# Patient Record
Sex: FEMALE | Race: WHITE | NOT HISPANIC OR LATINO | Employment: OTHER | URBAN - METROPOLITAN AREA
[De-identification: names, ages, dates, MRNs, and addresses within clinical notes are randomized per-mention and may not be internally consistent; named-entity substitution may affect disease eponyms.]

---

## 2018-07-19 ENCOUNTER — OFFICE VISIT (OUTPATIENT)
Dept: URGENT CARE | Facility: CLINIC | Age: 83
End: 2018-07-19
Payer: COMMERCIAL

## 2018-07-19 VITALS
HEART RATE: 78 BPM | DIASTOLIC BLOOD PRESSURE: 66 MMHG | HEIGHT: 63 IN | SYSTOLIC BLOOD PRESSURE: 126 MMHG | TEMPERATURE: 99 F | BODY MASS INDEX: 29.59 KG/M2 | WEIGHT: 167 LBS | OXYGEN SATURATION: 95 % | RESPIRATION RATE: 16 BRPM

## 2018-07-19 DIAGNOSIS — R42 DIZZINESS AND GIDDINESS: Primary | ICD-10-CM

## 2018-07-19 DIAGNOSIS — R53.1 WEAKNESS: ICD-10-CM

## 2018-07-19 PROCEDURE — 99203 OFFICE O/P NEW LOW 30 MIN: CPT | Performed by: PHYSICIAN ASSISTANT

## 2018-07-19 PROCEDURE — G0382 LEV 3 HOSP TYPE B ED VISIT: HCPCS | Performed by: PHYSICIAN ASSISTANT

## 2018-07-19 PROCEDURE — S9088 SERVICES PROVIDED IN URGENT: HCPCS | Performed by: PHYSICIAN ASSISTANT

## 2018-07-19 RX ORDER — ATENOLOL 50 MG/1
50 TABLET ORAL DAILY
COMMUNITY
End: 2018-11-21 | Stop reason: SDUPTHER

## 2018-07-19 RX ORDER — DOXYCYCLINE HYCLATE 50 MG/1
324 CAPSULE, GELATIN COATED ORAL
COMMUNITY

## 2018-07-19 RX ORDER — ATORVASTATIN CALCIUM 10 MG/1
10 TABLET, FILM COATED ORAL DAILY
COMMUNITY
End: 2018-11-21 | Stop reason: SDUPTHER

## 2018-07-19 NOTE — PROGRESS NOTES
[address] [employer] [facility]        NAME: Michelle Bearden is a 80 y o  female  : 1932    MRN: [id number]  DATE: 2018  TIME: 12:37 PM    Assessment and Plan   Dizziness and giddiness [R42]  1  Dizziness and giddiness     2  Weakness           Patient Instructions     Today you were evaluated for dizziness and feeling weak  I feel that you need more of a work-up than what can be provided here  Therefore I recommend that you go to the ER for further evaluation  Patient declines ambulance and prefers to go via private vehicle with her son  Patient signed AMA form  Chief Complaint     Chief Complaint   Patient presents with    Rapid Heart Rate     x this a m  History of Present Illness       Patient is an 14-year-old female the medical history of hypertension and hyperlipidemia who presents today for evaluation of dizziness and feeling generally weak  Patient states that around 11 a m  she started to feel dizzy and then sat down at the table and ate something  Patient states that after that, she started to feel her heart race  Patient states that she went back into bed and lay down and the sensation lasted for about an hour  She states that she also felt a "fullness" sensation near her neck which has now subsided  Patient states that she is feeling slightly better however she still feels generally weak and slightly dizzy  Patient denies having any chest pain or shortness of breath during this event  Review of Systems   Review of Systems   Constitutional: Negative for chills and fever  Respiratory: Negative for shortness of breath  Cardiovascular: Positive for palpitations  Negative for chest pain  Gastrointestinal: Negative for abdominal pain, nausea and vomiting  Musculoskeletal: Negative for back pain  Neurological: Positive for dizziness and weakness  Negative for headaches           Current Medications       Current Outpatient Prescriptions:     atenolol (TENORMIN) 50 mg tablet, Take 50 mg by mouth daily, Disp: , Rfl:     atorvastatin (LIPITOR) 10 mg tablet, Take 10 mg by mouth daily, Disp: , Rfl:     bisacodyl (FLEET) 10 MG/30ML ENEM, Insert 10 mg into the rectum once, Disp: , Rfl:     calcium carbonate-vitamin D (OSCAL-D) 500 mg-200 units per tablet, Take 1 tablet by mouth 2 (two) times a day with meals, Disp: , Rfl:     ferrous gluconate (FERGON) 324 mg tablet, Take 324 mg by mouth daily with breakfast, Disp: , Rfl:     Current Allergies     Allergies as of 07/19/2018    (No Known Allergies)            The following portions of the patient's history were reviewed and updated as appropriate: allergies, current medications, past family history, past medical history, past social history, past surgical history and problem list      Past Medical History:   Diagnosis Date    Hyperlipidemia     Hypertension        Past Surgical History:   Procedure Laterality Date    APPENDECTOMY      CARPAL TUNNEL RELEASE Bilateral     CHOLECYSTECTOMY      COLON SURGERY      HYSTERECTOMY         Family History   Problem Relation Age of Onset    Heart disease Mother     Cancer Father          Medications have been verified  Objective   /66 (BP Location: Right arm, Patient Position: Sitting, Cuff Size: Standard)   Pulse 78   Temp 99 °F (37 2 °C) (Tympanic)   Resp 16   Ht 5' 3" (1 6 m)   Wt 75 8 kg (167 lb)   SpO2 95%   BMI 29 58 kg/m²        Physical Exam     Physical Exam   Constitutional: She is oriented to person, place, and time  She appears well-developed and well-nourished  No distress  HENT:   Mouth/Throat: Oropharynx is clear and moist    Eyes: Conjunctivae and EOM are normal  Pupils are equal, round, and reactive to light  Neck: Normal range of motion  Neck supple  Cardiovascular: Normal rate, regular rhythm and normal heart sounds  Pulmonary/Chest: Effort normal and breath sounds normal  She has no wheezes  She has no rales     Neurological: She is alert and oriented to person, place, and time  She has normal strength  No cranial nerve deficit or sensory deficit  Coordination and gait normal    Skin: Skin is warm and dry  Psychiatric: She has a normal mood and affect  Nursing note and vitals reviewed

## 2018-07-19 NOTE — PATIENT INSTRUCTIONS
Today you were evaluated for dizziness and feeling weak  I feel that you need more of a work-up than what can be provided here  Therefore I recommend that you go to the ER for further evaluation  Patient declines ambulance and prefers to go via private vehicle with her son

## 2018-07-19 NOTE — PROGRESS NOTES
Pt  States that she could not get her balance this a m , ate something then laied down  Pt  States that her heart started to race and pound  Pt  States that she was afraid to get up for fear of falling

## 2018-07-20 ENCOUNTER — HOSPITAL ENCOUNTER (INPATIENT)
Facility: HOSPITAL | Age: 83
LOS: 3 days | Discharge: HOME | DRG: 309 | End: 2018-07-24
Attending: EMERGENCY MEDICINE | Admitting: INTERNAL MEDICINE
Payer: COMMERCIAL

## 2018-07-20 ENCOUNTER — APPOINTMENT (EMERGENCY)
Dept: RADIOLOGY | Facility: HOSPITAL | Age: 83
DRG: 309 | End: 2018-07-20
Payer: COMMERCIAL

## 2018-07-20 DIAGNOSIS — I48.0 PAROXYSMAL ATRIAL FIBRILLATION (HCC): ICD-10-CM

## 2018-07-20 DIAGNOSIS — R77.8 ELEVATED TROPONIN: ICD-10-CM

## 2018-07-20 DIAGNOSIS — I21.4 NSTEMI (NON-ST ELEVATED MYOCARDIAL INFARCTION) (HCC): Primary | ICD-10-CM

## 2018-07-20 LAB
ALBUMIN SERPL BCP-MCNC: 3.5 G/DL (ref 3.5–5)
ALP SERPL-CCNC: 95 U/L (ref 46–116)
ALT SERPL W P-5'-P-CCNC: 40 U/L (ref 12–78)
ANION GAP SERPL CALCULATED.3IONS-SCNC: 8 MMOL/L (ref 4–13)
APTT PPP: 28 SECONDS (ref 24–36)
AST SERPL W P-5'-P-CCNC: 38 U/L (ref 5–45)
BASOPHILS # BLD AUTO: 0.07 THOUSANDS/ΜL (ref 0–0.1)
BASOPHILS NFR BLD AUTO: 1 % (ref 0–1)
BILIRUB SERPL-MCNC: 0.3 MG/DL (ref 0.2–1)
BUN SERPL-MCNC: 21 MG/DL (ref 5–25)
CALCIUM SERPL-MCNC: 9.6 MG/DL (ref 8.3–10.1)
CHLORIDE SERPL-SCNC: 101 MMOL/L (ref 100–108)
CO2 SERPL-SCNC: 32 MMOL/L (ref 21–32)
CREAT SERPL-MCNC: 1.81 MG/DL (ref 0.6–1.3)
EOSINOPHIL # BLD AUTO: 0.2 THOUSAND/ΜL (ref 0–0.61)
EOSINOPHIL NFR BLD AUTO: 2 % (ref 0–6)
ERYTHROCYTE [DISTWIDTH] IN BLOOD BY AUTOMATED COUNT: 12.7 % (ref 11.6–15.1)
GFR SERPL CREATININE-BSD FRML MDRD: 25 ML/MIN/1.73SQ M
GLUCOSE SERPL-MCNC: 173 MG/DL (ref 65–140)
GLUCOSE SERPL-MCNC: 203 MG/DL (ref 65–140)
HCT VFR BLD AUTO: 48.6 % (ref 34.8–46.1)
HGB BLD-MCNC: 15.5 G/DL (ref 11.5–15.4)
IMM GRANULOCYTES # BLD AUTO: 0.06 THOUSAND/UL (ref 0–0.2)
IMM GRANULOCYTES NFR BLD AUTO: 1 % (ref 0–2)
INR PPP: 1 (ref 0.86–1.17)
LYMPHOCYTES # BLD AUTO: 2.47 THOUSANDS/ΜL (ref 0.6–4.47)
LYMPHOCYTES NFR BLD AUTO: 24 % (ref 14–44)
MAGNESIUM SERPL-MCNC: 2 MG/DL (ref 1.6–2.6)
MCH RBC QN AUTO: 30.6 PG (ref 26.8–34.3)
MCHC RBC AUTO-ENTMCNC: 31.9 G/DL (ref 31.4–37.4)
MCV RBC AUTO: 96 FL (ref 82–98)
MONOCYTES # BLD AUTO: 0.69 THOUSAND/ΜL (ref 0.17–1.22)
MONOCYTES NFR BLD AUTO: 7 % (ref 4–12)
NEUTROPHILS # BLD AUTO: 6.87 THOUSANDS/ΜL (ref 1.85–7.62)
NEUTS SEG NFR BLD AUTO: 65 % (ref 43–75)
NRBC BLD AUTO-RTO: 0 /100 WBCS
PLATELET # BLD AUTO: 272 THOUSANDS/UL (ref 149–390)
PMV BLD AUTO: 10.1 FL (ref 8.9–12.7)
POTASSIUM SERPL-SCNC: 3.8 MMOL/L (ref 3.5–5.3)
PROT SERPL-MCNC: 8.2 G/DL (ref 6.4–8.2)
PROTHROMBIN TIME: 13.1 SECONDS (ref 11.8–14.2)
RBC # BLD AUTO: 5.07 MILLION/UL (ref 3.81–5.12)
SODIUM SERPL-SCNC: 141 MMOL/L (ref 136–145)
TROPONIN I SERPL-MCNC: 0.11 NG/ML
TSH SERPL DL<=0.05 MIU/L-ACNC: 3.87 UIU/ML (ref 0.36–3.74)
WBC # BLD AUTO: 10.36 THOUSAND/UL (ref 4.31–10.16)

## 2018-07-20 PROCEDURE — 93005 ELECTROCARDIOGRAM TRACING: CPT

## 2018-07-20 PROCEDURE — 84484 ASSAY OF TROPONIN QUANT: CPT | Performed by: EMERGENCY MEDICINE

## 2018-07-20 PROCEDURE — 36415 COLL VENOUS BLD VENIPUNCTURE: CPT | Performed by: EMERGENCY MEDICINE

## 2018-07-20 PROCEDURE — 83735 ASSAY OF MAGNESIUM: CPT | Performed by: EMERGENCY MEDICINE

## 2018-07-20 PROCEDURE — 85610 PROTHROMBIN TIME: CPT | Performed by: EMERGENCY MEDICINE

## 2018-07-20 PROCEDURE — 96365 THER/PROPH/DIAG IV INF INIT: CPT

## 2018-07-20 PROCEDURE — 84443 ASSAY THYROID STIM HORMONE: CPT | Performed by: EMERGENCY MEDICINE

## 2018-07-20 PROCEDURE — 71045 X-RAY EXAM CHEST 1 VIEW: CPT

## 2018-07-20 PROCEDURE — 80053 COMPREHEN METABOLIC PANEL: CPT | Performed by: EMERGENCY MEDICINE

## 2018-07-20 PROCEDURE — 82948 REAGENT STRIP/BLOOD GLUCOSE: CPT

## 2018-07-20 PROCEDURE — 85730 THROMBOPLASTIN TIME PARTIAL: CPT | Performed by: EMERGENCY MEDICINE

## 2018-07-20 PROCEDURE — 85025 COMPLETE CBC W/AUTO DIFF WBC: CPT | Performed by: EMERGENCY MEDICINE

## 2018-07-20 PROCEDURE — 84439 ASSAY OF FREE THYROXINE: CPT | Performed by: EMERGENCY MEDICINE

## 2018-07-20 PROCEDURE — 96375 TX/PRO/DX INJ NEW DRUG ADDON: CPT

## 2018-07-20 RX ORDER — HEPARIN SODIUM 1000 [USP'U]/ML
2000 INJECTION, SOLUTION INTRAVENOUS; SUBCUTANEOUS AS NEEDED
Status: DISCONTINUED | OUTPATIENT
Start: 2018-07-20 | End: 2018-07-24

## 2018-07-20 RX ORDER — HEPARIN SODIUM 1000 [USP'U]/ML
4000 INJECTION, SOLUTION INTRAVENOUS; SUBCUTANEOUS AS NEEDED
Status: DISCONTINUED | OUTPATIENT
Start: 2018-07-20 | End: 2018-07-24

## 2018-07-20 RX ORDER — HEPARIN SODIUM 10000 [USP'U]/100ML
3-20 INJECTION, SOLUTION INTRAVENOUS
Status: DISCONTINUED | OUTPATIENT
Start: 2018-07-20 | End: 2018-07-24

## 2018-07-20 RX ORDER — DILTIAZEM HYDROCHLORIDE 5 MG/ML
5 INJECTION INTRAVENOUS ONCE
Status: COMPLETED | OUTPATIENT
Start: 2018-07-20 | End: 2018-07-20

## 2018-07-20 RX ORDER — HEPARIN SODIUM 1000 [USP'U]/ML
4000 INJECTION, SOLUTION INTRAVENOUS; SUBCUTANEOUS ONCE
Status: COMPLETED | OUTPATIENT
Start: 2018-07-20 | End: 2018-07-20

## 2018-07-20 RX ADMIN — HEPARIN SODIUM 4000 UNITS: 1000 INJECTION, SOLUTION INTRAVENOUS; SUBCUTANEOUS at 23:49

## 2018-07-20 RX ADMIN — HEPARIN SODIUM AND DEXTROSE 12 UNITS/KG/HR: 10000; 5 INJECTION INTRAVENOUS at 23:50

## 2018-07-20 RX ADMIN — DILTIAZEM HYDROCHLORIDE 5 MG: 5 INJECTION INTRAVENOUS at 23:20

## 2018-07-21 PROBLEM — I21.4 NSTEMI (NON-ST ELEVATED MYOCARDIAL INFARCTION) (HCC): Status: ACTIVE | Noted: 2018-07-21

## 2018-07-21 PROBLEM — I48.91 ATRIAL FIBRILLATION (HCC): Status: ACTIVE | Noted: 2018-07-21

## 2018-07-21 PROBLEM — I10 ESSENTIAL HYPERTENSION: Chronic | Status: ACTIVE | Noted: 2018-07-21

## 2018-07-21 LAB
ANION GAP SERPL CALCULATED.3IONS-SCNC: 8 MMOL/L (ref 4–13)
APTT PPP: 63 SECONDS (ref 24–36)
APTT PPP: 77 SECONDS (ref 24–36)
BACTERIA UR QL AUTO: ABNORMAL /HPF
BASOPHILS # BLD AUTO: 0.05 THOUSANDS/ΜL (ref 0–0.1)
BASOPHILS NFR BLD AUTO: 1 % (ref 0–1)
BILIRUB UR QL STRIP: NEGATIVE
BUN SERPL-MCNC: 19 MG/DL (ref 5–25)
CALCIUM SERPL-MCNC: 9 MG/DL (ref 8.3–10.1)
CHLORIDE SERPL-SCNC: 106 MMOL/L (ref 100–108)
CHOLEST SERPL-MCNC: 172 MG/DL (ref 50–200)
CLARITY UR: CLEAR
CO2 SERPL-SCNC: 28 MMOL/L (ref 21–32)
COLOR UR: YELLOW
CREAT SERPL-MCNC: 1.4 MG/DL (ref 0.6–1.3)
EOSINOPHIL # BLD AUTO: 0.14 THOUSAND/ΜL (ref 0–0.61)
EOSINOPHIL NFR BLD AUTO: 2 % (ref 0–6)
ERYTHROCYTE [DISTWIDTH] IN BLOOD BY AUTOMATED COUNT: 12.6 % (ref 11.6–15.1)
GFR SERPL CREATININE-BSD FRML MDRD: 34 ML/MIN/1.73SQ M
GLUCOSE SERPL-MCNC: 140 MG/DL (ref 65–140)
GLUCOSE UR STRIP-MCNC: NEGATIVE MG/DL
HCT VFR BLD AUTO: 41.7 % (ref 34.8–46.1)
HDLC SERPL-MCNC: 48 MG/DL (ref 40–60)
HGB BLD-MCNC: 13.4 G/DL (ref 11.5–15.4)
HGB UR QL STRIP.AUTO: ABNORMAL
IMM GRANULOCYTES # BLD AUTO: 0.04 THOUSAND/UL (ref 0–0.2)
IMM GRANULOCYTES NFR BLD AUTO: 1 % (ref 0–2)
KETONES UR STRIP-MCNC: NEGATIVE MG/DL
LDLC SERPL CALC-MCNC: 99 MG/DL (ref 0–100)
LEUKOCYTE ESTERASE UR QL STRIP: ABNORMAL
LYMPHOCYTES # BLD AUTO: 1.81 THOUSANDS/ΜL (ref 0.6–4.47)
LYMPHOCYTES NFR BLD AUTO: 21 % (ref 14–44)
MAGNESIUM SERPL-MCNC: 2.1 MG/DL (ref 1.6–2.6)
MCH RBC QN AUTO: 30.5 PG (ref 26.8–34.3)
MCHC RBC AUTO-ENTMCNC: 32.1 G/DL (ref 31.4–37.4)
MCV RBC AUTO: 95 FL (ref 82–98)
MONOCYTES # BLD AUTO: 0.56 THOUSAND/ΜL (ref 0.17–1.22)
MONOCYTES NFR BLD AUTO: 7 % (ref 4–12)
NEUTROPHILS # BLD AUTO: 5.93 THOUSANDS/ΜL (ref 1.85–7.62)
NEUTS SEG NFR BLD AUTO: 68 % (ref 43–75)
NITRITE UR QL STRIP: NEGATIVE
NON-SQ EPI CELLS URNS QL MICRO: ABNORMAL /HPF
NRBC BLD AUTO-RTO: 0 /100 WBCS
PH UR STRIP.AUTO: 7 [PH] (ref 4.5–8)
PLATELET # BLD AUTO: 201 THOUSANDS/UL (ref 149–390)
PMV BLD AUTO: 10 FL (ref 8.9–12.7)
POTASSIUM SERPL-SCNC: 3.8 MMOL/L (ref 3.5–5.3)
PROT UR STRIP-MCNC: NEGATIVE MG/DL
RBC # BLD AUTO: 4.4 MILLION/UL (ref 3.81–5.12)
RBC #/AREA URNS AUTO: ABNORMAL /HPF
SODIUM SERPL-SCNC: 142 MMOL/L (ref 136–145)
SP GR UR STRIP.AUTO: 1.01 (ref 1–1.03)
T4 FREE SERPL-MCNC: 1 NG/DL (ref 0.76–1.46)
TRIGL SERPL-MCNC: 126 MG/DL
TROPONIN I SERPL-MCNC: 0.1 NG/ML
TROPONIN I SERPL-MCNC: 0.11 NG/ML
TROPONIN I SERPL-MCNC: 0.11 NG/ML
UROBILINOGEN UR QL STRIP.AUTO: 0.2 E.U./DL
WBC # BLD AUTO: 8.53 THOUSAND/UL (ref 4.31–10.16)
WBC #/AREA URNS AUTO: ABNORMAL /HPF

## 2018-07-21 PROCEDURE — 84484 ASSAY OF TROPONIN QUANT: CPT | Performed by: PHYSICIAN ASSISTANT

## 2018-07-21 PROCEDURE — 93005 ELECTROCARDIOGRAM TRACING: CPT

## 2018-07-21 PROCEDURE — 85025 COMPLETE CBC W/AUTO DIFF WBC: CPT | Performed by: PHYSICIAN ASSISTANT

## 2018-07-21 PROCEDURE — 99285 EMERGENCY DEPT VISIT HI MDM: CPT

## 2018-07-21 PROCEDURE — 81001 URINALYSIS AUTO W/SCOPE: CPT | Performed by: EMERGENCY MEDICINE

## 2018-07-21 PROCEDURE — 85730 THROMBOPLASTIN TIME PARTIAL: CPT | Performed by: INTERNAL MEDICINE

## 2018-07-21 PROCEDURE — 99223 1ST HOSP IP/OBS HIGH 75: CPT | Performed by: INTERNAL MEDICINE

## 2018-07-21 PROCEDURE — 80061 LIPID PANEL: CPT | Performed by: PHYSICIAN ASSISTANT

## 2018-07-21 PROCEDURE — 99222 1ST HOSP IP/OBS MODERATE 55: CPT | Performed by: INTERNAL MEDICINE

## 2018-07-21 PROCEDURE — 83735 ASSAY OF MAGNESIUM: CPT | Performed by: PHYSICIAN ASSISTANT

## 2018-07-21 PROCEDURE — 84439 ASSAY OF FREE THYROXINE: CPT | Performed by: INTERNAL MEDICINE

## 2018-07-21 PROCEDURE — 96366 THER/PROPH/DIAG IV INF ADDON: CPT

## 2018-07-21 PROCEDURE — 80048 BASIC METABOLIC PNL TOTAL CA: CPT | Performed by: PHYSICIAN ASSISTANT

## 2018-07-21 RX ORDER — ATENOLOL 50 MG/1
50 TABLET ORAL DAILY
Status: DISCONTINUED | OUTPATIENT
Start: 2018-07-21 | End: 2018-07-24 | Stop reason: HOSPADM

## 2018-07-21 RX ORDER — ASPIRIN 81 MG/1
81 TABLET, CHEWABLE ORAL DAILY
Status: DISCONTINUED | OUTPATIENT
Start: 2018-07-22 | End: 2018-07-24

## 2018-07-21 RX ORDER — ACETAMINOPHEN 325 MG/1
650 TABLET ORAL EVERY 6 HOURS PRN
Status: DISCONTINUED | OUTPATIENT
Start: 2018-07-21 | End: 2018-07-24 | Stop reason: HOSPADM

## 2018-07-21 RX ORDER — ATORVASTATIN CALCIUM 10 MG/1
10 TABLET, FILM COATED ORAL DAILY
Status: DISCONTINUED | OUTPATIENT
Start: 2018-07-21 | End: 2018-07-24 | Stop reason: HOSPADM

## 2018-07-21 RX ORDER — ONDANSETRON 2 MG/ML
4 INJECTION INTRAMUSCULAR; INTRAVENOUS EVERY 6 HOURS PRN
Status: DISCONTINUED | OUTPATIENT
Start: 2018-07-21 | End: 2018-07-24 | Stop reason: HOSPADM

## 2018-07-21 RX ADMIN — ATORVASTATIN CALCIUM 10 MG: 10 TABLET, FILM COATED ORAL at 10:52

## 2018-07-21 RX ADMIN — ONDANSETRON 4 MG: 2 INJECTION INTRAMUSCULAR; INTRAVENOUS at 03:36

## 2018-07-21 RX ADMIN — ATENOLOL 50 MG: 50 TABLET ORAL at 10:52

## 2018-07-21 RX ADMIN — ONDANSETRON 4 MG: 2 INJECTION INTRAMUSCULAR; INTRAVENOUS at 16:08

## 2018-07-21 NOTE — CONSULTS
Consultation - Cardiology  Nakia Ely 80 y o  female MRN: [id number]  Unit/Bed#: -01 Encounter: [id number]    Consults    Physician Requesting Consult: Luiz Penn MD  Reason for Consult / Principal Problem: nstemi    Chief Complaint   Patient presents with    Dizziness     Pt co feeling light headed and dizzy since this evening  Pt also states "I feel cramping from my chest up I don't know I really just don't feel right or good "       HPI: Cardiologist Dr Kareem Prater is a 80y o  year old female who has a history of htn  Pt came to the ed with complaints of palpitations  She states that she had 2 episodes of palpitations  She states that she was seen in urgent care for 1 episodes and was advised to come to ed but did not  She then had another episode a few days later and this prompted her ed visit  She denies any chest pain, shortness of breath  In the ed she was noted to be in new afib and given iv cardizem  She converted to sr       REVIEW OF SYSTEMS:  Constitutional:  Denies fever or chills   Eyes:  Denies change in visual acuity   HENT:  Denies nasal congestion or sore throat   Respiratory:  Denies cough or shortness of breath   Cardiovascular: +palpitations Denies chest pain or edema   GI:  Denies abdominal pain, nausea, vomiting, bloody stools or diarrhea   :  Denies dysuria, frequency, difficulty in micturition and nocturia  Musculoskeletal:  Denies back pain or joint pain   Neurologic:  Denies headache, focal weakness or sensory changes   Endocrine:  Denies polyuria or polydipsia   Lymphatic:  Denies swollen glands   Psychiatric:  Denies depression or anxiety     Historical Information   Past Medical History:   Diagnosis Date    Hyperlipidemia     Hypertension      Past Surgical History:   Procedure Laterality Date    APPENDECTOMY      CARPAL TUNNEL RELEASE Bilateral     CHOLECYSTECTOMY      COLON SURGERY      COLOSTOMY      HYSTERECTOMY       History   Alcohol Use No     History   Drug Use No     History   Smoking Status    Never Smoker   Smokeless Tobacco    Never Used     Family History:   Family History   Problem Relation Age of Onset    Heart disease Mother     Cancer Father        MEDS & ALLERGIES:  all current active meds have been reviewed and current meds:   Current Facility-Administered Medications   Medication Dose Route Frequency    acetaminophen (TYLENOL) tablet 650 mg  650 mg Oral Q6H PRN    [START ON 7/22/2018] aspirin chewable tablet 81 mg  81 mg Oral Daily    atenolol (TENORMIN) tablet 50 mg  50 mg Oral Daily    atorvastatin (LIPITOR) tablet 10 mg  10 mg Oral Daily    diltiazem (CARDIZEM) 125 mg in sodium chloride 0 9 % 125 mL infusion  1-15 mg/hr Intravenous Once    heparin (porcine) 25,000 units in 250 mL infusion (premix)  3-20 Units/kg/hr (Order-Specific) Intravenous Titrated    heparin (porcine) injection 2,000 Units  2,000 Units Intravenous PRN    heparin (porcine) injection 4,000 Units  4,000 Units Intravenous PRN    ondansetron (ZOFRAN) injection 4 mg  4 mg Intravenous Q6H PRN    phenylephrine (KELLEY-SYNEPHRINE) 100 mcg in sodium chloride 0 9% FOR PRIAPISM  100 mcg Intracavernosal Q3 min PRN       heparin (porcine) 3-20 Units/kg/hr (Order-Specific) Last Rate: 12 Units/kg/hr (07/20/18 2350)     No Known Allergies    OBJECTIVE:  Vitals:   Vitals:    07/21/18 1100   BP: (!) 178/88   Pulse: 75   Resp: 18   Temp: 98 °F (36 7 °C)   SpO2: 96%     Body mass index is 29 41 kg/m²      Systolic (85FKX), DMI:554 , Min:120 , NFS:161     Diastolic (72PAV), RJY:15, Min:64, Max:112      Intake/Output Summary (Last 24 hours) at 07/21/18 1236  Last data filed at 07/21/18 0900   Gross per 24 hour   Intake              360 ml   Output              700 ml   Net             -340 ml     Weight (last 2 days)     Date/Time   Weight    07/20/18 2306  75 3 (166 01)            Invasive Devices     Peripheral Intravenous Line            Peripheral IV 07/20/18 Left Antecubital less than 1 day    Peripheral IV 07/21/18 Right Antecubital less than 1 day                PHYSICAL EXAMS:  General:  Patient is not in acute distress, laying in the bed comfortably, awake, alert responding to commands  Head: Normocephalic, Atraumatic  HEENT:  Both pupils normal-size atraumatic, normocephalic, nonicteric  Neck:  JVP not raised  Trachea central  Respiratory:  Bronchovascular breathing all over the chest without any accompaniment  Cardiovascular:  S1-S2 normal RRR without any murmur  or rub  GI:  Abdomen soft nontender   Liver and spleen normal size  Musculoskeletal:  No edema   Integument:  No skin rashes or ulceration  Lymphatic:  No cervical or inguinal lymphadenopathy  Neurologic:  Patient is awake alert, responding to command, well-oriented to time and place and person    LABORATORY RESULTS:    Results from last 7 days  Lab Units 07/21/18  0916 07/21/18  0624 07/21/18  0345   TROPONIN I ng/mL 0 11* 0 11* 0 10*     CBC with diff:   Results from last 7 days  Lab Units 07/21/18  0356 07/20/18  2323   WBC Thousand/uL 8 53 10 36*   HEMOGLOBIN g/dL 13 4 15 5*   HEMATOCRIT % 41 7 48 6*   MCV fL 95 96   PLATELETS Thousands/uL 201 272   MCH pg 30 5 30 6   MCHC g/dL 32 1 31 9   RDW % 12 6 12 7   MPV fL 10 0 10 1   NRBC AUTO /100 WBCs 0 0       CMP:  Results from last 7 days  Lab Units 07/21/18  0356 07/20/18  2324   SODIUM mmol/L 142 141   POTASSIUM mmol/L 3 8 3 8   CHLORIDE mmol/L 106 101   CO2 mmol/L 28 32   ANION GAP mmol/L 8 8   BUN mg/dL 19 21   CREATININE mg/dL 1 40* 1 81*   GLUCOSE RANDOM mg/dL 140 203*   CALCIUM mg/dL 9 0 9 6   AST U/L  --  38   ALT U/L  --  40   ALK PHOS U/L  --  95   TOTAL PROTEIN g/dL  --  8 2   BILIRUBIN TOTAL mg/dL  --  0 30   EGFR ml/min/1 73sq m 34 25       BMP:  Results from last 7 days  Lab Units 07/21/18  0356 07/20/18  2324   SODIUM mmol/L 142 141   POTASSIUM mmol/L 3 8 3 8   CHLORIDE mmol/L 106 101   CO2 mmol/L 28 32   BUN mg/dL 19 21   CREATININE mg/dL 1 40* 1 81*   GLUCOSE RANDOM mg/dL 140 203*   CALCIUM mg/dL 9 0 9 6              Results from last 7 days  Lab Units 07/21/18  0356 07/20/18  2324   MAGNESIUM mg/dL 2 1 2 0           Results from last 7 days  Lab Units 07/20/18  2324   TSH 3RD GENERATON uIU/mL 3 865*       Results from last 7 days  Lab Units 07/20/18  2323   INR  1 00       Lipid Profile:   Lab Results   Component Value Date    CHOL 172 07/21/2018     Lab Results   Component Value Date    HDL 48 07/21/2018     Lab Results   Component Value Date    LDLCALC 99 07/21/2018     Lab Results   Component Value Date    TRIG 126 07/21/2018       Cardiac testing:   No results found for this or any previous visit  No results found for this or any previous visit  No procedure found  No results found for this or any previous visit  Imaging: I have personally reviewed pertinent reports  EKG reviewed personally:   Unavailable to me at this time    Telemetry reviewed personally:   sr    Assessment/Plan:  1-new onset atrial fibrillation, converted back to sinus rhythm  Continue telemetry  Continue with aspirin, atenolol, Lipitor  Check echocardiogram to assess LV function  TSH 3 86    2-NSTEMI likely type 2 secondary to new onset of atrial fibrillation, troponin 0 10/0 11/0  11  Continue to monitor trend  Consider stress test if echocardiogram is abnormal, or if patient has symptoms  3-hypertension,  Continue all medications      Code Status: Level 3 - DNAR and DNI    Thank you for allowing us to participate in this patient's care  This pt will follow up with Dr Neil Lord once discharged  Counseling / Coordination of Care  Total floor / unit time spent today 35 minutes  Greater than 50% of total time was spent with the patient and / or family counseling and / or coordination of care  A description of the counseling / coordination of care: Review of history, current assessment, development of a plan      Darci Mary PA-C  4/10/1580,21:04 PM    Portions of the record may have been created with voice recognition software   Occasional wrong word or "sound a like" substitutions may have occurred due to the inherent limitations of voice recognition software   Read the chart carefully and recognize, using context, where substitutions have occurred

## 2018-07-21 NOTE — H&P
H&P- Poonam Alvarez 2/25/1932, 80 y o  female MRN: [id number]    Unit/Bed#: -01 Encounter: [id number]    Primary Care Provider: No primary care provider on file  Date and time admitted to hospital: 7/20/2018 11:01 PM        * NSTEMI (non-ST elevated myocardial infarction) [facility])   Assessment & Plan    Admit telemetry  Trend troponin  Cardiology consult  Echo pending  Begin heparin drip        Atrial fibrillation ([address] )   Assessment & Plan    Continue heparin drip  Cardiology consult pending  Monitor on telemetry        Essential hypertension   Assessment & Plan    Continue atenolol            VTE Prophylaxis: Heparin Drip  / sequential compression device   Code Status:  DNR/DNI  POLST: There is no POLST form on file for this patient (pre-hospital)  Discussion with family:  There is no family present for discussion    Anticipated Length of Stay:  Patient will be admitted on an Inpatient basis with an anticipated length of stay of  more than 2 midnights  Justification for Hospital Stay:  Cardiology evaluation    Total Time for Visit, including Counseling / Coordination of Care: 30 minutes  Greater than 50% of this total time spent on direct patient counseling and coordination of care  Chief Complaint:   Palpitations    History of Present Illness:    Poonam Alvarez is a 80 y o  female who presents with complaints of palpitations  Patient states that 1 day ago she felt palpitations with neck pain  Patient states that she was seen at urgent care who recommended she go to the emergency department for further evaluation  Patient states her symptoms resolved so she went home without being seen  Patient states the last evening her palpitations type pain return  Patient denies any shortness of breath, nausea, vomiting  Patient denies previous history of atrial fibrillation  Patient denies previous cardiac event  The patient presents to the ED she was found to be in atrial fibrillation    Patient was given 5 mg of IV diltiazem  Patient converted to normal sinus rhythm  Review of Systems:    Review of Systems   Cardiovascular: Positive for palpitations  All other systems reviewed and are negative  Past Medical and Surgical History:     Past Medical History:   Diagnosis Date    Hyperlipidemia     Hypertension        Past Surgical History:   Procedure Laterality Date    APPENDECTOMY      CARPAL TUNNEL RELEASE Bilateral     CHOLECYSTECTOMY      COLON SURGERY      COLOSTOMY      HYSTERECTOMY         Meds/Allergies:    Prior to Admission medications    Medication Sig Start Date End Date Taking? Authorizing Provider   atenolol (TENORMIN) 50 mg tablet Take 50 mg by mouth daily   Yes Historical Provider, MD   atorvastatin (LIPITOR) 10 mg tablet Take 10 mg by mouth daily   Yes Historical Provider, MD   bisacodyl (FLEET) 10 MG/30ML ENEM Insert 10 mg into the rectum once   Yes Historical Provider, MD   calcium carbonate-vitamin D (OSCAL-D) 500 mg-200 units per tablet Take 1 tablet by mouth 2 (two) times a day with meals   Yes Historical Provider, MD   ferrous gluconate (FERGON) 324 mg tablet Take 324 mg by mouth daily with breakfast   Yes Historical Provider, MD     I have reviewed home medications with patient personally  Allergies: No Known Allergies    Social History:     Marital Status:     Occupation:  Retired  Patient Pre-hospital Living Situation:  Lives at home  Patient Pre-hospital Level of Mobility:  Ambulatory  Patient Pre-hospital Diet Restrictions:  None  Substance Use History:   History   Alcohol Use No     History   Smoking Status    Never Smoker   Smokeless Tobacco    Never Used     History   Drug Use No       Family History:    Family History   Problem Relation Age of Onset    Heart disease Mother     Cancer Father        Physical Exam:     Vitals:   Blood Pressure: 163/80 (07/21/18 0300)  Pulse: 63 (07/21/18 0300)  Temperature: 98 3 °F (36 8 °C) (07/21/18 0300)  Temp Source: Oral (07/21/18 0300)  Respirations: 18 (07/21/18 0300)  Height: 5' 3" (160 cm) (07/20/18 2306)  Weight - Scale: 75 3 kg (166 lb 0 1 oz) (07/20/18 2306)  SpO2: 95 % (07/21/18 0300)    Physical Exam   Constitutional: She is oriented to person, place, and time  She appears well-developed and well-nourished  HENT:   Head: Normocephalic and atraumatic  Right Ear: External ear normal    Left Ear: External ear normal    Nose: Nose normal    Mouth/Throat: Oropharynx is clear and moist    Eyes: Pupils are equal, round, and reactive to light  Neck: Normal range of motion  Cardiovascular: Normal rate, regular rhythm and normal heart sounds  Pulmonary/Chest: Effort normal and breath sounds normal    Abdominal: Soft  Musculoskeletal: Normal range of motion  Neurological: She is alert and oriented to person, place, and time  Skin: Skin is warm and dry  Psychiatric: She has a normal mood and affect  Her behavior is normal  Judgment and thought content normal    Vitals reviewed  Additional Data:     Lab Results: I have personally reviewed pertinent reports  Results from last 7 days  Lab Units 07/21/18  0356   WBC Thousand/uL 8 53   HEMOGLOBIN g/dL 13 4   HEMATOCRIT % 41 7   PLATELETS Thousands/uL 201   NEUTROS PCT % 68   LYMPHS PCT % 21   MONOS PCT % 7   EOS PCT % 2       Results from last 7 days  Lab Units 07/21/18  0356 07/20/18  2324   SODIUM mmol/L 142 141   POTASSIUM mmol/L 3 8 3 8   CHLORIDE mmol/L 106 101   CO2 mmol/L 28 32   BUN mg/dL 19 21   CREATININE mg/dL 1 40* 1 81*   CALCIUM mg/dL 9 0 9 6   TOTAL PROTEIN g/dL  --  8 2   BILIRUBIN TOTAL mg/dL  --  0 30   ALK PHOS U/L  --  95   ALT U/L  --  40   AST U/L  --  38   GLUCOSE RANDOM mg/dL 140 203*       Results from last 7 days  Lab Units 07/20/18  2323   INR  1 00       Results from last 7 days  Lab Units 07/20/18  2306   POC GLUCOSE mg/dl 173*           Imaging: I have personally reviewed pertinent reports        XR chest 1 view portable (Results Pending)       EKG, Pathology, and Other Studies Reviewed on Admission:   · EKG:  Atrial fibrillation    Allscripts / Epic Records Reviewed: Yes     ** Please Note: This note has been constructed using a voice recognition system   **

## 2018-07-21 NOTE — CASE MANAGEMENT
Initial Clinical Review    Admission: Date/Time/Statement: 7/21/18 @ 0138     Orders Placed This Encounter   Procedures    Inpatient Admission     Standing Status:   Standing     Number of Occurrences:   1     Order Specific Question:   Admitting Physician     Answer:   Naveen Beard [[id number]]     Order Specific Question:   Level of Care     Answer:   Med Surg [16]     Order Specific Question:   Estimated length of stay     Answer:   More than 2 Midnights     Order Specific Question:   Certification     Answer:   I certify that inpatient services are medically necessary for this patient for a duration of greater than two midnights  See H&P and MD Progress Notes for additional information about the patient's course of treatment  ED: Date/Time/Mode of Arrival:   ED Arrival Information     Expected Arrival Acuity Means of Arrival Escorted By Service Admission Type    - 7/20/2018 22:55 Emergent Walk-In Self General Medicine Emergency    Arrival Complaint    DIZZINESS          Chief Complaint:   Chief Complaint   Patient presents with    Dizziness     Pt co feeling light headed and dizzy since this evening  Pt also states "I feel cramping from my chest up I don't know I really just don't feel right or good "       History of Illness:  80 y o  female who presents with complaints of palpitations  Patient states that 1 day ago she felt palpitations with neck pain  Patient states that she was seen at urgent care who recommended she go to the emergency department for further evaluation  Patient states her symptoms resolved so she went home without being seen  Patient states the last evening her palpitations type pain return  The patient presents to the ED she was found to be in atrial fibrillation  Patient was given 5 mg of IV diltiazem    Patient converted to normal sinus rhythm        ED Vital Signs:   ED Triage Vitals   Temperature Pulse Respirations Blood Pressure SpO2   07/20/18 2306 07/20/18 2306 07/20/18 2306 07/20/18 2315 07/20/18 2315   98 °F (36 7 °C) 77 20 (!) 138/112 97 %      Temp Source Heart Rate Source Patient Position - Orthostatic VS BP Location FiO2 (%)   07/20/18 2306 07/20/18 2306 07/20/18 2306 07/20/18 2306 --   Oral Monitor Lying Right arm       Pain Score       07/20/18 2306       No Pain        Wt Readings from Last 1 Encounters:   07/20/18 75 3 kg (166 lb 0 1 oz)       Vital Signs (abnormal): B/P = 178/88    Abnormal Labs:   Creatinine 0 60 - 1 30 mg/dL 1 81        07/20/18 2324    Troponin I <=0 04 ng/mL 0 11       10 36       RBC 3 81 - 5 12 Million/uL 5 07    Hemoglobin 11 5 - 15 4 g/dL 15 5     Hematocrit 34 8 - 46 1 % 48 6       Diagnostic Test Results: PCXR - Linear scarring or atelectasis in the left mid and lower lung zones  ED Treatment:   Medication Administration from 07/20/2018 2255 to 07/21/2018 0247       Date/Time Order Dose Route Action     07/20/2018 2320 diltiazem (CARDIZEM) injection 5 mg 5 mg Intravenous Given     07/20/2018 2349 heparin (porcine) injection 4,000 Units 4,000 Units Intravenous Given     07/20/2018 2350 heparin (porcine) 25,000 units in 250 mL infusion (premix) 12 Units/kg/hr Intravenous New Bag          Past Medical/Surgical History:    Active Ambulatory Problems     Diagnosis Date Noted    No Active Ambulatory Problems     Resolved Ambulatory Problems     Diagnosis Date Noted    No Resolved Ambulatory Problems     Past Medical History:   Diagnosis Date    Hyperlipidemia     Hypertension        Admitting Diagnosis: Dizziness [R42]  NSTEMI (non-ST elevated myocardial infarction) ([facility] ) [I21 4]    Age/Sex: 80 y o  female    Assessment/Plan:   * NSTEMI (non-ST elevated myocardial infarction) ([facility] )   Assessment & Plan     Admit telemetry  Trend troponin  Cardiology consult  Echo pending  Begin heparin drip          Atrial fibrillation (HCC)   Assessment & Plan     Continue heparin drip  Cardiology consult pending  Monitor on telemetry          Essential hypertension   Assessment & Plan     Continue atenolol             VTE Prophylaxis: Heparin Drip  / sequential compression device   Code Status:  DNR/DNI     Anticipated Length of Stay:  Patient will be admitted on an Inpatient basis with an anticipated length of stay of  more than 2 midnights  Justification for Hospital Stay:  Cardiology evaluation       Admission Orders:  Tele monitoring  Echo  Cardiology cons    Scheduled Meds:   Current Facility-Administered Medications:  [START ON 7/22/2018] aspirin 81 mg Oral Daily   atenolol 50 mg Oral Daily   atorvastatin 10 mg Oral Daily   diltiazem 1-15 mg/hr Intravenous Once     Continuous Infusions:   heparin (porcine) 3-20 Units/kg/hr (Order-Specific) Last Rate: 12 Units/kg/hr (07/20/18 9730)     PRN Meds:     acetaminophen    Ondansetron Iv x1    phenylephrine    -----------------------------------------------------------------------------------------------------------------------------------------------    7/21 Cardiology cons:  Assessment/Plan:  1-new onset atrial fibrillation, converted back to sinus rhythm  Continue telemetry  Continue with aspirin, atenolol, Lipitor  Check echocardiogram to assess LV function  TSH 3 86     2-NSTEMI likely type 2 secondary to new onset of atrial fibrillation, troponin 0 10/0 11/0  11  Continue to monitor trend    Consider stress test if echocardiogram is abnormal, or if patient has symptoms      3-hypertension,  Continue all medications        Code Status: Level 3 - DNAR and DNI

## 2018-07-22 ENCOUNTER — APPOINTMENT (INPATIENT)
Dept: NON INVASIVE DIAGNOSTICS | Facility: HOSPITAL | Age: 83
DRG: 309 | End: 2018-07-22
Payer: COMMERCIAL

## 2018-07-22 LAB
ANION GAP SERPL CALCULATED.3IONS-SCNC: 4 MMOL/L (ref 4–13)
APTT PPP: 61 SECONDS (ref 24–36)
ATRIAL RATE: 61 BPM
ATRIAL RATE: 63 BPM
BUN SERPL-MCNC: 18 MG/DL (ref 5–25)
CALCIUM SERPL-MCNC: 8.9 MG/DL (ref 8.3–10.1)
CHLORIDE SERPL-SCNC: 107 MMOL/L (ref 100–108)
CO2 SERPL-SCNC: 28 MMOL/L (ref 21–32)
CREAT SERPL-MCNC: 1.19 MG/DL (ref 0.6–1.3)
ERYTHROCYTE [DISTWIDTH] IN BLOOD BY AUTOMATED COUNT: 12.7 % (ref 11.6–15.1)
GFR SERPL CREATININE-BSD FRML MDRD: 41 ML/MIN/1.73SQ M
GLUCOSE SERPL-MCNC: 148 MG/DL (ref 65–140)
HCT VFR BLD AUTO: 41.7 % (ref 34.8–46.1)
HGB BLD-MCNC: 13.2 G/DL (ref 11.5–15.4)
MCH RBC QN AUTO: 30.3 PG (ref 26.8–34.3)
MCHC RBC AUTO-ENTMCNC: 31.7 G/DL (ref 31.4–37.4)
MCV RBC AUTO: 96 FL (ref 82–98)
P AXIS: 54 DEGREES
P AXIS: 55 DEGREES
PLATELET # BLD AUTO: 180 THOUSANDS/UL (ref 149–390)
PMV BLD AUTO: 10 FL (ref 8.9–12.7)
POTASSIUM SERPL-SCNC: 4.1 MMOL/L (ref 3.5–5.3)
PR INTERVAL: 158 MS
PR INTERVAL: 162 MS
QRS AXIS: -10 DEGREES
QRS AXIS: 1 DEGREES
QRSD INTERVAL: 74 MS
QRSD INTERVAL: 84 MS
QT INTERVAL: 460 MS
QT INTERVAL: 486 MS
QTC INTERVAL: 470 MS
QTC INTERVAL: 489 MS
RBC # BLD AUTO: 4.36 MILLION/UL (ref 3.81–5.12)
SODIUM SERPL-SCNC: 139 MMOL/L (ref 136–145)
T WAVE AXIS: 78 DEGREES
T WAVE AXIS: 86 DEGREES
T4 FREE SERPL-MCNC: 1.2 NG/DL (ref 0.76–1.46)
VENTRICULAR RATE: 61 BPM
VENTRICULAR RATE: 63 BPM
WBC # BLD AUTO: 6.74 THOUSAND/UL (ref 4.31–10.16)

## 2018-07-22 PROCEDURE — 93306 TTE W/DOPPLER COMPLETE: CPT

## 2018-07-22 PROCEDURE — 85027 COMPLETE CBC AUTOMATED: CPT | Performed by: INTERNAL MEDICINE

## 2018-07-22 PROCEDURE — 85730 THROMBOPLASTIN TIME PARTIAL: CPT | Performed by: INTERNAL MEDICINE

## 2018-07-22 PROCEDURE — 99232 SBSQ HOSP IP/OBS MODERATE 35: CPT | Performed by: INTERNAL MEDICINE

## 2018-07-22 PROCEDURE — 80048 BASIC METABOLIC PNL TOTAL CA: CPT | Performed by: INTERNAL MEDICINE

## 2018-07-22 PROCEDURE — 93010 ELECTROCARDIOGRAM REPORT: CPT | Performed by: INTERNAL MEDICINE

## 2018-07-22 PROCEDURE — 93306 TTE W/DOPPLER COMPLETE: CPT | Performed by: INTERNAL MEDICINE

## 2018-07-22 RX ORDER — MESALAMINE 400 MG/1
800 CAPSULE, DELAYED RELEASE ORAL 3 TIMES DAILY
Status: DISCONTINUED | OUTPATIENT
Start: 2018-07-22 | End: 2018-07-24 | Stop reason: HOSPADM

## 2018-07-22 RX ORDER — MESALAMINE 800 MG/1
800 TABLET, DELAYED RELEASE ORAL 3 TIMES DAILY
COMMUNITY
End: 2019-04-09 | Stop reason: SDUPTHER

## 2018-07-22 RX ORDER — MESALAMINE 400 MG/1
2400 CAPSULE, DELAYED RELEASE ORAL 3 TIMES DAILY
Status: DISCONTINUED | OUTPATIENT
Start: 2018-07-22 | End: 2018-07-22

## 2018-07-22 RX ADMIN — MESALAMINE 800 MG: 400 CAPSULE, DELAYED RELEASE ORAL at 20:56

## 2018-07-22 RX ADMIN — HEPARIN SODIUM AND DEXTROSE 12 UNITS/KG/HR: 10000; 5 INJECTION INTRAVENOUS at 02:20

## 2018-07-22 RX ADMIN — ASPIRIN 81 MG 81 MG: 81 TABLET ORAL at 08:35

## 2018-07-22 RX ADMIN — ATENOLOL 50 MG: 50 TABLET ORAL at 08:35

## 2018-07-22 RX ADMIN — MESALAMINE 800 MG: 400 CAPSULE, DELAYED RELEASE ORAL at 12:49

## 2018-07-22 RX ADMIN — MESALAMINE 800 MG: 400 CAPSULE, DELAYED RELEASE ORAL at 17:11

## 2018-07-22 RX ADMIN — ACETAMINOPHEN 650 MG: 325 TABLET, FILM COATED ORAL at 02:44

## 2018-07-22 RX ADMIN — ATORVASTATIN CALCIUM 10 MG: 10 TABLET, FILM COATED ORAL at 08:35

## 2018-07-22 NOTE — PROGRESS NOTES
General Cardiology   Progress Note   Poonam Alvarez 80 y o  female MRN: [id number]  Unit/Bed#: - Encounter: [id number]      SUBJECTIVE:   No significant events overnight  im feeling ok  Denies cp    REVIEW OF SYSTEMS:  Constitutional:  Denies fever or chills   Eyes:  Denies change in visual acuity   HENT:  Denies nasal congestion or sore throat   Respiratory:  Denies cough or shortness of breath   Cardiovascular:  Denies chest pain or edema   GI:  Denies abdominal pain, nausea, vomiting, bloody stools or diarrhea   :  Denies dysuria, frequency, difficulty in micturition and nocturia  Musculoskeletal:  Denies back pain or joint pain   Neurologic:  Denies headache, focal weakness or sensory changes   Endocrine:  Denies polyuria or polydipsia   Lymphatic:  Denies swollen glands   Psychiatric:  Denies depression or anxiety     OBJECTIVE:   Vitals:  Vitals:    18 0700   BP: (!) 171/81   Pulse: 68   Resp: 18   Temp: 98 6 °F (37 °C)   SpO2: 96%     Body mass index is 29 41 kg/m²  Systolic (84PXX), OO , Min:125 , AVT:497     Diastolic (06KWF), ICY:00, Min:63, Max:93      Intake/Output Summary (Last 24 hours) at 18 1058  Last data filed at 18 0900   Gross per 24 hour   Intake              840 ml   Output             2425 ml   Net            -1585 ml     Weight (last 2 days)     Date/Time   Weight    18 2306  75 3 (166 01)                  PHYSICAL EXAMS:  General:  Patient is not in acute distress, laying in the bed comfortably, awake, alert responding to commands  Head: Normocephalic, Atraumatic  HEENT:  Both pupils normal-size atraumatic, normocephalic, nonicteric  Neck:  JVP not raised  Trachea central  Respiratory:  Bronchovascular breathing all over the chest without any accompaniment  Cardiovascular:  S1 S2 normal RRR  GI:  Abdomen soft nontender   Liver and spleen normal size  Lymphatic:  No cervical or inguinal lymphadenopathy  Neurologic:  Patient is awake alert, responding to command, well-oriented to time and place and person moving     LABORATORY RESULTS:    Results from last 7 days  Lab Units 07/21/18  0916 07/21/18  0624 07/21/18  0345   TROPONIN I ng/mL 0 11* 0 11* 0 10*       CBC with diff:   Results from last 7 days  Lab Units 07/22/18  0552 07/21/18  0356 07/20/18  2323   WBC Thousand/uL 6 74 8 53 10 36*   HEMOGLOBIN g/dL 13 2 13 4 15 5*   HEMATOCRIT % 41 7 41 7 48 6*   MCV fL 96 95 96   PLATELETS Thousands/uL 180 201 272   MCH pg 30 3 30 5 30 6   MCHC g/dL 31 7 32 1 31 9   RDW % 12 7 12 6 12 7   MPV fL 10 0 10 0 10 1   NRBC AUTO /100 WBCs  --  0 0       CMP:  Results from last 7 days  Lab Units 07/22/18  0552 07/21/18  0356 07/20/18  2324   SODIUM mmol/L 139 142 141   POTASSIUM mmol/L 4 1 3 8 3 8   CHLORIDE mmol/L 107 106 101   CO2 mmol/L 28 28 32   ANION GAP mmol/L 4 8 8   BUN mg/dL 18 19 21   CREATININE mg/dL 1 19 1 40* 1 81*   GLUCOSE RANDOM mg/dL 148* 140 203*   CALCIUM mg/dL 8 9 9 0 9 6   AST U/L  --   --  38   ALT U/L  --   --  40   ALK PHOS U/L  --   --  95   TOTAL PROTEIN g/dL  --   --  8 2   BILIRUBIN TOTAL mg/dL  --   --  0 30   EGFR ml/min/1 73sq m 41 34 25       BMP:  Results from last 7 days  Lab Units 07/22/18  0552 07/21/18  0356 07/20/18  2324   SODIUM mmol/L 139 142 141   POTASSIUM mmol/L 4 1 3 8 3 8   CHLORIDE mmol/L 107 106 101   CO2 mmol/L 28 28 32   BUN mg/dL 18 19 21   CREATININE mg/dL 1 19 1 40* 1 81*   GLUCOSE RANDOM mg/dL 148* 140 203*   CALCIUM mg/dL 8 9 9 0 9 6              Results from last 7 days  Lab Units 07/21/18  0356 07/20/18  2324   MAGNESIUM mg/dL 2 1 2 0           Results from last 7 days  Lab Units 07/21/18  0356 07/20/18  2324   TSH 3RD GENERATON uIU/mL  --  3 865*   FREE T4 ng/dL 1 20 1 00       Results from last 7 days  Lab Units 07/20/18  2323   INR  1 00       Lipid Profile:   Lab Results   Component Value Date    CHOL 172 07/21/2018     Lab Results   Component Value Date    HDL 48 07/21/2018     Lab Results   Component Value Date    LDLCALC 99 07/21/2018     Lab Results   Component Value Date    TRIG 126 07/21/2018       Cardiac testing:  No results found for this or any previous visit  No results found for this or any previous visit  No results found for this or any previous visit  No procedure found  No results found for this or any previous visit  Meds/Allergies   all current active meds have been reviewed  Prescriptions Prior to Admission   Medication    atenolol (TENORMIN) 50 mg tablet    atorvastatin (LIPITOR) 10 mg tablet    bisacodyl (FLEET) 10 MG/30ML ENEM    calcium carbonate-vitamin D (OSCAL-D) 500 mg-200 units per tablet    ferrous gluconate (FERGON) 324 mg tablet    mesalamine (ASACOL) 800 MG EC tablet       heparin (porcine) 3-20 Units/kg/hr (Order-Specific) Last Rate: 12 Units/kg/hr (07/22/18 0220)       ASSESSMENT & PLAN   1-new onset af back to SR, continue tele, continue asa, Lipitor, atenolol  Echo pending  tsh 3 86  Oral AC on hold until after stress test    2-NSTEMI likely type 2 secondary to new af, trop  Peak 0 11, stress test ordered to be done on Monday, further recommendations will be based upon stress test results    3-htn, continue all meds            Nette Mcclelland PA-C  7/22/2018,10:58 AM    Portions of the record may have been created with voice recognition software   Occasional wrong word or "sound a like" substitutions may have occurred due to the inherent limitations of voice recognition software   Read the chart carefully and recognize, using context, where substitutions have occurred

## 2018-07-22 NOTE — PROGRESS NOTES
[facility] Internal Medicine Progress Note  Patient: Jordan Ponce 80 y o  female   MRN: [id number]  PCP: No primary care provider on file  Unit/Bed#: MS uMrphy Encounter: [id number]  Date Of Visit: 07/22/18    Assessment:    Principal Problem:    NSTEMI (non-ST elevated myocardial infarction) ([facility] )  Active Problems:    Atrial fibrillation ([facility] )    Essential hypertension      Plan:    # New onset PAF - s/p spontaneous conversion  - Remains in SR  - rate controlled, on atenolol  - A/c with heparin; to be switched to eliquis pending echo and NM stress test  - Echo pending  - D/w cardio plan on proceeding with NM stress test    # NSTEMI likely type II secondary to demand ischemic  - s/p serial trop remains flat  - PT is cp free  - NM stress test pending     # Subclinical hypothyroidism - repeat TFT in 6-8 weeks    # Hx of castillo's procedure; She follow with Dr Robb Shaffer had diversion colitis with resulting rectal bleeding s/p colonoscopy w/ polypectomy in 7/17  She is on short-chain fatty acid enema which she states has been causing rectal bleeding thus she has discontinued  She is also to be on proctofoam which she states also at times causes anal irritation and intermittent rectal bleeding  Given that she will be on chronic a/c I have advised that she holds further use of protofoam for now  I will try to call Dr Robb Shaffer on Monday to discuss with him as well  - cont mesalamine     VTE Pharmacologic Prophylaxis:   Pharmacologic: Heparin Drip  Mechanical VTE Prophylaxis in Place: Yes    Patient Centered Rounds: I have performed bedside rounds with nursing staff today  Discussions with Specialists or Other Care Team Provider: Cardiology    Education and Discussions with Family / Patient: patient    Time Spent for Care: 30 minutes  More than 50% of total time spent on counseling and coordination of care as described above      Current Length of Stay: 1 day(s)    Current Patient Status: Inpatient   Certification Statement: The patient will continue to require additional inpatient hospital stay due to Pending NM stress test    Discharge Plan / Estimated Discharge Date:     Code Status: Level 3 - DNAR and DNI      Subjective:   Sitting in chair, remains in SR, rate controlled  Denies cp or palpitations    Objective:     Vitals:   Temp (24hrs), Av 2 °F (36 8 °C), Min:97 9 °F (36 6 °C), Max:98 6 °F (37 °C)    HR:  [60-75] 68  Resp:  [18] 18  BP: (125-178)/(63-93) 171/81  SpO2:  [94 %-96 %] 96 %  Body mass index is 29 41 kg/m²  Input and Output Summary (last 24 hours): Intake/Output Summary (Last 24 hours) at 18 1026  Last data filed at 18 0900   Gross per 24 hour   Intake              480 ml   Output             2425 ml   Net            -1945 ml       Physical Exam:     Physical Exam   Constitutional: She is oriented to person, place, and time  She appears well-developed  Neck: Neck supple  Cardiovascular: Normal rate, regular rhythm, normal heart sounds and intact distal pulses  Exam reveals no gallop and no friction rub  No murmur heard  Pulmonary/Chest: Effort normal and breath sounds normal  No respiratory distress  She has no wheezes  She has no rales  She exhibits no tenderness  Abdominal: Soft  Bowel sounds are normal  She exhibits no distension and no mass  There is no tenderness  There is no rebound and no guarding    + colostomy, brown stool   Musculoskeletal: Normal range of motion  She exhibits no edema, tenderness or deformity  Neurological: She is alert and oriented to person, place, and time  She has normal reflexes  She displays normal reflexes  No cranial nerve deficit  She exhibits normal muscle tone   Coordination normal          Additional Data:     Labs:      Results from last 7 days  Lab Units 18  0552 18  0356   WBC Thousand/uL 6 74 8 53   HEMOGLOBIN g/dL 13 2 13 4   HEMATOCRIT % 41 7 41 7   PLATELETS Thousands/uL 180 201   NEUTROS PCT %  --  68   LYMPHS PCT %  --  21   MONOS PCT %  --  7   EOS PCT %  --  2       Results from last 7 days  Lab Units 07/22/18  0552  07/20/18  2324   SODIUM mmol/L 139  < > 141   POTASSIUM mmol/L 4 1  < > 3 8   CHLORIDE mmol/L 107  < > 101   CO2 mmol/L 28  < > 32   BUN mg/dL 18  < > 21   CREATININE mg/dL 1 19  < > 1 81*   CALCIUM mg/dL 8 9  < > 9 6   TOTAL PROTEIN g/dL  --   --  8 2   BILIRUBIN TOTAL mg/dL  --   --  0 30   ALK PHOS U/L  --   --  95   ALT U/L  --   --  40   AST U/L  --   --  38   GLUCOSE RANDOM mg/dL 148*  < > 203*   < > = values in this interval not displayed  Results from last 7 days  Lab Units 07/20/18  2323   INR  1 00       * I Have Reviewed All Lab Data Listed Above  * Additional Pertinent Lab Tests Reviewed: All Labs Within Last 24 Hours Reviewed    Imaging:    Imaging Reports Reviewed Today Include:   Imaging Personally Reviewed by Myself Includes:      Recent Cultures (last 7 days):           Last 24 Hours Medication List:     Current Facility-Administered Medications:  acetaminophen 650 mg Oral Q6H PRN Alcario Ramus, PA-C    aspirin 81 mg Oral Daily Alcario Ramus, PA-C    atenolol 50 mg Oral Daily Alcario Ramus, PA-C    atorvastatin 10 mg Oral Daily Alcario Ramus, PA-C    diltiazem 1-15 mg/hr Intravenous Once Ardia Moros, DO Last Rate: Stopped (07/21/18 0034)   heparin (porcine) 3-20 Units/kg/hr (Order-Specific) Intravenous Titrated Ardia Moros, DO Last Rate: 12 Units/kg/hr (07/22/18 0220)   heparin (porcine) 2,000 Units Intravenous PRN Ardia Moros, DO    heparin (porcine) 4,000 Units Intravenous PRN Ardia Moros, DO    ondansetron 4 mg Intravenous Q6H PRN Alcario Ramus, PA-C    phenylephrine 100 mcg Intracavernosal Q3 min PRN Ardia Moros, DO         Today, Patient Was Seen By: Kenyetta Gould DO    ** Please Note: This note has been constructed using a voice recognition system   **

## 2018-07-22 NOTE — PLAN OF CARE
CARDIOVASCULAR - ADULT     Maintains optimal cardiac output and hemodynamic stability Progressing     Absence of cardiac dysrhythmias or at baseline rhythm Progressing        GASTROINTESTINAL - ADULT     Minimal or absence of nausea and/or vomiting Progressing     Maintains or returns to baseline bowel function Progressing     Maintains adequate nutritional intake Progressing     Establish and maintain optimal ostomy function Progressing        GENITOURINARY - ADULT     Maintains or returns to baseline urinary function Progressing     Absence of urinary retention Progressing        METABOLIC, FLUID AND ELECTROLYTES - ADULT     Electrolytes maintained within normal limits Progressing     Fluid balance maintained Progressing        MUSCULOSKELETAL - ADULT     Maintain or return mobility to safest level of function Progressing     Maintain proper alignment of affected body part Progressing        Potential for Falls     Patient will remain free of falls Progressing        Prexisting or High Potential for Compromised Skin Integrity     Skin integrity is maintained or improved Progressing        RESPIRATORY - ADULT     Achieves optimal ventilation and oxygenation Progressing        SKIN/TISSUE INTEGRITY - ADULT     Skin integrity remains intact Progressing     Incision(s), wounds(s) or drain site(s) healing without S/S of infection Progressing     Oral mucous membranes remain intact Progressing

## 2018-07-22 NOTE — ED PROVIDER NOTES
History  Chief Complaint   Patient presents with    Dizziness     Pt co feeling light headed and dizzy since this evening  Pt also states "I feel cramping from my chest up I don't know I really just don't feel right or good  "     51-year-old female patient presents emergency department for evaluation of generalized fatigue  The patient states chest discomfort and feeling generally unwell  She cannot be more descriptive than that  Currently patient is lying in bed, no apparent distress, she is mildly hypertensive  Patient is otherwise relatively well-appearing  The patient has a benign physical exam   The patient be evaluated with a differential diagnosis to include but not be limited to STEMI, nSTEMI, dehydration  History provided by:  Patient   used: No    Dizziness   Quality:  Unable to specify  Severity:  Unable to specify  Timing:  Unable to specify  Context: not when bending over, not with ear pain, not with eye movement, not with inactivity, not with loss of consciousness and not when standing up    Relieved by:  Nothing  Worsened by:  Nothing  Ineffective treatments:  None tried  Associated symptoms: no chest pain, no headaches, no hearing loss and no shortness of breath        Prior to Admission Medications   Prescriptions Last Dose Informant Patient Reported?  Taking?   atenolol (TENORMIN) 50 mg tablet  Self Yes Yes   Sig: Take 50 mg by mouth daily   atorvastatin (LIPITOR) 10 mg tablet  Self Yes Yes   Sig: Take 10 mg by mouth daily   bisacodyl (FLEET) 10 MG/30ML ENEM  Self Yes Yes   Sig: Insert 10 mg into the rectum once   calcium carbonate-vitamin D (OSCAL-D) 500 mg-200 units per tablet  Self Yes Yes   Sig: Take 1 tablet by mouth 2 (two) times a day with meals   ferrous gluconate (FERGON) 324 mg tablet  Self Yes Yes   Sig: Take 324 mg by mouth daily with breakfast      Facility-Administered Medications: None       Past Medical History:   Diagnosis Date    Hyperlipidemia  Hypertension        Past Surgical History:   Procedure Laterality Date    APPENDECTOMY      CARPAL TUNNEL RELEASE Bilateral     CHOLECYSTECTOMY      COLON SURGERY      COLOSTOMY      HYSTERECTOMY         Family History   Problem Relation Age of Onset    Heart disease Mother     Cancer Father      I have reviewed and agree with the history as documented  Social History   Substance Use Topics    Smoking status: Never Smoker    Smokeless tobacco: Never Used    Alcohol use No        Review of Systems   HENT: Negative for hearing loss  Respiratory: Negative for shortness of breath  Cardiovascular: Negative for chest pain  Neurological: Positive for dizziness  Negative for headaches  All other systems reviewed and are negative  Physical Exam  Physical Exam   Constitutional: She is oriented to person, place, and time  She appears well-developed and well-nourished  HENT:   Head: Normocephalic and atraumatic  Right Ear: External ear normal    Left Ear: External ear normal    Eyes: Conjunctivae and EOM are normal    Neck: No JVD present  No tracheal deviation present  No thyromegaly present  Cardiovascular: Normal rate  Pulmonary/Chest: Effort normal and breath sounds normal  No stridor  Abdominal: Soft  She exhibits no distension and no mass  There is no tenderness  There is no guarding  No hernia  Musculoskeletal: Normal range of motion  She exhibits no edema, tenderness or deformity  Lymphadenopathy:     She has no cervical adenopathy  Neurological: She is alert and oriented to person, place, and time  Skin: Skin is warm  No rash noted  No erythema  No pallor  Psychiatric: She has a normal mood and affect  Her behavior is normal    Nursing note and vitals reviewed        Vital Signs  ED Triage Vitals   Temperature Pulse Respirations Blood Pressure SpO2   07/20/18 2306 07/20/18 2306 07/20/18 2306 07/20/18 2315 07/20/18 2315   98 °F (36 7 °C) 77 20 (!) 138/112 97 % Temp Source Heart Rate Source Patient Position - Orthostatic VS BP Location FiO2 (%)   07/20/18 2306 07/20/18 2306 07/20/18 2306 07/20/18 2306 --   Oral Monitor Lying Right arm       Pain Score       07/20/18 2306       No Pain           Vitals:    07/21/18 1052 07/21/18 1100 07/21/18 1500 07/21/18 1900   BP: 134/64 (!) 178/88 130/63 136/93   Pulse: 60 75 71 66   Patient Position - Orthostatic VS:  Lying Lying Lying       Visual Acuity      ED Medications  Medications   phenylephrine (KELLEY-SYNEPHRINE) 100 mcg in sodium chloride 0 9% FOR PRIAPISM (not administered)   diltiazem (CARDIZEM) 125 mg in sodium chloride 0 9 % 125 mL infusion (0 mg/hr Intravenous Hold 7/21/18 0034)   heparin (porcine) 25,000 units in 250 mL infusion (premix) (12 Units/kg/hr × 70 kg (Order-Specific) Intravenous Handoff 7/21/18 0728)   heparin (porcine) injection 4,000 Units (not administered)   heparin (porcine) injection 2,000 Units (not administered)   atenolol (TENORMIN) tablet 50 mg (50 mg Oral Given 7/21/18 1052)   atorvastatin (LIPITOR) tablet 10 mg (10 mg Oral Given 7/21/18 1052)   ondansetron (ZOFRAN) injection 4 mg (4 mg Intravenous Given 7/21/18 1608)   aspirin chewable tablet 81 mg (not administered)   acetaminophen (TYLENOL) tablet 650 mg (not administered)   diltiazem (CARDIZEM) injection 5 mg (5 mg Intravenous Given 7/20/18 2320)   heparin (porcine) injection 4,000 Units (4,000 Units Intravenous Given 7/20/18 2349)       Diagnostic Studies  Results Reviewed     Procedure Component Value Units Date/Time    T4, free [[id number]]  (Normal) Collected:  07/20/18 2324    Lab Status:  Final result Specimen:  Blood from Arm, Left Updated:  07/21/18 1251     Free T4 1 00 ng/dL     Urine Microscopic [[id number]]  (Abnormal) Collected:  07/21/18 0042    Lab Status:  Final result Specimen:  Urine from Urine, Clean Catch Updated:  07/21/18 0054     RBC, UA None Seen /hpf      WBC, UA 1-2 (A) /hpf      Epithelial Cells Occasional /hpf Bacteria, UA Occasional /hpf     UA w Reflex to Microscopic w Reflex to Culture [[id number]]  (Abnormal) Collected:  07/21/18 0042    Lab Status:  Final result Specimen:  Urine from Urine, Clean Catch Updated:  07/21/18 0049     Color, UA Yellow     Clarity, UA Clear     Specific Gravity, UA 1 010     pH, UA 7 0     Leukocytes, UA Trace (A)     Nitrite, UA Negative     Protein, UA Negative mg/dl      Glucose, UA Negative mg/dl      Ketones, UA Negative mg/dl      Urobilinogen, UA 0 2 E U /dl      Bilirubin, UA Negative     Blood, UA Trace-Intact (A)    TSH, 3rd generation with Free T4 reflex [[id number]]  (Abnormal) Collected:  07/20/18 2324    Lab Status:  Final result Specimen:  Blood from Arm, Left Updated:  07/20/18 2353     TSH 3RD GENERATON 3 865 (H) uIU/mL     Narrative:         Patients undergoing fluorescein dye angiography may retain small amounts of fluorescein in the body for 48-72 hours post procedure  Samples containing fluorescein can produce falsely depressed TSH values  If the patient had this procedure,a specimen should be resubmitted post fluorescein clearance            The recommended reference ranges for TSH during pregnancy are as follows:  First trimester 0 1 to 2 5 uIU/mL  Second trimester  0 2 to 3 0 uIU/mL  Third trimester 0 3 to 3 0 uIU/m      Magnesium [[id number]]  (Normal) Collected:  07/20/18 2324    Lab Status:  Final result Specimen:  Blood from Arm, Left Updated:  07/20/18 2353     Magnesium 2 0 mg/dL     Troponin I [[id number]]  (Abnormal) Collected:  07/20/18 2324    Lab Status:  Final result Specimen:  Blood from Arm, Left Updated:  07/20/18 2350     Troponin I 0 11 (H) ng/mL     Comprehensive metabolic panel [[id number]]  (Abnormal) Collected:  07/20/18 2324    Lab Status:  Final result Specimen:  Blood from Arm, Left Updated:  07/20/18 2345     Sodium 141 mmol/L      Potassium 3 8 mmol/L      Chloride 101 mmol/L      CO2 32 mmol/L      Anion Gap 8 mmol/L      BUN 21 mg/dL      Creatinine 1 81 (H) mg/dL      Glucose 203 (H) mg/dL      Calcium 9 6 mg/dL      AST 38 U/L      ALT 40 U/L      Alkaline Phosphatase 95 U/L      Total Protein 8 2 g/dL      Albumin 3 5 g/dL      Total Bilirubin 0 30 mg/dL      eGFR 25 ml/min/1 73sq m     Narrative:         National Kidney Disease Education Program recommendations are as follows:  GFR calculation is accurate only with a steady state creatinine  Chronic Kidney disease less than 60 ml/min/1 73 sq  meters  Kidney failure less than 15 ml/min/1 73 sq  meters      Protime-INR [[id number]]  (Normal) Collected:  07/20/18 2323    Lab Status:  Final result Specimen:  Blood from Arm, Left Updated:  07/20/18 2340     Protime 13 1 seconds      INR 1 00    APTT six (6) hours after Heparin bolus/drip initiation or dosing change [[id number]]  (Normal) Collected:  07/20/18 2323    Lab Status:  Final result Specimen:  Blood from Arm, Left Updated:  07/20/18 2340     PTT 28 seconds     CBC and differential [[id number]]  (Abnormal) Collected:  07/20/18 2323    Lab Status:  Final result Specimen:  Blood from Arm, Left Updated:  07/20/18 2329     WBC 10 36 (H) Thousand/uL      RBC 5 07 Million/uL      Hemoglobin 15 5 (H) g/dL      Hematocrit 48 6 (H) %      MCV 96 fL      MCH 30 6 pg      MCHC 31 9 g/dL      RDW 12 7 %      MPV 10 1 fL      Platelets 141 Thousands/uL      nRBC 0 /100 WBCs      Neutrophils Relative 65 %      Immat GRANS % 1 %      Lymphocytes Relative 24 %      Monocytes Relative 7 %      Eosinophils Relative 2 %      Basophils Relative 1 %      Neutrophils Absolute 6 87 Thousands/µL      Immature Grans Absolute 0 06 Thousand/uL      Lymphocytes Absolute 2 47 Thousands/µL      Monocytes Absolute 0 69 Thousand/µL      Eosinophils Absolute 0 20 Thousand/µL      Basophils Absolute 0 07 Thousands/µL     Fingerstick Glucose (POCT) [[id number]]  (Abnormal) Collected:  07/20/18 2306    Lab Status:  Final result Updated:  07/20/18 2318     POC Glucose 173 (H) mg/dl XR chest 1 view portable   Final Result by Godfrey Cid MD (07/21 3155)      Linear scarring or atelectasis in the left mid and lower lung zones  Workstation performed: [id number]                    Procedures  Procedures       Phone Contacts  ED Phone Contact    ED Course                               MDM  Number of Diagnoses or Management Options  Elevated troponin: new and requires workup     Amount and/or Complexity of Data Reviewed  Decide to obtain previous medical records or to obtain history from someone other than the patient: yes  Review and summarize past medical records: yes    Patient Progress  Patient progress: stable    CritCare Time    Disposition  Final diagnoses:   NSTEMI (non-ST elevated myocardial infarction) ([facility] [address] )     Time reflects when diagnosis was documented in both MDM as applicable and the Disposition within this note     Time User Action Codes Description Comment    7/21/2018  1:38 AM Amor Peralta Add [I21 4] NSTEMI (non-ST elevated myocardial infarction) [facility])       ED Disposition     None      Follow-up Information    None         Current Discharge Medication List      CONTINUE these medications which have NOT CHANGED    Details   atenolol (TENORMIN) 50 mg tablet Take 50 mg by mouth daily      atorvastatin (LIPITOR) 10 mg tablet Take 10 mg by mouth daily      bisacodyl (FLEET) 10 MG/30ML ENEM Insert 10 mg into the rectum once      calcium carbonate-vitamin D (OSCAL-D) 500 mg-200 units per tablet Take 1 tablet by mouth 2 (two) times a day with meals      ferrous gluconate (FERGON) 324 mg tablet Take 324 mg by mouth daily with breakfast           No discharge procedures on file      ED Provider  Electronically Signed by           Lincoln Parnell DO  07/21/18 9292

## 2018-07-23 ENCOUNTER — APPOINTMENT (INPATIENT)
Dept: NUCLEAR MEDICINE | Facility: HOSPITAL | Age: 83
DRG: 309 | End: 2018-07-23
Payer: COMMERCIAL

## 2018-07-23 ENCOUNTER — APPOINTMENT (INPATIENT)
Dept: NON INVASIVE DIAGNOSTICS | Facility: HOSPITAL | Age: 83
DRG: 309 | End: 2018-07-23
Payer: COMMERCIAL

## 2018-07-23 LAB
APTT PPP: 57 SECONDS (ref 24–36)
APTT PPP: 65 SECONDS (ref 24–36)
APTT PPP: 69 SECONDS (ref 24–36)
ARRHY DURING EX: NORMAL
ATRIAL RATE: 80 BPM
ATRIAL RATE: 86 BPM
CHEST PAIN STATEMENT: NORMAL
MAX DIASTOLIC BP: 76 MMHG
MAX HEART RATE: 88 BPM
MAX PREDICTED HEART RATE: 134 BPM
MAX. SYSTOLIC BP: 167 MMHG
P AXIS: 42 DEGREES
P AXIS: 55 DEGREES
PR INTERVAL: 170 MS
PR INTERVAL: 174 MS
PROTOCOL NAME: NORMAL
QRS AXIS: -20 DEGREES
QRS AXIS: -34 DEGREES
QRSD INTERVAL: 76 MS
QRSD INTERVAL: 76 MS
QT INTERVAL: 380 MS
QT INTERVAL: 432 MS
QTC INTERVAL: 454 MS
QTC INTERVAL: 498 MS
REASON FOR TERMINATION: NORMAL
T WAVE AXIS: 71 DEGREES
T WAVE AXIS: 72 DEGREES
TARGET HR FORMULA: NORMAL
TEST INDICATION: NORMAL
TIME IN EXERCISE PHASE: NORMAL
VENTRICULAR RATE: 80 BPM
VENTRICULAR RATE: 86 BPM

## 2018-07-23 PROCEDURE — 93010 ELECTROCARDIOGRAM REPORT: CPT | Performed by: PHYSICIAN ASSISTANT

## 2018-07-23 PROCEDURE — 85730 THROMBOPLASTIN TIME PARTIAL: CPT | Performed by: INTERNAL MEDICINE

## 2018-07-23 PROCEDURE — 78452 HT MUSCLE IMAGE SPECT MULT: CPT

## 2018-07-23 PROCEDURE — 99232 SBSQ HOSP IP/OBS MODERATE 35: CPT | Performed by: INTERNAL MEDICINE

## 2018-07-23 PROCEDURE — A9502 TC99M TETROFOSMIN: HCPCS

## 2018-07-23 PROCEDURE — 93017 CV STRESS TEST TRACING ONLY: CPT

## 2018-07-23 PROCEDURE — 99232 SBSQ HOSP IP/OBS MODERATE 35: CPT | Performed by: PHYSICIAN ASSISTANT

## 2018-07-23 RX ADMIN — HEPARIN SODIUM 2000 UNITS: 1000 INJECTION, SOLUTION INTRAVENOUS; SUBCUTANEOUS at 05:57

## 2018-07-23 RX ADMIN — ATENOLOL 50 MG: 50 TABLET ORAL at 08:58

## 2018-07-23 RX ADMIN — HEPARIN SODIUM AND DEXTROSE 14 UNITS/KG/HR: 10000; 5 INJECTION INTRAVENOUS at 11:58

## 2018-07-23 RX ADMIN — ATORVASTATIN CALCIUM 10 MG: 10 TABLET, FILM COATED ORAL at 08:58

## 2018-07-23 RX ADMIN — MESALAMINE 800 MG: 400 CAPSULE, DELAYED RELEASE ORAL at 08:58

## 2018-07-23 RX ADMIN — MESALAMINE 800 MG: 400 CAPSULE, DELAYED RELEASE ORAL at 16:32

## 2018-07-23 RX ADMIN — REGADENOSON 0.4 MG: 0.08 INJECTION, SOLUTION INTRAVENOUS at 10:29

## 2018-07-23 RX ADMIN — MESALAMINE 800 MG: 400 CAPSULE, DELAYED RELEASE ORAL at 20:14

## 2018-07-23 RX ADMIN — ASPIRIN 81 MG 81 MG: 81 TABLET ORAL at 08:58

## 2018-07-23 NOTE — SOCIAL WORK
CM met with pt at bedside  Pt is alert and oriented  She lives alone in an apt in a [facility] in [place], [place]  It is a community that has a lab, doctor, store, post office, transportation, activities, and a nurse on call  She has no steps to navigate  She uses no DME's  Pt is independent with ADL's  She has never been in rehab or used [facility] services  Denies substance abuse, tobacco abuse, or mental health issues  Dr Renetta Esteves is her PCP  She has an Advanced Directive and her POA is her son Rissa Holbrook  She is retired, but still drives  Her pharmacy is [facility] in [place]  CM discussed d/c needs and pt is in discussion with her son at the present time to move locally near her son in her own home  She has not made the final decision yet and will be returning to her apt on d/c   CM contacted [facility] for the cost of Eliquis 5mg #60 1 BID and is it a [facility] covered med with a co-pay cost of $47 00  This cost is ok with pt  CM will continue to follow through hospitalization  CM reviewed discharge planning process including the following: identifying help at home, patient preference for discharge planning needs, pharmacy preference, and availability of treatment team to discuss questions or concerns patient and/or family may have regarding understanding medications and recognizing signs and symptoms once discharged  CM also encouraged patient to follow up with all recommended appointments after discharge  Patient advised of importance for patient and family to participate in managing patients medical well being  CM name and role reviewed and Discharge Checklist provided

## 2018-07-23 NOTE — PROGRESS NOTES
[facility] Progress Note  Patient: Tyrone Malone 80 y o  female   MRN: [id number]  PCP: No primary care provider on file  Unit/Bed#: [facility] Encounter: [id number]  Date Of Visit: 07/23/18    Assessment:    Principal Problem:    NSTEMI (non-ST elevated myocardial infarction) ([facility] [address] )  Active Problems:    Atrial fibrillation ([facility] [address] )    Essential hypertension      Plan:    # New onset PAF - s/p spontaneous conversion  - Remains in SR  - rate controlled, on atenolol  - A/c with heparin; to be switched to eliquis if nuclear stress test is normal  - echocardiogram will be done    # NSTEMI likely type II secondary to demand ischemic  - s/p serial trop remains flat  - PT is cp free  - status post nuclear stress test    # Subclinical hypothyroidism - repeat TFT in 6-8 weeks    # Hx of castillo's procedure; She follows with Dr Keeley Mendoza had diversion colitis with resulting rectal bleeding s/p colonoscopy w/ polypectomy in 7/17  She is on short-chain fatty acid enema which she states has been causing rectal bleeding thus she has discontinued  She is also to be on proctofoam which she states also at times causes anal irritation and intermittent rectal bleeding  Given that she will be on chronic a/c I have advised that she holds further use of protofoam for now  I will try to call Dr Keeley Mendoza on Monday to discuss with him as well  - cont mesalamine     VTE Pharmacologic Prophylaxis:   Pharmacologic: Heparin Drip  Mechanical VTE Prophylaxis in Place: Yes    Patient Centered Rounds: I have performed bedside rounds with nursing staff today  Discussions with Specialists or Other Care Team Provider:     Education and Discussions with Family / Patient: patient    Time Spent for Care: 30 minutes  More than 50% of total time spent on counseling and coordination of care as described above      Current Length of Stay: 2 day(s)    Current Patient Status: Inpatient   Certification Statement: The patient will continue to require additional inpatient hospital stay due to Pending NM stress test    Discharge Plan / Estimated Discharge Date:  DC planning tomorrow    Code Status: Level 3 - DNAR and DNI      Subjective:   Remains in sinus rhythm  No acute events overnight  Objective:     Vitals:   Temp (24hrs), Av 5 °F (36 9 °C), Min:98 3 °F (36 8 °C), Max:98 9 °F (37 2 °C)    HR:  [60-68] 63  Resp:  [18] 18  BP: (135-162)/(64-75) 143/67  SpO2:  [94 %-98 %] 94 %  Body mass index is 29 41 kg/m²  Input and Output Summary (last 24 hours): Intake/Output Summary (Last 24 hours) at 18 1648  Last data filed at 18 1601   Gross per 24 hour   Intake             1080 ml   Output             3250 ml   Net            -2170 ml       Physical Exam:     Physical Exam   Constitutional: She is oriented to person, place, and time  She appears well-developed  Neck: Neck supple  Cardiovascular: Normal rate, regular rhythm, normal heart sounds and intact distal pulses  Exam reveals no gallop and no friction rub  No murmur heard  Pulmonary/Chest: Effort normal and breath sounds normal  No respiratory distress  She has no wheezes  She has no rales  She exhibits no tenderness  Abdominal: Soft  Bowel sounds are normal  She exhibits no distension and no mass  There is no tenderness  There is no rebound and no guarding    + colostomy, brown stool   Musculoskeletal: Normal range of motion  She exhibits no edema, tenderness or deformity  Neurological: She is alert and oriented to person, place, and time  She has normal reflexes  No cranial nerve deficit  She exhibits normal muscle tone   Coordination normal          Additional Data:     Labs:      Results from last 7 days  Lab Units 18  0552 18  0356   WBC Thousand/uL 6 74 8 53   HEMOGLOBIN g/dL 13 2 13 4   HEMATOCRIT % 41 7 41 7   PLATELETS Thousands/uL 180 201   NEUTROS PCT %  --  68   LYMPHS PCT %  --  21   MONOS PCT %  --  7   EOS PCT %  --  2       Results from last 7 days  Lab Units 07/22/18  0552  07/20/18  2324   SODIUM mmol/L 139  < > 141   POTASSIUM mmol/L 4 1  < > 3 8   CHLORIDE mmol/L 107  < > 101   CO2 mmol/L 28  < > 32   BUN mg/dL 18  < > 21   CREATININE mg/dL 1 19  < > 1 81*   CALCIUM mg/dL 8 9  < > 9 6   TOTAL PROTEIN g/dL  --   --  8 2   BILIRUBIN TOTAL mg/dL  --   --  0 30   ALK PHOS U/L  --   --  95   ALT U/L  --   --  40   AST U/L  --   --  38   GLUCOSE RANDOM mg/dL 148*  < > 203*   < > = values in this interval not displayed  Results from last 7 days  Lab Units 07/20/18  2323   INR  1 00       * I Have Reviewed All Lab Data Listed Above  * Additional Pertinent Lab Tests Reviewed: All Labs Within Last 24 Hours Reviewed    Imaging:    Imaging Reports Reviewed Today Include:   Imaging Personally Reviewed by Myself Includes:      Recent Cultures (last 7 days):           Last 24 Hours Medication List:     Current Facility-Administered Medications:  acetaminophen 650 mg Oral Q6H PRN Eve Don PA-C    aspirin 81 mg Oral Daily Eve Don PA-C    atenolol 50 mg Oral Daily Eve Don PA-C    atorvastatin 10 mg Oral Daily Eve Don PA-C    diltiazem 1-15 mg/hr Intravenous Once Jose Meeter, DO Last Rate: Stopped (07/21/18 0034)   heparin (porcine) 3-20 Units/kg/hr (Order-Specific) Intravenous Titrated Jose Meeter, DO Last Rate: 14 Units/kg/hr (07/23/18 1158)   heparin (porcine) 2,000 Units Intravenous PRN Jose Meeter, DO    heparin (porcine) 4,000 Units Intravenous PRN Jose Meeter, DO    mesalamine 800 mg Oral TID Mirna Wills DO    ondansetron 4 mg Intravenous Q6H PRN Eve Don PA-C    phenylephrine 100 mcg Intracavernosal Q3 min PRN Jose Meeter, DO         Today, Patient Was Seen By: Mirna Wills DO    ** Please Note: This note has been constructed using a voice recognition system   **

## 2018-07-23 NOTE — PLAN OF CARE
Problem: DISCHARGE PLANNING - CARE MANAGEMENT  Goal: Discharge to post-acute care or home with appropriate resources  INTERVENTIONS:  - Conduct assessment to determine patient/family and health care team treatment goals, and need for post-acute services based on payer coverage, community resources, and patient preferences, and barriers to discharge  - Address psychosocial, clinical, and financial barriers to discharge as identified in assessment in conjunction with the patient/family and health care team  - Arrange appropriate level of post-acute services according to patients   needs and preference and payer coverage in collaboration with the physician and health care team  - Communicate with and update the patient/family, physician, and health care team regarding progress on the discharge plan  - Arrange appropriate transportation to post-acute venues  Outcome: Progressing  CM met with pt at bedside  Pt is alert and oriented  She lives alone in an apt in a nursing home [facility] in [place], [place]  It is a community that has a lab, doctor, store, post office, transportation, activities, and a nurse on call  She has no steps to navigate  She uses no DME's  Pt is independent with ADL's  She has never been in rehab or used [facility] services  Denies substance abuse, tobacco abuse, or mental health issues  Dr Nancy Jean is her PCP  She has an Advanced Directive and her POA is her son Martinez Or  She is retired, but still drives  Her pharmacy is [facility] in [place]  CM discussed d/c needs and pt is in discussion with her son at the present time to move locally near her son in her own home  She has not made the final decision yet and will be returning to her apt on d/c   CM contacted [facility] for the cost of Eliquis 5mg #60 1 BID and is it a [employer] covered med with a co-pay cost of $47 00  This cost is ok with pt  CM will continue to follow through hospitalization    CM reviewed discharge planning process including the following: identifying help at home, patient preference for discharge planning needs, pharmacy preference, and availability of treatment team to discuss questions or concerns patient and/or family may have regarding understanding medications and recognizing signs and symptoms once discharged  CM also encouraged patient to follow up with all recommended appointments after discharge  Patient advised of importance for patient and family to participate in managing patients medical well being  CM name and role reviewed and Discharge Checklist provided

## 2018-07-23 NOTE — PROGRESS NOTES
Progress Note - Cardiology     Meryle Sins 80 y o  female MRN: [id number]  Unit/Bed#: -01 Encounter: [id number]      Subjective:   No significant events overnight  Patient denies chest pain, SOB, palpitations, lightheadedness  Patient had colostomy and uses rectal enemas as prescribed by her gastroenterologist   She does admit to occasional rectal bleeding after doing an enema  And will at times pass clots  She denies bloody, dark tarry stool        Objective:   Vitals:  Vitals:    07/23/18 1100   BP: 136/65   Pulse: 62   Resp: 18   Temp: 98 9 °F (37 2 °C)   SpO2: 94%       Body mass index is 29 41 kg/m²  Systolic (44OFU), EJT:727 , Min:135 , GBK:551     Diastolic (56CQV), KSH:51, Min:64, Max:75      Intake/Output Summary (Last 24 hours) at 07/23/18 1258  Last data filed at 07/23/18 0900   Gross per 24 hour   Intake           1018 8 ml   Output             2650 ml   Net          -1631 2 ml     Weight (last 2 days)     None          PHYSICAL EXAM:  General: Patient is not in acute distress  Head: Normocephalic  Atraumatic  HEENT: Both pupils normal sized, round and reactive to light  Nonicteric  Neck: JVP not raised  Trachea central    Respiratory: Bilateral bronchovascular breath sounds with no crackles or rhonchi  Cardiovascular: RRR  S1 and S2 normal  No murmur, rub or gallop  GI: Abdomen soft, nontender  No guarding or rigidity  Neurologic: Patient is awake, alert, responding to command   Moving all extremities  Integumentary:  No skin rash  Extremities: No clubbing, cyanosis or edema    LABORATORY RESULTS:    Results from last 7 days  Lab Units 07/21/18  0916 07/21/18  0624 07/21/18  0345   TROPONIN I ng/mL 0 11* 0 11* 0 10*     CBC with diff:   Results from last 7 days  Lab Units 07/22/18  0552 07/21/18  0356 07/20/18  2323   WBC Thousand/uL 6 74 8 53 10 36*   HEMOGLOBIN g/dL 13 2 13 4 15 5*   HEMATOCRIT % 41 7 41 7 48 6*   MCV fL 96 95 96   PLATELETS Thousands/uL 180 201 272   MCH pg 30 3 30 5 30 6   MCHC g/dL 31 7 32 1 31 9   RDW % 12 7 12 6 12 7   MPV fL 10 0 10 0 10 1   NRBC AUTO /100 WBCs  --  0 0       CMP:  Results from last 7 days  Lab Units 07/22/18  0552 07/21/18  0356 07/20/18  2324   SODIUM mmol/L 139 142 141   POTASSIUM mmol/L 4 1 3 8 3 8   CHLORIDE mmol/L 107 106 101   CO2 mmol/L 28 28 32   ANION GAP mmol/L 4 8 8   BUN mg/dL 18 19 21   CREATININE mg/dL 1 19 1 40* 1 81*   GLUCOSE RANDOM mg/dL 148* 140 203*   CALCIUM mg/dL 8 9 9 0 9 6   AST U/L  --   --  38   ALT U/L  --   --  40   ALK PHOS U/L  --   --  95   TOTAL PROTEIN g/dL  --   --  8 2   BILIRUBIN TOTAL mg/dL  --   --  0 30   EGFR ml/min/1 73sq m 41 34 25       BMP:  Results from last 7 days  Lab Units 07/22/18  0552 07/21/18  0356 07/20/18  2324   SODIUM mmol/L 139 142 141   POTASSIUM mmol/L 4 1 3 8 3 8   CHLORIDE mmol/L 107 106 101   CO2 mmol/L 28 28 32   BUN mg/dL 18 19 21   CREATININE mg/dL 1 19 1 40* 1 81*   GLUCOSE RANDOM mg/dL 148* 140 203*   CALCIUM mg/dL 8 9 9 0 9 6                Results from last 7 days  Lab Units 07/21/18  0356 07/20/18  2324   MAGNESIUM mg/dL 2 1 2 0               Results from last 7 days  Lab Units 07/21/18  0356 07/20/18  2324   TSH 3RD GENERATON uIU/mL  --  3 865*   FREE T4 ng/dL 1 20 1 00         Results from last 7 days  Lab Units 07/20/18  2323   INR  1 00       Lipid Profile:   Lab Results   Component Value Date    CHOL 172 07/21/2018     Lab Results   Component Value Date    HDL 48 07/21/2018     Lab Results   Component Value Date    LDLCALC 99 07/21/2018     Lab Results   Component Value Date    TRIG 126 07/21/2018       Cardiac testing:   Results for orders placed during the hospital encounter of 07/20/18   Echo complete with contrast if indicated    Narrative [address] [place], [place] [address]  [phone]    Transthoracic Echocardiogram  2D, M-mode, Doppler, and Color Doppler    Study date:  22-Jul-2018    Patient: Germán Wright  MR number: [id number]  Account number: [de-identified]  : 1932  Age: 80 years  Gender: Female  Status: Inpatient  Location: Bedside  Height: 63 in  Weight: 166 lb  BP: 171/ 81 mmHg    Indications: Evaluate suspected myocardial infarction  Diagnoses: 410 90 - AMI NOS, UNSPECIFIED    Sonographer:  Dulce Rees RDCS  Interpreting Physician:  Armida Barney MD  Referring Physician:  Pako Best PA-C  Group:  [facility]    SUMMARY    LEFT VENTRICLE:  Ejection fraction was estimated to be 65 %  There were no regional wall motion abnormalities  There was moderate concentric hypertrophy  Doppler parameters were consistent with abnormal left ventricular relaxation (grade 1 diastolic dysfunction)  MITRAL VALVE:  There was mild to moderate annular calcification  There was trace regurgitation  AORTIC VALVE:  There was trace regurgitation  TRICUSPID VALVE:  There was trace regurgitation  HISTORY: PRIOR HISTORY: Palpitations, NSTEMI, Atrial fibrillation  Risk factors: hypertension and hypercholesterolemia  PROCEDURE: The procedure was performed at the bedside  This was a routine study  The transthoracic approach was used  The study included complete 2D imaging, M-mode, complete spectral Doppler, and color Doppler  The heart rate was 62 bpm,  at the start of the study  Images were obtained from the parasternal, apical, subcostal, and suprasternal notch acoustic windows  Image quality was adequate  LEFT VENTRICLE: Size was normal  Ejection fraction was estimated to be 65 %  There were no regional wall motion abnormalities  There was moderate concentric hypertrophy  DOPPLER: Doppler parameters were consistent with abnormal left  ventricular relaxation (grade 1 diastolic dysfunction)      RIGHT VENTRICLE: The size was normal  Systolic function was normal  Wall thickness was normal     LEFT ATRIUM: Size was normal     RIGHT ATRIUM: Size was normal     MITRAL VALVE: There was mild to moderate annular calcification  Valve structure was normal  There was normal leaflet separation  DOPPLER: The transmitral velocity was within the normal range  There was no evidence for stenosis  There was  trace regurgitation  AORTIC VALVE: The valve was not well visualized  DOPPLER: There was trace regurgitation  TRICUSPID VALVE: The valve structure was normal  There was normal leaflet separation  DOPPLER: The transtricuspid velocity was within the normal range  There was no evidence for stenosis  There was trace regurgitation  PULMONIC VALVE: Leaflets exhibited normal thickness, no calcification, and normal cuspal separation  DOPPLER: The transpulmonic velocity was within the normal range  There was no regurgitation  PERICARDIUM: There was no pericardial effusion  The pericardium was normal in appearance  AORTA: The root exhibited normal size  SYSTEM MEASUREMENT TABLES    2D  %FS: 35 4 %  Ao Diam: 2 9 cm  EDV(Teich): 53 7 ml  EF(Teich): 65 8 %  ESV(Teich): 18 4 ml  IVSd: 1 3 cm  LA Area: 17 6 cm2  LA Diam: 2 1 cm  LVEDV MOD A4C: 67 9 ml  LVEF MOD A4C: 77 2 %  LVESV MOD A4C: 15 5 ml  LVIDd: 3 6 cm  LVIDs: 2 3 cm  LVLd A4C: 6 5 cm  LVLs A4C: 5 3 cm  LVOT Diam: 2 cm  LVPWd: 1 3 cm  RA Area: 13 4 cm2  RVIDd: 3 6 cm  SV MOD A4C: 52 4 ml  SV(Teich): 35 3 ml    CW  AV Env  Ti: 295 ms  AV VTI: 41 1 cm  AV Vmax: 2 m/s  AV Vmean: 1 4 m/s  AV maxPG: 15 3 mmHg  AV meanP 7 mmHg  TR Vmax: 2 4 m/s  TR maxP 2 mmHg    MM  TAPSE: 2 6 cm    PW  SINDY (VTI): 2 6 cm2  SINDY Vmax: 2 3 cm2  E': 0 m/s  E/E': 18 8  LVOT Env  Ti: 331 1 ms  LVOT VTI: 34 6 cm  LVOT Vmax: 1 4 m/s  LVOT Vmean: 1 m/s  LVOT maxP 3 mmHg  LVOT meanP mmHg  LVSV Dopp: 106 5 ml  MV A Wilson: 1 3 m/s  MV Dec [place]: 2 4 m/s2  MV DecT: 301 2 ms  MV E Wilson: 0 7 m/s  MV E/A Ratio: 0 6  MV PHT: 87 3 ms  MVA By PHT: 2 5 cm2    [employer] Accredited Echocardiography Laboratory    Prepared and electronically signed by    [employer] MD Gwen  Signed 22-Jul-2018 17:12:32           Meds/Allergies   all current active meds have been reviewed and current meds:   Current Facility-Administered Medications   Medication Dose Route Frequency    acetaminophen (TYLENOL) tablet 650 mg  650 mg Oral Q6H PRN    aspirin chewable tablet 81 mg  81 mg Oral Daily    atenolol (TENORMIN) tablet 50 mg  50 mg Oral Daily    atorvastatin (LIPITOR) tablet 10 mg  10 mg Oral Daily    diltiazem (CARDIZEM) 125 mg in sodium chloride 0 9 % 125 mL infusion  1-15 mg/hr Intravenous Once    heparin (porcine) 25,000 units in 250 mL infusion (premix)  3-20 Units/kg/hr (Order-Specific) Intravenous Titrated    heparin (porcine) injection 2,000 Units  2,000 Units Intravenous PRN    heparin (porcine) injection 4,000 Units  4,000 Units Intravenous PRN    mesalamine (DELZICOL) delayed release capsule 800 mg  800 mg Oral TID    ondansetron (ZOFRAN) injection 4 mg  4 mg Intravenous Q6H PRN    phenylephrine (KELLEY-SYNEPHRINE) 100 mcg in sodium chloride 0 9% FOR PRIAPISM  100 mcg Intracavernosal Q3 min PRN     Prescriptions Prior to Admission   Medication    atenolol (TENORMIN) 50 mg tablet    atorvastatin (LIPITOR) 10 mg tablet    bisacodyl (FLEET) 10 MG/30ML ENEM    calcium carbonate-vitamin D (OSCAL-D) 500 mg-200 units per tablet    ferrous gluconate (FERGON) 324 mg tablet    hydrocortisone-pramoxine (PROCTOFOAM-HC) rectal foam    mesalamine (ASACOL) 800 MG EC tablet         heparin (porcine) 3-20 Units/kg/hr (Order-Specific) Last Rate: 14 Units/kg/hr (07/23/18 1158)         Assessment and Plan:    New onset atrial fibrillation  - now converted back to normal sinus rhythm, continue atenolol  -CHADS VASC 4 (age-2, HTN-1, female-1)  Risks and benefits of anticoagulation to reduce thromboembolic risk discussed    Patient currently on heparin drip, will plan to transition to oral anticoagulation following result stress test   Options for anticoagulation including Coumadin versus NOAC explained  Will speak to case management to determine if NOAC may be affordable for this patient   - patient does report some issues with rectal bleeding in the past, patient advised that anticoagulation may exacerbate this, she was advised to follow up with gastroenterologist upon discharge  NSTEMI   - likely type 2 in the setting of RVR, echocardiogram was without regional wall motion abnormality, EF 65%  await stress test      hypertension  -  Stable, continue present regimen    ** Please Note: Dragon 360 Dictation voice to text software may have been used in the creation of this document   **

## 2018-07-24 VITALS
BODY MASS INDEX: 29.41 KG/M2 | DIASTOLIC BLOOD PRESSURE: 71 MMHG | HEART RATE: 61 BPM | OXYGEN SATURATION: 95 % | SYSTOLIC BLOOD PRESSURE: 166 MMHG | HEIGHT: 63 IN | TEMPERATURE: 98.2 F | RESPIRATION RATE: 17 BRPM | WEIGHT: 166.01 LBS

## 2018-07-24 PROBLEM — I48.0 PAF (PAROXYSMAL ATRIAL FIBRILLATION) (HCC): Status: ACTIVE | Noted: 2018-07-24

## 2018-07-24 PROBLEM — I21.4 NSTEMI (NON-ST ELEVATED MYOCARDIAL INFARCTION) (HCC): Status: RESOLVED | Noted: 2018-07-21 | Resolved: 2018-07-24

## 2018-07-24 LAB
APTT PPP: 83 SECONDS (ref 24–36)
ATRIAL RATE: 147 BPM
P AXIS: 232 DEGREES
QRS AXIS: 61 DEGREES
QRSD INTERVAL: 228 MS
QT INTERVAL: 264 MS
QTC INTERVAL: 468 MS
T WAVE AXIS: 0 DEGREES
VENTRICULAR RATE: 189 BPM

## 2018-07-24 PROCEDURE — 93010 ELECTROCARDIOGRAM REPORT: CPT | Performed by: PHYSICIAN ASSISTANT

## 2018-07-24 PROCEDURE — 99232 SBSQ HOSP IP/OBS MODERATE 35: CPT | Performed by: PHYSICIAN ASSISTANT

## 2018-07-24 PROCEDURE — 85730 THROMBOPLASTIN TIME PARTIAL: CPT | Performed by: INTERNAL MEDICINE

## 2018-07-24 PROCEDURE — 99239 HOSP IP/OBS DSCHRG MGMT >30: CPT | Performed by: INTERNAL MEDICINE

## 2018-07-24 RX ADMIN — ATORVASTATIN CALCIUM 10 MG: 10 TABLET, FILM COATED ORAL at 08:53

## 2018-07-24 RX ADMIN — APIXABAN 5 MG: 5 TABLET, FILM COATED ORAL at 09:41

## 2018-07-24 RX ADMIN — MESALAMINE 800 MG: 400 CAPSULE, DELAYED RELEASE ORAL at 08:53

## 2018-07-24 RX ADMIN — ASPIRIN 81 MG 81 MG: 81 TABLET ORAL at 08:53

## 2018-07-24 RX ADMIN — ATENOLOL 50 MG: 50 TABLET ORAL at 08:53

## 2018-07-24 NOTE — SOCIAL WORK
Pt to be discharged home w/no needs  IMM reviewed and signed  Copy to pt and copy to MR for scanning  Marcell Perez will transport home

## 2018-07-24 NOTE — DISCHARGE INSTRUCTIONS
A-fib (Atrial Fibrillation)   WHAT YOU NEED TO KNOW:   A-fib may come and go, or it may be a long-term condition  A-fib can cause blood clots, stroke, or heart failure  These conditions may become life-threatening  It is important to treat and manage a-fib to help prevent a blood clot, stroke, or heart failure  DISCHARGE INSTRUCTIONS:   Call 911 for any of the following:   · You have any of the following signs of a heart attack:      ¨ Squeezing, pressure, or pain in your chest that lasts longer than 5 minutes or returns    ¨ Discomfort or pain in your back, neck, jaw, stomach, or arm     ¨ Trouble breathing    ¨ Nausea or vomiting    ¨ Lightheadedness or a sudden cold sweat, especially with chest pain or trouble breathing    · You have any of the following signs of a stroke:      ¨ Numbness or drooping on one side of your face     ¨ Weakness in an arm or leg    ¨ Confusion or difficulty speaking    ¨ Dizziness, a severe headache, or vision loss  Seek care immediately if:  You have any of the following signs of a blood clot:  · You feel lightheaded, are short of breath, and have chest pain  · You cough up blood  · You have swelling, redness, pain, or warmth in your arm or leg  Contact your cardiologist or healthcare provider if:   · Your heart rate is higher than your healthcare provider said it should be  · You have new or worsening swelling in your legs, feet, ankles, or abdomen  · You are short of breath, even at rest      · You have questions or concerns about your condition or care  Medicines: You may need any of the following:  · Heart medicines  help control your heart rate and rhythm  You may need more than one medicine to treat your symptoms  · Blood thinners    help prevent blood clots  Examples of blood thinners include heparin and warfarin  Clots can cause strokes, heart attacks, and death   The following are general safety guidelines to follow while you are taking a blood thinner:    ¨ Watch for bleeding and bruising while you take blood thinners  Watch for bleeding from your gums or nose  Watch for blood in your urine and bowel movements  Use a soft washcloth on your skin, and a soft toothbrush to brush your teeth  This can keep your skin and gums from bleeding  If you shave, use an electric shaver  Do not play contact sports  ¨ Tell your dentist and other healthcare providers that you take anticoagulants  Wear a bracelet or necklace that says you take this medicine  ¨ Do not start or stop any medicines unless your healthcare provider tells you to  Many medicines cannot be used with blood thinners  ¨ Tell your healthcare provider right away if you forget to take the medicine, or if you take too much  ¨ Warfarin  is a blood thinner that you may need to take  The following are things you should be aware of if you take warfarin  § Foods and medicines can affect the amount of warfarin in your blood  Do not make major changes to your diet while you take warfarin  Warfarin works best when you eat about the same amount of vitamin K every day  Vitamin K is found in green leafy vegetables and certain other foods  Ask for more information about what to eat when you are taking warfarin  § You will need to see your healthcare provider for follow-up visits when you are on warfarin  You will need regular blood tests  These tests are used to decide how much medicine you need  · Antiplatelets , such as aspirin, help prevent blood clots  Take your antiplatelet medicine exactly as directed  These medicines make it more likely for you to bleed or bruise  If you are told to take aspirin, do not take acetaminophen or ibuprofen instead  · Take your medicine as directed  Contact your healthcare provider if you think your medicine is not helping or if you have side effects  Tell him or her if you are allergic to any medicine   Keep a list of the medicines, vitamins, and herbs you take  Include the amounts, and when and why you take them  Bring the list or the pill bottles to follow-up visits  Carry your medicine list with you in case of an emergency  Follow up with your cardiologist as directed: You will need regular blood tests and monitoring  Write down your questions so you remember to ask them during your visits  Manage A-fib:   · Know your target heart rate  Learn how to take your pulse and monitor your heart rate  · Manage other health conditions  This includes high blood pressure, sleep apnea, thyroid disease, diabetes, and other heart conditions  Take medicine as directed and follow your treatment plan  · Limit or do not drink alcohol  Alcohol can make a-fib hard to manage  Ask your healthcare provider if it is safe for you to drink alcohol  A drink of alcohol is 12 ounces of beer, 5 ounces of wine, or 1½ ounces of liquor  · Do not smoke  Nicotine and other chemicals in cigarettes and cigars can cause heart and lung damage  Ask your healthcare provider for information if you currently smoke and need help to quit  E-cigarettes or smokeless tobacco still contain nicotine  Talk to your healthcare provider before you use these products  · Eat heart-healthy foods  Heart healthy foods will help keep your cholesterol low  These include fruits, vegetables, whole-grain breads, low-fat dairy products, beans, lean meats, and fish  Replace butter and margarine with heart-healthy oils such as olive oil and canola oil  · Maintain a healthy weight  Ask your healthcare provider how much you should weigh  Ask him to help you create a weight loss plan if you are overweight  · Exercise for 30 minutes  most days of the week  Ask your healthcare provider about the best exercise plan for you  © 2017 [employer] Information is for End User's use only and may not be sold, redistributed or otherwise used for commercial purposes   All illustrations and images included in CareNotes® are the copyrighted property of [employer]  or [employer] Figuero[employer]  The above information is an  only  It is not intended as medical advice for individual conditions or treatments  Talk to your doctor, nurse or pharmacist before following any medical regimen to see if it is safe and effective for you  Apixaban (By mouth)   Apixaban (a-PIX-a-ban)  Treats and prevents blood clots  This medicine is a blood thinner  Brand Name(s): Eliquis   There may be other brand names for this medicine  When This Medicine Should Not Be Used: This medicine is not right for everyone  Do not use it if you had an allergic reaction to apixaban or you have active bleeding  How to Use This Medicine:   Tablet  · Your doctor will tell you how much medicine to use  Do not use more than directed  · If you are not able to swallow the tablets whole, they may be crushed and mixed in water, 5% dextrose in water (D5W), apple juice, or applesauce  The crushed tablets may be mixed with 60 mL of water or D5W dose and given through a nasogastric tube (NGT)  · This medicine should come with a Medication Guide  Ask your pharmacist for a copy if you do not have one  · Missed dose: Take a dose as soon as you remember  If it is almost time for your next dose, wait until then and take a regular dose  Do not take extra medicine to make up for a missed dose  · Store the medicine in a closed container at room temperature, away from heat, moisture, and direct light  Drugs and Foods to Avoid:   Ask your doctor or pharmacist before using any other medicine, including over-the-counter medicines, vitamins, and herbal products  · Some medicines can affect how apixaban works   Tell your doctor if you are using any of the following:   ¨ Carbamazepine, clarithromycin, itraconazole, ketoconazole, phenytoin, rifampin, ritonavir, Stefan's wort  ¨ Blood thinner (including clopidogrel, heparin, prasugrel, warfarin)  ¨ Medicine to treat depression  ¨ NSAID pain or arthritis medicine (including aspirin, celecoxib, diclofenac, ibuprofen, naproxen)  Warnings While Using This Medicine:   · Tell your doctor if you are pregnant or breastfeeding, or if you have kidney disease, liver disease, bleeding problems, or an artificial heart valve  · Do not stop using this medicine suddenly without asking your doctor  You might have a higher risk of stroke for a short time after you stop using this medicine  · This medicine increases your risk for bleeding that can become serious if not controlled  You may also bruise easily, and it may take longer than usual for bleeding to stop  · This medicine may increase your risk for blood clots in your spine or back if you undergo an epidural or spinal puncture  This could lead to paralysis  Tell your doctor if you ever had spine problems or back surgery  · Tell any doctor or dentist who treats you that you are using this medicine  With your doctor's supervision, you may need to stop using this medicine several days before you have surgery or medical tests  · Your doctor will do lab tests at regular visits to check on the effects of this medicine  Keep all appointments  · Keep all medicine out of the reach of children  Never share your medicine with anyone    Possible Side Effects While Using This Medicine:   Call your doctor right away if you notice any of these side effects:  · Allergic reaction: Itching or hives, swelling in your face or hands, swelling or tingling in your mouth or throat, chest tightness, trouble breathing  · Change in how much or how often you urinate, red or pink urine  · Chest pain, trouble breathing  · Coughing up blood, vomiting blood or material that looks like coffee grounds  · Numbness, tingling, or muscle weakness in your legs or feet  · Red or black, tarry stools  · Unusual bleeding, bruising, or weakness  If you notice other side effects that you think are caused by this medicine, tell your doctor  Call your doctor for medical advice about side effects  You may report side effects to FDA at 9-283-FDA-0777  © 2017 [employer] Information is for End User's use only and may not be sold, redistributed or otherwise used for commercial purposes  The above information is an  only  It is not intended as medical advice for individual conditions or treatments  Talk to your doctor, nurse or pharmacist before following any medical regimen to see if it is safe and effective for you

## 2018-07-24 NOTE — DISCHARGE SUMMARY
Discharge Summary - [facility]    Patient Information: Carmelo Hall 80 y o  female MRN: [id number]  Unit/Bed#: -01 Encounter: [id number]    Discharging Physician / Practitioner: Rosa Pineda DO  PCP: No primary care provider on file  Admission Date: 7/20/2018  Discharge Date: 07/24/18    Reason for Admission: Palpitations    Discharge Diagnoses:     Principal Problem:    PAF (paroxysmal atrial fibrillation) ([address] )  Active Problems:    Essential hypertension  Resolved Problems:    NSTEMI (non-ST elevated myocardial infarction) ([facility])    HPI: Carmelo Hall is a 80 y o  female who presents with complaints of palpitations  Patient states that 1 day ago she felt palpitations with neck pain  Patient states that she was seen at urgent care who recommended she go to the emergency department for further evaluation  Patient states her symptoms resolved so she went home without being seen  Patient states the last evening her palpitations type pain return  Patient denies any shortness of breath, nausea, vomiting  Patient denies previous history of atrial fibrillation  Patient denies previous cardiac event  The patient presents to the ED she was found to be in atrial fibrillation  Patient was given 5 mg of IV diltiazem  Patient converted to normal sinus rhythm      Consultations During Hospital Stay:  · Cardiology    Procedures Performed:     · Nuclear stress test  · Echocardiogram    Significant Findings:     · Refer to hospital course    Incidental Findings:   ·     Test Results Pending at Discharge (will require follow up):   ·      Outpatient Tests Requested:  ·     Complications:  None    Hospital Course:      # New onset PAF - s/p spontaneous conversion  - Remains in SR  - rate controlled, on atenolol  - s/p echo   - A/c eliquis, CM verified coverage     # NSTEMI likely type II secondary to demand ischemic  - s/p serial trop remains flat  - PT is cp free  - status post nuclear stress test; nml     # Subclinical hypothyroidism - repeat TFT in 6-8 weeks; communicated to patient     # Hx of castillo's procedure; She follows with Dr Naveed Ponce had diversion colitis with resulting rectal bleeding s/p colonoscopy w/ polypectomy in 7/17  She is on short-chain fatty acid enema which she states has been causing rectal bleeding thus she has discontinued  She is also to be on proctofoam which she states also at times causes anal irritation and intermittent rectal bleeding  Given that she will be on chronic a/c I called Dr Naveed Ponce and discussed with him, he recommended should not be an issue and that pt may continue on protofoam and will arrange for patient to see him sooner  - cont mesalamine   - Obviously educated patient at length of potential side effects of chronic a/c and advised if were to occur to immediately present to her pcp or ER  Disp: D/c to home  Plan of care discussed with patient at length, also called her son and discussed plan of care with him as well  Condition at Discharge: stable     Discharge Day Visit / Exam:     Subjective:  Remains in sinus rhythm, heart rate controlled  Remains without any cardiac symptoms including chest pain palpitations  No acute events overnight  Vitals: Blood Pressure: 166/71 (07/24/18 1100)  Pulse: 61 (07/24/18 1100)  Temperature: 98 2 °F (36 8 °C) (07/24/18 1100)  Temp Source: Oral (07/24/18 1100)  Respirations: 17 (07/24/18 1100)  Height: 5' 3" (160 cm) (07/20/18 2306)  Weight - Scale: 75 3 kg (166 lb 0 1 oz) (07/20/18 2306)  SpO2: 95 % (07/24/18 1100)  Exam:   Physical Exam   Constitutional: She is oriented to person, place, and time  She appears well-developed and well-nourished  Neck: Neck supple  Cardiovascular: Normal rate, regular rhythm, normal heart sounds and intact distal pulses  Exam reveals no gallop and no friction rub  No murmur heard  Pulmonary/Chest: Effort normal and breath sounds normal  No respiratory distress   She has no wheezes  She has no rales  She exhibits no tenderness  Abdominal: Soft  Bowel sounds are normal  She exhibits no distension and no mass  There is no tenderness  There is no rebound and no guarding  Positive colostomy, brown stool   Musculoskeletal: Normal range of motion  She exhibits no edema, tenderness or deformity  Neurological: She is alert and oriented to person, place, and time  She has normal reflexes  She displays normal reflexes  No cranial nerve deficit  She exhibits normal muscle tone  Coordination normal        Discharge instructions/Information to patient and family:   See after visit summary for information provided to patient and family  Provisions for Follow-Up Care:  See after visit summary for information related to follow-up care and any pertinent home health orders  Disposition:     Home    For Discharges to [facility] SNF:   · Not Applicable to this Patient - Not Applicable to this Patient    Planned Readmission: No     Discharge Statement:  I spent > 30 minutes discharging the patient  This time was spent on the day of discharge  I had direct contact with the patient on the day of discharge  Greater than 50% of the total time was spent examining patient, answering all patient questions, arranging and discussing plan of care with patient as well as directly providing post-discharge instructions  Additional time then spent on discharge activities  Discharge Medications:  See after visit summary for reconciled discharge medications provided to patient and family  ** Please Note: Dragon 360 Dictation voice to text software may have been used in the creation of this document   **

## 2018-07-24 NOTE — PROGRESS NOTES
Progress Note - Cardiology     Charlane Riding 80 y o  female MRN: [id number]  Unit/Bed#: [facility]-01 Encounter: [id number]      Subjective:   No significant events overnight  Patient denies chest pain, SOB, palpitations, lightheadedness  Objective:   Vitals:  Vitals:    07/24/18 1100   BP: 166/71   Pulse: 61   Resp: 17   Temp: 98 2 °F (36 8 °C)   SpO2: 95%       Body mass index is 29 41 kg/m²  Systolic (97EGW), RGB:878 , Min:140 , NZY:878     Diastolic (65QIC), HFL:10, Min:65, Max:76      Intake/Output Summary (Last 24 hours) at 07/24/18 1410  Last data filed at 07/24/18 0700   Gross per 24 hour   Intake              360 ml   Output             1775 ml   Net            -1415 ml     Weight (last 2 days)     None          PHYSICAL EXAM:  General: Patient is not in acute distress  Laying comfortably in the bed  Head: Normocephalic  Atraumatic  HEENT: Both pupils normal sized, round and reactive to light  Nonicteric  Neck: JVP not raised  Trachea central  Respiratory: Bilateral bronchovascular breath sounds with no crackles or rhonchi  Cardiovascular: RRR  S1 and S2 normal  No murmur, rub or gallop  GI: Abdomen soft, nontender  No guarding or rigidity  Neurologic: Patient is awake, alert, responding to command   Moving all extremities  Integumentary:  No skin rash  Extremities: No clubbing, cyanosis or edema    LABORATORY RESULTS:    Results from last 7 days  Lab Units 07/21/18  0916 07/21/18  0624 07/21/18  0345   TROPONIN I ng/mL 0 11* 0 11* 0 10*     CBC with diff:   Results from last 7 days  Lab Units 07/22/18  0552 07/21/18  0356 07/20/18  2323   WBC Thousand/uL 6 74 8 53 10 36*   HEMOGLOBIN g/dL 13 2 13 4 15 5*   HEMATOCRIT % 41 7 41 7 48 6*   MCV fL 96 95 96   PLATELETS Thousands/uL 180 201 272   MCH pg 30 3 30 5 30 6   MCHC g/dL 31 7 32 1 31 9   RDW % 12 7 12 6 12 7   MPV fL 10 0 10 0 10 1   NRBC AUTO /100 WBCs  --  0 0       CMP:  Results from last 7 days  Lab Units 07/22/18  0552 07/21/18  0356 18  2324   SODIUM mmol/L 139 142 141   POTASSIUM mmol/L 4 1 3 8 3 8   CHLORIDE mmol/L 107 106 101   CO2 mmol/L 28 28 32   ANION GAP mmol/L 4 8 8   BUN mg/dL 18 19 21   CREATININE mg/dL 1 19 1 40* 1 81*   GLUCOSE RANDOM mg/dL 148* 140 203*   CALCIUM mg/dL 8 9 9 0 9 6   AST U/L  --   --  38   ALT U/L  --   --  40   ALK PHOS U/L  --   --  95   TOTAL PROTEIN g/dL  --   --  8 2   BILIRUBIN TOTAL mg/dL  --   --  0 30   EGFR ml/min/1 73sq m 41 34 25       BMP:  Results from last 7 days  Lab Units 18  0552 18  0356 18  2324   SODIUM mmol/L 139 142 141   POTASSIUM mmol/L 4 1 3 8 3 8   CHLORIDE mmol/L 107 106 101   CO2 mmol/L 28 28 32   BUN mg/dL 18 19 21   CREATININE mg/dL 1 19 1 40* 1 81*   GLUCOSE RANDOM mg/dL 148* 140 203*   CALCIUM mg/dL 8 9 9 0 9 6                Results from last 7 days  Lab Units 18  0356 18  2324   MAGNESIUM mg/dL 2 1 2 0               Results from last 7 days  Lab Units 18  0356 18  2324   TSH 3RD GENERATON uIU/mL  --  3 865*   FREE T4 ng/dL 1 20 1 00         Results from last 7 days  Lab Units 18  2323   INR  1 00       Lipid Profile:   Lab Results   Component Value Date    CHOL 172 2018     Lab Results   Component Value Date    HDL 48 2018     Lab Results   Component Value Date    LDLCALC 99 2018     Lab Results   Component Value Date    TRIG 126 2018       Cardiac testing:   Results for orders placed during the hospital encounter of 18   Echo complete with contrast if indicated    [place], [place] [id number] [phone]    Transthoracic Echocardiogram  2D, M-mode, Doppler, and Color Doppler    Study date:  2018    Patient: Grace Pang  MR number: [id number]  Account number: [de-identified]  : [id number]  Age: 80 years  Gender: Female  Status: Inpatient  Location: Bedside  Height: 63 in  Weight: 166 lb  BP: 171/ 81 mmHg    Indications: Evaluate suspected myocardial infarction  Diagnoses: 410 90 - AMI NOS, UNSPECIFIED    Sonographer:  Dulce Donald RDCS  Interpreting Physician:  Lexie Mcintyre MD  Referring Physician:  Pastor Fowler PA-C  Group:  [facility]    SUMMARY    LEFT VENTRICLE:  Ejection fraction was estimated to be 65 %  There were no regional wall motion abnormalities  There was moderate concentric hypertrophy  Doppler parameters were consistent with abnormal left ventricular relaxation (grade 1 diastolic dysfunction)  MITRAL VALVE:  There was mild to moderate annular calcification  There was trace regurgitation  AORTIC VALVE:  There was trace regurgitation  TRICUSPID VALVE:  There was trace regurgitation  HISTORY: PRIOR HISTORY: Palpitations, NSTEMI, Atrial fibrillation  Risk factors: hypertension and hypercholesterolemia  PROCEDURE: The procedure was performed at the bedside  This was a routine study  The transthoracic approach was used  The study included complete 2D imaging, M-mode, complete spectral Doppler, and color Doppler  The heart rate was 62 bpm,  at the start of the study  Images were obtained from the parasternal, apical, subcostal, and suprasternal notch acoustic windows  Image quality was adequate  LEFT VENTRICLE: Size was normal  Ejection fraction was estimated to be 65 %  There were no regional wall motion abnormalities  There was moderate concentric hypertrophy  DOPPLER: Doppler parameters were consistent with abnormal left  ventricular relaxation (grade 1 diastolic dysfunction)  RIGHT VENTRICLE: The size was normal  Systolic function was normal  Wall thickness was normal     LEFT ATRIUM: Size was normal     RIGHT ATRIUM: Size was normal     MITRAL VALVE: There was mild to moderate annular calcification  Valve structure was normal  There was normal leaflet separation  DOPPLER: The transmitral velocity was within the normal range   There was no evidence for stenosis  There was  trace regurgitation  AORTIC VALVE: The valve was not well visualized  DOPPLER: There was trace regurgitation  TRICUSPID VALVE: The valve structure was normal  There was normal leaflet separation  DOPPLER: The transtricuspid velocity was within the normal range  There was no evidence for stenosis  There was trace regurgitation  PULMONIC VALVE: Leaflets exhibited normal thickness, no calcification, and normal cuspal separation  DOPPLER: The transpulmonic velocity was within the normal range  There was no regurgitation  PERICARDIUM: There was no pericardial effusion  The pericardium was normal in appearance  AORTA: The root exhibited normal size  SYSTEM MEASUREMENT TABLES    2D  %FS: 35 4 %  Ao Diam: 2 9 cm  EDV(Teich): 53 7 ml  EF(Teich): 65 8 %  ESV(Teich): 18 4 ml  IVSd: 1 3 cm  LA Area: 17 6 cm2  LA Diam: 2 1 cm  LVEDV MOD A4C: 67 9 ml  LVEF MOD A4C: 77 2 %  LVESV MOD A4C: 15 5 ml  LVIDd: 3 6 cm  LVIDs: 2 3 cm  LVLd A4C: 6 5 cm  LVLs A4C: 5 3 cm  LVOT Diam: 2 cm  LVPWd: 1 3 cm  RA Area: 13 4 cm2  RVIDd: 3 6 cm  SV MOD A4C: 52 4 ml  SV(Teich): 35 3 ml    CW  AV Env  Ti: 295 ms  AV VTI: 41 1 cm  AV Vmax: 2 m/s  AV Vmean: 1 4 m/s  AV maxPG: 15 3 mmHg  AV meanP 7 mmHg  TR Vmax: 2 4 m/s  TR maxP 2 mmHg    MM  TAPSE: 2 6 cm    PW  SINDY (VTI): 2 6 cm2  SINDY Vmax: 2 3 cm2  E': 0 m/s  E/E': 18 8  LVOT Env  Ti: 331 1 ms  LVOT VTI: 34 6 cm  LVOT Vmax: 1 4 m/s  LVOT Vmean: 1 m/s  LVOT maxP 3 mmHg  LVOT meanP mmHg  LVSV Dopp: 106 5 ml  MV A Wilson: 1 3 m/s  MV Dec Carson: 2 4 m/s2  MV DecT: 301 2 ms  MV E Wilson: 0 7 m/s  MV E/A Ratio: 0 6  MV PHT: 87 3 ms  MVA By PHT: 2 5 cm2    [employer] Accredited Echocardiography Laboratory    Prepared and electronically signed by    Jose Maloney MD  Signed 2018 17:12:32             Meds/Allergies   all current active meds have been reviewed  Prescriptions Prior to Admission   Medication    atenolol (TENORMIN) 50 mg tablet    atorvastatin (LIPITOR) 10 mg tablet    bisacodyl (FLEET) 10 MG/30ML ENEM    calcium carbonate-vitamin D (OSCAL-D) 500 mg-200 units per tablet    ferrous gluconate (FERGON) 324 mg tablet    hydrocortisone-pramoxine (PROCTOFOAM-HC) rectal foam    mesalamine (ASACOL) 800 MG EC tablet              Assessment and Plan:    New onset atrial fibrillation  - now converted back to normal sinus rhythm, continue atenolol  -CHADS VASC 4 (age-2, HTN-1, female-1)  Risks and benefits of anticoagulation to reduce thromboembolic risk discussed  Will transition from heparin drip to Eliquis 5 mg twice daily  Patient had discussed cost of medications with case management, patient reports that Eliquis will be affordable her  - patient does report some issues with rectal bleeding in cony past, patient advised that anticoagulation may exacerbate this, she was advised to follow up with gastroenterologist upon discharge and to call the cardiology office if she has any issues while taking the Eliquis      NSTEMI   - likely type 2 in the setting of RVR, echocardiogram was without regional wall motion abnormality, EF 65%  Stress test negative for evidence of ischemia      hypertension  -  Stable, continue present regimen    ** Please Note: Dragon 360 Dictation voice to text software may have been used in the creation of this document   **

## 2018-07-24 NOTE — PLAN OF CARE
Problem: DISCHARGE PLANNING - CARE MANAGEMENT  Goal: Discharge to post-acute care or home with appropriate resources  INTERVENTIONS:  - Conduct assessment to determine patient/family and health care team treatment goals, and need for post-acute services based on payer coverage, community resources, and patient preferences, and barriers to discharge  - Address psychosocial, clinical, and financial barriers to discharge as identified in assessment in conjunction with the patient/family and health care team  - Arrange appropriate level of post-acute services according to patient's   needs and preference and payer coverage in collaboration with the physician and health care team  - Communicate with and update the patient/family, physician, and health care team regarding progress on the discharge plan  - Arrange appropriate transportation to post-acute venues   Outcome: Completed Date Met: 07/24/18  Pt to be discharged home w/no needs  IMM reviewed and signed  Copy to pt and copy to MR for scanning  Marcell Dennis will transport home

## 2018-08-14 ENCOUNTER — OFFICE VISIT (OUTPATIENT)
Dept: CARDIOLOGY CLINIC | Facility: CLINIC | Age: 83
End: 2018-08-14
Payer: COMMERCIAL

## 2018-08-14 VITALS
SYSTOLIC BLOOD PRESSURE: 136 MMHG | DIASTOLIC BLOOD PRESSURE: 82 MMHG | HEART RATE: 80 BPM | BODY MASS INDEX: 29.06 KG/M2 | OXYGEN SATURATION: 96 % | HEIGHT: 63 IN | WEIGHT: 164 LBS

## 2018-08-14 DIAGNOSIS — I51.89 DIASTOLIC DYSFUNCTION: ICD-10-CM

## 2018-08-14 DIAGNOSIS — I05.9 MITRAL ANNULAR CALCIFICATION: ICD-10-CM

## 2018-08-14 DIAGNOSIS — Z79.01 ANTICOAGULANT LONG-TERM USE: ICD-10-CM

## 2018-08-14 DIAGNOSIS — I10 ESSENTIAL HYPERTENSION: ICD-10-CM

## 2018-08-14 DIAGNOSIS — E78.2 MIXED HYPERLIPIDEMIA: ICD-10-CM

## 2018-08-14 DIAGNOSIS — R00.2 INTERMITTENT PALPITATIONS: ICD-10-CM

## 2018-08-14 DIAGNOSIS — I48.0 PAROXYSMAL ATRIAL FIBRILLATION (HCC): Primary | ICD-10-CM

## 2018-08-14 PROBLEM — I34.81 MITRAL ANNULAR CALCIFICATION: Status: ACTIVE | Noted: 2018-08-14

## 2018-08-14 PROCEDURE — 99214 OFFICE O/P EST MOD 30 MIN: CPT | Performed by: INTERNAL MEDICINE

## 2018-08-14 RX ORDER — POLYETHYLENE GLYCOL 3350 17 G/17G
17 POWDER, FOR SOLUTION ORAL DAILY
COMMUNITY

## 2018-08-14 RX ORDER — DOCUSATE SODIUM 100 MG/1
100 CAPSULE, LIQUID FILLED ORAL 2 TIMES DAILY
COMMUNITY

## 2018-08-14 NOTE — PROGRESS NOTES
CARDIOLOGY OFFICE VISIT  [facility]  [place] [address], [place], [address] [place] [address], [facility]  Tel: [phone]      NAME: Shannon Higgins  AGE: 80 y o  SEX: female  : 1932   MRN: [id number]      Chief Complaint:  Chief Complaint   Patient presents with   [place] [address] follow-up - NSTEMI         History of Present Illness:   Patient comes for follow up after recent hospitalization at [facility] for NSTEMI type 2 and new onset atrial fibrillation  States she is doing well from cardiac stand point and denies chest pain / pressure, SOB, lightheadedness, syncope, swelling feet, orthopnea, PND, claudication  PAF - has rare episodes of short lived palpitations  On beta-blocker for rate control and apixaban for anticoagulation  Denies bleeding from any site  CHADS2-VASc = 4 (age x 2, female, HTN)    HTN, DD -  Has been hypertensive for many years  Taking medications regularly  Denies lightheadedness, headache, medication side effects  HLP -  Has had hyperlipidemia for many years  Taking statin regularly along with diet control  Denies myalgia  PCP closely monitoring the blood work  Colostomy+ since     Past Medical History:  Past Medical History:   Diagnosis Date    Hyperlipidemia     Hypertension          Past Surgical History:  Past Surgical History:   Procedure Laterality Date    APPENDECTOMY      CARPAL TUNNEL RELEASE Bilateral     CHOLECYSTECTOMY      COLON SURGERY      COLOSTOMY      HYSTERECTOMY           Family History:  Family History   Problem Relation Age of Onset    Heart disease Mother     Cancer Father          Social History:  Social History     Social History    Marital status:      Spouse name: N/A    Number of children: N/A    Years of education: N/A     Social History Main Topics    Smoking status: Never Smoker    Smokeless tobacco: Never Used    Alcohol use No    Drug use:  No  Sexual activity: Not on file     Other Topics Concern    Not on file     Social History Narrative    No narrative on file         Active Problems:  Patient Active Problem List   Diagnosis    Essential hypertension    Paroxysmal atrial fibrillation (HCC)    Anticoagulant long-term use    Mixed hyperlipidemia    Diastolic dysfunction    Mitral annular calcification    Intermittent palpitations         The following portions of the patient's history were reviewed and updated as appropriate: past medical history, past surgical history, past family history,  past social history, current medications, allergies and problem list       Review of Systems:  Constitutional: Denies fever, chills, fatigue  Eyes: Denies eye redness, eye discharge, double vision  ENT: Denies hearing loss, tinnitus, sneezing, nasal discharge, sore throat   Respiratory: Denies cough, expectoration, hemoptysis, shortness of breath  Cardiovascular: Denies chest pain, orthopnea, PND, lower extremity swelling  Gastrointestinal: Denies abdominal pain, nausea, vomiting, hematemesis, diarrhea, bloody stools  Genito-Urinary: Denies dysuria, incontinence  Musculoskeletal: Denies back pain, joint pain, muscle pain  Neurologic: Denies confusion, lightheadedness, syncope, headache, focal weakness, sensory changes, seizures  Endocrine: Denies polyuria, polydipsia, temperature intolerance  Allergy and Immunology: Denies hives, insect bite sensitivity  Hematological and Lymphatic: Denies bleeding problems, swollen glands   Psychological: Denies depression, suicidal ideation, anxiety, panic  Dermatological: Denies pruritus, rash, skin lesion changes      Vitals:  Vitals:    08/14/18 1323   BP: 136/82   Pulse: 80   SpO2: 96%       Body mass index is 29 05 kg/m²  Weight (last 2 days)     Date/Time   Weight    08/14/18 1323  74 4 (164)                Physical Examination:  General: Patient is not in acute distress   Awake, alert, oriented in time, place and person  Responding to commands  Head: Normocephalic  Atraumatic  Eyes: Both pupils normal sized, round and reactive to light  Nonicteric  ENT: Normal external ear canals  Nares normal, no drainage  Lips and oral mucosa normal  Neck: Supple  JVP not raised  Trachea central  No thyromegaly  Lungs: Bilateral bronchovascular breath sounds with no crackles or rhonchi  Chest wall: No tenderness  Cardiovascular: RRR  S1 and S2 normal  No murmur, rub or gallop  Gastrointestinal: Abdomen soft, nontender  Colostomy+  Neurologic: Patient is awake, alert, oriented in time, place and person  Responding to command  Moving all extremities  Integumentary:  No skin rash  Lymphatic: No cervical lymphadenopathy  Back: Symmetric   No CVA tenderness  Extremities: No clubbing, cyanosis or edema      Laboratory Results:  CBC with diff:   Lab Results   Component Value Date    WBC 6 74 07/22/2018    RBC 4 36 07/22/2018    HGB 13 2 07/22/2018    HCT 41 7 07/22/2018    MCV 96 07/22/2018    MCH 30 3 07/22/2018    RDW 12 7 07/22/2018     07/22/2018       CMP:  Lab Results   Component Value Date    CREATININE 1 19 07/22/2018    BUN 18 07/22/2018     07/22/2018    K 4 1 07/22/2018     07/22/2018    CO2 28 07/22/2018    GLUCOSE 148 (H) 07/22/2018    PROT 8 2 07/20/2018    ALKPHOS 95 07/20/2018    ALT 40 07/20/2018    AST 38 07/20/2018       Lab Results   Component Value Date    MG 2 1 07/21/2018       Lab Results   Component Value Date    TROPONINI 0 11 (H) 07/21/2018    TROPONINI 0 11 (H) 07/21/2018    TROPONINI 0 10 (H) 07/21/2018       Lipid Profile:   Lab Results   Component Value Date    CHOL 172 07/21/2018     Lab Results   Component Value Date    HDL 48 07/21/2018     Lab Results   Component Value Date    LDLCALC 99 07/21/2018     Lab Results   Component Value Date    TRIG 126 07/21/2018       Cardiac testing:   Results for orders placed during the hospital encounter of 07/20/18   Echo complete with contrast if indicated    Narrative [address] [place], [place] [id number]  [phone]    Transthoracic Echocardiogram  2D, M-mode, Doppler, and Color Doppler    Study date:  2018    Patient: Leighton Villanueva  MR number: [id number]  Account number: [de-identified]  : 1932  Age: 80 years  Gender: Female  Status: Inpatient  Location: Bedside  Height: 63 in  Weight: 166 lb  BP: 171/ 81 mmHg    Indications: Evaluate suspected myocardial infarction  Diagnoses: 410 90 - AMI NOS, UNSPECIFIED    Sonographer:  Dulce Griffin RDCS  Interpreting Physician:  Ana Pham MD  Referring Physician:  Agata Miller PA-C  Group:  [facility]    SUMMARY    LEFT VENTRICLE:  Ejection fraction was estimated to be 65 %  There were no regional wall motion abnormalities  There was moderate concentric hypertrophy  Doppler parameters were consistent with abnormal left ventricular relaxation (grade 1 diastolic dysfunction)  MITRAL VALVE:  There was mild to moderate annular calcification  There was trace regurgitation  AORTIC VALVE:  There was trace regurgitation  TRICUSPID VALVE:  There was trace regurgitation  HISTORY: PRIOR HISTORY: Palpitations, NSTEMI, Atrial fibrillation  Risk factors: hypertension and hypercholesterolemia  PROCEDURE: The procedure was performed at the bedside  This was a routine study  The transthoracic approach was used  The study included complete 2D imaging, M-mode, complete spectral Doppler, and color Doppler  The heart rate was 62 bpm,  at the start of the study  Images were obtained from the parasternal, apical, subcostal, and suprasternal notch acoustic windows  Image quality was adequate  LEFT VENTRICLE: Size was normal  Ejection fraction was estimated to be 65 %  There were no regional wall motion abnormalities  There was moderate concentric hypertrophy   DOPPLER: Doppler parameters were consistent with abnormal left  ventricular relaxation (grade 1 diastolic dysfunction)  RIGHT VENTRICLE: The size was normal  Systolic function was normal  Wall thickness was normal     LEFT ATRIUM: Size was normal     RIGHT ATRIUM: Size was normal     MITRAL VALVE: There was mild to moderate annular calcification  Valve structure was normal  There was normal leaflet separation  DOPPLER: The transmitral velocity was within the normal range  There was no evidence for stenosis  There was  trace regurgitation  AORTIC VALVE: The valve was not well visualized  DOPPLER: There was trace regurgitation  TRICUSPID VALVE: The valve structure was normal  There was normal leaflet separation  DOPPLER: The transtricuspid velocity was within the normal range  There was no evidence for stenosis  There was trace regurgitation  PULMONIC VALVE: Leaflets exhibited normal thickness, no calcification, and normal cuspal separation  DOPPLER: The transpulmonic velocity was within the normal range  There was no regurgitation  PERICARDIUM: There was no pericardial effusion  The pericardium was normal in appearance  AORTA: The root exhibited normal size  SYSTEM MEASUREMENT TABLES    2D  %FS: 35 4 %  Ao Diam: 2 9 cm  EDV(Teich): 53 7 ml  EF(Teich): 65 8 %  ESV(Teich): 18 4 ml  IVSd: 1 3 cm  LA Area: 17 6 cm2  LA Diam: 2 1 cm  LVEDV MOD A4C: 67 9 ml  LVEF MOD A4C: 77 2 %  LVESV MOD A4C: 15 5 ml  LVIDd: 3 6 cm  LVIDs: 2 3 cm  LVLd A4C: 6 5 cm  LVLs A4C: 5 3 cm  LVOT Diam: 2 cm  LVPWd: 1 3 cm  RA Area: 13 4 cm2  RVIDd: 3 6 cm  SV MOD A4C: 52 4 ml  SV(Teich): 35 3 ml    CW  AV Env  Ti: 295 ms  AV VTI: 41 1 cm  AV Vmax: 2 m/s  AV Vmean: 1 4 m/s  AV maxPG: 15 3 mmHg  AV meanP 7 mmHg  TR Vmax: 2 4 m/s  TR maxP 2 mmHg    MM  TAPSE: 2 6 cm    PW  SINDY (VTI): 2 6 cm2  SINDY Vmax: 2 3 cm2  E': 0 m/s  E/E': 18 8  LVOT Env  Ti: 331 1 ms  LVOT VTI: 34 6 cm  LVOT Vmax: 1 4 m/s  LVOT Vmean: 1 m/s  LVOT maxP 3 mmHg  LVOT meanP mmHg  LVSV Dopp: 106 5 ml  MV A Wilson: 1 3 m/s  MV Dec [place]: 2 4 m/s2  MV DecT: 301 2 ms  MV E Wilson: 0 7 m/s  MV E/A Ratio: 0 6  MV PHT: 87 3 ms  MVA By PHT: 2 5 cm2    [address] Accredited Ech[facility]    Prepared and electronically signed by    Ramez Ragsdale MD  Signed 22-Jul-2018 17:12:32         Medications:    Current Outpatient Prescriptions:     apixaban (ELIQUIS) 5 mg, Take 1 tablet (5 mg total) by mouth 2 (two) times a day for 30 days, Disp: 60 tablet, Rfl: 1    atenolol (TENORMIN) 50 mg tablet, Take 50 mg by mouth daily, Disp: , Rfl:     atorvastatin (LIPITOR) 10 mg tablet, Take 10 mg by mouth daily, Disp: , Rfl:     calcium carbonate-vitamin D (OSCAL-D) 500 mg-200 units per tablet, Take 1 tablet by mouth 2 (two) times a day with meals, Disp: , Rfl:     docusate sodium (COLACE) 100 mg capsule, Take 100 mg by mouth 2 (two) times a day, Disp: , Rfl:     ferrous gluconate (FERGON) 324 mg tablet, Take 324 mg by mouth daily with breakfast, Disp: , Rfl:     hydrocortisone-pramoxine (PROCTOFOAM-HC) rectal foam, Insert 1 applicator into the rectum daily, Disp: , Rfl:     mesalamine (ASACOL) 800 MG EC tablet, Take 800 mg by mouth 3 (three) times a day  , Disp: , Rfl:     polyethylene glycol (MIRALAX) 17 g packet, Take 17 g by mouth daily, Disp: , Rfl:     bisacodyl (FLEET) 10 MG/30ML ENEM, Insert 10 mg into the rectum once, Disp: , Rfl:       Allergies:  No Known Allergies      Assessment and Plan:  1  Paroxysmal atrial fibrillation (HCC)  Continue beta-blocker for rate control and Eliquis for anticoagulation    2  Anticoagulant long-term use  Continue Eliquis  Denies bleeding from any site    3  Essential hypertension  BP well controlled  Continue current medications    4  Mixed hyperlipidemia  Continue statin and diet control  Her PCP closely monitor the blood    5  Diastolic dysfunction  Tight BP control    6  Mitral annular calcification  Follow-up with serial echocardiogram    7  Intermittent palpitations  Likely from atrial fibrillation  Recommend aggressive risk factor modification and therapeutic lifestyle changes  Low-salt, low-calorie, low-fat, low-cholesterol diet with regular exercise and to optimize weight  I will defer the ordering and monitoring of necessity lab studies to you, but I am available and happy to review and manage any of the data at your request in the future  Discussed concepts of atherosclerosis, including signs and symptoms of cardiac disease  Previous studies were reviewed  Safety measures were reviewed  Questions were entertained and answered  Patient was advised to report any problems requiring medical attention  Follow-up with PCP and appropriate specialist and lab work as discussed  Return for follow up visit as scheduled or earlier, if needed  Thank you for allowing me to participate in the care and evaluation of your patient  Should you have any questions, please feel free to contact me        Rakel Chen MD  9/27/4149,8:73 PM

## 2018-11-21 ENCOUNTER — OFFICE VISIT (OUTPATIENT)
Dept: FAMILY MEDICINE CLINIC | Facility: CLINIC | Age: 83
End: 2018-11-21
Payer: COMMERCIAL

## 2018-11-21 VITALS
OXYGEN SATURATION: 97 % | RESPIRATION RATE: 18 BRPM | DIASTOLIC BLOOD PRESSURE: 100 MMHG | HEART RATE: 68 BPM | TEMPERATURE: 98.9 F | HEIGHT: 63 IN | SYSTOLIC BLOOD PRESSURE: 160 MMHG | WEIGHT: 168 LBS | BODY MASS INDEX: 29.77 KG/M2

## 2018-11-21 DIAGNOSIS — N18.30 TYPE 2 DIABETES MELLITUS WITH STAGE 3 CHRONIC KIDNEY DISEASE, WITHOUT LONG-TERM CURRENT USE OF INSULIN (HCC): ICD-10-CM

## 2018-11-21 DIAGNOSIS — N18.30 CHRONIC KIDNEY DISEASE, STAGE III (MODERATE) (HCC): ICD-10-CM

## 2018-11-21 DIAGNOSIS — Z76.89 ENCOUNTER TO ESTABLISH CARE: Primary | ICD-10-CM

## 2018-11-21 DIAGNOSIS — E03.8 SUBCLINICAL HYPOTHYROIDISM: ICD-10-CM

## 2018-11-21 DIAGNOSIS — E11.22 TYPE 2 DIABETES MELLITUS WITH STAGE 3 CHRONIC KIDNEY DISEASE, WITHOUT LONG-TERM CURRENT USE OF INSULIN (HCC): ICD-10-CM

## 2018-11-21 DIAGNOSIS — Z93.3 COLOSTOMY IN PLACE (HCC): ICD-10-CM

## 2018-11-21 DIAGNOSIS — E78.2 MIXED HYPERLIPIDEMIA: ICD-10-CM

## 2018-11-21 DIAGNOSIS — I10 BENIGN ESSENTIAL HYPERTENSION: Chronic | ICD-10-CM

## 2018-11-21 DIAGNOSIS — I48.0 PAROXYSMAL ATRIAL FIBRILLATION (HCC): ICD-10-CM

## 2018-11-21 PROBLEM — K21.9 GASTROESOPHAGEAL REFLUX DISEASE WITHOUT ESOPHAGITIS: Status: RESOLVED | Noted: 2018-11-21 | Resolved: 2018-11-21

## 2018-11-21 PROBLEM — K21.9 GASTROESOPHAGEAL REFLUX DISEASE WITHOUT ESOPHAGITIS: Status: ACTIVE | Noted: 2018-11-21

## 2018-11-21 PROBLEM — I48.91 ATRIAL FIBRILLATION (HCC): Status: ACTIVE | Noted: 2018-07-24

## 2018-11-21 PROCEDURE — 1036F TOBACCO NON-USER: CPT | Performed by: FAMILY MEDICINE

## 2018-11-21 PROCEDURE — 1160F RVW MEDS BY RX/DR IN RCRD: CPT | Performed by: FAMILY MEDICINE

## 2018-11-21 PROCEDURE — 99204 OFFICE O/P NEW MOD 45 MIN: CPT | Performed by: FAMILY MEDICINE

## 2018-11-21 PROCEDURE — 3725F SCREEN DEPRESSION PERFORMED: CPT | Performed by: FAMILY MEDICINE

## 2018-11-21 RX ORDER — ATENOLOL 50 MG/1
50 TABLET ORAL DAILY
Qty: 90 TABLET | Refills: 3 | Status: SHIPPED | OUTPATIENT
Start: 2018-11-21 | End: 2019-07-24 | Stop reason: SDUPTHER

## 2018-11-21 RX ORDER — ATORVASTATIN CALCIUM 10 MG/1
10 TABLET, FILM COATED ORAL DAILY
Qty: 90 TABLET | Refills: 3 | Status: SHIPPED | OUTPATIENT
Start: 2018-11-21 | End: 2019-03-06 | Stop reason: SDUPTHER

## 2018-11-21 RX ORDER — LOSARTAN POTASSIUM 25 MG/1
25 TABLET ORAL DAILY
Qty: 30 TABLET | Refills: 3 | Status: SHIPPED | OUTPATIENT
Start: 2018-11-21 | End: 2018-11-23 | Stop reason: SDUPTHER

## 2018-11-21 NOTE — PROGRESS NOTES
Assessment/Plan:     Diagnoses and all orders for this visit:    Encounter to establish care    Benign essential hypertension  -     atenolol (TENORMIN) 50 mg tablet; Take 1 tablet (50 mg total) by mouth daily  -     losartan (COZAAR) 25 mg tablet; Take 1 tablet (25 mg total) by mouth daily    Type 2 diabetes mellitus with stage 3 chronic kidney disease, without long-term current use of insulin (HCC)  -     Microalbumin / creatinine urine ratio; Future  -     Hemoglobin A1C; Future  -     Ambulatory referral to Ophthalmology; Future  -     losartan (COZAAR) 25 mg tablet; Take 1 tablet (25 mg total) by mouth daily    Chronic kidney disease, stage III (moderate) (HCC)  -     CBC and differential; Future  -     Comprehensive metabolic panel; Future  -     losartan (COZAAR) 25 mg tablet; Take 1 tablet (25 mg total) by mouth daily    Colostomy in place [facility])    Paroxysmal atrial fibrillation (HCC)  -     apixaban (ELIQUIS) 5 mg; Take 1 tablet (5 mg total) by mouth 2 (two) times a day for 30 days  -     CBC and differential; Future    Mixed hyperlipidemia  -     atorvastatin (LIPITOR) 10 mg tablet; Take 1 tablet (10 mg total) by mouth daily  -     Lipid Panel with Direct LDL reflex; Future    Subclinical hypothyroidism  -     TSH, 3rd generation with Free T4 reflex; Future        Add Losartan for BP, renal protection  Recheck BP here in 2 wks  Labs in 3 months  Subjective:      Patient ID: Mima Lombardi is a 80 y o  female  59-year-old female here to establish care  She moved here from the [facility]  H/O colostomy since 2010- patient states she had a "rectal stricture" - chart lists h/o diverticular disease  She follows up with Dr Quin Muir from [facility] every 6 months  She is on mesalamine and proctofoam      H/O DM - last A1c in care everywhere was 7 4 in August 2018  Not on any medications  H/O HTN - BP is elevated  She does not check it at home  She is only on atenolol    Is not on an Ace inhibitor or Arb     History of CKD-her last creatinine was in July  GFR was 41  H/O A fib - on Eliquis, sees Dr Daniella Hdz  She is also on Atenolol  H/O subclinical hypothyroid - due for TSH  The following portions of the patient's history were reviewed and updated as appropriate:   She  has a past medical history of Chronic kidney disease; Diabetes mellitus ([facility] ); Hyperlipidemia; and Hypertension  She   Patient Active Problem List    Diagnosis Date Noted    Type 2 diabetes mellitus with stage 3 chronic kidney disease, without long-term current use of insulin ([facility] ) 11/21/2018    Anticoagulant long-term use 08/14/2018    Mixed hyperlipidemia 36/00/8445    Diastolic dysfunction 28/73/8356    Mitral annular calcification 08/14/2018    Intermittent palpitations 08/14/2018    Subclinical hypothyroidism 07/30/2018    Atrial fibrillation ([facility] ) 07/24/2018    Benign essential hypertension 07/21/2018    Osteopenia of multiple sites 02/22/2016    Colostomy in place [facility]) 01/27/2016    Chronic kidney disease, stage III (moderate) ([facility] ) 06/24/2015    Bilateral hearing loss 02/13/2015     She  has a past surgical history that includes Colon surgery; Appendectomy; Hysterectomy; Carpal tunnel release (Bilateral); Cholecystectomy; Colostomy; and Cataract extraction  Her family history includes Cancer in her father; Heart disease in her mother  She  reports that she has never smoked  She has never used smokeless tobacco  She reports that she does not drink alcohol or use drugs    Current Outpatient Prescriptions   Medication Sig Dispense Refill    atenolol (TENORMIN) 50 mg tablet Take 1 tablet (50 mg total) by mouth daily 90 tablet 3    atorvastatin (LIPITOR) 10 mg tablet Take 1 tablet (10 mg total) by mouth daily 90 tablet 3    calcium carbonate-vitamin D (OSCAL-D) 500 mg-200 units per tablet Take 1 tablet by mouth 2 (two) times a day with meals      docusate sodium (COLACE) 100 mg capsule Take 100 mg by mouth 2 (two) times a day      ferrous gluconate (FERGON) 324 mg tablet Take 324 mg by mouth daily with breakfast      hydrocortisone-pramoxine (PROCTOFOAM-HC) rectal foam Insert 1 applicator into the rectum daily      mesalamine (ASACOL) 800 MG EC tablet Take 800 mg by mouth 3 (three) times a day        polyethylene glycol (MIRALAX) 17 g packet Take 17 g by mouth daily      apixaban (ELIQUIS) 5 mg Take 1 tablet (5 mg total) by mouth 2 (two) times a day for 30 days 180 tablet 0    losartan (COZAAR) 25 mg tablet Take 1 tablet (25 mg total) by mouth daily 30 tablet 3     No current facility-administered medications for this visit  Current Outpatient Prescriptions on File Prior to Visit   Medication Sig    calcium carbonate-vitamin D (OSCAL-D) 500 mg-200 units per tablet Take 1 tablet by mouth 2 (two) times a day with meals    docusate sodium (COLACE) 100 mg capsule Take 100 mg by mouth 2 (two) times a day    ferrous gluconate (FERGON) 324 mg tablet Take 324 mg by mouth daily with breakfast    hydrocortisone-pramoxine (PROCTOFOAM-HC) rectal foam Insert 1 applicator into the rectum daily    mesalamine (ASACOL) 800 MG EC tablet Take 800 mg by mouth 3 (three) times a day      polyethylene glycol (MIRALAX) 17 g packet Take 17 g by mouth daily    [DISCONTINUED] atenolol (TENORMIN) 50 mg tablet Take 50 mg by mouth daily    [DISCONTINUED] atorvastatin (LIPITOR) 10 mg tablet Take 10 mg by mouth daily    [DISCONTINUED] bisacodyl (FLEET) 10 MG/30ML ENEM Insert 10 mg into the rectum once    [DISCONTINUED] apixaban (ELIQUIS) 5 mg Take 1 tablet (5 mg total) by mouth 2 (two) times a day for 30 days     No current facility-administered medications on file prior to visit  She is allergic to amoxicillin-pot clavulanate       Review of Systems   Constitutional: Negative for activity change, appetite change, chills, fatigue, fever and unexpected weight change     HENT: Negative for congestion, ear discharge, ear pain, postnasal drip, sinus pressure and sore throat  Eyes: Negative for discharge and visual disturbance  Respiratory: Negative for cough, shortness of breath and wheezing  Cardiovascular: Negative for chest pain, palpitations and leg swelling  Gastrointestinal: Negative for abdominal pain, constipation, diarrhea, nausea and vomiting  Endocrine: Negative for cold intolerance, heat intolerance, polydipsia and polyuria  Genitourinary: Negative for difficulty urinating and frequency  Musculoskeletal: Negative for arthralgias, back pain, joint swelling and myalgias  Skin: Negative for rash  Neurological: Negative for dizziness, weakness, light-headedness, numbness and headaches  Hematological: Negative for adenopathy  Psychiatric/Behavioral: Negative for behavioral problems, confusion, dysphoric mood, sleep disturbance and suicidal ideas  The patient is not nervous/anxious  Objective:      /100   Pulse 68   Temp 98 9 °F (37 2 °C) (Tympanic)   Resp 18   Ht 5' 3" (1 6 m)   Wt 76 2 kg (168 lb)   SpO2 97%   BMI 29 76 kg/m²          Physical Exam   Constitutional: She is oriented to person, place, and time  She appears well-developed and well-nourished  No distress  HENT:   Head: Normocephalic and atraumatic  Right Ear: Hearing, tympanic membrane, external ear and ear canal normal    Left Ear: Hearing, tympanic membrane, external ear and ear canal normal    Nose: Nose normal    Mouth/Throat: Oropharynx is clear and moist and mucous membranes are normal  No oropharyngeal exudate  Eyes: Pupils are equal, round, and reactive to light  Conjunctivae and EOM are normal    Neck: Neck supple  No thyromegaly present  Cardiovascular: Normal rate, regular rhythm and normal heart sounds  Exam reveals no gallop and no friction rub  Pulses are no weak pulses  No murmur heard  Pulses:       Dorsalis pedis pulses are 2+ on the right side, and 2+ on the left side          Posterior tibial pulses are 2+ on the right side, and 2+ on the left side  Pulmonary/Chest: Effort normal and breath sounds normal  No respiratory distress  She has no wheezes  She has no rales  She exhibits no tenderness  Abdominal: Soft  Bowel sounds are normal  She exhibits no distension and no mass  There is no tenderness  There is no rebound and no guarding  Musculoskeletal: Normal range of motion  She exhibits no edema  Feet:   Right Foot:   Skin Integrity: Negative for ulcer, skin breakdown, erythema, warmth, callus or dry skin  Left Foot:   Skin Integrity: Negative for ulcer, skin breakdown, erythema, warmth, callus or dry skin  Lymphadenopathy:     She has no cervical adenopathy  Neurological: She is alert and oriented to person, place, and time  Skin: Skin is warm and dry  No rash noted  She is not diaphoretic  Psychiatric: She has a normal mood and affect  Her behavior is normal  Judgment and thought content normal    Nursing note and vitals reviewed  Patient's shoes and socks removed  Right Foot/Ankle   Right Foot Inspection  Skin Exam: skin normal and skin intact no dry skin, no warmth, no callus, no erythema, no maceration, no abnormal color, no pre-ulcer, no ulcer and no callus                          Toe Exam: no swelling, no tenderness, erythema and  no right toe deformity  Sensory   Vibration: intact    Monofilament testing: intact  Vascular  Capillary refills: < 3 seconds  The right DP pulse is 2+  The right PT pulse is 2+  Left Foot/Ankle  Left Foot Inspection  Skin Exam: skin normal and skin intactno dry skin, no warmth, no erythema, no maceration, normal color, no pre-ulcer, no ulcer and no callus                         Toe Exam: no swelling, no tenderness, no erythema and no left toe deformity                   Sensory   Vibration: intact    Monofilament: intact  Vascular  Capillary refills: < 3 seconds  The left DP pulse is 2+  The left PT pulse is 2+     Assign Risk Category:  No deformity present; No loss of protective sensation;  No weak pulses       Risk: 0

## 2018-11-23 DIAGNOSIS — N18.30 TYPE 2 DIABETES MELLITUS WITH STAGE 3 CHRONIC KIDNEY DISEASE, WITHOUT LONG-TERM CURRENT USE OF INSULIN (HCC): ICD-10-CM

## 2018-11-23 DIAGNOSIS — N18.30 CHRONIC KIDNEY DISEASE, STAGE III (MODERATE) (HCC): ICD-10-CM

## 2018-11-23 DIAGNOSIS — I10 BENIGN ESSENTIAL HYPERTENSION: Chronic | ICD-10-CM

## 2018-11-23 DIAGNOSIS — E11.22 TYPE 2 DIABETES MELLITUS WITH STAGE 3 CHRONIC KIDNEY DISEASE, WITHOUT LONG-TERM CURRENT USE OF INSULIN (HCC): ICD-10-CM

## 2018-11-23 RX ORDER — LOSARTAN POTASSIUM 25 MG/1
25 TABLET ORAL DAILY
Qty: 90 TABLET | Refills: 1 | Status: SHIPPED | OUTPATIENT
Start: 2018-11-23 | End: 2019-06-05 | Stop reason: SDUPTHER

## 2018-12-05 ENCOUNTER — OFFICE VISIT (OUTPATIENT)
Dept: FAMILY MEDICINE CLINIC | Facility: CLINIC | Age: 83
End: 2018-12-05
Payer: COMMERCIAL

## 2018-12-05 VITALS
HEIGHT: 63 IN | SYSTOLIC BLOOD PRESSURE: 142 MMHG | OXYGEN SATURATION: 98 % | HEART RATE: 70 BPM | BODY MASS INDEX: 29.23 KG/M2 | WEIGHT: 165 LBS | DIASTOLIC BLOOD PRESSURE: 82 MMHG | TEMPERATURE: 97.2 F

## 2018-12-05 DIAGNOSIS — I10 BENIGN ESSENTIAL HYPERTENSION: Primary | Chronic | ICD-10-CM

## 2018-12-05 PROCEDURE — 1160F RVW MEDS BY RX/DR IN RCRD: CPT | Performed by: FAMILY MEDICINE

## 2018-12-05 PROCEDURE — 4040F PNEUMOC VAC/ADMIN/RCVD: CPT | Performed by: FAMILY MEDICINE

## 2018-12-05 PROCEDURE — 99213 OFFICE O/P EST LOW 20 MIN: CPT | Performed by: FAMILY MEDICINE

## 2018-12-05 NOTE — PROGRESS NOTES
Assessment/Plan:       Diagnoses and all orders for this visit:    Benign essential hypertension        Stay on current BP medications  BP improved  Repeat labs in 3 months, f/u then  Subjective:      Patient ID: Courtney Davison is a 80 y o  female  She is here to repeat her BP  She was started on Losartan  Her BP is better  Has not been checking at home  No cp, sob  The following portions of the patient's history were reviewed and updated as appropriate:   She  has a past medical history of Chronic kidney disease; Diabetes mellitus ([facility] [address] ); Hyperlipidemia; and Hypertension  She   Patient Active Problem List    Diagnosis Date Noted    Type 2 diabetes mellitus with stage 3 chronic kidney disease, without long-term current use of insulin ([facility] ) 11/21/2018    Anticoagulant long-term use 08/14/2018    Mixed hyperlipidemia 74/34/9976    Diastolic dysfunction 33/35/6433    Mitral annular calcification 08/14/2018    Intermittent palpitations 08/14/2018    Subclinical hypothyroidism 07/30/2018    Atrial fibrillation ([facility] [address] ) 07/24/2018    Benign essential hypertension 07/21/2018    Osteopenia of multiple sites 02/22/2016    Colostomy in place [facility]) 01/27/2016    Chronic kidney disease, stage III (moderate) ([facility] [address] ) 06/24/2015    Bilateral hearing loss 02/13/2015     She  has a past surgical history that includes Colon surgery; Appendectomy; Hysterectomy; Carpal tunnel release (Bilateral); Cholecystectomy; Colostomy; and Cataract extraction  Her family history includes Cancer in her father; Heart disease in her mother  She  reports that she has never smoked  She has never used smokeless tobacco  She reports that she does not drink alcohol or use drugs    Current Outpatient Prescriptions   Medication Sig Dispense Refill    apixaban (ELIQUIS) 5 mg Take 1 tablet (5 mg total) by mouth 2 (two) times a day for 30 days 180 tablet 0    atenolol (TENORMIN) 50 mg tablet Take 1 tablet (50 mg total) by mouth daily 90 tablet 3    atorvastatin (LIPITOR) 10 mg tablet Take 1 tablet (10 mg total) by mouth daily 90 tablet 3    calcium carbonate-vitamin D (OSCAL-D) 500 mg-200 units per tablet Take 1 tablet by mouth 2 (two) times a day with meals      docusate sodium (COLACE) 100 mg capsule Take 100 mg by mouth 2 (two) times a day      ferrous gluconate (FERGON) 324 mg tablet Take 324 mg by mouth daily with breakfast      hydrocortisone-pramoxine (PROCTOFOAM-HC) rectal foam Insert 1 applicator into the rectum daily      losartan (COZAAR) 25 mg tablet Take 1 tablet (25 mg total) by mouth daily 90 tablet 1    mesalamine (ASACOL) 800 MG EC tablet Take 800 mg by mouth 3 (three) times a day        polyethylene glycol (MIRALAX) 17 g packet Take 17 g by mouth daily       No current facility-administered medications for this visit  Current Outpatient Prescriptions on File Prior to Visit   Medication Sig    apixaban (ELIQUIS) 5 mg Take 1 tablet (5 mg total) by mouth 2 (two) times a day for 30 days    atenolol (TENORMIN) 50 mg tablet Take 1 tablet (50 mg total) by mouth daily    atorvastatin (LIPITOR) 10 mg tablet Take 1 tablet (10 mg total) by mouth daily    calcium carbonate-vitamin D (OSCAL-D) 500 mg-200 units per tablet Take 1 tablet by mouth 2 (two) times a day with meals    docusate sodium (COLACE) 100 mg capsule Take 100 mg by mouth 2 (two) times a day    ferrous gluconate (FERGON) 324 mg tablet Take 324 mg by mouth daily with breakfast    hydrocortisone-pramoxine (PROCTOFOAM-HC) rectal foam Insert 1 applicator into the rectum daily    losartan (COZAAR) 25 mg tablet Take 1 tablet (25 mg total) by mouth daily    mesalamine (ASACOL) 800 MG EC tablet Take 800 mg by mouth 3 (three) times a day      polyethylene glycol (MIRALAX) 17 g packet Take 17 g by mouth daily     No current facility-administered medications on file prior to visit  She is allergic to amoxicillin-pot clavulanate       Review of Systems Constitutional: Negative for activity change  Respiratory: Negative for chest tightness and shortness of breath  Cardiovascular: Negative for chest pain and leg swelling  Neurological: Negative for headaches  Psychiatric/Behavioral: Negative for dysphoric mood  The patient is not nervous/anxious  Objective:      /82   Pulse 70   Temp (!) 97 2 °F (36 2 °C)   Ht 5' 3" (1 6 m)   Wt 74 8 kg (165 lb)   SpO2 98%   BMI 29 23 kg/m²          Physical Exam   Constitutional: She is oriented to person, place, and time  She appears well-developed and well-nourished  No distress  HENT:   Head: Normocephalic and atraumatic  Eyes: Pupils are equal, round, and reactive to light  Conjunctivae are normal    Neck: Neck supple  Cardiovascular: Normal rate, regular rhythm and normal heart sounds  Exam reveals no gallop and no friction rub  No murmur heard  Pulmonary/Chest: Effort normal and breath sounds normal  No respiratory distress  She has no wheezes  She has no rales  She exhibits no tenderness  Musculoskeletal: Normal range of motion  She exhibits no edema  Neurological: She is alert and oriented to person, place, and time  Skin: Skin is warm and dry  No rash noted  She is not diaphoretic  Psychiatric: She has a normal mood and affect  Her behavior is normal  Judgment and thought content normal    Nursing note and vitals reviewed

## 2018-12-11 LAB
LEFT EYE DIABETIC RETINOPATHY: NORMAL
RIGHT EYE DIABETIC RETINOPATHY: NORMAL

## 2019-02-22 ENCOUNTER — APPOINTMENT (OUTPATIENT)
Dept: LAB | Facility: CLINIC | Age: 84
End: 2019-02-22
Payer: COMMERCIAL

## 2019-02-22 DIAGNOSIS — E78.2 MIXED HYPERLIPIDEMIA: ICD-10-CM

## 2019-02-22 DIAGNOSIS — I48.0 PAROXYSMAL ATRIAL FIBRILLATION (HCC): ICD-10-CM

## 2019-02-22 DIAGNOSIS — E11.22 TYPE 2 DIABETES MELLITUS WITH STAGE 3 CHRONIC KIDNEY DISEASE, WITHOUT LONG-TERM CURRENT USE OF INSULIN (HCC): ICD-10-CM

## 2019-02-22 DIAGNOSIS — E03.8 SUBCLINICAL HYPOTHYROIDISM: ICD-10-CM

## 2019-02-22 DIAGNOSIS — N18.30 TYPE 2 DIABETES MELLITUS WITH STAGE 3 CHRONIC KIDNEY DISEASE, WITHOUT LONG-TERM CURRENT USE OF INSULIN (HCC): ICD-10-CM

## 2019-02-22 DIAGNOSIS — N18.30 CHRONIC KIDNEY DISEASE, STAGE III (MODERATE) (HCC): ICD-10-CM

## 2019-02-22 LAB
ALBUMIN SERPL BCP-MCNC: 3.7 G/DL (ref 3.5–5)
ALP SERPL-CCNC: 76 U/L (ref 46–116)
ALT SERPL W P-5'-P-CCNC: 19 U/L (ref 12–78)
ANION GAP SERPL CALCULATED.3IONS-SCNC: 6 MMOL/L (ref 4–13)
AST SERPL W P-5'-P-CCNC: 16 U/L (ref 5–45)
BASOPHILS # BLD AUTO: 0.05 THOUSANDS/ΜL (ref 0–0.1)
BASOPHILS NFR BLD AUTO: 1 % (ref 0–1)
BILIRUB SERPL-MCNC: 0.36 MG/DL (ref 0.2–1)
BUN SERPL-MCNC: 21 MG/DL (ref 5–25)
CALCIUM SERPL-MCNC: 8.8 MG/DL (ref 8.3–10.1)
CHLORIDE SERPL-SCNC: 104 MMOL/L (ref 100–108)
CHOLEST SERPL-MCNC: 200 MG/DL (ref 50–200)
CO2 SERPL-SCNC: 29 MMOL/L (ref 21–32)
CREAT SERPL-MCNC: 1.04 MG/DL (ref 0.6–1.3)
CREAT UR-MCNC: 112 MG/DL
EOSINOPHIL # BLD AUTO: 0.09 THOUSAND/ΜL (ref 0–0.61)
EOSINOPHIL NFR BLD AUTO: 1 % (ref 0–6)
ERYTHROCYTE [DISTWIDTH] IN BLOOD BY AUTOMATED COUNT: 12.8 % (ref 11.6–15.1)
EST. AVERAGE GLUCOSE BLD GHB EST-MCNC: 183 MG/DL
GFR SERPL CREATININE-BSD FRML MDRD: 49 ML/MIN/1.73SQ M
GLUCOSE P FAST SERPL-MCNC: 143 MG/DL (ref 65–99)
HBA1C MFR BLD: 8 % (ref 4.2–6.3)
HCT VFR BLD AUTO: 43.1 % (ref 34.8–46.1)
HDLC SERPL-MCNC: 59 MG/DL (ref 40–60)
HGB BLD-MCNC: 13.7 G/DL (ref 11.5–15.4)
IMM GRANULOCYTES # BLD AUTO: 0.03 THOUSAND/UL (ref 0–0.2)
IMM GRANULOCYTES NFR BLD AUTO: 0 % (ref 0–2)
LDLC SERPL CALC-MCNC: 120 MG/DL (ref 0–100)
LYMPHOCYTES # BLD AUTO: 1.09 THOUSANDS/ΜL (ref 0.6–4.47)
LYMPHOCYTES NFR BLD AUTO: 12 % (ref 14–44)
MCH RBC QN AUTO: 31.1 PG (ref 26.8–34.3)
MCHC RBC AUTO-ENTMCNC: 31.8 G/DL (ref 31.4–37.4)
MCV RBC AUTO: 98 FL (ref 82–98)
MICROALBUMIN UR-MCNC: 20.5 MG/L (ref 0–20)
MICROALBUMIN/CREAT 24H UR: 18 MG/G CREATININE (ref 0–30)
MONOCYTES # BLD AUTO: 0.54 THOUSAND/ΜL (ref 0.17–1.22)
MONOCYTES NFR BLD AUTO: 6 % (ref 4–12)
NEUTROPHILS # BLD AUTO: 7.01 THOUSANDS/ΜL (ref 1.85–7.62)
NEUTS SEG NFR BLD AUTO: 80 % (ref 43–75)
NRBC BLD AUTO-RTO: 0 /100 WBCS
PLATELET # BLD AUTO: 236 THOUSANDS/UL (ref 149–390)
PMV BLD AUTO: 10.8 FL (ref 8.9–12.7)
POTASSIUM SERPL-SCNC: 4.1 MMOL/L (ref 3.5–5.3)
PROT SERPL-MCNC: 7.6 G/DL (ref 6.4–8.2)
RBC # BLD AUTO: 4.41 MILLION/UL (ref 3.81–5.12)
SODIUM SERPL-SCNC: 139 MMOL/L (ref 136–145)
TRIGL SERPL-MCNC: 105 MG/DL
TSH SERPL DL<=0.05 MIU/L-ACNC: 1.66 UIU/ML (ref 0.36–3.74)
WBC # BLD AUTO: 8.81 THOUSAND/UL (ref 4.31–10.16)

## 2019-02-22 PROCEDURE — 84443 ASSAY THYROID STIM HORMONE: CPT

## 2019-02-22 PROCEDURE — 82043 UR ALBUMIN QUANTITATIVE: CPT

## 2019-02-22 PROCEDURE — 85025 COMPLETE CBC W/AUTO DIFF WBC: CPT

## 2019-02-22 PROCEDURE — 36415 COLL VENOUS BLD VENIPUNCTURE: CPT

## 2019-02-22 PROCEDURE — 82570 ASSAY OF URINE CREATININE: CPT

## 2019-02-22 PROCEDURE — 80053 COMPREHEN METABOLIC PANEL: CPT

## 2019-02-22 PROCEDURE — 83036 HEMOGLOBIN GLYCOSYLATED A1C: CPT

## 2019-02-22 PROCEDURE — 80061 LIPID PANEL: CPT

## 2019-03-06 ENCOUNTER — OFFICE VISIT (OUTPATIENT)
Dept: FAMILY MEDICINE CLINIC | Facility: CLINIC | Age: 84
End: 2019-03-06
Payer: COMMERCIAL

## 2019-03-06 VITALS
HEIGHT: 63 IN | DIASTOLIC BLOOD PRESSURE: 88 MMHG | TEMPERATURE: 97.8 F | WEIGHT: 164 LBS | HEART RATE: 82 BPM | SYSTOLIC BLOOD PRESSURE: 150 MMHG | OXYGEN SATURATION: 98 % | BODY MASS INDEX: 29.06 KG/M2

## 2019-03-06 DIAGNOSIS — N18.30 TYPE 2 DIABETES MELLITUS WITH STAGE 3 CHRONIC KIDNEY DISEASE, WITHOUT LONG-TERM CURRENT USE OF INSULIN (HCC): Primary | ICD-10-CM

## 2019-03-06 DIAGNOSIS — Z23 NEED FOR SHINGLES VACCINE: ICD-10-CM

## 2019-03-06 DIAGNOSIS — E66.3 OVERWEIGHT (BMI 25.0-29.9): ICD-10-CM

## 2019-03-06 DIAGNOSIS — E78.2 MIXED HYPERLIPIDEMIA: ICD-10-CM

## 2019-03-06 DIAGNOSIS — Z00.00 MEDICARE ANNUAL WELLNESS VISIT, SUBSEQUENT: ICD-10-CM

## 2019-03-06 DIAGNOSIS — N18.30 CHRONIC KIDNEY DISEASE, STAGE III (MODERATE) (HCC): ICD-10-CM

## 2019-03-06 DIAGNOSIS — I10 BENIGN ESSENTIAL HYPERTENSION: Chronic | ICD-10-CM

## 2019-03-06 DIAGNOSIS — E11.22 TYPE 2 DIABETES MELLITUS WITH STAGE 3 CHRONIC KIDNEY DISEASE, WITHOUT LONG-TERM CURRENT USE OF INSULIN (HCC): Primary | ICD-10-CM

## 2019-03-06 PROCEDURE — G0439 PPPS, SUBSEQ VISIT: HCPCS | Performed by: FAMILY MEDICINE

## 2019-03-06 PROCEDURE — 99214 OFFICE O/P EST MOD 30 MIN: CPT | Performed by: FAMILY MEDICINE

## 2019-03-06 PROCEDURE — 1160F RVW MEDS BY RX/DR IN RCRD: CPT | Performed by: FAMILY MEDICINE

## 2019-03-06 PROCEDURE — 1170F FXNL STATUS ASSESSED: CPT | Performed by: FAMILY MEDICINE

## 2019-03-06 PROCEDURE — 1036F TOBACCO NON-USER: CPT | Performed by: FAMILY MEDICINE

## 2019-03-06 RX ORDER — ATORVASTATIN CALCIUM 20 MG/1
20 TABLET, FILM COATED ORAL DAILY
Qty: 90 TABLET | Refills: 3 | Status: SHIPPED | OUTPATIENT
Start: 2019-03-06 | End: 2019-12-10 | Stop reason: SDUPTHER

## 2019-03-06 RX ORDER — LANCETS
EACH MISCELLANEOUS
Qty: 100 EACH | Refills: 0 | Status: SHIPPED | OUTPATIENT
Start: 2019-03-06 | End: 2019-03-08

## 2019-03-06 RX ORDER — GLIPIZIDE 2.5 MG/1
2.5 TABLET, EXTENDED RELEASE ORAL DAILY
Qty: 90 TABLET | Refills: 0 | Status: SHIPPED | OUTPATIENT
Start: 2019-03-06 | End: 2019-06-05 | Stop reason: SDUPTHER

## 2019-03-06 NOTE — PROGRESS NOTES
Assessment/Plan:    No problem-specific Assessment & Plan notes found for this encounter  Diagnoses and all orders for this visit:    Type 2 diabetes mellitus with stage 3 chronic kidney disease, without long-term current use of insulin ([facility])  -     Blood Glucose Monitoring Suppl (ACCU-CHEK LION CONNECT) w/Device KIT; by Does not apply route daily  -     Lancets (ACCU-CHEK MULTICLIX) lancets; Check fasting glucose daily  -     glucose blood (ACCU-CHEK LION PLUS) test strip; Check fasting glucose once a day  -     glipiZIDE (GLUCOTROL XL) 2 5 mg 24 hr tablet; Take 1 tablet (2 5 mg total) by mouth daily  -     Hemoglobin A1C; Future  -     Ambulatory referral to Podiatry; Future    Mixed hyperlipidemia  -     atorvastatin (LIPITOR) 20 mg tablet; Take 1 tablet (20 mg total) by mouth daily  -     Lipid Panel with Direct LDL reflex; Future  -     Comprehensive metabolic panel; Future    Benign essential hypertension    Chronic kidney disease, stage III (moderate) ([facility])  -     Comprehensive metabolic panel; Future    Overweight (BMI 25 0-29  9)    Need for shingles vaccine  -     Zoster Vac Recomb Adjuvanted 50 MCG/0 5ML SUSR; as directed    Medicare annual wellness visit, subsequent        -DM - uncontrolled  Cannot take Metformin due to CKD  Start Glipizide 2 5 mg daily  Start checking daily fasting glucose per day  Repeat A1c in 3 months  Would like to see podiatry  -HTN -elevated today, she attributes to stress  She will monitor and repeat at her next appt  -Lipids - LDL not at goal, increase Atorvastatin to 20 mg  Repeat labs in 3 months  -BMI Counseling: Body mass index is 29 05 kg/m²  Discussed the patient's BMI with her  The BMI is above average  BMI counseling and education was provided to the patient  Nutrition recommendations include decreasing overall calorie intake  Exercise recommendations include exercising 3-5 times per week  Subjective:      Patient ID: Magnolia Khan is a 80 y o  female  Here to review labs  DM - A1c 8%  Not currently on any diabetic medications  Not checking her glucose  She states she used to take medications for diabetes but not recently  She is not sure what she used to take  She admits to a lot of stress in her life and states she is "not eating properly"  She would like to see a podiatrist because she has trouble cutting her toenails  She has a history of CKD, her recent GFR is 49  Thyroid - stable TSH in range  Her blood pressure is elevated today  She admits she has been under lot of stress lately and that is why it is high  Lipids -  on Atorvastatin 10 mg  LDL not at goal 120  The following portions of the patient's history were reviewed and updated as appropriate:   She  has a past medical history of Chronic kidney disease, Diabetes mellitus ([facility] [address] ), Hyperlipidemia, and Hypertension  She   Patient Active Problem List    Diagnosis Date Noted    Overweight (BMI 25 0-29 9) 03/06/2019    Type 2 diabetes mellitus with stage 3 chronic kidney disease, without long-term current use of insulin ([facility] [address] ) 11/21/2018    Anticoagulant long-term use 08/14/2018    Mixed hyperlipidemia 13/98/0666    Diastolic dysfunction 74/44/3364    Mitral annular calcification 08/14/2018    Intermittent palpitations 08/14/2018    Subclinical hypothyroidism 07/30/2018    Atrial fibrillation ([address] ) 07/24/2018    Benign essential hypertension 07/21/2018    Osteopenia of multiple sites 02/22/2016    Colostomy in place [facility]) 01/27/2016    Chronic kidney disease, stage III (moderate) ([address] ) 06/24/2015    Bilateral hearing loss 02/13/2015     She  has a past surgical history that includes Colon surgery; Appendectomy; Hysterectomy; Carpal tunnel release (Bilateral); Cholecystectomy; Colostomy; and Cataract extraction  Her family history includes Cancer in her father; Heart disease in her mother  She  reports that she has never smoked   She has never used smokeless tobacco  She reports that she does not drink alcohol or use drugs  Current Outpatient Medications   Medication Sig Dispense Refill    apixaban (ELIQUIS) 5 mg Take 1 tablet (5 mg total) by mouth 2 (two) times a day for 30 days 180 tablet 0    atenolol (TENORMIN) 50 mg tablet Take 1 tablet (50 mg total) by mouth daily 90 tablet 3    atorvastatin (LIPITOR) 20 mg tablet Take 1 tablet (20 mg total) by mouth daily 90 tablet 3    calcium carbonate-vitamin D (OSCAL-D) 500 mg-200 units per tablet Take 1 tablet by mouth 2 (two) times a day with meals      docusate sodium (COLACE) 100 mg capsule Take 100 mg by mouth 2 (two) times a day      ferrous gluconate (FERGON) 324 mg tablet Take 324 mg by mouth daily with breakfast      hydrocortisone-pramoxine (PROCTOFOAM-HC) rectal foam Insert 1 applicator into the rectum daily      losartan (COZAAR) 25 mg tablet Take 1 tablet (25 mg total) by mouth daily 90 tablet 1    mesalamine (ASACOL) 800 MG EC tablet Take 800 mg by mouth 3 (three) times a day        polyethylene glycol (MIRALAX) 17 g packet Take 17 g by mouth daily      Blood Glucose Monitoring Suppl (ACCU-CHEK LION CONNECT) w/Device KIT by Does not apply route daily 1 kit 0    glipiZIDE (GLUCOTROL XL) 2 5 mg 24 hr tablet Take 1 tablet (2 5 mg total) by mouth daily 90 tablet 0    glucose blood (ACCU-CHEK LION PLUS) test strip Check fasting glucose once a day 100 each 0    Lancets (ACCU-CHEK MULTICLIX) lancets Check fasting glucose daily 100 each 0    Zoster Vac Recomb Adjuvanted 50 MCG/0 5ML SUSR as directed 1 each 0     No current facility-administered medications for this visit        Current Outpatient Medications on File Prior to Visit   Medication Sig    apixaban (ELIQUIS) 5 mg Take 1 tablet (5 mg total) by mouth 2 (two) times a day for 30 days    atenolol (TENORMIN) 50 mg tablet Take 1 tablet (50 mg total) by mouth daily    calcium carbonate-vitamin D (OSCAL-D) 500 mg-200 units per tablet Take 1 tablet by mouth 2 (two) times a day with meals    docusate sodium (COLACE) 100 mg capsule Take 100 mg by mouth 2 (two) times a day    ferrous gluconate (FERGON) 324 mg tablet Take 324 mg by mouth daily with breakfast    hydrocortisone-pramoxine (PROCTOFOAM-HC) rectal foam Insert 1 applicator into the rectum daily    losartan (COZAAR) 25 mg tablet Take 1 tablet (25 mg total) by mouth daily    mesalamine (ASACOL) 800 MG EC tablet Take 800 mg by mouth 3 (three) times a day      polyethylene glycol (MIRALAX) 17 g packet Take 17 g by mouth daily    [DISCONTINUED] atorvastatin (LIPITOR) 10 mg tablet Take 1 tablet (10 mg total) by mouth daily     No current facility-administered medications on file prior to visit  She is allergic to amoxicillin-pot clavulanate       Review of Systems   Constitutional: Negative for activity change, appetite change, chills, fatigue, fever and unexpected weight change  HENT: Negative for congestion, ear discharge, ear pain, postnasal drip, sinus pressure and sore throat  Eyes: Negative for discharge and visual disturbance  Respiratory: Negative for cough, shortness of breath and wheezing  Cardiovascular: Negative for chest pain, palpitations and leg swelling  Gastrointestinal: Negative for abdominal pain, constipation, diarrhea, nausea and vomiting  Endocrine: Negative for cold intolerance, heat intolerance, polydipsia and polyuria  Genitourinary: Negative for difficulty urinating and frequency  Musculoskeletal: Negative for arthralgias, back pain, joint swelling and myalgias  Skin: Negative for rash  Neurological: Negative for dizziness, weakness, light-headedness, numbness and headaches  Hematological: Negative for adenopathy  Psychiatric/Behavioral: Negative for behavioral problems, confusion, dysphoric mood, sleep disturbance and suicidal ideas  The patient is not nervous/anxious            Objective:      /88   Pulse 82   Temp 97 8 °F (36 6 °C) (Tympanic)   Ht 5' 3" (1 6 m)   Wt 74 4 kg (164 lb)   SpO2 98%   BMI 29 05 kg/m²          Physical Exam   Constitutional: She is oriented to person, place, and time  She appears well-developed and well-nourished  No distress  HENT:   Head: Normocephalic and atraumatic  Eyes: Pupils are equal, round, and reactive to light  Conjunctivae are normal    Neck: Neck supple  Cardiovascular: Normal rate, regular rhythm and normal heart sounds  Exam reveals no gallop and no friction rub  No murmur heard  Pulmonary/Chest: Effort normal and breath sounds normal  No respiratory distress  She has no wheezes  She has no rales  She exhibits no tenderness  Musculoskeletal: Normal range of motion  She exhibits no edema  Neurological: She is alert and oriented to person, place, and time  Skin: Skin is warm and dry  No rash noted  She is not diaphoretic  Psychiatric: She has a normal mood and affect  Her behavior is normal  Judgment and thought content normal    Nursing note and vitals reviewed

## 2019-03-06 NOTE — PROGRESS NOTES
Assessment and Plan:    Problem List Items Addressed This Visit        Endocrine    Type 2 diabetes mellitus with stage 3 chronic kidney disease, without long-term current use of insulin (HCC) - Primary    Relevant Medications    Blood Glucose Monitoring Suppl (ACCU-CHEK LION CONNECT) w/Device KIT    Lancets (ACCU-CHEK MULTICLIX) lancets    glucose blood (ACCU-CHEK LION PLUS) test strip    glipiZIDE (GLUCOTROL XL) 2 5 mg 24 hr tablet    Other Relevant Orders    Hemoglobin A1C    Ambulatory referral to Podiatry       Cardiovascular and Mediastinum    Benign essential hypertension (Chronic)       Genitourinary    Chronic kidney disease, stage III (moderate) (HCC)    Relevant Orders    Comprehensive metabolic panel       Other    Mixed hyperlipidemia    Relevant Medications    atorvastatin (LIPITOR) 20 mg tablet    Other Relevant Orders    Lipid Panel with Direct LDL reflex    Comprehensive metabolic panel    Overweight (BMI 25 0-29  9)      Other Visit Diagnoses     Need for shingles vaccine        Relevant Medications    Zoster Vac Recomb Adjuvanted 50 MCG/0 5ML SUSR    Medicare annual wellness visit, subsequent            Health Maintenance Due   Topic Date Due    Medicare Annual Wellness Visit (AWV)  02/25/1932    DM Eye Exam  02/25/1942    BMI: Followup Plan  02/25/1950    HEPATITIS B VACCINES (1 of 3 - Risk 3-dose series) 02/25/1951         HPI:  Beulah Reyes is a 80 y o  female here for her Subsequent Wellness Visit      Patient Active Problem List   Diagnosis    Benign essential hypertension    Atrial fibrillation (HCC)    Anticoagulant long-term use    Mixed hyperlipidemia    Diastolic dysfunction    Mitral annular calcification    Intermittent palpitations    Bilateral hearing loss    Chronic kidney disease, stage III (moderate) (HCC)    Colostomy in place ([facility] [address] )    Osteopenia of multiple sites    Subclinical hypothyroidism    Type 2 diabetes mellitus with stage 3 chronic kidney disease, without long-term current use of insulin (HCC)    Overweight (BMI 25 0-29  9)     Past Medical History:   Diagnosis Date    Chronic kidney disease     Diabetes mellitus ([address] )     Hyperlipidemia     Hypertension      Past Surgical History:   Procedure Laterality Date    APPENDECTOMY      CARPAL TUNNEL RELEASE Bilateral     CATARACT EXTRACTION      CHOLECYSTECTOMY      COLON SURGERY      COLOSTOMY      HYSTERECTOMY       Family History   Problem Relation Age of Onset    Heart disease Mother     Cancer Father      Social History     Tobacco Use   Smoking Status Never Smoker   Smokeless Tobacco Never Used     Social History     Substance and Sexual Activity   Alcohol Use No      Social History     Substance and Sexual Activity   Drug Use No       Current Outpatient Medications   Medication Sig Dispense Refill    apixaban (ELIQUIS) 5 mg Take 1 tablet (5 mg total) by mouth 2 (two) times a day for 30 days 180 tablet 0    atenolol (TENORMIN) 50 mg tablet Take 1 tablet (50 mg total) by mouth daily 90 tablet 3    atorvastatin (LIPITOR) 20 mg tablet Take 1 tablet (20 mg total) by mouth daily 90 tablet 3    calcium carbonate-vitamin D (OSCAL-D) 500 mg-200 units per tablet Take 1 tablet by mouth 2 (two) times a day with meals      docusate sodium (COLACE) 100 mg capsule Take 100 mg by mouth 2 (two) times a day      ferrous gluconate (FERGON) 324 mg tablet Take 324 mg by mouth daily with breakfast      hydrocortisone-pramoxine (PROCTOFOAM-HC) rectal foam Insert 1 applicator into the rectum daily      losartan (COZAAR) 25 mg tablet Take 1 tablet (25 mg total) by mouth daily 90 tablet 1    mesalamine (ASACOL) 800 MG EC tablet Take 800 mg by mouth 3 (three) times a day        polyethylene glycol (MIRALAX) 17 g packet Take 17 g by mouth daily      Blood Glucose Monitoring Suppl (ACCU-CHEK LION CONNECT) w/Device KIT by Does not apply route daily 1 kit 0    glipiZIDE (GLUCOTROL XL) 2 5 mg 24 hr tablet Take 1 tablet (2 5 mg total) by mouth daily 90 tablet 0    glucose blood (ACCU-CHEK LION PLUS) test strip Check fasting glucose once a day 100 each 0    Lancets (ACCU-CHEK MULTICLIX) lancets Check fasting glucose daily 100 each 0    Zoster Vac Recomb Adjuvanted 50 MCG/0 5ML SUSR as directed 1 each 0     No current facility-administered medications for this visit  Allergies   Allergen Reactions    Amoxicillin-Pot Clavulanate Other (See Comments)     C-DIFF, x's 2  developed c diff taking drug     Immunization History   Administered Date(s) Administered    INFLUENZA 10/28/2011, 11/12/2013, 10/07/2014, 09/29/2015, 09/21/2016, 09/19/2017, 10/03/2018, 10/03/2018    Influenza TIV (IM) 10/28/2011, 11/12/2013, 09/19/2017    Pneumococcal Conjugate 13-Valent 08/03/2015    Pneumococcal Conjugate PCV 7 01/01/2008    Pneumococcal Polysaccharide PPV23 01/01/2008    Tdap 08/10/2016    Zoster 03/03/2016       Patient Care Team:  Avelina Sidhu MD as PCP - General (Family Medicine)    Medicare Screening Tests and Risk Assessments:      Health Risk Assessment:  Patient rates overall health as very good  Patient feels that their physical health rating is Same  Eyesight was rated as Same  Hearing was rated as Same  Patient feels that their emotional and mental health rating is Same  Patient states that she has experienced no weight loss or gain in last 6 months  Emotional/Mental Health:  Patient has been feeling nervous/anxious  PHQ-9 Depression Screening:    Frequency of the following problems over the past two weeks:      1  Little interest or pleasure in doing things: 0 - not at all      2  Feeling down, depressed, or hopeless: 0 - not at all  PHQ-2 Score: 0          Broken Bones/Falls: Fall Risk Assessment:    In the past year, patient has experienced: History of falling in past year     Number of falls: 2 or more  Patient feels unsteady standing   Patient is not taking medication that can cause feelings of lightheadedness or tiredness  Patient often has no need to rush to the toilet  Bladder/Bowel:  Patient has leaked urine accidently in the last six months  Patient reports no loss of bowel control  Immunizations:  Patient has had a flu vaccination within the last year  Patient has received a pneumonia shot  Patient has not received a shingles shot  Patient has not received tetanus/diphtheria shot  Home Safety:  Patient does not have trouble with stairs inside or outside of their home  Patient currently reports that there are no safety hazards present in home, working smoke alarms, no working carbon monoxide detectors  Preventative Screenings:   No breast cancer screening performed, no colon cancer screen completed, cholesterol screen completed, glaucoma eye exam completed,     Nutrition:  Current diet: Regular with servings of the following:    Medications:  Patient is not currently taking any over-the-counter supplements  Patient is able to manage medications  Lifestyle Choices:  Patient reports no tobacco use  Patient has not smoked or used tobacco in the past   Patient reports no alcohol use  Patient drives a vehicle  Patient wears seat belt  Activities of Daily Living:  Can get out of bed by his or her self, able to dress self, able to make own meals, able to do own shopping, able to bathe self, can do own laundry/housekeeping, can manage own money, pay bills and track expenses    Previous Hospitalizations:  Hospitalization or ED visit in past 12 months  Number of hospitalizations within the last year: 1-2        Advanced Directives:  Patient has decided on a power of   Patient has spoken to designated power of   Patient has completed advanced directive          Preventative Screening/Counseling:      Cardiovascular:      General: Risks and Benefits Discussed and Screening Current      Counseling: Healthy Diet, Healthy Weight, Improve Blood Pressure and Improve Cholesterol          Diabetes:      General: Risks and Benefits Discussed and Screening Current      Counseling: Healthy Diet, Healthy Weight and Improve Physical Activity          Colorectal Cancer:      General: Risks and Benefits Discussed and Screening Not Indicated          Breast Cancer:      General: Risks and Benefits Discussed and Screening Not Indicated          Cervical Cancer:      General: Screening Not Indicated          Osteoporosis:      General: Risks and Benefits Discussed and Patient Declines          AAA:      General: Screening Not Indicated          Glaucoma:      General: Risks and Benefits Discussed and Screening Current          HIV:      General: Screening Not Indicated          Hepatitis C:      General: Screening Not Indicated        Advanced Directives:   Patient has living will for healthcare, has durable POA for healthcare, patient has an advanced directive  Information on ACP and/or AD provided  End of life assessment reviewed with patient  Immunizations:      Influenza: Risks & Benefits Discussed and Influenza UTD This Year      Pneumococcal: Lifetime Vaccine Completed and Risks & Benefits Discussed      Zostavax: Risks & Benefits Discussed and Zostavax Vaccine UTD      TDAP: Risks & Benefits Discussed and Tdap Vaccine UTD      Other Preventative Counseling (Non-Medicare):   Increase physical activity and Nutrition Counseling      Referrals:  Referral(s) to: Podiatry

## 2019-03-08 DIAGNOSIS — N18.30 TYPE 2 DIABETES MELLITUS WITH STAGE 3 CHRONIC KIDNEY DISEASE, WITHOUT LONG-TERM CURRENT USE OF INSULIN (HCC): ICD-10-CM

## 2019-03-08 DIAGNOSIS — E11.22 TYPE 2 DIABETES MELLITUS WITH STAGE 3 CHRONIC KIDNEY DISEASE, WITHOUT LONG-TERM CURRENT USE OF INSULIN (HCC): ICD-10-CM

## 2019-03-10 RX ORDER — BLOOD-GLUCOSE METER
EACH MISCELLANEOUS DAILY
Qty: 1 EACH | Refills: 0 | Status: SHIPPED | OUTPATIENT
Start: 2019-03-10

## 2019-03-13 DIAGNOSIS — I10 BENIGN ESSENTIAL HYPERTENSION: Chronic | ICD-10-CM

## 2019-03-13 DIAGNOSIS — N18.30 CHRONIC KIDNEY DISEASE, STAGE III (MODERATE) (HCC): ICD-10-CM

## 2019-03-13 DIAGNOSIS — N18.30 TYPE 2 DIABETES MELLITUS WITH STAGE 3 CHRONIC KIDNEY DISEASE, WITHOUT LONG-TERM CURRENT USE OF INSULIN (HCC): ICD-10-CM

## 2019-03-13 DIAGNOSIS — E11.22 TYPE 2 DIABETES MELLITUS WITH STAGE 3 CHRONIC KIDNEY DISEASE, WITHOUT LONG-TERM CURRENT USE OF INSULIN (HCC): ICD-10-CM

## 2019-03-13 RX ORDER — LOSARTAN POTASSIUM 25 MG/1
TABLET ORAL
Qty: 30 TABLET | Refills: 3 | Status: SHIPPED | OUTPATIENT
Start: 2019-03-13 | End: 2019-06-05 | Stop reason: ALTCHOICE

## 2019-03-27 DIAGNOSIS — I48.0 PAROXYSMAL ATRIAL FIBRILLATION (HCC): ICD-10-CM

## 2019-03-27 RX ORDER — APIXABAN 5 MG/1
TABLET, FILM COATED ORAL
Qty: 180 TABLET | Refills: 0 | Status: SHIPPED | OUTPATIENT
Start: 2019-03-27 | End: 2019-06-05 | Stop reason: SDUPTHER

## 2019-04-09 DIAGNOSIS — K51.919 ULCERATIVE COLITIS WITH COMPLICATION, UNSPECIFIED LOCATION (HCC): Primary | ICD-10-CM

## 2019-04-09 RX ORDER — MESALAMINE 800 MG/1
800 TABLET, DELAYED RELEASE ORAL 3 TIMES DAILY
Qty: 270 TABLET | Refills: 3 | Status: SHIPPED | OUTPATIENT
Start: 2019-04-09 | End: 2019-12-10 | Stop reason: SDUPTHER

## 2019-04-10 ENCOUNTER — TELEPHONE (OUTPATIENT)
Dept: FAMILY MEDICINE CLINIC | Facility: CLINIC | Age: 84
End: 2019-04-10

## 2019-05-23 ENCOUNTER — APPOINTMENT (OUTPATIENT)
Dept: LAB | Facility: CLINIC | Age: 84
End: 2019-05-23
Payer: COMMERCIAL

## 2019-05-23 DIAGNOSIS — N18.30 CHRONIC KIDNEY DISEASE, STAGE III (MODERATE) (HCC): ICD-10-CM

## 2019-05-23 DIAGNOSIS — E78.2 MIXED HYPERLIPIDEMIA: ICD-10-CM

## 2019-05-23 DIAGNOSIS — N18.30 TYPE 2 DIABETES MELLITUS WITH STAGE 3 CHRONIC KIDNEY DISEASE, WITHOUT LONG-TERM CURRENT USE OF INSULIN (HCC): ICD-10-CM

## 2019-05-23 DIAGNOSIS — E11.22 TYPE 2 DIABETES MELLITUS WITH STAGE 3 CHRONIC KIDNEY DISEASE, WITHOUT LONG-TERM CURRENT USE OF INSULIN (HCC): ICD-10-CM

## 2019-05-23 LAB
ALBUMIN SERPL BCP-MCNC: 3.6 G/DL (ref 3.5–5)
ALP SERPL-CCNC: 74 U/L (ref 46–116)
ALT SERPL W P-5'-P-CCNC: 19 U/L (ref 12–78)
ANION GAP SERPL CALCULATED.3IONS-SCNC: 6 MMOL/L (ref 4–13)
AST SERPL W P-5'-P-CCNC: 16 U/L (ref 5–45)
BILIRUB SERPL-MCNC: 0.38 MG/DL (ref 0.2–1)
BUN SERPL-MCNC: 19 MG/DL (ref 5–25)
CALCIUM SERPL-MCNC: 8.8 MG/DL (ref 8.3–10.1)
CHLORIDE SERPL-SCNC: 108 MMOL/L (ref 100–108)
CHOLEST SERPL-MCNC: 185 MG/DL (ref 50–200)
CO2 SERPL-SCNC: 26 MMOL/L (ref 21–32)
CREAT SERPL-MCNC: 1.12 MG/DL (ref 0.6–1.3)
EST. AVERAGE GLUCOSE BLD GHB EST-MCNC: 148 MG/DL
GFR SERPL CREATININE-BSD FRML MDRD: 44 ML/MIN/1.73SQ M
GLUCOSE P FAST SERPL-MCNC: 142 MG/DL (ref 65–99)
HBA1C MFR BLD: 6.8 % (ref 4.2–6.3)
HDLC SERPL-MCNC: 60 MG/DL (ref 40–60)
LDLC SERPL CALC-MCNC: 104 MG/DL (ref 0–100)
POTASSIUM SERPL-SCNC: 4.2 MMOL/L (ref 3.5–5.3)
PROT SERPL-MCNC: 7.7 G/DL (ref 6.4–8.2)
SODIUM SERPL-SCNC: 140 MMOL/L (ref 136–145)
TRIGL SERPL-MCNC: 106 MG/DL

## 2019-05-23 PROCEDURE — 83036 HEMOGLOBIN GLYCOSYLATED A1C: CPT

## 2019-05-23 PROCEDURE — 80053 COMPREHEN METABOLIC PANEL: CPT

## 2019-05-23 PROCEDURE — 36415 COLL VENOUS BLD VENIPUNCTURE: CPT

## 2019-05-23 PROCEDURE — 80061 LIPID PANEL: CPT

## 2019-06-05 ENCOUNTER — OFFICE VISIT (OUTPATIENT)
Dept: FAMILY MEDICINE CLINIC | Facility: CLINIC | Age: 84
End: 2019-06-05
Payer: COMMERCIAL

## 2019-06-05 VITALS
DIASTOLIC BLOOD PRESSURE: 82 MMHG | HEIGHT: 63 IN | SYSTOLIC BLOOD PRESSURE: 158 MMHG | HEART RATE: 78 BPM | TEMPERATURE: 99 F | WEIGHT: 161.6 LBS | OXYGEN SATURATION: 97 % | BODY MASS INDEX: 28.63 KG/M2

## 2019-06-05 DIAGNOSIS — N18.30 CHRONIC KIDNEY DISEASE, STAGE III (MODERATE) (HCC): ICD-10-CM

## 2019-06-05 DIAGNOSIS — E78.2 MIXED HYPERLIPIDEMIA: ICD-10-CM

## 2019-06-05 DIAGNOSIS — I10 BENIGN ESSENTIAL HYPERTENSION: Chronic | ICD-10-CM

## 2019-06-05 DIAGNOSIS — I48.0 PAROXYSMAL ATRIAL FIBRILLATION (HCC): ICD-10-CM

## 2019-06-05 DIAGNOSIS — N18.30 TYPE 2 DIABETES MELLITUS WITH STAGE 3 CHRONIC KIDNEY DISEASE, WITHOUT LONG-TERM CURRENT USE OF INSULIN (HCC): ICD-10-CM

## 2019-06-05 DIAGNOSIS — Z79.01 ANTICOAGULANT LONG-TERM USE: ICD-10-CM

## 2019-06-05 DIAGNOSIS — E11.22 TYPE 2 DIABETES MELLITUS WITH STAGE 3 CHRONIC KIDNEY DISEASE, WITHOUT LONG-TERM CURRENT USE OF INSULIN (HCC): ICD-10-CM

## 2019-06-05 DIAGNOSIS — I48.20 CHRONIC ATRIAL FIBRILLATION (HCC): Primary | ICD-10-CM

## 2019-06-05 PROCEDURE — 99214 OFFICE O/P EST MOD 30 MIN: CPT | Performed by: FAMILY MEDICINE

## 2019-06-05 RX ORDER — LOSARTAN POTASSIUM 50 MG/1
50 TABLET ORAL DAILY
Qty: 90 TABLET | Refills: 0 | Status: SHIPPED | OUTPATIENT
Start: 2019-06-05 | End: 2019-07-05 | Stop reason: SDUPTHER

## 2019-06-05 RX ORDER — GLIPIZIDE 2.5 MG/1
2.5 TABLET, EXTENDED RELEASE ORAL DAILY
Qty: 90 TABLET | Refills: 3 | Status: SHIPPED | OUTPATIENT
Start: 2019-06-05 | End: 2019-12-10 | Stop reason: SDUPTHER

## 2019-06-25 ENCOUNTER — OFFICE VISIT (OUTPATIENT)
Dept: URGENT CARE | Facility: CLINIC | Age: 84
End: 2019-06-25
Payer: COMMERCIAL

## 2019-06-25 VITALS
BODY MASS INDEX: 28.7 KG/M2 | WEIGHT: 162 LBS | TEMPERATURE: 98.3 F | OXYGEN SATURATION: 94 % | HEART RATE: 94 BPM | SYSTOLIC BLOOD PRESSURE: 156 MMHG | HEIGHT: 63 IN | DIASTOLIC BLOOD PRESSURE: 92 MMHG
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
## 2019-06-25 DIAGNOSIS — S40.862A TICK BITE OF LEFT UPPER ARM, INITIAL ENCOUNTER: Primary | ICD-10-CM

## 2019-06-25 PROCEDURE — S9088 SERVICES PROVIDED IN URGENT: HCPCS | Performed by: PHYSICIAN ASSISTANT

## 2019-06-25 PROCEDURE — 99213 OFFICE O/P EST LOW 20 MIN: CPT | Performed by: PHYSICIAN ASSISTANT

## 2019-06-25 RX ORDER — DOXYCYCLINE 100 MG/1
200 TABLET ORAL ONCE
Qty: 2 TABLET | Refills: 0 | Status: SHIPPED | OUTPATIENT
Start: 2019-06-25 | End: 2019-06-25

## 2019-07-05 ENCOUNTER — OFFICE VISIT (OUTPATIENT)
Dept: FAMILY MEDICINE CLINIC | Facility: CLINIC | Age: 84
End: 2019-07-05
Payer: COMMERCIAL

## 2019-07-05 VITALS
OXYGEN SATURATION: 99 % | DIASTOLIC BLOOD PRESSURE: 80 MMHG | SYSTOLIC BLOOD PRESSURE: 164 MMHG | BODY MASS INDEX: 28.88 KG/M2 | WEIGHT: 163 LBS | HEIGHT: 63 IN | RESPIRATION RATE: 18 BRPM | HEART RATE: 74 BPM

## 2019-07-05 DIAGNOSIS — I10 BENIGN ESSENTIAL HYPERTENSION: Primary | Chronic | ICD-10-CM

## 2019-07-05 DIAGNOSIS — E78.2 MIXED HYPERLIPIDEMIA: ICD-10-CM

## 2019-07-05 PROCEDURE — 99213 OFFICE O/P EST LOW 20 MIN: CPT | Performed by: FAMILY MEDICINE

## 2019-07-05 PROCEDURE — 1036F TOBACCO NON-USER: CPT | Performed by: FAMILY MEDICINE

## 2019-07-05 RX ORDER — LOSARTAN POTASSIUM 100 MG/1
100 TABLET ORAL DAILY
Qty: 90 TABLET | Refills: 3 | Status: SHIPPED | OUTPATIENT
Start: 2019-07-05 | End: 2019-12-10 | Stop reason: SDUPTHER

## 2019-07-05 RX ORDER — DOXYCYCLINE HYCLATE 100 MG
TABLET ORAL
COMMUNITY
Start: 2019-06-25 | End: 2019-08-01 | Stop reason: ALTCHOICE

## 2019-07-05 NOTE — PROGRESS NOTES
Assessment/Plan:    No problem-specific Assessment & Plan notes found for this encounter  Diagnoses and all orders for this visit:    Benign essential hypertension  -     losartan (COZAAR) 100 MG tablet; Take 1 tablet (100 mg total) by mouth daily    Mixed hyperlipidemia    Other orders  -     doxycycline hyclate (VIBRA-TABS) 100 mg tablet        Increase Losartan to 100 mg  Return in 2 wks for BP check with device  Staff Stewart Michael) clarified with [employer] that she has never been given anything that was recalled  She should stay on atorvastatin and losartan  Subjective:      Patient ID: Darryl Rubi is a 80 y o  female  She is here for a BP check    I increased her Losartan a few weeks ago to 50 mg daily  She checked her BP once at [employer] and it was 132/92  Her BP is high today here it is 180/98 for me  She states her pharmacist told her that she should be switched off atorvastatin  Last lipids were normal in May  The following portions of the patient's history were reviewed and updated as appropriate:   She  has a past medical history of Chronic kidney disease, Diabetes mellitus ([address] ), Hyperlipidemia, and Hypertension    She   Patient Active Problem List    Diagnosis Date Noted    Overweight (BMI 25 0-29 9) 03/06/2019    Type 2 diabetes mellitus with stage 3 chronic kidney disease, without long-term current use of insulin ([address] ) 11/21/2018    Anticoagulant long-term use 08/14/2018    Mixed hyperlipidemia 21/14/1483    Diastolic dysfunction 74/58/3746    Mitral annular calcification 08/14/2018    Intermittent palpitations 08/14/2018    Subclinical hypothyroidism 07/30/2018    Atrial fibrillation ([address] ) 07/24/2018    Benign essential hypertension 07/21/2018    Osteopenia of multiple sites 02/22/2016    Colostomy in place [facility]) 01/27/2016    Chronic kidney disease, stage III (moderate) ([address] ) 06/24/2015    Bilateral hearing loss 02/13/2015     She  has a past surgical history that includes Colon surgery; Appendectomy; Hysterectomy; Carpal tunnel release (Bilateral); Cholecystectomy; Colostomy; and Cataract extraction  Her family history includes Cancer in her father; Heart disease in her mother  She  reports that she has never smoked  She has never used smokeless tobacco  She reports that she does not drink alcohol or use drugs    Current Outpatient Medications   Medication Sig Dispense Refill    apixaban (ELIQUIS) 5 mg Take 1 tablet (5 mg total) by mouth 2 (two) times a day 180 tablet 3    atenolol (TENORMIN) 50 mg tablet Take 1 tablet (50 mg total) by mouth daily 90 tablet 3    atorvastatin (LIPITOR) 20 mg tablet Take 1 tablet (20 mg total) by mouth daily 90 tablet 3    Blood Glucose Monitoring Suppl (ONE TOUCH ULTRA 2) w/Device KIT by Does not apply route daily 1 each 0    calcium carbonate-vitamin D (OSCAL-D) 500 mg-200 units per tablet Take 1 tablet by mouth 2 (two) times a day with meals      docusate sodium (COLACE) 100 mg capsule Take 100 mg by mouth 2 (two) times a day      doxycycline hyclate (VIBRA-TABS) 100 mg tablet       ferrous gluconate (FERGON) 324 mg tablet Take 324 mg by mouth daily with breakfast      glipiZIDE (GLUCOTROL XL) 2 5 mg 24 hr tablet Take 1 tablet (2 5 mg total) by mouth daily 90 tablet 3    glucose blood (ONE TOUCH ULTRA TEST) test strip Test once daily 100 each 3    hydrocortisone-pramoxine (PROCTOFOAM-HC) rectal foam Insert 1 applicator into the rectum daily      losartan (COZAAR) 100 MG tablet Take 1 tablet (100 mg total) by mouth daily 90 tablet 3    mesalamine (ASACOL) 800 MG EC tablet Take 1 tablet (800 mg total) by mouth 3 (three) times a day for 360 days 270 tablet 3    ONE TOUCH LANCETS MISC by Does not apply route daily 100 each 3    polyethylene glycol (MIRALAX) 17 g packet Take 17 g by mouth daily      Zoster Vac Recomb Adjuvanted 50 MCG/0 5ML SUSR as directed (Patient not taking: Reported on 6/25/2019) 1 each 0     No current facility-administered medications for this visit  Current Outpatient Medications on File Prior to Visit   Medication Sig    apixaban (ELIQUIS) 5 mg Take 1 tablet (5 mg total) by mouth 2 (two) times a day    atenolol (TENORMIN) 50 mg tablet Take 1 tablet (50 mg total) by mouth daily    atorvastatin (LIPITOR) 20 mg tablet Take 1 tablet (20 mg total) by mouth daily    Blood Glucose Monitoring Suppl (ONE TOUCH ULTRA 2) w/Device KIT by Does not apply route daily    calcium carbonate-vitamin D (OSCAL-D) 500 mg-200 units per tablet Take 1 tablet by mouth 2 (two) times a day with meals    docusate sodium (COLACE) 100 mg capsule Take 100 mg by mouth 2 (two) times a day    doxycycline hyclate (VIBRA-TABS) 100 mg tablet     ferrous gluconate (FERGON) 324 mg tablet Take 324 mg by mouth daily with breakfast    glipiZIDE (GLUCOTROL XL) 2 5 mg 24 hr tablet Take 1 tablet (2 5 mg total) by mouth daily    glucose blood (ONE TOUCH ULTRA TEST) test strip Test once daily    hydrocortisone-pramoxine (PROCTOFOAM-HC) rectal foam Insert 1 applicator into the rectum daily    mesalamine (ASACOL) 800 MG EC tablet Take 1 tablet (800 mg total) by mouth 3 (three) times a day for 360 days    ONE TOUCH LANCETS MISC by Does not apply route daily    polyethylene glycol (MIRALAX) 17 g packet Take 17 g by mouth daily    Zoster Vac Recomb Adjuvanted 50 MCG/0 5ML SUSR as directed (Patient not taking: Reported on 6/25/2019)    [DISCONTINUED] losartan (COZAAR) 50 mg tablet Take 1 tablet (50 mg total) by mouth daily     No current facility-administered medications on file prior to visit  She is allergic to amoxicillin-pot clavulanate       Review of Systems   Constitutional: Negative for activity change  Respiratory: Negative for chest tightness and shortness of breath  Cardiovascular: Negative for chest pain and leg swelling  Neurological: Negative for headaches  Psychiatric/Behavioral: Negative for dysphoric mood   The patient is not nervous/anxious  Objective:      /80 (BP Location: Left arm, Patient Position: Sitting)   Pulse 74   Resp 18   Ht 5' 3" (1 6 m)   Wt 73 9 kg (163 lb)   SpO2 99%   BMI 28 87 kg/m²          Physical Exam   Constitutional: She appears well-developed and well-nourished  Pulmonary/Chest: Effort normal and breath sounds normal    Psychiatric: She has a normal mood and affect  Nursing note and vitals reviewed

## 2019-07-09 LAB
LEFT EYE DIABETIC RETINOPATHY: NORMAL
RIGHT EYE DIABETIC RETINOPATHY: NORMAL

## 2019-07-24 ENCOUNTER — OFFICE VISIT (OUTPATIENT)
Dept: FAMILY MEDICINE CLINIC | Facility: CLINIC | Age: 84
End: 2019-07-24
Payer: COMMERCIAL

## 2019-07-24 VITALS
BODY MASS INDEX: 28.63 KG/M2 | DIASTOLIC BLOOD PRESSURE: 82 MMHG | WEIGHT: 161.6 LBS | SYSTOLIC BLOOD PRESSURE: 138 MMHG | HEIGHT: 63 IN | HEART RATE: 80 BPM | TEMPERATURE: 99.4 F | OXYGEN SATURATION: 96 %

## 2019-07-24 DIAGNOSIS — I10 BENIGN ESSENTIAL HYPERTENSION: Primary | Chronic | ICD-10-CM

## 2019-07-24 PROCEDURE — 99213 OFFICE O/P EST LOW 20 MIN: CPT | Performed by: FAMILY MEDICINE

## 2019-07-24 PROCEDURE — 1036F TOBACCO NON-USER: CPT | Performed by: FAMILY MEDICINE

## 2019-07-24 RX ORDER — ATENOLOL 100 MG/1
100 TABLET ORAL DAILY
Qty: 90 TABLET | Refills: 1 | Status: SHIPPED | OUTPATIENT
Start: 2019-07-24 | End: 2019-12-10 | Stop reason: SDUPTHER

## 2019-07-24 NOTE — PROGRESS NOTES
Assessment/Plan:    No problem-specific Assessment & Plan notes found for this encounter  Diagnoses and all orders for this visit:    Benign essential hypertension  -     atenolol (TENORMIN) 100 mg tablet; Take 1 tablet (100 mg total) by mouth daily          Subjective:      Patient ID: Mere Alvarez is a 80 y o  female  45-year-old female here for recheck on her blood pressure  I saw her approximately 2 weeks ago and increased her losartan to 100 mg daily  Her BP is still elevated for me 152/86  Her cuff reads similarly  The following portions of the patient's history were reviewed and updated as appropriate:   She  has a past medical history of Chronic kidney disease, Diabetes mellitus ([facility] [address] ), Hyperlipidemia, and Hypertension  She   Patient Active Problem List    Diagnosis Date Noted    Overweight (BMI 25 0-29 9) 03/06/2019    Type 2 diabetes mellitus with stage 3 chronic kidney disease, without long-term current use of insulin ([facility] [address] ) 11/21/2018    Anticoagulant long-term use 08/14/2018    Mixed hyperlipidemia 47/51/2122    Diastolic dysfunction 32/45/8225    Mitral annular calcification 08/14/2018    Intermittent palpitations 08/14/2018    Subclinical hypothyroidism 07/30/2018    Atrial fibrillation ([facility] [address] ) 07/24/2018    Benign essential hypertension 07/21/2018    Osteopenia of multiple sites 02/22/2016    Colostomy in place [facility]) 01/27/2016    Chronic kidney disease, stage III (moderate) ([facility] [address] ) 06/24/2015    Bilateral hearing loss 02/13/2015     She  has a past surgical history that includes Colon surgery; Appendectomy; Hysterectomy; Carpal tunnel release (Bilateral); Cholecystectomy; Colostomy; and Cataract extraction  Her family history includes Cancer in her father; Heart disease in her mother  She  reports that she has never smoked  She has never used smokeless tobacco  She reports that she does not drink alcohol or use drugs    Current Outpatient Medications   Medication Sig Dispense Refill    apixaban (ELIQUIS) 5 mg Take 1 tablet (5 mg total) by mouth 2 (two) times a day 180 tablet 3    atenolol (TENORMIN) 100 mg tablet Take 1 tablet (100 mg total) by mouth daily 90 tablet 1    atorvastatin (LIPITOR) 20 mg tablet Take 1 tablet (20 mg total) by mouth daily 90 tablet 3    Blood Glucose Monitoring Suppl (ONE TOUCH ULTRA 2) w/Device KIT by Does not apply route daily 1 each 0    calcium carbonate-vitamin D (OSCAL-D) 500 mg-200 units per tablet Take 1 tablet by mouth 2 (two) times a day with meals      docusate sodium (COLACE) 100 mg capsule Take 100 mg by mouth 2 (two) times a day      ferrous gluconate (FERGON) 324 mg tablet Take 324 mg by mouth daily with breakfast      glipiZIDE (GLUCOTROL XL) 2 5 mg 24 hr tablet Take 1 tablet (2 5 mg total) by mouth daily 90 tablet 3    glucose blood (ONE TOUCH ULTRA TEST) test strip Test once daily 100 each 3    hydrocortisone-pramoxine (PROCTOFOAM-HC) rectal foam Insert 1 applicator into the rectum daily      losartan (COZAAR) 100 MG tablet Take 1 tablet (100 mg total) by mouth daily 90 tablet 3    mesalamine (ASACOL) 800 MG EC tablet Take 1 tablet (800 mg total) by mouth 3 (three) times a day for 360 days 270 tablet 3    ONE TOUCH LANCETS MISC by Does not apply route daily 100 each 3    polyethylene glycol (MIRALAX) 17 g packet Take 17 g by mouth daily      Zoster Vac Recomb Adjuvanted 50 MCG/0 5ML SUSR as directed 1 each 0    doxycycline hyclate (VIBRA-TABS) 100 mg tablet        No current facility-administered medications for this visit        Current Outpatient Medications on File Prior to Visit   Medication Sig    apixaban (ELIQUIS) 5 mg Take 1 tablet (5 mg total) by mouth 2 (two) times a day    atorvastatin (LIPITOR) 20 mg tablet Take 1 tablet (20 mg total) by mouth daily    Blood Glucose Monitoring Suppl (ONE TOUCH ULTRA 2) w/Device KIT by Does not apply route daily    calcium carbonate-vitamin D (OSCAL-D) 500 mg-200 units per tablet Take 1 tablet by mouth 2 (two) times a day with meals    docusate sodium (COLACE) 100 mg capsule Take 100 mg by mouth 2 (two) times a day    ferrous gluconate (FERGON) 324 mg tablet Take 324 mg by mouth daily with breakfast    glipiZIDE (GLUCOTROL XL) 2 5 mg 24 hr tablet Take 1 tablet (2 5 mg total) by mouth daily    glucose blood (ONE TOUCH ULTRA TEST) test strip Test once daily    hydrocortisone-pramoxine (PROCTOFOAM-HC) rectal foam Insert 1 applicator into the rectum daily    losartan (COZAAR) 100 MG tablet Take 1 tablet (100 mg total) by mouth daily    mesalamine (ASACOL) 800 MG EC tablet Take 1 tablet (800 mg total) by mouth 3 (three) times a day for 360 days    ONE TOUCH LANCETS MISC by Does not apply route daily    polyethylene glycol (MIRALAX) 17 g packet Take 17 g by mouth daily    Zoster Vac Recomb Adjuvanted 50 MCG/0 5ML SUSR as directed    [DISCONTINUED] atenolol (TENORMIN) 50 mg tablet Take 1 tablet (50 mg total) by mouth daily    doxycycline hyclate (VIBRA-TABS) 100 mg tablet      No current facility-administered medications on file prior to visit  She is allergic to amoxicillin-pot clavulanate       Review of Systems   Constitutional: Negative for activity change  Respiratory: Negative for chest tightness and shortness of breath  Cardiovascular: Negative for chest pain and leg swelling  Neurological: Negative for dizziness and headaches  Psychiatric/Behavioral: Negative for dysphoric mood  The patient is not nervous/anxious  Objective:      /82   Pulse 80   Temp 99 4 °F (37 4 °C) (Tympanic)   Ht 5' 3" (1 6 m)   Wt 73 3 kg (161 lb 9 6 oz)   SpO2 96%   BMI 28 63 kg/m²          Physical Exam   Constitutional: She is oriented to person, place, and time  She appears well-developed and well-nourished  No distress  HENT:   Head: Normocephalic and atraumatic  Eyes: Pupils are equal, round, and reactive to light   Conjunctivae are normal    Neck: Neck supple  Cardiovascular: Normal rate  An irregularly irregular rhythm present  Exam reveals no gallop and no friction rub  Murmur heard  Pulmonary/Chest: Effort normal and breath sounds normal  No respiratory distress  She has no wheezes  She has no rales  She exhibits no tenderness  Musculoskeletal: Normal range of motion  She exhibits no edema  Neurological: She is alert and oriented to person, place, and time  Skin: Skin is warm and dry  No rash noted  She is not diaphoretic  Psychiatric: She has a normal mood and affect  Her behavior is normal  Judgment and thought content normal    Nursing note and vitals reviewed

## 2019-07-24 NOTE — PATIENT INSTRUCTIONS

## 2019-08-01 ENCOUNTER — OFFICE VISIT (OUTPATIENT)
Dept: FAMILY MEDICINE CLINIC | Facility: CLINIC | Age: 84
End: 2019-08-01
Payer: COMMERCIAL

## 2019-08-01 VITALS
DIASTOLIC BLOOD PRESSURE: 100 MMHG | BODY MASS INDEX: 28.88 KG/M2 | WEIGHT: 163 LBS | HEART RATE: 71 BPM | SYSTOLIC BLOOD PRESSURE: 178 MMHG | HEIGHT: 63 IN | OXYGEN SATURATION: 96 % | TEMPERATURE: 98.7 F

## 2019-08-01 DIAGNOSIS — I10 BENIGN ESSENTIAL HYPERTENSION: Primary | Chronic | ICD-10-CM

## 2019-08-01 PROCEDURE — 99213 OFFICE O/P EST LOW 20 MIN: CPT | Performed by: FAMILY MEDICINE

## 2019-08-01 PROCEDURE — 1160F RVW MEDS BY RX/DR IN RCRD: CPT | Performed by: FAMILY MEDICINE

## 2019-08-01 RX ORDER — AMLODIPINE BESYLATE 5 MG/1
5 TABLET ORAL DAILY
Qty: 30 TABLET | Refills: 2 | Status: SHIPPED | OUTPATIENT
Start: 2019-08-01 | End: 2019-10-25 | Stop reason: SDUPTHER

## 2019-08-01 NOTE — PROGRESS NOTES
Assessment/Plan:     Diagnoses and all orders for this visit:    Benign essential hypertension  -     amLODIPine (NORVASC) 5 mg tablet; Take 1 tablet (5 mg total) by mouth daily        Add amlodipine 5 mg  Discussed common side effects  F/U 2 wks  Monitor BP at home  Subjective:      Patient ID: Misa Everett is a 80 y o  female  She was at her podaitrists office and her BP was >170/100  For me it is 178/100  She is on atenolol 100 mg and Losartan 100 mg  Denies any CP, Headache  The following portions of the patient's history were reviewed and updated as appropriate:   She  has a past medical history of Chronic kidney disease, Diabetes mellitus ([facility] [address] ), Hyperlipidemia, and Hypertension  She   Patient Active Problem List    Diagnosis Date Noted    Overweight (BMI 25 0-29 9) 03/06/2019    Type 2 diabetes mellitus with stage 3 chronic kidney disease, without long-term current use of insulin ([facility] [address] ) 11/21/2018    Anticoagulant long-term use 08/14/2018    Mixed hyperlipidemia 22/07/4514    Diastolic dysfunction 97/59/0514    Mitral annular calcification 08/14/2018    Intermittent palpitations 08/14/2018    Subclinical hypothyroidism 07/30/2018    Atrial fibrillation ([facility] ) 07/24/2018    Benign essential hypertension 07/21/2018    Osteopenia of multiple sites 02/22/2016    Colostomy in place [facility]) 01/27/2016    Chronic kidney disease, stage III (moderate) ([facility] ) 06/24/2015    Bilateral hearing loss 02/13/2015     She  has a past surgical history that includes Colon surgery; Appendectomy; Hysterectomy; Carpal tunnel release (Bilateral); Cholecystectomy; Colostomy; and Cataract extraction  Her family history includes Cancer in her father; Heart disease in her mother  She  reports that she has never smoked  She has never used smokeless tobacco  She reports that she does not drink alcohol or use drugs    Current Outpatient Medications   Medication Sig Dispense Refill    apixaban (ELIQUIS) 5 mg Take 1 tablet (5 mg total) by mouth 2 (two) times a day 180 tablet 3    atenolol (TENORMIN) 100 mg tablet Take 1 tablet (100 mg total) by mouth daily 90 tablet 1    atorvastatin (LIPITOR) 20 mg tablet Take 1 tablet (20 mg total) by mouth daily 90 tablet 3    Blood Glucose Monitoring Suppl (ONE TOUCH ULTRA 2) w/Device KIT by Does not apply route daily 1 each 0    calcium carbonate-vitamin D (OSCAL-D) 500 mg-200 units per tablet Take 1 tablet by mouth 2 (two) times a day with meals      docusate sodium (COLACE) 100 mg capsule Take 100 mg by mouth 2 (two) times a day      ferrous gluconate (FERGON) 324 mg tablet Take 324 mg by mouth daily with breakfast      glipiZIDE (GLUCOTROL XL) 2 5 mg 24 hr tablet Take 1 tablet (2 5 mg total) by mouth daily 90 tablet 3    glucose blood (ONE TOUCH ULTRA TEST) test strip Test once daily 100 each 3    hydrocortisone-pramoxine (PROCTOFOAM-HC) rectal foam Insert 1 applicator into the rectum daily      losartan (COZAAR) 100 MG tablet Take 1 tablet (100 mg total) by mouth daily 90 tablet 3    mesalamine (ASACOL) 800 MG EC tablet Take 1 tablet (800 mg total) by mouth 3 (three) times a day for 360 days 270 tablet 3    ONE TOUCH LANCETS MISC by Does not apply route daily 100 each 3    polyethylene glycol (MIRALAX) 17 g packet Take 17 g by mouth daily      Zoster Vac Recomb Adjuvanted 50 MCG/0 5ML SUSR as directed 1 each 0    amLODIPine (NORVASC) 5 mg tablet Take 1 tablet (5 mg total) by mouth daily 30 tablet 2     No current facility-administered medications for this visit        Current Outpatient Medications on File Prior to Visit   Medication Sig    apixaban (ELIQUIS) 5 mg Take 1 tablet (5 mg total) by mouth 2 (two) times a day    atenolol (TENORMIN) 100 mg tablet Take 1 tablet (100 mg total) by mouth daily    atorvastatin (LIPITOR) 20 mg tablet Take 1 tablet (20 mg total) by mouth daily    Blood Glucose Monitoring Suppl (ONE TOUCH ULTRA 2) w/Device KIT by Does not apply route daily    calcium carbonate-vitamin D (OSCAL-D) 500 mg-200 units per tablet Take 1 tablet by mouth 2 (two) times a day with meals    docusate sodium (COLACE) 100 mg capsule Take 100 mg by mouth 2 (two) times a day    ferrous gluconate (FERGON) 324 mg tablet Take 324 mg by mouth daily with breakfast    glipiZIDE (GLUCOTROL XL) 2 5 mg 24 hr tablet Take 1 tablet (2 5 mg total) by mouth daily    glucose blood (ONE TOUCH ULTRA TEST) test strip Test once daily    hydrocortisone-pramoxine (PROCTOFOAM-HC) rectal foam Insert 1 applicator into the rectum daily    losartan (COZAAR) 100 MG tablet Take 1 tablet (100 mg total) by mouth daily    mesalamine (ASACOL) 800 MG EC tablet Take 1 tablet (800 mg total) by mouth 3 (three) times a day for 360 days    ONE TOUCH LANCETS MISC by Does not apply route daily    polyethylene glycol (MIRALAX) 17 g packet Take 17 g by mouth daily    Zoster Vac Recomb Adjuvanted 50 MCG/0 5ML SUSR as directed    [DISCONTINUED] doxycycline hyclate (VIBRA-TABS) 100 mg tablet      No current facility-administered medications on file prior to visit  She is allergic to amoxicillin-pot clavulanate       Review of Systems   Constitutional: Negative for activity change  Respiratory: Negative for chest tightness and shortness of breath  Cardiovascular: Negative for chest pain and leg swelling  Neurological: Negative for headaches  Psychiatric/Behavioral: Negative for dysphoric mood  The patient is not nervous/anxious  Objective:      BP (!) 178/100   Pulse 71   Temp 98 7 °F (37 1 °C)   Ht 5' 3" (1 6 m)   Wt 73 9 kg (163 lb)   SpO2 96%   BMI 28 87 kg/m²          Physical Exam   Constitutional: She is oriented to person, place, and time  She appears well-developed and well-nourished  No distress  HENT:   Head: Normocephalic and atraumatic  Eyes: Pupils are equal, round, and reactive to light  Conjunctivae are normal    Neck: Neck supple     Cardiovascular: Normal rate, regular rhythm and normal heart sounds  Exam reveals no gallop and no friction rub  No murmur heard  Pulmonary/Chest: Effort normal and breath sounds normal  No respiratory distress  She has no wheezes  She has no rales  She exhibits no tenderness  Musculoskeletal: Normal range of motion  She exhibits no edema  Neurological: She is alert and oriented to person, place, and time  Skin: Skin is warm and dry  No rash noted  She is not diaphoretic  Psychiatric: She has a normal mood and affect  Her behavior is normal  Judgment and thought content normal    Nursing note and vitals reviewed

## 2019-08-14 ENCOUNTER — OFFICE VISIT (OUTPATIENT)
Dept: FAMILY MEDICINE CLINIC | Facility: CLINIC | Age: 84
End: 2019-08-14
Payer: COMMERCIAL

## 2019-08-14 VITALS
HEART RATE: 74 BPM | SYSTOLIC BLOOD PRESSURE: 122 MMHG | RESPIRATION RATE: 18 BRPM | OXYGEN SATURATION: 96 % | WEIGHT: 160.8 LBS | HEIGHT: 63 IN | DIASTOLIC BLOOD PRESSURE: 76 MMHG | TEMPERATURE: 98.1 F | BODY MASS INDEX: 28.49 KG/M2

## 2019-08-14 DIAGNOSIS — I10 BENIGN ESSENTIAL HYPERTENSION: Primary | Chronic | ICD-10-CM

## 2019-08-14 PROBLEM — N18.30 TYPE 2 DIABETES MELLITUS WITH STAGE 3 CHRONIC KIDNEY DISEASE, WITHOUT LONG-TERM CURRENT USE OF INSULIN (HCC): Chronic | Status: ACTIVE | Noted: 2018-11-21

## 2019-08-14 PROBLEM — E11.22 TYPE 2 DIABETES MELLITUS WITH STAGE 3 CHRONIC KIDNEY DISEASE, WITHOUT LONG-TERM CURRENT USE OF INSULIN (HCC): Chronic | Status: ACTIVE | Noted: 2018-11-21

## 2019-08-14 PROBLEM — I48.91 ATRIAL FIBRILLATION (HCC): Chronic | Status: ACTIVE | Noted: 2018-07-24

## 2019-08-14 PROBLEM — E03.8 SUBCLINICAL HYPOTHYROIDISM: Chronic | Status: ACTIVE | Noted: 2018-07-30

## 2019-08-14 PROCEDURE — 99213 OFFICE O/P EST LOW 20 MIN: CPT | Performed by: FAMILY MEDICINE

## 2019-08-14 NOTE — PROGRESS NOTES
Assessment/Plan:         Problem List Items Addressed This Visit        Cardiovascular and Mediastinum    Benign essential hypertension - Primary (Chronic)     BP significantly improved  Continue amlodipine, losartan and atenolol                F/U 4 months with labs  Subjective:      Patient ID: Ayah Fernandes is a 80 y o  female  Pt here for BP check  I saw her 2 weeks ago added Amlodipine to her regimen  Also on Atenolol 100 mg per day and Losartan 100 mg per day  She says at home it is running 130-140s SBP  For me her BP is 122/76  She denies any CP, SOB, edema  The following portions of the patient's history were reviewed and updated as appropriate:   She  has a past medical history of Chronic kidney disease, Diabetes mellitus ([facility] [address] ), Hyperlipidemia, and Hypertension  She   Patient Active Problem List    Diagnosis Date Noted    Overweight (BMI 25 0-29 9) 03/06/2019    Type 2 diabetes mellitus with stage 3 chronic kidney disease, without long-term current use of insulin ([facility] [address] ) 11/21/2018    Anticoagulant long-term use 08/14/2018    Mixed hyperlipidemia 04/22/4696    Diastolic dysfunction 42/94/1163    Mitral annular calcification 08/14/2018    Intermittent palpitations 08/14/2018    Subclinical hypothyroidism 07/30/2018    Atrial fibrillation ([facility] [address] ) 07/24/2018    Benign essential hypertension 07/21/2018    Osteopenia of multiple sites 02/22/2016    Colostomy in place [facility]) 01/27/2016    Chronic kidney disease, stage III (moderate) ([facility] [address] ) 06/24/2015    Bilateral hearing loss 02/13/2015     She  has a past surgical history that includes Colon surgery; Appendectomy; Hysterectomy; Carpal tunnel release (Bilateral); Cholecystectomy; Colostomy; and Cataract extraction  Her family history includes Cancer in her father; Heart disease in her mother  She  reports that she has never smoked   She has never used smokeless tobacco  She reports that she does not drink alcohol or use drugs   Current Outpatient Medications   Medication Sig Dispense Refill    amLODIPine (NORVASC) 5 mg tablet Take 1 tablet (5 mg total) by mouth daily 30 tablet 2    apixaban (ELIQUIS) 5 mg Take 1 tablet (5 mg total) by mouth 2 (two) times a day 180 tablet 3    atenolol (TENORMIN) 100 mg tablet Take 1 tablet (100 mg total) by mouth daily 90 tablet 1    atorvastatin (LIPITOR) 20 mg tablet Take 1 tablet (20 mg total) by mouth daily 90 tablet 3    Blood Glucose Monitoring Suppl (ONE TOUCH ULTRA 2) w/Device KIT by Does not apply route daily 1 each 0    calcium carbonate-vitamin D (OSCAL-D) 500 mg-200 units per tablet Take 1 tablet by mouth 2 (two) times a day with meals      docusate sodium (COLACE) 100 mg capsule Take 100 mg by mouth 2 (two) times a day      ferrous gluconate (FERGON) 324 mg tablet Take 324 mg by mouth daily with breakfast      glipiZIDE (GLUCOTROL XL) 2 5 mg 24 hr tablet Take 1 tablet (2 5 mg total) by mouth daily 90 tablet 3    glucose blood (ONE TOUCH ULTRA TEST) test strip Test once daily 100 each 3    hydrocortisone-pramoxine (PROCTOFOAM-HC) rectal foam Insert 1 applicator into the rectum daily      losartan (COZAAR) 100 MG tablet Take 1 tablet (100 mg total) by mouth daily 90 tablet 3    mesalamine (ASACOL) 800 MG EC tablet Take 1 tablet (800 mg total) by mouth 3 (three) times a day for 360 days 270 tablet 3    ONE TOUCH LANCETS MISC by Does not apply route daily 100 each 3    polyethylene glycol (MIRALAX) 17 g packet Take 17 g by mouth daily      Zoster Vac Recomb Adjuvanted 50 MCG/0 5ML SUSR as directed (Patient not taking: Reported on 8/14/2019) 1 each 0     No current facility-administered medications for this visit        Current Outpatient Medications on File Prior to Visit   Medication Sig    amLODIPine (NORVASC) 5 mg tablet Take 1 tablet (5 mg total) by mouth daily    apixaban (ELIQUIS) 5 mg Take 1 tablet (5 mg total) by mouth 2 (two) times a day    atenolol (TENORMIN) 100 mg tablet Take 1 tablet (100 mg total) by mouth daily    atorvastatin (LIPITOR) 20 mg tablet Take 1 tablet (20 mg total) by mouth daily    Blood Glucose Monitoring Suppl (ONE TOUCH ULTRA 2) w/Device KIT by Does not apply route daily    calcium carbonate-vitamin D (OSCAL-D) 500 mg-200 units per tablet Take 1 tablet by mouth 2 (two) times a day with meals    docusate sodium (COLACE) 100 mg capsule Take 100 mg by mouth 2 (two) times a day    ferrous gluconate (FERGON) 324 mg tablet Take 324 mg by mouth daily with breakfast    glipiZIDE (GLUCOTROL XL) 2 5 mg 24 hr tablet Take 1 tablet (2 5 mg total) by mouth daily    glucose blood (ONE TOUCH ULTRA TEST) test strip Test once daily    hydrocortisone-pramoxine (PROCTOFOAM-HC) rectal foam Insert 1 applicator into the rectum daily    losartan (COZAAR) 100 MG tablet Take 1 tablet (100 mg total) by mouth daily    mesalamine (ASACOL) 800 MG EC tablet Take 1 tablet (800 mg total) by mouth 3 (three) times a day for 360 days    ONE TOUCH LANCETS MISC by Does not apply route daily    polyethylene glycol (MIRALAX) 17 g packet Take 17 g by mouth daily    Zoster Vac Recomb Adjuvanted 50 MCG/0 5ML SUSR as directed (Patient not taking: Reported on 8/14/2019)     No current facility-administered medications on file prior to visit  She is allergic to amoxicillin-pot clavulanate       Review of Systems   Constitutional: Negative for activity change  Respiratory: Negative for chest tightness and shortness of breath  Cardiovascular: Negative for chest pain and leg swelling  Neurological: Negative for headaches  Psychiatric/Behavioral: Negative for dysphoric mood  The patient is not nervous/anxious  Objective:      /76   Pulse 74   Temp 98 1 °F (36 7 °C) (Tympanic)   Resp 18   Ht 5' 3" (1 6 m)   Wt 72 9 kg (160 lb 12 8 oz)   SpO2 96%   BMI 28 48 kg/m²          Physical Exam   Constitutional: She is oriented to person, place, and time   She appears well-developed and well-nourished  No distress  HENT:   Head: Normocephalic and atraumatic  Eyes: Pupils are equal, round, and reactive to light  Conjunctivae are normal    Neck: Neck supple  Cardiovascular: Normal rate, regular rhythm and normal heart sounds  Exam reveals no gallop and no friction rub  No murmur heard  Pulmonary/Chest: Effort normal and breath sounds normal  No respiratory distress  She has no wheezes  She has no rales  She exhibits no tenderness  Musculoskeletal: Normal range of motion  She exhibits no edema  Neurological: She is alert and oriented to person, place, and time  Skin: Skin is warm and dry  No rash noted  She is not diaphoretic  Psychiatric: She has a normal mood and affect  Her behavior is normal  Judgment and thought content normal    Nursing note and vitals reviewed

## 2019-09-06 DIAGNOSIS — Z23 NEED FOR INFLUENZA VACCINATION: Primary | ICD-10-CM

## 2019-09-06 NOTE — TELEPHONE ENCOUNTER
Patient called her insurance company about the flu shot - she stated that per insurance they will only cover it at the pharmacy  Can you send over a script to [facility] - will order for you

## 2019-10-25 DIAGNOSIS — I10 BENIGN ESSENTIAL HYPERTENSION: Chronic | ICD-10-CM

## 2019-10-25 RX ORDER — AMLODIPINE BESYLATE 5 MG/1
TABLET ORAL
Qty: 30 TABLET | Refills: 2 | Status: SHIPPED | OUTPATIENT
Start: 2019-10-25 | End: 2019-12-10

## 2019-12-05 ENCOUNTER — APPOINTMENT (OUTPATIENT)
Dept: LAB | Facility: CLINIC | Age: 84
End: 2019-12-05
Payer: COMMERCIAL

## 2019-12-05 DIAGNOSIS — N18.30 TYPE 2 DIABETES MELLITUS WITH STAGE 3 CHRONIC KIDNEY DISEASE, WITHOUT LONG-TERM CURRENT USE OF INSULIN (HCC): ICD-10-CM

## 2019-12-05 DIAGNOSIS — E11.22 TYPE 2 DIABETES MELLITUS WITH STAGE 3 CHRONIC KIDNEY DISEASE, WITHOUT LONG-TERM CURRENT USE OF INSULIN (HCC): ICD-10-CM

## 2019-12-05 DIAGNOSIS — I48.20 CHRONIC ATRIAL FIBRILLATION (HCC): ICD-10-CM

## 2019-12-05 DIAGNOSIS — I10 BENIGN ESSENTIAL HYPERTENSION: Chronic | ICD-10-CM

## 2019-12-05 DIAGNOSIS — Z79.01 ANTICOAGULANT LONG-TERM USE: ICD-10-CM

## 2019-12-05 DIAGNOSIS — I48.0 PAROXYSMAL ATRIAL FIBRILLATION (HCC): ICD-10-CM

## 2019-12-05 DIAGNOSIS — E78.2 MIXED HYPERLIPIDEMIA: ICD-10-CM

## 2019-12-05 LAB
ALBUMIN SERPL BCP-MCNC: 3.6 G/DL (ref 3.5–5)
ALP SERPL-CCNC: 83 U/L (ref 46–116)
ALT SERPL W P-5'-P-CCNC: 19 U/L (ref 12–78)
ANION GAP SERPL CALCULATED.3IONS-SCNC: 5 MMOL/L (ref 4–13)
AST SERPL W P-5'-P-CCNC: 12 U/L (ref 5–45)
BASOPHILS # BLD AUTO: 0.06 THOUSANDS/ΜL (ref 0–0.1)
BASOPHILS NFR BLD AUTO: 1 % (ref 0–1)
BILIRUB SERPL-MCNC: 0.32 MG/DL (ref 0.2–1)
BUN SERPL-MCNC: 21 MG/DL (ref 5–25)
CALCIUM SERPL-MCNC: 8.8 MG/DL (ref 8.3–10.1)
CHLORIDE SERPL-SCNC: 110 MMOL/L (ref 100–108)
CHOLEST SERPL-MCNC: 190 MG/DL (ref 50–200)
CO2 SERPL-SCNC: 28 MMOL/L (ref 21–32)
CREAT SERPL-MCNC: 1.1 MG/DL (ref 0.6–1.3)
CREAT UR-MCNC: 122 MG/DL
EOSINOPHIL # BLD AUTO: 0.17 THOUSAND/ΜL (ref 0–0.61)
EOSINOPHIL NFR BLD AUTO: 2 % (ref 0–6)
ERYTHROCYTE [DISTWIDTH] IN BLOOD BY AUTOMATED COUNT: 12.4 % (ref 11.6–15.1)
EST. AVERAGE GLUCOSE BLD GHB EST-MCNC: 134 MG/DL
GFR SERPL CREATININE-BSD FRML MDRD: 45 ML/MIN/1.73SQ M
GLUCOSE P FAST SERPL-MCNC: 138 MG/DL (ref 65–99)
HBA1C MFR BLD: 6.3 % (ref 4.2–6.3)
HCT VFR BLD AUTO: 42.8 % (ref 34.8–46.1)
HDLC SERPL-MCNC: 56 MG/DL
HGB BLD-MCNC: 13.5 G/DL (ref 11.5–15.4)
IMM GRANULOCYTES # BLD AUTO: 0.04 THOUSAND/UL (ref 0–0.2)
IMM GRANULOCYTES NFR BLD AUTO: 1 % (ref 0–2)
LDLC SERPL CALC-MCNC: 107 MG/DL (ref 0–100)
LYMPHOCYTES # BLD AUTO: 1.14 THOUSANDS/ΜL (ref 0.6–4.47)
LYMPHOCYTES NFR BLD AUTO: 16 % (ref 14–44)
MCH RBC QN AUTO: 30.8 PG (ref 26.8–34.3)
MCHC RBC AUTO-ENTMCNC: 31.5 G/DL (ref 31.4–37.4)
MCV RBC AUTO: 98 FL (ref 82–98)
MICROALBUMIN UR-MCNC: 28.3 MG/L (ref 0–20)
MICROALBUMIN/CREAT 24H UR: 23 MG/G CREATININE (ref 0–30)
MONOCYTES # BLD AUTO: 0.45 THOUSAND/ΜL (ref 0.17–1.22)
MONOCYTES NFR BLD AUTO: 7 % (ref 4–12)
NEUTROPHILS # BLD AUTO: 5.09 THOUSANDS/ΜL (ref 1.85–7.62)
NEUTS SEG NFR BLD AUTO: 73 % (ref 43–75)
NRBC BLD AUTO-RTO: 0 /100 WBCS
PLATELET # BLD AUTO: 219 THOUSANDS/UL (ref 149–390)
PMV BLD AUTO: 10.3 FL (ref 8.9–12.7)
POTASSIUM SERPL-SCNC: 4.1 MMOL/L (ref 3.5–5.3)
PROT SERPL-MCNC: 7.2 G/DL (ref 6.4–8.2)
RBC # BLD AUTO: 4.39 MILLION/UL (ref 3.81–5.12)
SODIUM SERPL-SCNC: 143 MMOL/L (ref 136–145)
TRIGL SERPL-MCNC: 135 MG/DL
WBC # BLD AUTO: 6.95 THOUSAND/UL (ref 4.31–10.16)

## 2019-12-05 PROCEDURE — 82570 ASSAY OF URINE CREATININE: CPT

## 2019-12-05 PROCEDURE — 80061 LIPID PANEL: CPT

## 2019-12-05 PROCEDURE — 85025 COMPLETE CBC W/AUTO DIFF WBC: CPT

## 2019-12-05 PROCEDURE — 82043 UR ALBUMIN QUANTITATIVE: CPT

## 2019-12-05 PROCEDURE — 80053 COMPREHEN METABOLIC PANEL: CPT

## 2019-12-05 PROCEDURE — 36415 COLL VENOUS BLD VENIPUNCTURE: CPT

## 2019-12-05 PROCEDURE — 83036 HEMOGLOBIN GLYCOSYLATED A1C: CPT

## 2019-12-09 NOTE — PROGRESS NOTES
Assessment/Plan:       Diagnoses and all orders for this visit:    Syncope, unspecified syncope type  -     Comprehensive metabolic panel; Future  -     Hemoglobin A1C; Future  -     CBC and differential; Future  -     Lipid panel; Future  -     CT head wo contrast; Future    Type 2 diabetes mellitus with stage 3 chronic kidney disease, without long-term current use of insulin (HCC)  -     glipiZIDE (GLUCOTROL XL) 2 5 mg 24 hr tablet; Take 1 tablet (2 5 mg total) by mouth daily  -     Comprehensive metabolic panel; Future  -     Hemoglobin A1C; Future  -     CBC and differential; Future  -     Lipid panel; Future    Benign essential hypertension  -     losartan (COZAAR) 100 MG tablet; Take 1 tablet (100 mg total) by mouth daily  -     atenolol (TENORMIN) 100 mg tablet; Take 1 tablet (100 mg total) by mouth daily  -     amLODIPine (NORVASC) 10 mg tablet; Take 1 tablet (10 mg total) by mouth daily  -     Comprehensive metabolic panel; Future  -     Hemoglobin A1C; Future  -     CBC and differential; Future  -     Lipid panel; Future  -     CT head wo contrast; Future    Longstanding persistent atrial fibrillation  -     Comprehensive metabolic panel; Future  -     Hemoglobin A1C; Future  -     CBC and differential; Future  -     Lipid panel; Future    Chronic kidney disease, stage III (moderate) (HCC)  -     Comprehensive metabolic panel; Future  -     Hemoglobin A1C; Future  -     CBC and differential; Future  -     Lipid panel; Future    Anticoagulant long-term use  -     Comprehensive metabolic panel; Future  -     Hemoglobin A1C; Future  -     CBC and differential; Future  -     Lipid panel; Future    Fever, unspecified fever cause  -     POCT urine dip  -     Comprehensive metabolic panel; Future  -     Hemoglobin A1C; Future  -     CBC and differential; Future  -     Lipid panel; Future    Colostomy in place [facility])  -     Comprehensive metabolic panel;  Future  -     Hemoglobin A1C; Future  -     CBC and differential; Future  -     Lipid panel; Future    Subclinical hypothyroidism  -     Comprehensive metabolic panel; Future  -     Hemoglobin A1C; Future  -     CBC and differential; Future  -     Lipid panel; Future    Osteopenia of multiple sites  -     Comprehensive metabolic panel; Future  -     Hemoglobin A1C; Future  -     CBC and differential; Future  -     Lipid panel; Future    Mixed hyperlipidemia  -     atorvastatin (LIPITOR) 20 mg tablet; Take 1 tablet (20 mg total) by mouth daily  -     Comprehensive metabolic panel; Future  -     Hemoglobin A1C; Future  -     CBC and differential; Future  -     Lipid panel; Future    Overweight (BMI 25 0-29 9)  -     Comprehensive metabolic panel; Future  -     Hemoglobin A1C; Future  -     CBC and differential; Future  -     Lipid panel; Future    Paroxysmal atrial fibrillation (HCC)  -     apixaban (ELIQUIS) 5 mg; Take 1 tablet (5 mg total) by mouth 2 (two) times a day  -     Comprehensive metabolic panel; Future  -     Hemoglobin A1C; Future  -     CBC and differential; Future  -     Lipid panel; Future  -     CT head wo contrast; Future    Ulcerative colitis with complication, unspecified location (HCC)  -     mesalamine (ASACOL) 800 MG EC tablet; Take 1 tablet (800 mg total) by mouth 3 (three) times a day  -     Comprehensive metabolic panel; Future  -     Hemoglobin A1C; Future  -     CBC and differential; Future  -     Lipid panel; Future  -     hydrocortisone-pramoxine (PROCTOFOAM-HC) rectal foam; Insert 1 applicator into the rectum daily for 90 doses    Hearing loss of right ear due to cerumen impaction    Other orders  -     Ear cerumen removal        No problem-specific Assessment & Plan notes found for this encounter  Subjective:      Patient ID: Ana Rizo is a 80 y o  female  Patient was in [employer] two weeks ago, listening to the sermon, the next thing she knew she opened her eyes and people were everywhere  She had passed out   EMS was called  VS were stable when EMS arrived  She refused to be evaluated in the hospital  The next day, she felt very weak and tired  Over the past two weeks, she has noticed that her blood pressure is running higher than normal  She also reports that her glucose is labile   The morning of the syncopal event, her glucose was "normal"  She was "overdressed" but doesn't recall feeling too warm  She reports that when she was sitting upright her eyelids were open, but when they laid her back, her eyelids closed  Over the past few days, she has felt symptoms of sore throat and a "tickling" in her throat  She has been trying to drink more water  She does have temperature today in the office   She does not have headache but states "my head doesn't feel right"    Chronic medical conditions:  Hypertension- higher than normal for her  Hyperlipidemia- stable  DM- A1C is stable and has improved to 6 3  Declines her immunizations today because she is changing insurance next month  Atrila fibrillation- chronically anticoagulated with eliquis    Results for Scurrie Drilling (MRN [id number]) as of 12/10/2019 09:49    2019 08:58  Sodium: 143  Potassium: 4 1  Chloride: 110 (H)  CO2: 28  Anion Gap: 5  BUN: 21  Creatinine: 1 10  GLUCOSE FASTIN (H)  Calcium: 8 8  AST: 12  ALT: 19  Alkaline Phosphatase: 83  Total Protein: 7 2  Albumin: 3 6  TOTAL BILIRUBIN: 0 32  eGFR: 45  Cholesterol: 190  Triglycerides: 135  HDL: 56  LDL Direct: 107 (H)  WBC: 6 95  Red Blood Cell Count: 4 39  Hemoglobin: 13 5  HCT: 42 8  MCV: 98  MCH: 30 8  MCHC: 31 5  RDW: 12 4  Platelet Count: 387  MPV: 10 3  nRBC: 0  Neutrophils %: 73  Immat GRANS %: 1  Lymphocytes Relative: 16  Monocytes Relative: 7  Eosinophils: 2  Basophils Relative: 1  Immature Grans Absolute: 0 04  Absolute Neutrophils: 5 09  Lymphocytes Absolute: 1 14  Absolute Monocytes: 0 45  Absolute Eosinophils: 0 17  Basophils Absolute: 0 06  Hemoglobin A1C: 6 3  EA  EXT Creatinine Urine: 122 0  MICROALBUMIN/CREATININE RATIO: 21  MICROALBUM ,U,RANDOM: 28 3 (H)        The following portions of the patient's history were reviewed and updated as appropriate:   She has a past medical history of Chronic kidney disease, Diabetes mellitus ([address] ), Hyperlipidemia, and Hypertension  ,  does not have any pertinent problems on file  ,   has a past surgical history that includes Colon surgery; Appendectomy; Hysterectomy; Carpal tunnel release (Bilateral); Cholecystectomy; Colostomy; and Cataract extraction  ,  family history includes Cancer in her father; Heart disease in her mother  ,   reports that she has never smoked  She has never used smokeless tobacco  She reports that she does not drink alcohol or use drugs  ,  is allergic to amoxicillin-pot clavulanate     Current Outpatient Medications   Medication Sig Dispense Refill    amLODIPine (NORVASC) 10 mg tablet Take 1 tablet (10 mg total) by mouth daily 90 tablet 3    apixaban (ELIQUIS) 5 mg Take 1 tablet (5 mg total) by mouth 2 (two) times a day 180 tablet 3    atenolol (TENORMIN) 100 mg tablet Take 1 tablet (100 mg total) by mouth daily 90 tablet 1    atorvastatin (LIPITOR) 20 mg tablet Take 1 tablet (20 mg total) by mouth daily 90 tablet 3    Blood Glucose Monitoring Suppl (ONE TOUCH ULTRA 2) w/Device KIT by Does not apply route daily 1 each 0    calcium carbonate-vitamin D (OSCAL-D) 500 mg-200 units per tablet Take 1 tablet by mouth 2 (two) times a day with meals      docusate sodium (COLACE) 100 mg capsule Take 100 mg by mouth 2 (two) times a day      ferrous gluconate (FERGON) 324 mg tablet Take 324 mg by mouth daily with breakfast      glipiZIDE (GLUCOTROL XL) 2 5 mg 24 hr tablet Take 1 tablet (2 5 mg total) by mouth daily 90 tablet 3    glucose blood (ONE TOUCH ULTRA TEST) test strip Test once daily 100 each 3    hydrocortisone-pramoxine (PROCTOFOAM-HC) rectal foam Insert 1 applicator into the rectum daily for 90 doses 30 g 3    losartan (COZAAR) 100 MG tablet Take 1 tablet (100 mg total) by mouth daily 90 tablet 3    mesalamine (ASACOL) 800 MG EC tablet Take 1 tablet (800 mg total) by mouth 3 (three) times a day 270 tablet 3    ONE TOUCH LANCETS MISC by Does not apply route daily 100 each 3    polyethylene glycol (MIRALAX) 17 g packet Take 17 g by mouth daily      Zoster Vac Recomb Adjuvanted 50 MCG/0 5ML SUSR as directed (Patient not taking: Reported on 8/14/2019) 1 each 0     No current facility-administered medications for this visit  Review of Systems   Constitutional: Negative  Negative for fatigue and fever  HENT: Positive for sore throat  Negative for congestion  Eyes: Negative  Negative for visual disturbance  Respiratory: Positive for cough  Negative for chest tightness, shortness of breath and wheezing  Cardiovascular: Negative  Gastrointestinal: Negative  Negative for abdominal pain, blood in stool, diarrhea and nausea  Endocrine: Negative for polydipsia, polyphagia and polyuria  Genitourinary: Negative for difficulty urinating and flank pain  Musculoskeletal: Negative  Negative for arthralgias, back pain and myalgias  Skin: Negative  Negative for color change, pallor and rash  Allergic/Immunologic: Negative for immunocompromised state  Neurological: Positive for light-headedness  Negative for dizziness, weakness, numbness and headaches  Hematological: Negative for adenopathy  Psychiatric/Behavioral: Negative  Negative for confusion, decreased concentration and sleep disturbance  All other systems reviewed and are negative  Objective:  Vitals:    12/10/19 0914   BP: 152/80   BP Location: Left arm   Patient Position: Sitting   Pulse: 89   Resp: 18   Temp: 100 3 °F (37 9 °C)   SpO2: 97%   Weight: 74 4 kg (164 lb)   Height: 5' 3" (1 6 m)     Body mass index is 29 05 kg/m²  Physical Exam   Constitutional: She is oriented to person, place, and time   She appears well-developed and well-nourished  No distress  HENT:   Head: Normocephalic and atraumatic  Left Ear: External ear normal    Nose: Nose normal    Mouth/Throat: Oropharynx is clear and moist  No oropharyngeal exudate  Cerumen impaction right ear   Eyes: Pupils are equal, round, and reactive to light  Conjunctivae are normal  No scleral icterus  Neck: Normal range of motion  Neck supple  No JVD present  Cardiovascular: Normal rate, normal heart sounds and intact distal pulses  Exam reveals no gallop and no friction rub  Pulses are no weak pulses  No murmur heard  Pulses:       Dorsalis pedis pulses are 2+ on the right side, and 2+ on the left side  Posterior tibial pulses are 2+ on the right side, and 2+ on the left side  Normal rate, few irregular beats likely PAC   Pulmonary/Chest: Effort normal and breath sounds normal  No respiratory distress  She exhibits no tenderness  Abdominal: Soft  Bowel sounds are normal  She exhibits no distension  There is no tenderness  Musculoskeletal: Normal range of motion  She exhibits no edema or tenderness  Feet:   Right Foot:   Skin Integrity: Negative for ulcer, skin breakdown, erythema, warmth, callus or dry skin  Left Foot:   Skin Integrity: Negative for ulcer, skin breakdown, erythema, warmth, callus or dry skin  Lymphadenopathy:     She has no cervical adenopathy  Neurological: She is alert and oriented to person, place, and time  Coordination normal    Skin: Skin is warm and dry  No rash noted  She is not diaphoretic  Psychiatric: She has a normal mood and affect  Her behavior is normal  Judgment and thought content normal    Nursing note and vitals reviewed  Diabetic Foot Exam    Patient's shoes and socks removed  Right Foot/Ankle   Right Foot Inspection  Skin Exam: skin normal and skin intact no dry skin, no warmth, no callus, no erythema, no maceration, no abnormal color, no pre-ulcer, no ulcer and no callus                          Toe Exam: ROM and strength within normal limits  Sensory   Vibration: intact  Proprioception: intact   Monofilament testing: intact  Vascular  Capillary refills: < 3 seconds  The right DP pulse is 2+  The right PT pulse is 2+  Right Toe  - Comprehensive Exam  Ecchymosis: none  Arch: normal  Hammertoes: absent  Claw Toes: absent  Swelling: none   Tenderness: none         Left Foot/Ankle  Left Foot Inspection  Skin Exam: skin normal and skin intactno dry skin, no warmth, no erythema, no maceration, normal color, no pre-ulcer, no ulcer and no callus                         Toe Exam: ROM and strength within normal limits                   Sensory   Vibration: intact  Proprioception: intact  Monofilament: intact  Vascular  Capillary refills: < 3 seconds  The left DP pulse is 2+  The left PT pulse is 2+  Left Toe  - Comprehensive Exam  Ecchymosis: none  Arch: normal  Hammertoes: absent  Claw toes: absent  Swelling: none   Tenderness: none       Assign Risk Category:  No deformity present; No loss of protective sensation; No weak pulses       Risk: 0    Ear cerumen removal  Date/Time: 12/10/2019 10:12 AM  Performed by: Ames Lundborg, CRNP  Authorized by: Ames Lundborg, CRNP     Patient location:  Bedside  Consent:     Consent obtained:  Verbal    Consent given by:  Patient  Universal protocol:     Procedure explained and questions answered to patient or proxy's satisfaction: yes    Procedure details:     Local anesthetic:  None    Location:  R ear    Procedure type: irrigation with insturmentation      Insturmentation: curette      Approach:  External and internal  Post-procedure details:     Complication:  None    Hearing quality:  Normal    Patient tolerance of procedure:   Tolerated well, no immediate complications  Comments:      Some dizziness after irrigation

## 2019-12-10 ENCOUNTER — OFFICE VISIT (OUTPATIENT)
Dept: FAMILY MEDICINE CLINIC | Facility: CLINIC | Age: 84
End: 2019-12-10
Payer: COMMERCIAL

## 2019-12-10 VITALS
OXYGEN SATURATION: 97 % | BODY MASS INDEX: 29.06 KG/M2 | DIASTOLIC BLOOD PRESSURE: 80 MMHG | WEIGHT: 164 LBS | TEMPERATURE: 100.3 F | RESPIRATION RATE: 18 BRPM | HEART RATE: 89 BPM | SYSTOLIC BLOOD PRESSURE: 152 MMHG | HEIGHT: 63 IN

## 2019-12-10 DIAGNOSIS — I48.11 LONGSTANDING PERSISTENT ATRIAL FIBRILLATION (HCC): Chronic | ICD-10-CM

## 2019-12-10 DIAGNOSIS — E66.3 OVERWEIGHT (BMI 25.0-29.9): ICD-10-CM

## 2019-12-10 DIAGNOSIS — M85.89 OSTEOPENIA OF MULTIPLE SITES: ICD-10-CM

## 2019-12-10 DIAGNOSIS — E11.22 TYPE 2 DIABETES MELLITUS WITH STAGE 3 CHRONIC KIDNEY DISEASE, WITHOUT LONG-TERM CURRENT USE OF INSULIN (HCC): Chronic | ICD-10-CM

## 2019-12-10 DIAGNOSIS — H61.21 HEARING LOSS OF RIGHT EAR DUE TO CERUMEN IMPACTION: ICD-10-CM

## 2019-12-10 DIAGNOSIS — R50.9 FEVER, UNSPECIFIED FEVER CAUSE: ICD-10-CM

## 2019-12-10 DIAGNOSIS — Z93.3 COLOSTOMY IN PLACE (HCC): ICD-10-CM

## 2019-12-10 DIAGNOSIS — I48.0 PAROXYSMAL ATRIAL FIBRILLATION (HCC): ICD-10-CM

## 2019-12-10 DIAGNOSIS — R55 SYNCOPE, UNSPECIFIED SYNCOPE TYPE: Primary | ICD-10-CM

## 2019-12-10 DIAGNOSIS — I10 BENIGN ESSENTIAL HYPERTENSION: Chronic | ICD-10-CM

## 2019-12-10 DIAGNOSIS — E03.8 SUBCLINICAL HYPOTHYROIDISM: Chronic | ICD-10-CM

## 2019-12-10 DIAGNOSIS — N18.30 CHRONIC KIDNEY DISEASE, STAGE III (MODERATE) (HCC): ICD-10-CM

## 2019-12-10 DIAGNOSIS — N18.30 TYPE 2 DIABETES MELLITUS WITH STAGE 3 CHRONIC KIDNEY DISEASE, WITHOUT LONG-TERM CURRENT USE OF INSULIN (HCC): Chronic | ICD-10-CM

## 2019-12-10 DIAGNOSIS — Z79.01 ANTICOAGULANT LONG-TERM USE: ICD-10-CM

## 2019-12-10 DIAGNOSIS — K51.919 ULCERATIVE COLITIS WITH COMPLICATION, UNSPECIFIED LOCATION (HCC): ICD-10-CM

## 2019-12-10 DIAGNOSIS — E78.2 MIXED HYPERLIPIDEMIA: ICD-10-CM

## 2019-12-10 LAB
SL AMB  POCT GLUCOSE, UA: 500
SL AMB LEUKOCYTE ESTERASE,UA: NEGATIVE
SL AMB POCT BILIRUBIN,UA: NEGATIVE
SL AMB POCT BLOOD,UA: ABNORMAL
SL AMB POCT CLARITY,UA: CLEAR
SL AMB POCT COLOR,UA: YELLOW
SL AMB POCT KETONES,UA: NEGATIVE
SL AMB POCT NITRITE,UA: NEGATIVE
SL AMB POCT PH,UA: 5.5
SL AMB POCT SPECIFIC GRAVITY,UA: 1.01
SL AMB POCT URINE PROTEIN: NEGATIVE
SL AMB POCT UROBILINOGEN: 0.2

## 2019-12-10 PROCEDURE — 69210 REMOVE IMPACTED EAR WAX UNI: CPT | Performed by: NURSE PRACTITIONER

## 2019-12-10 PROCEDURE — 1160F RVW MEDS BY RX/DR IN RCRD: CPT | Performed by: NURSE PRACTITIONER

## 2019-12-10 PROCEDURE — 81002 URINALYSIS NONAUTO W/O SCOPE: CPT | Performed by: NURSE PRACTITIONER

## 2019-12-10 PROCEDURE — 99214 OFFICE O/P EST MOD 30 MIN: CPT | Performed by: NURSE PRACTITIONER

## 2019-12-10 RX ORDER — AMLODIPINE BESYLATE 10 MG/1
10 TABLET ORAL DAILY
Qty: 90 TABLET | Refills: 3 | Status: SHIPPED | OUTPATIENT
Start: 2019-12-10 | End: 2019-12-31 | Stop reason: SDUPTHER

## 2019-12-10 RX ORDER — ATENOLOL 100 MG/1
100 TABLET ORAL DAILY
Qty: 90 TABLET | Refills: 1 | Status: SHIPPED | OUTPATIENT
Start: 2019-12-10 | End: 2020-01-06 | Stop reason: SDUPTHER

## 2019-12-10 RX ORDER — LOSARTAN POTASSIUM 100 MG/1
100 TABLET ORAL DAILY
Qty: 90 TABLET | Refills: 3 | Status: SHIPPED | OUTPATIENT
Start: 2019-12-10 | End: 2020-01-06 | Stop reason: SDUPTHER

## 2019-12-10 RX ORDER — ATORVASTATIN CALCIUM 20 MG/1
20 TABLET, FILM COATED ORAL DAILY
Qty: 90 TABLET | Refills: 3 | Status: SHIPPED | OUTPATIENT
Start: 2019-12-10 | End: 2019-12-31 | Stop reason: SDUPTHER

## 2019-12-10 RX ORDER — GLIPIZIDE 2.5 MG/1
2.5 TABLET, EXTENDED RELEASE ORAL DAILY
Qty: 90 TABLET | Refills: 3 | Status: SHIPPED | OUTPATIENT
Start: 2019-12-10 | End: 2020-01-06 | Stop reason: SDUPTHER

## 2019-12-10 RX ORDER — MESALAMINE 800 MG/1
800 TABLET, DELAYED RELEASE ORAL 3 TIMES DAILY
Qty: 270 TABLET | Refills: 3 | Status: SHIPPED | OUTPATIENT
Start: 2019-12-10 | End: 2019-12-31 | Stop reason: SDUPTHER

## 2019-12-10 NOTE — PATIENT INSTRUCTIONS
Syncopal event- obtain CT of the head  She has h/o PAF  BP is running higher than normal for her  Increase amlodipine to 10 mg daily  All medications refilled to [facility]    Cerumen was removed from right ear      Chronic medical conditions:  Hypertension- higher than normal for her  Hyperlipidemia- stable  DM- A1C is stable and has improved to 6 3  Declines her immunizations today because she is changing insurance next month  Atrila fibrillation- chronically anticoagulated with eliquis      Results for Jas Babcock (MRN [id number]) as of 12/10/2019 09:49   Ref   Range 12/5/2019 08:58   Sodium Latest Ref Range: 136 - 145 mmol/L 143   Potassium Latest Ref Range: 3 5 - 5 3 mmol/L 4 1   Chloride Latest Ref Range: 100 - 108 mmol/L 110 (H)   CO2 Latest Ref Range: 21 - 32 mmol/L 28   Anion Gap Latest Ref Range: 4 - 13 mmol/L 5   BUN Latest Ref Range: 5 - 25 mg/dL 21   Creatinine Latest Ref Range: 0 60 - 1 30 mg/dL 1 10   GLUCOSE FASTING Latest Ref Range: 65 - 99 mg/dL 138 (H)   Calcium Latest Ref Range: 8 3 - 10 1 mg/dL 8 8   AST Latest Ref Range: 5 - 45 U/L 12   ALT Latest Ref Range: 12 - 78 U/L 19   Alkaline Phosphatase Latest Ref Range: 46 - 116 U/L 83   Total Protein Latest Ref Range: 6 4 - 8 2 g/dL 7 2   Albumin Latest Ref Range: 3 5 - 5 0 g/dL 3 6   TOTAL BILIRUBIN Latest Ref Range: 0 20 - 1 00 mg/dL 0 32   eGFR Latest Units: ml/min/1 73sq m 45   Cholesterol Latest Ref Range: 50 - 200 mg/dL 190   Triglycerides Latest Ref Range: <=150 mg/dL 135   HDL Latest Ref Range: >=40 mg/dL 56   LDL Direct Latest Ref Range: 0 - 100 mg/dL 107 (H)   WBC Latest Ref Range: 4 31 - 10 16 Thousand/uL 6 95   Red Blood Cell Count Latest Ref Range: 3 81 - 5 12 Million/uL 4 39   Hemoglobin Latest Ref Range: 11 5 - 15 4 g/dL 13 5   HCT Latest Ref Range: 34 8 - 46 1 % 42 8   MCV Latest Ref Range: 82 - 98 fL 98   MCH Latest Ref Range: 26 8 - 34 3 pg 30 8   MCHC Latest Ref Range: 31 4 - 37 4 g/dL 31 5   RDW Latest Ref Range: 11 6 - 15 1 % 12 4   Platelet Count Latest Ref Range: 149 - 390 Thousands/uL 219   MPV Latest Ref Range: 8 9 - 12 7 fL 10 3   nRBC Latest Units: /100 WBCs 0   Neutrophils % Latest Ref Range: 43 - 75 % 73   Immat GRANS % Latest Ref Range: 0 - 2 % 1   Lymphocytes Relative Latest Ref Range: 14 - 44 % 16   Monocytes Relative Latest Ref Range: 4 - 12 % 7   Eosinophils Latest Ref Range: 0 - 6 % 2   Basophils Relative Latest Ref Range: 0 - 1 % 1   Immature Grans Absolute Latest Ref Range: 0 00 - 0 20 Thousand/uL 0 04   Absolute Neutrophils Latest Ref Range: 1 85 - 7 62 Thousands/µL 5 09   Lymphocytes Absolute Latest Ref Range: 0 60 - 4 47 Thousands/µL 1 14   Absolute Monocytes Latest Ref Range: 0 17 - 1 22 Thousand/µL 0 45   Absolute Eosinophils Latest Ref Range: 0 00 - 0 61 Thousand/µL 0 17   Basophils Absolute Latest Ref Range: 0 00 - 0 10 Thousands/µL 0 06   Hemoglobin A1C Latest Ref Range: 4 2 - 6 3 % 6 3   EAG Latest Units: mg/dl 134   EXT Creatinine Urine Latest Units: mg/dL 122 0   MICROALBUMIN/CREATININE RATIO Latest Ref Range: 0 - 30 mg/g creatinine 23   MICROALBUM ,U,RANDOM Latest Ref Range: 0 0 - 20 0 mg/L 28 3 (H)

## 2019-12-12 ENCOUNTER — HOSPITAL ENCOUNTER (OUTPATIENT)
Dept: CT IMAGING | Facility: HOSPITAL | Age: 84
Discharge: HOME | DRG: 194 | End: 2019-12-12
Payer: COMMERCIAL

## 2019-12-12 DIAGNOSIS — I48.0 PAROXYSMAL ATRIAL FIBRILLATION (HCC): ICD-10-CM

## 2019-12-12 DIAGNOSIS — I10 BENIGN ESSENTIAL HYPERTENSION: ICD-10-CM

## 2019-12-12 DIAGNOSIS — R55 SYNCOPE, UNSPECIFIED SYNCOPE TYPE: ICD-10-CM

## 2019-12-12 PROCEDURE — 70450 CT HEAD/BRAIN W/O DYE: CPT

## 2019-12-14 ENCOUNTER — HOSPITAL ENCOUNTER (INPATIENT)
Facility: HOSPITAL | Age: 84
LOS: 2 days | Discharge: HOME | DRG: 194 | End: 2019-12-17
Attending: EMERGENCY MEDICINE | Admitting: INTERNAL MEDICINE
Payer: COMMERCIAL

## 2019-12-14 ENCOUNTER — APPOINTMENT (EMERGENCY)
Dept: RADIOLOGY | Facility: HOSPITAL | Age: 84
DRG: 194 | End: 2019-12-14
Payer: COMMERCIAL

## 2019-12-14 DIAGNOSIS — R53.1 WEAKNESS: Primary | ICD-10-CM

## 2019-12-14 DIAGNOSIS — R53.1 WEAKNESS GENERALIZED: ICD-10-CM

## 2019-12-14 DIAGNOSIS — J18.9 RIGHT UPPER LOBE PNEUMONIA: ICD-10-CM

## 2019-12-14 DIAGNOSIS — N39.0 UTI (URINARY TRACT INFECTION): ICD-10-CM

## 2019-12-14 DIAGNOSIS — E86.0 DEHYDRATION: ICD-10-CM

## 2019-12-14 PROBLEM — R05.9 COUGH: Status: ACTIVE | Noted: 2019-12-14

## 2019-12-14 PROBLEM — R77.8 ELEVATED TROPONIN: Status: ACTIVE | Noted: 2019-12-14

## 2019-12-14 PROBLEM — R79.89 ELEVATED TROPONIN: Status: ACTIVE | Noted: 2019-12-14

## 2019-12-14 LAB
ALBUMIN SERPL BCP-MCNC: 3.4 G/DL (ref 3.5–5)
ALP SERPL-CCNC: 81 U/L (ref 46–116)
ALT SERPL W P-5'-P-CCNC: 19 U/L (ref 12–78)
ANION GAP SERPL CALCULATED.3IONS-SCNC: 9 MMOL/L (ref 4–13)
APTT PPP: 32 SECONDS (ref 23–37)
AST SERPL W P-5'-P-CCNC: 26 U/L (ref 5–45)
BACTERIA UR QL AUTO: ABNORMAL /HPF
BACTERIA UR QL AUTO: ABNORMAL /HPF
BASOPHILS # BLD AUTO: 0.04 THOUSANDS/ΜL (ref 0–0.1)
BASOPHILS NFR BLD AUTO: 1 % (ref 0–1)
BILIRUB DIRECT SERPL-MCNC: 0.08 MG/DL (ref 0–0.2)
BILIRUB SERPL-MCNC: 0.3 MG/DL (ref 0.2–1)
BILIRUB UR QL STRIP: NEGATIVE
BILIRUB UR QL STRIP: NEGATIVE
BUN SERPL-MCNC: 21 MG/DL (ref 5–25)
CALCIUM SERPL-MCNC: 9.3 MG/DL (ref 8.3–10.1)
CHLORIDE SERPL-SCNC: 102 MMOL/L (ref 100–108)
CLARITY UR: ABNORMAL
CLARITY UR: CLEAR
CO2 SERPL-SCNC: 29 MMOL/L (ref 21–32)
COLOR UR: ABNORMAL
COLOR UR: YELLOW
CREAT SERPL-MCNC: 1.24 MG/DL (ref 0.6–1.3)
EOSINOPHIL # BLD AUTO: 0.12 THOUSAND/ΜL (ref 0–0.61)
EOSINOPHIL NFR BLD AUTO: 2 % (ref 0–6)
ERYTHROCYTE [DISTWIDTH] IN BLOOD BY AUTOMATED COUNT: 12.7 % (ref 11.6–15.1)
FLUAV RNA NPH QL NAA+PROBE: NORMAL
FLUBV RNA NPH QL NAA+PROBE: NORMAL
GFR SERPL CREATININE-BSD FRML MDRD: 39 ML/MIN/1.73SQ M
GLUCOSE SERPL-MCNC: 82 MG/DL (ref 65–140)
GLUCOSE SERPL-MCNC: 94 MG/DL (ref 65–140)
GLUCOSE UR STRIP-MCNC: NEGATIVE MG/DL
GLUCOSE UR STRIP-MCNC: NEGATIVE MG/DL
HCT VFR BLD AUTO: 46.5 % (ref 34.8–46.1)
HGB BLD-MCNC: 14.6 G/DL (ref 11.5–15.4)
HGB UR QL STRIP.AUTO: ABNORMAL
HGB UR QL STRIP.AUTO: NEGATIVE
IMM GRANULOCYTES # BLD AUTO: 0.03 THOUSAND/UL (ref 0–0.2)
IMM GRANULOCYTES NFR BLD AUTO: 1 % (ref 0–2)
INR PPP: 1.27 (ref 0.84–1.19)
KETONES UR STRIP-MCNC: NEGATIVE MG/DL
KETONES UR STRIP-MCNC: NEGATIVE MG/DL
LACTATE SERPL-SCNC: 1.1 MMOL/L (ref 0.5–2)
LEUKOCYTE ESTERASE UR QL STRIP: ABNORMAL
LEUKOCYTE ESTERASE UR QL STRIP: ABNORMAL
LYMPHOCYTES # BLD AUTO: 0.92 THOUSANDS/ΜL (ref 0.6–4.47)
LYMPHOCYTES NFR BLD AUTO: 14 % (ref 14–44)
MCH RBC QN AUTO: 30.4 PG (ref 26.8–34.3)
MCHC RBC AUTO-ENTMCNC: 31.4 G/DL (ref 31.4–37.4)
MCV RBC AUTO: 97 FL (ref 82–98)
MONOCYTES # BLD AUTO: 0.61 THOUSAND/ΜL (ref 0.17–1.22)
MONOCYTES NFR BLD AUTO: 9 % (ref 4–12)
NEUTROPHILS # BLD AUTO: 4.89 THOUSANDS/ΜL (ref 1.85–7.62)
NEUTS SEG NFR BLD AUTO: 73 % (ref 43–75)
NITRITE UR QL STRIP: NEGATIVE
NITRITE UR QL STRIP: NEGATIVE
NON-SQ EPI CELLS URNS QL MICRO: ABNORMAL /HPF
NON-SQ EPI CELLS URNS QL MICRO: ABNORMAL /HPF
NRBC BLD AUTO-RTO: 0 /100 WBCS
PH UR STRIP.AUTO: 5.5 [PH]
PH UR STRIP.AUTO: 5.5 [PH]
PLATELET # BLD AUTO: 189 THOUSANDS/UL (ref 149–390)
PMV BLD AUTO: 10.2 FL (ref 8.9–12.7)
POTASSIUM SERPL-SCNC: 4 MMOL/L (ref 3.5–5.3)
PROT SERPL-MCNC: 8.2 G/DL (ref 6.4–8.2)
PROT UR STRIP-MCNC: ABNORMAL MG/DL
PROT UR STRIP-MCNC: NEGATIVE MG/DL
PROTHROMBIN TIME: 15.9 SECONDS (ref 11.6–14.5)
RBC # BLD AUTO: 4.81 MILLION/UL (ref 3.81–5.12)
RBC #/AREA URNS AUTO: ABNORMAL /HPF
RBC #/AREA URNS AUTO: ABNORMAL /HPF
RSV RNA NPH QL NAA+PROBE: NORMAL
SODIUM SERPL-SCNC: 140 MMOL/L (ref 136–145)
SP GR UR STRIP.AUTO: 1.01 (ref 1–1.03)
SP GR UR STRIP.AUTO: 1.02 (ref 1–1.03)
TROPONIN I SERPL-MCNC: 0.09 NG/ML
TROPONIN I SERPL-MCNC: 0.09 NG/ML
UROBILINOGEN UR QL STRIP.AUTO: 0.2 E.U./DL
UROBILINOGEN UR QL STRIP.AUTO: 0.2 E.U./DL
WBC # BLD AUTO: 6.61 THOUSAND/UL (ref 4.31–10.16)
WBC #/AREA URNS AUTO: ABNORMAL /HPF
WBC #/AREA URNS AUTO: ABNORMAL /HPF

## 2019-12-14 PROCEDURE — 84484 ASSAY OF TROPONIN QUANT: CPT | Performed by: EMERGENCY MEDICINE

## 2019-12-14 PROCEDURE — 99285 EMERGENCY DEPT VISIT HI MDM: CPT | Performed by: EMERGENCY MEDICINE

## 2019-12-14 PROCEDURE — 85025 COMPLETE CBC W/AUTO DIFF WBC: CPT | Performed by: EMERGENCY MEDICINE

## 2019-12-14 PROCEDURE — 87086 URINE CULTURE/COLONY COUNT: CPT | Performed by: INTERNAL MEDICINE

## 2019-12-14 PROCEDURE — 80076 HEPATIC FUNCTION PANEL: CPT | Performed by: EMERGENCY MEDICINE

## 2019-12-14 PROCEDURE — 85610 PROTHROMBIN TIME: CPT | Performed by: EMERGENCY MEDICINE

## 2019-12-14 PROCEDURE — 82948 REAGENT STRIP/BLOOD GLUCOSE: CPT

## 2019-12-14 PROCEDURE — 83605 ASSAY OF LACTIC ACID: CPT | Performed by: EMERGENCY MEDICINE

## 2019-12-14 PROCEDURE — 81001 URINALYSIS AUTO W/SCOPE: CPT | Performed by: NURSE PRACTITIONER

## 2019-12-14 PROCEDURE — 93005 ELECTROCARDIOGRAM TRACING: CPT

## 2019-12-14 PROCEDURE — 81001 URINALYSIS AUTO W/SCOPE: CPT | Performed by: EMERGENCY MEDICINE

## 2019-12-14 PROCEDURE — 87631 RESP VIRUS 3-5 TARGETS: CPT | Performed by: NURSE PRACTITIONER

## 2019-12-14 PROCEDURE — 99285 EMERGENCY DEPT VISIT HI MDM: CPT

## 2019-12-14 PROCEDURE — 36415 COLL VENOUS BLD VENIPUNCTURE: CPT | Performed by: EMERGENCY MEDICINE

## 2019-12-14 PROCEDURE — 71046 X-RAY EXAM CHEST 2 VIEWS: CPT

## 2019-12-14 PROCEDURE — 99220 PR INITIAL OBSERVATION CARE/DAY 70 MINUTES: CPT | Performed by: INTERNAL MEDICINE

## 2019-12-14 PROCEDURE — 84484 ASSAY OF TROPONIN QUANT: CPT | Performed by: NURSE PRACTITIONER

## 2019-12-14 PROCEDURE — 80048 BASIC METABOLIC PNL TOTAL CA: CPT | Performed by: EMERGENCY MEDICINE

## 2019-12-14 PROCEDURE — 85730 THROMBOPLASTIN TIME PARTIAL: CPT | Performed by: EMERGENCY MEDICINE

## 2019-12-14 RX ORDER — BENZONATATE 100 MG/1
100 CAPSULE ORAL 3 TIMES DAILY PRN
Status: DISCONTINUED | OUTPATIENT
Start: 2019-12-14 | End: 2019-12-17 | Stop reason: HOSPADM

## 2019-12-14 RX ORDER — GLIPIZIDE 2.5 MG/1
2.5 TABLET, EXTENDED RELEASE ORAL DAILY
Status: DISCONTINUED | OUTPATIENT
Start: 2019-12-15 | End: 2019-12-15

## 2019-12-14 RX ORDER — DOCUSATE SODIUM 100 MG/1
100 CAPSULE, LIQUID FILLED ORAL 2 TIMES DAILY
Status: DISCONTINUED | OUTPATIENT
Start: 2019-12-14 | End: 2019-12-17 | Stop reason: HOSPADM

## 2019-12-14 RX ORDER — ATENOLOL 50 MG/1
100 TABLET ORAL DAILY
Status: DISCONTINUED | OUTPATIENT
Start: 2019-12-15 | End: 2019-12-17 | Stop reason: HOSPADM

## 2019-12-14 RX ORDER — ACETAMINOPHEN 325 MG/1
650 TABLET ORAL EVERY 6 HOURS PRN
Status: DISCONTINUED | OUTPATIENT
Start: 2019-12-14 | End: 2019-12-17 | Stop reason: HOSPADM

## 2019-12-14 RX ORDER — B-COMPLEX WITH VITAMIN C
1 TABLET ORAL 2 TIMES DAILY WITH MEALS
Status: DISCONTINUED | OUTPATIENT
Start: 2019-12-15 | End: 2019-12-17 | Stop reason: HOSPADM

## 2019-12-14 RX ORDER — ATORVASTATIN CALCIUM 20 MG/1
20 TABLET, FILM COATED ORAL DAILY
Status: DISCONTINUED | OUTPATIENT
Start: 2019-12-15 | End: 2019-12-17 | Stop reason: HOSPADM

## 2019-12-14 RX ORDER — MESALAMINE 400 MG/1
800 CAPSULE, DELAYED RELEASE ORAL 3 TIMES DAILY
Status: DISCONTINUED | OUTPATIENT
Start: 2019-12-14 | End: 2019-12-17 | Stop reason: HOSPADM

## 2019-12-14 RX ORDER — DOXYCYCLINE HYCLATE 50 MG/1
324 CAPSULE, GELATIN COATED ORAL
Status: DISCONTINUED | OUTPATIENT
Start: 2019-12-15 | End: 2019-12-17 | Stop reason: HOSPADM

## 2019-12-14 RX ORDER — LOSARTAN POTASSIUM 50 MG/1
100 TABLET ORAL DAILY
Status: DISCONTINUED | OUTPATIENT
Start: 2019-12-15 | End: 2019-12-17 | Stop reason: HOSPADM

## 2019-12-14 RX ORDER — POLYETHYLENE GLYCOL 3350 17 G/17G
17 POWDER, FOR SOLUTION ORAL DAILY
Status: DISCONTINUED | OUTPATIENT
Start: 2019-12-15 | End: 2019-12-17 | Stop reason: HOSPADM

## 2019-12-14 RX ORDER — CALCIUM CARBONATE 200(500)MG
1000 TABLET,CHEWABLE ORAL DAILY PRN
Status: DISCONTINUED | OUTPATIENT
Start: 2019-12-14 | End: 2019-12-17 | Stop reason: HOSPADM

## 2019-12-14 RX ORDER — AMLODIPINE BESYLATE 10 MG/1
10 TABLET ORAL DAILY
Status: DISCONTINUED | OUTPATIENT
Start: 2019-12-15 | End: 2019-12-17 | Stop reason: HOSPADM

## 2019-12-14 RX ORDER — GUAIFENESIN 600 MG
600 TABLET, EXTENDED RELEASE 12 HR ORAL EVERY 12 HOURS SCHEDULED
Status: DISCONTINUED | OUTPATIENT
Start: 2019-12-14 | End: 2019-12-17 | Stop reason: HOSPADM

## 2019-12-14 RX ORDER — ONDANSETRON 2 MG/ML
4 INJECTION INTRAMUSCULAR; INTRAVENOUS EVERY 6 HOURS PRN
Status: DISCONTINUED | OUTPATIENT
Start: 2019-12-14 | End: 2019-12-17 | Stop reason: HOSPADM

## 2019-12-14 RX ADMIN — INSULIN LISPRO 3 UNITS: 100 INJECTION, SOLUTION INTRAVENOUS; SUBCUTANEOUS at 23:36

## 2019-12-14 RX ADMIN — DOCUSATE SODIUM 100 MG: 100 CAPSULE, LIQUID FILLED ORAL at 20:10

## 2019-12-14 RX ADMIN — MESALAMINE 800 MG: 400 CAPSULE, DELAYED RELEASE ORAL at 20:10

## 2019-12-14 RX ADMIN — GUAIFENESIN 600 MG: 600 TABLET ORAL at 23:34

## 2019-12-14 RX ADMIN — CEFTRIAXONE SODIUM 1000 MG: 10 INJECTION, POWDER, FOR SOLUTION INTRAVENOUS at 18:57

## 2019-12-14 RX ADMIN — APIXABAN 5 MG: 5 TABLET, FILM COATED ORAL at 20:10

## 2019-12-14 RX ADMIN — BENZONATATE 100 MG: 100 CAPSULE ORAL at 21:36

## 2019-12-14 RX ADMIN — ACETAMINOPHEN 650 MG: 325 TABLET, FILM COATED ORAL at 20:10

## 2019-12-14 RX ADMIN — SODIUM CHLORIDE 1000 ML: 0.9 INJECTION, SOLUTION INTRAVENOUS at 18:43

## 2019-12-14 NOTE — ED NOTES
1) Chief Complaint: weakness     2) Orientation Status: a/ox4    3) Abnormal labs/abnormal focused assessment/abnormal visits:   Generalized weakness  Cough  Abnormal UA  Troponin I 0 09 ng/mL @1810 NEXT DUE AT 2027  Protime 15 9 seconds  INR 1 27    4) Important medication drips such as heparin/Cardizem/insulin, etc  N/A    5) Last time narcotics were given: N/A    6) IV lines/drains/etc  20 RAC    7)Isolation status: N/A    8)Skin: WARM DRY INTACT    9) Ambulation status: ASSISTX1    10) trauma status: N/A    RN phone number 120 [facility], RN  12/14/19 [id number]

## 2019-12-14 NOTE — ED PROVIDER NOTES
History  Chief Complaint   Patient presents with    Weakness - Generalized     Patient c/o generalized weakness, cough, nausea and just not feeling right over the last week and states that her symptoms have gotten increasingly worse over the last 2 days  Pt comes to ED w one week of generalized weakness, nausea, and feeling fatigued  She notes that she spent the last two days in bed because she felt generally weak  She denies HA and focal weakness, denies numbness, denies confusion and seizure  She denies SOB and CP but notes 1-2 days nonproductive cough and associated temperature of 100 3  She denies abdominal pain and back pain  She has no other specific concerns or sxs at this time  Prior to Admission Medications   Prescriptions Last Dose Informant Patient Reported? Taking?    Blood Glucose Monitoring Suppl (ONE TOUCH ULTRA 2) w/Device KIT   No Yes   Sig: by Does not apply route daily   ONE TOUCH LANCETS MISC   No Yes   Sig: by Does not apply route daily   Zoster Vac Recomb Adjuvanted 50 MCG/0 5ML SUSR  Self No No   Sig: as directed   Patient not taking: Reported on 8/14/2019   amLODIPine (NORVASC) 10 mg tablet   No Yes   Sig: Take 1 tablet (10 mg total) by mouth daily   apixaban (ELIQUIS) 5 mg   No Yes   Sig: Take 1 tablet (5 mg total) by mouth 2 (two) times a day   atenolol (TENORMIN) 100 mg tablet   No Yes   Sig: Take 1 tablet (100 mg total) by mouth daily   atorvastatin (LIPITOR) 20 mg tablet   No Yes   Sig: Take 1 tablet (20 mg total) by mouth daily   calcium carbonate-vitamin D (OSCAL-D) 500 mg-200 units per tablet  Self Yes Yes   Sig: Take 1 tablet by mouth 2 (two) times a day with meals   docusate sodium (COLACE) 100 mg capsule   Yes Yes   Sig: Take 100 mg by mouth 2 (two) times a day   ferrous gluconate (FERGON) 324 mg tablet  Self Yes Yes   Sig: Take 324 mg by mouth daily with breakfast   glipiZIDE (GLUCOTROL XL) 2 5 mg 24 hr tablet   No Yes   Sig: Take 1 tablet (2 5 mg total) by mouth daily   glucose blood (ONE TOUCH ULTRA TEST) test strip   No Yes   Sig: Test once daily   hydrocortisone-pramoxine (PROCTOFOAM-HC) rectal foam   No Yes   Sig: Insert 1 applicator into the rectum daily for 90 doses   losartan (COZAAR) 100 MG tablet   No Yes   Sig: Take 1 tablet (100 mg total) by mouth daily   mesalamine (ASACOL) 800 MG EC tablet   No Yes   Sig: Take 1 tablet (800 mg total) by mouth 3 (three) times a day   polyethylene glycol (MIRALAX) 17 g packet   Yes Yes   Sig: Take 17 g by mouth daily      Facility-Administered Medications: None       Past Medical History:   Diagnosis Date    A-fib ([place] [address] )     Chronic kidney disease     Colostomy present ([place] [address] )     Diabetes mellitus ([place] [address] )     Hyperlipidemia     Hypertension        Past Surgical History:   Procedure Laterality Date    APPENDECTOMY      CARPAL TUNNEL RELEASE Bilateral     CATARACT EXTRACTION      CHOLECYSTECTOMY      COLON SURGERY      COLOSTOMY      HYSTERECTOMY         Family History   Problem Relation Age of Onset    Heart disease Mother     Cancer Father      I have reviewed and agree with the history as documented  Social History     Tobacco Use    Smoking status: Never Smoker    Smokeless tobacco: Never Used   Substance Use Topics    Alcohol use: No    Drug use: No        Review of Systems   Constitutional: Positive for fever  Negative for chills  Respiratory: Positive for cough  Negative for chest tightness and shortness of breath  Cardiovascular: Negative for chest pain, palpitations and leg swelling  Gastrointestinal: Negative for abdominal distention, abdominal pain, constipation and vomiting  Neurological: Positive for weakness  Negative for dizziness, tremors, seizures, syncope, facial asymmetry, speech difficulty, light-headedness, numbness and headaches  All other systems reviewed and are negative  Physical Exam  Physical Exam   Constitutional: She is oriented to person, place, and time   She appears well-developed and well-nourished  No distress  HENT:   Head: Normocephalic and atraumatic  Eyes: Pupils are equal, round, and reactive to light  Conjunctivae and EOM are normal    Neck: Normal range of motion  Neck supple  No JVD present  Cardiovascular: Normal rate, regular rhythm, normal heart sounds and intact distal pulses  Pulmonary/Chest: Effort normal and breath sounds normal  No stridor  Abdominal: Soft  She exhibits no distension  There is no tenderness  There is no rebound and no guarding  Musculoskeletal: Normal range of motion  She exhibits no edema, tenderness or deformity  Neurological: She is alert and oriented to person, place, and time  No cranial nerve deficit or sensory deficit  She exhibits normal muscle tone  Coordination normal    Skin: Skin is warm and dry  Capillary refill takes less than 2 seconds  No rash noted  She is not diaphoretic  No erythema  No pallor  Nursing note and vitals reviewed        Vital Signs  ED Triage Vitals   Temperature Pulse Respirations Blood Pressure SpO2   12/14/19 1656 12/14/19 1656 12/14/19 1656 12/14/19 1656 12/14/19 1656   98 6 °F (37 °C) 81 16 136/64 93 %      Temp Source Heart Rate Source Patient Position - Orthostatic VS BP Location FiO2 (%)   12/14/19 1656 12/14/19 1656 12/14/19 1656 12/14/19 1656 --   Oral Monitor Sitting Left arm       Pain Score       12/14/19 1745       No Pain           Vitals:    12/14/19 1711 12/14/19 1745 12/14/19 1815 12/14/19 1947   BP: 135/66 155/77 144/74 132/72   Pulse: 85 88 79 86   Patient Position - Orthostatic VS:   Sitting          Visual Acuity      ED Medications  Medications   amLODIPine (NORVASC) tablet 10 mg (has no administration in time range)   apixaban (ELIQUIS) tablet 5 mg (5 mg Oral Given 12/14/19 2010)   atenolol (TENORMIN) tablet 100 mg (has no administration in time range)   atorvastatin (LIPITOR) tablet 20 mg (has no administration in time range)   calcium carbonate-vitamin D (OSCAL-D) 500 mg-200 units per tablet 1 tablet (has no administration in time range)   docusate sodium (COLACE) capsule 100 mg (100 mg Oral Given 12/14/19 2010)   ferrous gluconate (FERGON) tablet 324 mg (has no administration in time range)   glipiZIDE (GLUCOTROL XL) 24 hr tablet 2 5 mg (has no administration in time range)   losartan (COZAAR) tablet 100 mg (has no administration in time range)   mesalamine (DELZICOL) delayed release capsule 800 mg (800 mg Oral Given 12/14/19 2010)   polyethylene glycol (MIRALAX) packet 17 g (has no administration in time range)   acetaminophen (TYLENOL) tablet 650 mg (has no administration in time range)   ondansetron (ZOFRAN) injection 4 mg (has no administration in time range)   calcium carbonate (TUMS) chewable tablet 1,000 mg (has no administration in time range)   cefTRIAXone (ROCEPHIN) 1,000 mg in dextrose 5 % 50 mL IVPB (has no administration in time range)   sodium chloride 0 9 % bolus 1,000 mL (1,000 mL Intravenous New Bag 12/14/19 1843)   ceftriaxone (ROCEPHIN) 1 g/50 mL in dextrose IVPB (0 mg Intravenous Stopped 12/14/19 2002)       Diagnostic Studies  Results Reviewed     Procedure Component Value Units Date/Time    Urine Microscopic [[id number]]  (Abnormal) Collected:  12/14/19 1725    Lab Status:  Final result Specimen:  Urine, Clean Catch Updated:  12/14/19 1827     RBC, UA 4-10 /hpf      WBC, UA 4-10 /hpf      Epithelial Cells Occasional /hpf      Bacteria, UA Moderate /hpf     Basic metabolic panel [[id number]] Collected:  12/14/19 1727    Lab Status:  Final result Specimen:  Blood from Arm, Right Updated:  12/14/19 1815     Sodium 140 mmol/L      Potassium 4 0 mmol/L      Chloride 102 mmol/L      CO2 29 mmol/L      ANION GAP 9 mmol/L      BUN 21 mg/dL      Creatinine 1 24 mg/dL      Glucose 94 mg/dL      Calcium 9 3 mg/dL      eGFR 39 ml/min/1 73sq m     Narrative:       [place] guidelines for Chronic Kidney Disease (CKD):     Stage 1 with normal or high GFR (GFR > 90 mL/min/1 73 square meters)    Stage 2 Mild CKD (GFR = 60-89 mL/min/1 73 square meters)    Stage 3A Moderate CKD (GFR = 45-59 mL/min/1 73 square meters)    Stage 3B Moderate CKD (GFR = 30-44 mL/min/1 73 square meters)    Stage 4 Severe CKD (GFR = 15-29 mL/min/1 73 square meters)    Stage 5 End Stage CKD (GFR <15 mL/min/1 73 square meters)  Note: GFR calculation is accurate only with a steady state creatinine    Hepatic function panel [[id number]]  (Abnormal) Collected:  12/14/19 1727    Lab Status:  Final result Specimen:  Blood from Arm, Right Updated:  12/14/19 1815     Total Bilirubin 0 30 mg/dL      Bilirubin, Direct 0 08 mg/dL      Alkaline Phosphatase 81 U/L      AST 26 U/L      ALT 19 U/L      Total Protein 8 2 g/dL      Albumin 3 4 g/dL     Troponin I [[id number]]  (Abnormal) Collected:  12/14/19 1727    Lab Status:  Final result Specimen:  Blood from Arm, Right Updated:  12/14/19 1810     Troponin I 0 09 ng/mL     Lactic acid, plasma x2 [[id number]]  (Normal) Collected:  12/14/19 1727    Lab Status:  Final result Specimen:  Blood from Arm, Right Updated:  12/14/19 1809     LACTIC ACID 1 1 mmol/L     Narrative:       Result may be elevated if tourniquet was used during collection      Protime-INR [[id number]]  (Abnormal) Collected:  12/14/19 1727    Lab Status:  Final result Specimen:  Blood from Arm, Right Updated:  12/14/19 1804     Protime 15 9 seconds      INR 1 27    APTT [[id number]]  (Normal) Collected:  12/14/19 1727    Lab Status:  Final result Specimen:  Blood from Arm, Right Updated:  12/14/19 1804     PTT 32 seconds     UA w Reflex to Microscopic w Reflex to Culture [[id number]]  (Abnormal) Collected:  12/14/19 1725    Lab Status:  Final result Specimen:  Urine, Clean Catch Updated:  12/14/19 1758     Color, UA Yellow     Clarity, UA Slightly Cloudy     Specific Gravity, UA 1 025     pH, UA 5 5     Leukocytes, UA Large     Nitrite, UA Negative     Protein, UA 30 (1+) mg/dl Glucose, UA Negative mg/dl      Ketones, UA Negative mg/dl      Urobilinogen, UA 0 2 E U /dl      Bilirubin, UA Negative     Blood, UA Moderate    CBC and differential [[id number]]  (Abnormal) Collected:  12/14/19 1725    Lab Status:  Final result Specimen:  Blood from Arm, Right Updated:  12/14/19 1752     WBC 6 61 Thousand/uL      RBC 4 81 Million/uL      Hemoglobin 14 6 g/dL      Hematocrit 46 5 %      MCV 97 fL      MCH 30 4 pg      MCHC 31 4 g/dL      RDW 12 7 %      MPV 10 2 fL      Platelets 349 Thousands/uL      nRBC 0 /100 WBCs      Neutrophils Relative 73 %      Immat GRANS % 1 %      Lymphocytes Relative 14 %      Monocytes Relative 9 %      Eosinophils Relative 2 %      Basophils Relative 1 %      Neutrophils Absolute 4 89 Thousands/µL      Immature Grans Absolute 0 03 Thousand/uL      Lymphocytes Absolute 0 92 Thousands/µL      Monocytes Absolute 0 61 Thousand/µL      Eosinophils Absolute 0 12 Thousand/µL      Basophils Absolute 0 04 Thousands/µL     Lactic acid, plasma x2 [[id number]] Collected:  12/14/19 1725    Lab Status:  No result Specimen:  Blood from Arm, Right                  XR chest 2 views    (Results Pending)        Interpreted by me  No focal infiltrate, no effusion or ptx  Impression - no acute process  Procedures  ECG 12 Lead Documentation Only  Date/Time: 12/14/2019 5:23 PM  Performed by: Mecca Pride MD  Authorized by: Mecca Pride MD     Indications / Diagnosis:  Weakness  ECG reviewed by me, the ED Provider: yes    Patient location:  ED  Previous ECG:     Previous ECG:  Compared to current    Comparison ECG info:  21 july 2018    Similarity:  Changes noted  Interpretation:     Interpretation: non-specific    Rate:     ECG rate:  82    ECG rate assessment: normal    Rhythm:     Rhythm: sinus rhythm    Comments:      SR, nonspecific st changes, slightly changed as compared to prior                ED Course                               MDM      Disposition  Final diagnoses: Weakness   UTI (urinary tract infection)   Dehydration     Time reflects when diagnosis was documented in both MDM as applicable and the Disposition within this note     Time User Action Codes Description Comment    12/14/2019  6:45 PM Troy Carter Add [R53 1] Weakness     12/14/2019  6:45 PM Dilcia Chan Add [N39 0] UTI (urinary tract infection)     12/14/2019  6:45 PM Troy Carter Add [E86 0] Dehydration       ED Disposition     ED Disposition Condition Date/Time Comment    Admit Stable Sat Dec 14, 2019  6:45 PM Case was discussed with Dr Cadence Burgess and the patient's admission status was agreed to be Admission Status: observation status to the service of Dr Cadence Burgess           Follow-up Information    None         Current Discharge Medication List      CONTINUE these medications which have NOT CHANGED    Details   amLODIPine (NORVASC) 10 mg tablet Take 1 tablet (10 mg total) by mouth daily  Qty: 90 tablet, Refills: 3    Associated Diagnoses: Benign essential hypertension      apixaban (ELIQUIS) 5 mg Take 1 tablet (5 mg total) by mouth 2 (two) times a day  Qty: 180 tablet, Refills: 3    Associated Diagnoses: Paroxysmal atrial fibrillation (HCC)      atenolol (TENORMIN) 100 mg tablet Take 1 tablet (100 mg total) by mouth daily  Qty: 90 tablet, Refills: 1    Associated Diagnoses: Benign essential hypertension      atorvastatin (LIPITOR) 20 mg tablet Take 1 tablet (20 mg total) by mouth daily  Qty: 90 tablet, Refills: 3    Associated Diagnoses: Mixed hyperlipidemia      Blood Glucose Monitoring Suppl (ONE TOUCH ULTRA 2) w/Device KIT by Does not apply route daily  Qty: 1 each, Refills: 0    Associated Diagnoses: Type 2 diabetes mellitus with stage 3 chronic kidney disease, without long-term current use of insulin (HCC)      calcium carbonate-vitamin D (OSCAL-D) 500 mg-200 units per tablet Take 1 tablet by mouth 2 (two) times a day with meals      docusate sodium (COLACE) 100 mg capsule Take 100 mg by mouth 2 (two) times a day ferrous gluconate (FERGON) 324 mg tablet Take 324 mg by mouth daily with breakfast      glipiZIDE (GLUCOTROL XL) 2 5 mg 24 hr tablet Take 1 tablet (2 5 mg total) by mouth daily  Qty: 90 tablet, Refills: 3    Associated Diagnoses: Type 2 diabetes mellitus with stage 3 chronic kidney disease, without long-term current use of insulin ([facility] [place])      glucose blood (ONE TOUCH ULTRA TEST) test strip Test once daily  Qty: 100 each, Refills: 3    Associated Diagnoses: Type 2 diabetes mellitus with stage 3 chronic kidney disease, without long-term current use of insulin ([facility] [place])      hydrocortisone-pramoxine (PROCTOFOAM-HC) rectal foam Insert 1 applicator into the rectum daily for 90 doses  Qty: 30 g, Refills: 3    Associated Diagnoses: Ulcerative colitis with complication, unspecified location (HCC)      losartan (COZAAR) 100 MG tablet Take 1 tablet (100 mg total) by mouth daily  Qty: 90 tablet, Refills: 3    Associated Diagnoses: Benign essential hypertension      mesalamine (ASACOL) 800 MG EC tablet Take 1 tablet (800 mg total) by mouth 3 (three) times a day  Qty: 270 tablet, Refills: 3    Associated Diagnoses: Ulcerative colitis with complication, unspecified location ([facility] [place])      ONE TOUCH LANCETS MISC by Does not apply route daily  Qty: 100 each, Refills: 3    Associated Diagnoses: 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

## 2019-12-15 ENCOUNTER — APPOINTMENT (INPATIENT)
Dept: CT IMAGING | Facility: HOSPITAL | Age: 84
DRG: 194 | End: 2019-12-15
Payer: COMMERCIAL

## 2019-12-15 PROBLEM — J18.9 RIGHT UPPER LOBE PNEUMONIA: Status: ACTIVE | Noted: 2019-12-15

## 2019-12-15 LAB
ANION GAP SERPL CALCULATED.3IONS-SCNC: 7 MMOL/L (ref 4–13)
BUN SERPL-MCNC: 21 MG/DL (ref 5–25)
CALCIUM SERPL-MCNC: 8.4 MG/DL (ref 8.3–10.1)
CHLORIDE SERPL-SCNC: 107 MMOL/L (ref 100–108)
CO2 SERPL-SCNC: 27 MMOL/L (ref 21–32)
CREAT SERPL-MCNC: 1.09 MG/DL (ref 0.6–1.3)
ERYTHROCYTE [DISTWIDTH] IN BLOOD BY AUTOMATED COUNT: 12.7 % (ref 11.6–15.1)
GFR SERPL CREATININE-BSD FRML MDRD: 46 ML/MIN/1.73SQ M
GLUCOSE SERPL-MCNC: 156 MG/DL (ref 65–140)
GLUCOSE SERPL-MCNC: 173 MG/DL (ref 65–140)
GLUCOSE SERPL-MCNC: 231 MG/DL (ref 65–140)
GLUCOSE SERPL-MCNC: 232 MG/DL (ref 65–140)
GLUCOSE SERPL-MCNC: 79 MG/DL (ref 65–140)
GLUCOSE SERPL-MCNC: 82 MG/DL (ref 65–140)
HCT VFR BLD AUTO: 41.6 % (ref 34.8–46.1)
HGB BLD-MCNC: 13.2 G/DL (ref 11.5–15.4)
MAGNESIUM SERPL-MCNC: 2 MG/DL (ref 1.6–2.6)
MCH RBC QN AUTO: 31 PG (ref 26.8–34.3)
MCHC RBC AUTO-ENTMCNC: 31.7 G/DL (ref 31.4–37.4)
MCV RBC AUTO: 98 FL (ref 82–98)
PHOSPHATE SERPL-MCNC: 3.3 MG/DL (ref 2.3–4.1)
PLATELET # BLD AUTO: 144 THOUSANDS/UL (ref 149–390)
PMV BLD AUTO: 10.2 FL (ref 8.9–12.7)
POTASSIUM SERPL-SCNC: 3.9 MMOL/L (ref 3.5–5.3)
RBC # BLD AUTO: 4.26 MILLION/UL (ref 3.81–5.12)
SODIUM SERPL-SCNC: 141 MMOL/L (ref 136–145)
TROPONIN I SERPL-MCNC: 0.08 NG/ML
TROPONIN I SERPL-MCNC: 0.09 NG/ML
WBC # BLD AUTO: 5.31 THOUSAND/UL (ref 4.31–10.16)

## 2019-12-15 PROCEDURE — 87449 NOS EACH ORGANISM AG IA: CPT | Performed by: INTERNAL MEDICINE

## 2019-12-15 PROCEDURE — 97163 PT EVAL HIGH COMPLEX 45 MIN: CPT

## 2019-12-15 PROCEDURE — 85027 COMPLETE CBC AUTOMATED: CPT | Performed by: NURSE PRACTITIONER

## 2019-12-15 PROCEDURE — 84100 ASSAY OF PHOSPHORUS: CPT | Performed by: NURSE PRACTITIONER

## 2019-12-15 PROCEDURE — 99232 SBSQ HOSP IP/OBS MODERATE 35: CPT | Performed by: INTERNAL MEDICINE

## 2019-12-15 PROCEDURE — 82948 REAGENT STRIP/BLOOD GLUCOSE: CPT

## 2019-12-15 PROCEDURE — G8978 MOBILITY CURRENT STATUS: HCPCS

## 2019-12-15 PROCEDURE — 80048 BASIC METABOLIC PNL TOTAL CA: CPT | Performed by: NURSE PRACTITIONER

## 2019-12-15 PROCEDURE — 84484 ASSAY OF TROPONIN QUANT: CPT | Performed by: INTERNAL MEDICINE

## 2019-12-15 PROCEDURE — G8979 MOBILITY GOAL STATUS: HCPCS

## 2019-12-15 PROCEDURE — 83735 ASSAY OF MAGNESIUM: CPT | Performed by: NURSE PRACTITIONER

## 2019-12-15 PROCEDURE — 71250 CT THORAX DX C-: CPT

## 2019-12-15 RX ORDER — AZITHROMYCIN 250 MG/1
500 TABLET, FILM COATED ORAL EVERY 24 HOURS
Status: DISCONTINUED | OUTPATIENT
Start: 2019-12-15 | End: 2019-12-16

## 2019-12-15 RX ADMIN — FERROUS GLUCONATE 324 MG: 324 TABLET ORAL at 07:30

## 2019-12-15 RX ADMIN — GUAIFENESIN 600 MG: 600 TABLET ORAL at 09:22

## 2019-12-15 RX ADMIN — OYSTER SHELL CALCIUM WITH VITAMIN D 1 TABLET: 500; 200 TABLET, FILM COATED ORAL at 16:11

## 2019-12-15 RX ADMIN — CEFTRIAXONE SODIUM 1000 MG: 10 INJECTION, POWDER, FOR SOLUTION INTRAVENOUS at 18:21

## 2019-12-15 RX ADMIN — MESALAMINE 800 MG: 400 CAPSULE, DELAYED RELEASE ORAL at 20:12

## 2019-12-15 RX ADMIN — OYSTER SHELL CALCIUM WITH VITAMIN D 1 TABLET: 500; 200 TABLET, FILM COATED ORAL at 07:30

## 2019-12-15 RX ADMIN — INSULIN LISPRO 1 UNITS: 100 INJECTION, SOLUTION INTRAVENOUS; SUBCUTANEOUS at 17:00

## 2019-12-15 RX ADMIN — MESALAMINE 800 MG: 400 CAPSULE, DELAYED RELEASE ORAL at 09:22

## 2019-12-15 RX ADMIN — POLYETHYLENE GLYCOL 3350 17 G: 17 POWDER, FOR SOLUTION ORAL at 09:22

## 2019-12-15 RX ADMIN — DOCUSATE SODIUM 100 MG: 100 CAPSULE, LIQUID FILLED ORAL at 09:22

## 2019-12-15 RX ADMIN — APIXABAN 5 MG: 5 TABLET, FILM COATED ORAL at 17:00

## 2019-12-15 RX ADMIN — ATORVASTATIN CALCIUM 20 MG: 20 TABLET, FILM COATED ORAL at 09:22

## 2019-12-15 RX ADMIN — GUAIFENESIN 600 MG: 600 TABLET ORAL at 20:13

## 2019-12-15 RX ADMIN — APIXABAN 5 MG: 5 TABLET, FILM COATED ORAL at 09:22

## 2019-12-15 RX ADMIN — LOSARTAN POTASSIUM 100 MG: 50 TABLET, FILM COATED ORAL at 09:22

## 2019-12-15 RX ADMIN — DOCUSATE SODIUM 100 MG: 100 CAPSULE, LIQUID FILLED ORAL at 17:00

## 2019-12-15 RX ADMIN — MESALAMINE 800 MG: 400 CAPSULE, DELAYED RELEASE ORAL at 16:11

## 2019-12-15 RX ADMIN — BENZONATATE 100 MG: 100 CAPSULE ORAL at 20:13

## 2019-12-15 RX ADMIN — AMLODIPINE BESYLATE 10 MG: 10 TABLET ORAL at 09:22

## 2019-12-15 RX ADMIN — INSULIN LISPRO 3 UNITS: 100 INJECTION, SOLUTION INTRAVENOUS; SUBCUTANEOUS at 12:07

## 2019-12-15 RX ADMIN — AZITHROMYCIN 500 MG: 250 TABLET, FILM COATED ORAL at 16:11

## 2019-12-15 RX ADMIN — INSULIN LISPRO 1 UNITS: 100 INJECTION, SOLUTION INTRAVENOUS; SUBCUTANEOUS at 21:22

## 2019-12-15 RX ADMIN — ATENOLOL 100 MG: 50 TABLET ORAL at 09:22

## 2019-12-15 NOTE — H&P
[address] Internal Medicine  H&P- Izshona Kwadwo 2/25/1932, 80 y o  female MRN: [id number]    Unit/Bed#: -01 Encounter: [id number]    Primary Care Provider: Radha Holloway MD   Date and time admitted to hospital: 12/14/2019  5:08 PM    * UTI (urinary tract infection)  Assessment & Plan  Questionable UA  Follow up urine culture  Denies any urinary symptoms    Weakness generalized  Assessment & Plan  Worsening over the last 2 days  PT/OT consult  Patient ambulates without difficulty at home    Atrial fibrillation [facility])  Assessment & Plan  Appears rate controlled  Continue home medications  Continue Eliquis    Chronic kidney disease, stage III (moderate) ([facility])  Assessment & Plan  Stable    Cough  Assessment & Plan  Patient reports a cough that started approximately 2 days ago  Denies any fevers or chills  Denies any shortness of breath, does report occasional dyspnea with exertion  Chest x-ray unremarkable  Reports that she has received flu vaccine, will screen for flu     Elevated troponin  Assessment & Plan  Denies any chest pain chest tightness shortness of breath or difficulty breathing  Elevated troponin is likely in the setting advanced age  0 56, monitor next to  No EKG changes      Benign essential hypertension  Assessment & Plan  Appears controlled  Continue home medications         VTE Prophylaxis: Apixaban (Eliquis)  / sequential compression device   Code Status:  DNR  POLST: POLST form is not discussed and not completed at this time  Discussion with family:  Son at bedside    Anticipated Length of Stay:  Patient will be admitted on an Observation basis with an anticipated length of stay of  < 2 midnights  Justification for Hospital Stay:  Monitoring, urine culture, flu rule out, physical therapy evaluation    Total Time for Visit, including Counseling / Coordination of Care: 1 hour  Greater than 50% of this total time spent on direct patient counseling and coordination of care      Chief Complaint:      Chief Complaint   Patient presents with    Weakness - Generalized     Patient c/o generalized weakness, cough, nausea and just not feeling right over the last week and states that her symptoms have gotten increasingly worse over the last 2 days  History of Present Illness:    Swapnil Clark is a 80 y o  female who presents with generalized weakness and a cough  She denies any chest pain chest tightness shortness of breath or difficulty breathing  She reports the musculoskeletal pain related to her increasing cough over the last 2 days  She reports increasing weakness over the last 2 days  She reports that at baseline she ambulates independently without difficulty  She does report occasional dyspnea with exertion however reports that is not consistent and resolves with rest   She denies any abdominal pain fever chills  She does have an ostomy which has been managing without difficulty  She does not smoke or use illicit drugs    Review of Systems:    Review of Systems   Constitutional: Positive for fatigue  Negative for chills and fever  Eyes: Negative  Respiratory: Positive for cough  Cardiovascular: Negative  Gastrointestinal: Negative  Endocrine: Negative  Genitourinary: Negative  Musculoskeletal: Negative  Allergic/Immunologic: Negative  Neurological: Positive for weakness  Hematological: Negative  Psychiatric/Behavioral: Negative  All other systems reviewed and are negative        Past Medical and Surgical History:     Past Medical History:   Diagnosis Date    A-fib [facility])     Chronic kidney disease     Colostomy present ([facility] [address] )     [facility]     Hyperlipidemia     Hypertension        Past Surgical History:   Procedure Laterality Date    APPENDECTOMY      CARPAL TUNNEL RELEASE Bilateral     CATARACT EXTRACTION      CHOLECYSTECTOMY      COLON SURGERY      COLOSTOMY      HYSTERECTOMY         Meds/Allergies:    Prior to Admission medications    Medication Sig Start Date End Date Taking?  Authorizing Provider   amLODIPine (NORVASC) 10 mg tablet Take 1 tablet (10 mg total) by mouth daily 12/10/19  Yes SULEMA Nunez   apixaban (ELIQUIS) 5 mg Take 1 tablet (5 mg total) by mouth 2 (two) times a day 12/10/19  Yes SULEMA Nunez   atenolol (TENORMIN) 100 mg tablet Take 1 tablet (100 mg total) by mouth daily 12/10/19 3/9/20 Yes SULEMA Nunez   atorvastatin (LIPITOR) 20 mg tablet Take 1 tablet (20 mg total) by mouth daily 12/10/19  Yes SULEMA Nunez   Blood Glucose Monitoring Suppl (ONE TOUCH ULTRA 2) w/Device KIT by Does not apply route daily 3/10/19  Yes Manisha Murillo MD   calcium carbonate-vitamin D (OSCAL-D) 500 mg-200 units per tablet Take 1 tablet by mouth 2 (two) times a day with meals   Yes Historical Provider, MD   docusate sodium (COLACE) 100 mg capsule Take 100 mg by mouth 2 (two) times a day   Yes Historical Provider, MD   ferrous gluconate (FERGON) 324 mg tablet Take 324 mg by mouth daily with breakfast   Yes Historical Provider, MD   glipiZIDE (GLUCOTROL XL) 2 5 mg 24 hr tablet Take 1 tablet (2 5 mg total) by mouth daily 12/10/19  Yes SULEMA Nunez   glucose blood (ONE TOUCH ULTRA TEST) test strip Test once daily 3/10/19  Yes Manisha Murillo MD   hydrocortisone-pramoxine [facility]) rectal foam Insert 1 applicator into the rectum daily for 90 doses 12/10/19 3/9/20 Yes SULEMA oSares   losartan (COZAAR) 100 MG tablet Take 1 tablet (100 mg total) by mouth daily 12/10/19  Yes SULEMA Nunez   mesalamine (ASACOL) 800 MG EC tablet Take 1 tablet (800 mg total) by mouth 3 (three) times a day 12/10/19 12/4/20 Yes Alyce Sandra, [facility] by Does not apply route daily 3/10/19  Yes Manisha Murillo MD   polyethylene glycol (MIRALAX) 17 g packet Take 17 g by mouth daily   Yes Historical Provider, MD   Zoster Vac Recomb Adjuvanted 50 MCG/0 5ML SUSR as directed  Patient not taking: Reported on 8/14/2019 3/6/19   Manisha Murillo MD     I have reviewed home medications with patient personally  Allergies: Allergies   Allergen Reactions    Amoxicillin-Pot Clavulanate Other (See Comments)     C-DIFF, x's 2  developed c diff taking drug       Social History:     Marital Status:    Occupation:  NA  Patient Pre-hospital Living Situation:  Private residence  Patient Pre-hospital Level of Mobility:  Independent  Patient Pre-hospital Diet Restrictions:  None  Substance Use History:   Social History     Substance and Sexual Activity   Alcohol Use No     Social History     Tobacco Use   Smoking Status Never Smoker   Smokeless Tobacco Never Used     Social History     Substance and Sexual Activity   Drug Use No       Family History:    Family History   Problem Relation Age of Onset    Heart disease Mother     Cancer Father        Physical Exam:     Vitals:   Blood Pressure: 132/72 (12/14/19 1947)  Pulse: 86 (12/14/19 1947)  Temperature: 98 6 °F (37 °C) (12/14/19 1656)  Temp Source: Oral (12/14/19 1656)  Respirations: 18 (12/14/19 1815)  SpO2: 93 % (12/14/19 1947)    Physical Exam   Constitutional: She is oriented to person, place, and time  She appears well-developed and well-nourished  She is active and cooperative  She does not appear ill  HENT:   Head: Normocephalic  Eyes: Pupils are equal, round, and reactive to light  Neck: Normal range of motion  Cardiovascular: Normal rate  Pulmonary/Chest: Effort normal  No respiratory distress  She has wheezes  She exhibits tenderness  Abdominal: Soft  Bowel sounds are normal    Musculoskeletal: Normal range of motion  Neurological: She is alert and oriented to person, place, and time  Skin: Skin is warm and dry  Psychiatric: She has a normal mood and affect  Her behavior is normal  Judgment and thought content normal    Nursing note and vitals reviewed  Additional Data:     Lab Results: I have personally reviewed pertinent reports  Results from last 7 days   Lab Units 12/14/19  1725   WBC Thousand/uL 6 61   HEMOGLOBIN g/dL 14 6   HEMATOCRIT % 46 5*   PLATELETS Thousands/uL 189   NEUTROS PCT % 73   LYMPHS PCT % 14   MONOS PCT % 9   EOS PCT % 2     Results from last 7 days   Lab Units 12/14/19  1727   SODIUM mmol/L 140   POTASSIUM mmol/L 4 0   CHLORIDE mmol/L 102   CO2 mmol/L 29   BUN mg/dL 21   CREATININE mg/dL 1 24   ANION GAP mmol/L 9   CALCIUM mg/dL 9 3   ALBUMIN g/dL 3 4*   TOTAL BILIRUBIN mg/dL 0 30   ALK PHOS U/L 81   ALT U/L 19   AST U/L 26   GLUCOSE RANDOM mg/dL 94     Results from last 7 days   Lab Units 12/14/19  1727   INR  1 27*     Results from last 7 days   Lab Units 12/14/19  1940   POC GLUCOSE mg/dl 82         Results from last 7 days   Lab Units 12/14/19  1727   LACTIC ACID mmol/L 1 1       Imaging: I have personally reviewed pertinent reports  XR chest 2 views    (Results Pending)       EKG, Pathology, and Other Studies Reviewed on Admission:   · EKG:  Not available at this time    [facility] Yes     ** Please Note: This note has been constructed using a voice recognition system   **

## 2019-12-15 NOTE — ASSESSMENT & PLAN NOTE
Chest x-ray demonstrated right upper lobe infiltrate will treat as community-acquired pneumonia  Patient already on Rocephin will continue, and azithromycin  Respiratory protocol  Urine Legionella strep pneumo antigens, sputum cultures   as per Radiology will get CT of the chest

## 2019-12-15 NOTE — ASSESSMENT & PLAN NOTE
Denies any chest pain chest tightness shortness of breath or difficulty breathing  Etiology unknown  Chronically elevated troponin  Continue to monitor clinically

## 2019-12-15 NOTE — ASSESSMENT & PLAN NOTE
Patient reports a cough that started approximately 2 days ago  Denies any fevers or chills  Denies any shortness of breath, does report occasional dyspnea with exertion  Chest x-ray unremarkable  Reports that she has received flu vaccine, will screen for flu WOUNDS

## 2019-12-15 NOTE — ASSESSMENT & PLAN NOTE
Denies any chest pain chest tightness shortness of breath or difficulty breathing  Elevated troponin is likely in the setting advanced age  0 56, monitor next to  No EKG changes

## 2019-12-15 NOTE — PLAN OF CARE
Problem: PHYSICAL THERAPY ADULT  Goal: Performs mobility at highest level of function for planned discharge setting  See evaluation for individualized goals  Description  Treatment/Interventions: Functional transfer training, LE strengthening/ROM, Elevations, Therapeutic exercise, Endurance training, Patient/family training, Equipment eval/education, Gait training, Spoke to case management          See flowsheet documentation for full assessment, interventions and recommendations  Note:   Prognosis: Good  Problem List: Decreased strength, Decreased endurance, Impaired balance, Decreased mobility  Assessment: Pt is an 43-year-old female who was admitted to [address] [place] [address] on 19 with a diagnosis of UTI  PT consulted to assess functional mobility and anticipate d/c needs  PT eval/treat as well as up & OOB orders active  Pt's past medical history is significant for HTN, HLD, a fib with long-term anticoagulant use, CKD stage III, diastolic dysfunction, mitral annular calcification, type 2 DM, s/p colostomy, syncope, and intermittent palpitations  Prior to her admission, the pt was living alone in a two-story house and was independent with all functional mobility and ambulation  Personal factors include living alone in a multi-level home, decreased caregiver assistance, and a positive fall history  JAMES Smith verbalized pt appropriate for PT eval, and pt agreeable to the eval  Two patient identifiers assessed upon arrival, as the pt verified her full name and   Pt received OOB in bathroom upon arrival  Pt able to perform all transfers with modified independence  Pt able to ambulate 36' x2 with supervision without SOB  Pt's gait mildly unsteady and occasionally veered to the left but had no LOB   Upon assessment, pt's physical limitations include: decreased strength, decreased endurance, impaired balance, and decreased mobility, impacting the pt's ability to perform functional activities safely and independently  Other outcome measures: [place] 75/100, Modified [place] 3  Pt's clinical presentation is currently unstable due to her multiple co-morbidities, ongoing medical assessment, and abnormal labs  At this time, pt is recommended for d/c home with family support and home PT to address the above impairments and return to her prior level of function  Pt would benefit from continued PT throughout her hospital stay with an emphasis on ambulation to improve overall physical functioning  Barriers to Discharge: Inaccessible home environment, Decreased caregiver support     Recommendation: Home with family support, Home PT(anticipated pending progress)     PT - OK to Discharge: No(pending consistency with mobility at mod (I) level)    See flowsheet documentation for full assessment

## 2019-12-15 NOTE — ASSESSMENT & PLAN NOTE
Patient reports a cough that started approximately 2 days ago  Denies any fevers or chills  Denies any shortness of breath, does report occasional dyspnea with exertion    Influenza negative  Chest x-ray appears to have right upper lobe and infiltrate will start treatment for community-acquired pneumonia

## 2019-12-15 NOTE — UTILIZATION REVIEW
Initial Clinical Review - admitted as Observation on 12/14/19 @ 1845  Changed to Inpatient on 12/15/19 @ 1408 for continued evaluation and treatment of community acquired pneumonia  Admission: Date/Time/Statement:     Ordered    Inpatient Admission Once     Transfer Service: General Medicine       Question Answer Comment   [facility]    Level of Care Med Surg    Estimated length of stay More than 2 Midnights    Certification I certify that inpatient services are medically necessary for this patient for a duration of greater than two midnights  See H&P and MD Progress Notes for additional information about the patient's course of treatment  cap       12/15/19 1408     ED Arrival Information     Expected Arrival Acuity Means of Arrival Escorted By Service Admission Type    - 12/14/2019 16:28 Urgent Walk-In Family Member Hospitalist Urgent    Arrival Complaint    COUGH        Chief Complaint   Patient presents with    Weakness - Generalized     Patient c/o generalized weakness, cough, nausea and just not feeling right over the last week and states that her symptoms have gotten increasingly worse over the last 2 days  HPI:  Annita Gomez is a 80 y o  female with history of A-fib on Eliquis, HTN, HL, DM, CKD and colostomy who presented to the ED with generalized weakness and a cough  She denies any chest pain chest tightness shortness of breath or difficulty breathing  She reports the musculoskeletal pain related to her increasing cough and weakness over the last 2 days  She reports that at baseline she ambulates independently without difficulty  She does report occasional dyspnea with exertion      Plan: Admit to Observation for evaluation and treatment of cough, weakness, elevated troponin, possible UTI: Empiric ceftriaxone, follow urine culture, PT/OT eval, trend troponin      12/15 Internal Medicine note: Chest x-ray demonstrated right upper lobe infiltrate will treat as community-acquired pneumonia  Patient already on Rocephin will continue, and azithromycin, urine Legionella strep pneumo antigens, sputum cultures, chest CT   Patient continues with cough, normal breath sounds on physical exam        ED Triage Vitals   Temperature Pulse Respirations Blood Pressure SpO2   12/14/19 1656 12/14/19 1656 12/14/19 1656 12/14/19 1656 12/14/19 1656   98 6 °F (37 °C) 81 16 136/64 93 %      Temp Source Heart Rate Source Patient Position - Orthostatic VS BP Location FiO2 (%)   12/14/19 1656 12/14/19 1656 12/14/19 1656 12/14/19 1656 --   Oral Monitor Sitting Left arm       Pain Score       12/14/19 1745       No Pain        Wt Readings from Last 1 Encounters:   12/14/19 78 3 kg (172 lb 9 9 oz)     Additional Vital Signs:     Date/Time  Temp  Pulse  Resp  BP   SpO2  O2 Device   12/15/19 15:17:07  97 9 °  73  18  124/69   96 %  Room air       Date/Time  Temp  Pulse  Resp  BP   SpO2  O2 Device    12/15/19 07:18:44  98 1   86  18  139/84   94 %      12/14/19 23:31:40  98 °F  72  17  137/72   93 %      12/14/19 19:47:49    86    132/72   93 %      12/14/19 1815    79  18  144/74   93 %  None (Room air)      Pertinent Labs/Diagnostic Test Results:   Results from last 7 days   Lab Units 12/15/19  0533 12/14/19  1725   WBC Thousand/uL 5 31 6 61   HEMOGLOBIN g/dL 13 2 14 6   HEMATOCRIT % 41 6 46 5*   PLATELETS Thousands/uL 144* 189   NEUTROS ABS Thousands/µL  --  4 89         Results from last 7 days   Lab Units 12/15/19  0533 12/14/19  1727   SODIUM mmol/L 141 140   POTASSIUM mmol/L 3 9 4 0   CHLORIDE mmol/L 107 102   CO2 mmol/L 27 29   ANION GAP mmol/L 7 9   BUN mg/dL 21 21   CREATININE mg/dL 1 09 1 24   EGFR ml/min/1 73sq m 46 39   CALCIUM mg/dL 8 4 9 3   MAGNESIUM mg/dL 2 0  --    PHOSPHORUS mg/dL 3 3  --      Results from last 7 days   Lab Units 12/14/19  1727   AST U/L 26   ALT U/L 19   ALK PHOS U/L 81   TOTAL PROTEIN g/dL 8 2   ALBUMIN g/dL 3 4*   TOTAL BILIRUBIN mg/dL 0 30   BILIRUBIN DIRECT mg/dL 0 08     Results from last 7 days   Lab Units 12/15/19  0614 12/14/19  2130 12/14/19  1940   POC GLUCOSE mg/dl 82 231* 82     Results from last 7 days   Lab Units 12/15/19  0533 12/14/19  1727   GLUCOSE RANDOM mg/dL 79 94         Results from last 7 days   Lab Units 12/15/19  0911 12/14/19  2326 12/14/19  2110 12/14/19  1727   TROPONIN I ng/mL 0 08* 0 09* 0 09* 0 09*         Results from last 7 days   Lab Units 12/14/19  1727   PROTIME seconds 15 9*   INR  1 27*   PTT seconds 32             Results from last 7 days   Lab Units 12/14/19  1727   LACTIC ACID mmol/L 1 1         Lab Units 12/14/19  2216 12/14/19  1725   CLARITY UA  Clear Slightly Cloudy   COLOR UA  Light Yellow Yellow   SPEC GRAV UA  1 015 1 025   PH UA  5 5 5 5   GLUCOSE UA mg/dl Negative Negative   KETONES UA mg/dl Negative Negative   BLOOD UA  Negative Moderate*   PROTEIN UA mg/dl Negative 30 (1+)*   NITRITE UA  Negative Negative   BILIRUBIN UA  Negative Negative   BILIRUBIN UA POC   --   --    UROBILINOGEN UA E U /dl 0 2 0 2   LEUKOCYTES UA  Trace* Large*   WBC UA /hpf 0-1* 4-10*   RBC UA /hpf None Seen 4-10*   BACTERIA UA /hpf Occasional Moderate*   EPITHELIAL CELLS WET PREP /hpf Occasional Occasional     Results from last 7 days   Lab Units 12/14/19  2109   INFLUENZA A PCR  None Detected   RSV PCR  None Detected       12/14 Chest x-ray:  Cardiomediastinal silhouette appears unremarkable  Pulmonary vessels are normal  Scarring at the left lung base  Nodular infiltrate in the right upper lobe  No pneumothorax or pleural effusion    Recommend further evaluation with a CT scan of the chest     ED Treatment:   Medication Administration from 12/14/2019 1628 to 12/14/2019 1927       Date/Time Order Dose Route Action Action by Comments     12/14/2019 1843 sodium chloride 0 9 % bolus 1,000 mL 1,000 mL Intravenous New Bag Vicki Ledezma RN      12/14/2019 1857 ceftriaxone (ROCEPHIN) 1 g/50 mL in dextrose IVPB 1,000 mg Intravenous New Bag Mark Johnson Patricia Cintron RN         Past Medical History:   Diagnosis Date    A-fib [facility])     Chronic kidney disease     [facility] present ([facility] )     Diabetes mellitus ([facility] )     Hyperlipidemia     Hypertension      Present on Admission:   UTI (urinary tract infection)   Weakness generalized   Atrial fibrillation (HCC)   Benign essential hypertension   Chronic kidney disease, stage III (moderate) (HCC)   Dehydration   Elevated troponin   Cough      Admitting Diagnosis: Dehydration [E86 0]  UTI (urinary tract infection) [N39 0]  Weakness generalized [R53 1]  Weakness [R53 1]  Age/Sex: 80 y o  female     Admission Orders: SCD, PT/OT    Scheduled Medications:    Scheduled Meds:  Current Facility-Administered Medications:  acetaminophen 650 mg Oral Q6H PRN   amLODIPine 10 mg Oral Daily   apixaban 5 mg Oral BID   atenolol 100 mg Oral Daily   atorvastatin 20 mg Oral Daily   azithromycin 500 mg Oral Q24H   benzonatate 100 mg Oral TID PRN   calcium carbonate 1,000 mg Oral Daily PRN   calcium carbonate-vitamin D 1 tablet Oral BID With Meals   cefTRIAXone 1,000 mg Intravenous Q24H   docusate sodium 100 mg Oral BID   ferrous gluconate 324 mg Oral Daily With Breakfast   guaiFENesin 600 mg Oral Q12H [address]   insulin lispro 1-6 Units Subcutaneous 4x Daily (AC & HS)   losartan 100 mg Oral Daily   mesalamine 800 mg Oral TID   ondansetron 4 mg Intravenous Q6H PRN   polyethylene glycol 17 g Oral Daily             Network Utilization Review Department  [email or web address]  ATTENTION: Please call with any questions or concerns to [phone] and carefully listen to the prompts so that you are directed to the right person  All voicemails are confidential   Roxana Negrete all requests for admission clinical reviews, approved or denied determinations and any other requests to dedicated fax number below belonging to the campus where the patient is receiving treatment   List of dedicated fax numbers for the Facilities:  Hiral Streeter NAME UR FAX NUMBER   ADMISSION DENIALS (Administrative/Medical Necessity) [phone]   PARENT CHILD HEALTH (Maternity/NICU/Pediatrics) [phone]   [facility] [phone]   [facility] [phone]   [facility] [phone]   Sasha Dyson [facility] [address] [facility] [phone]   Tina Krishnan [phone]   Man Varma [address] [place] [address] [phone]

## 2019-12-15 NOTE — PHYSICAL THERAPY NOTE
Physical Therapy Evaluation     Patient's Name: Vee Cardenas    Admitting Diagnosis  Dehydration [E86 0]  UTI (urinary tract infection) [N39 0]  Weakness generalized [R53 1]  Weakness [R53 1]    Problem List  Patient Active Problem List   Diagnosis    Benign essential hypertension    Atrial fibrillation ([facility] )    Anticoagulant long-term use    Mixed hyperlipidemia    Diastolic dysfunction    Mitral annular calcification    Intermittent palpitations    Hearing loss of right ear due to cerumen impaction    Chronic [facility] disease, stage III (moderate) ([facility])    Colostomy in place ([place] [address] )    Osteopenia of multiple sites    Subclinical hypothyroidism    Type 2 diabetes mellitus with stage 3 chronic kidney disease, without long-term current use of insulin ([place] [address] )    Overweight (BMI 25 0-29  9)    Fever    Syncope    Ulcerative colitis with complication ([facility])    UTI (urinary tract infection)    Weakness generalized    Dehydration    Elevated troponin    Cough       Past Medical History  Past Medical History:   Diagnosis Date    A-fib ([place] [address] )     Chronic kidney disease     Colostomy present ([place] [address] )     Diabetes mellitus ([place] [address] )     Hyperlipidemia     Hypertension        Past Surgical History  Past Surgical History:   Procedure Laterality Date    APPENDECTOMY      CARPAL TUNNEL RELEASE Bilateral     CATARACT EXTRACTION      CHOLECYSTECTOMY      COLON SURGERY      COLOSTOMY      HYSTERECTOMY            12/15/19 1058   Note Type   Note type Eval only   Pain Assessment   Pain Assessment 0-10   Pain Score No Pain  ("only when coughing")   Home Living   Type of [address] [place] [address] Two level;Performs ADLs on one level; Able to live on main level with bedroom/bathroom  (pt does stairs 2-3x/day as fridge is upstairs)   Bathroom Shower/Tub Walk-in shower   Bathroom Toilet Standard   Bathroom Equipment Commode;Grab bars in shower   [facility]  (no AD used at baseline)   Additional Comments downstairs converted to pt's living area, kitchen is upstairs  pt uses microwave downstairs  pt has a flight of stairs to access her basement level   Prior Function   Level of Dent Independent with ADLs and functional mobility   Lives With Alone   Receives Help From Family  (local son)   ADL Assistance Independent  (per pt)   IADLs Independent  ("when I'm feeling ok it's no problem")   Falls in the last 6 months 0  ((+) fall history)   Comments pt reports she used to reside in a nursing home in [place]; however now moved into her original family home where her son (who is now ) was living   Restrictions/Precautions   Weight Bearing Precautions Per Order No   Braces or Orthoses   (none per pt)   Other Precautions Fall Risk;Hard of hearing  (partial colectomy and colostomy  hearing aides)   General   Family/Caregiver Present No   Cognition   Overall Cognitive Status WFL   Arousal/Participation Alert   Orientation Level Oriented X4   Memory Within functional limits   Following Commands Follows all commands and directions without difficulty   Comments pt agreeable to PT evaluation  Pt verified full name and   RUE Assessment   RUE Assessment WFL  (upon functional assessment)   LUE Assessment   LUE Assessment WFL  (upon functional assessment)   RLE Assessment   RLE Assessment WFL  (upon functional assessment; grossly 3+/5)   LLE Assessment   LLE Assessment WFL  (upon functional assessment; grossly 3+/5)   Coordination   Sensation WFL  (no numbness/tingling per pt)   Light Touch   RLE Light Touch Grossly intact   LLE Light Touch Grossly intact   Bed Mobility   Additional Comments pt received OOB to bathroom upon arrival  Pt returned to recliner at end of session with all needs in reach     Transfers   Sit to Stand 6  Modified independent   Stand to Sit 6  Modified independent   Additional Comments pt denies any SOB, lightheadedness/dizziness throughout session Ambulation/Elevation   Gait pattern Decreased R stance;Decreased foot clearance;Narrow ERICA; Short stride  (no uncorrected LOB  veering towards L side)   Gait Assistance 5  Supervision   Assistive Device None   Distance 40' x2   Stair Management Assistance Not tested   Balance   Static Sitting Good   Dynamic Sitting Good   Static Standing Fair +   Dynamic Standing Fair   Ambulatory Fair -   Endurance Deficit   Endurance Deficit Yes   Activity Tolerance   Activity Tolerance Patient limited by fatigue   Medical Staff Made Aware PT Doloris Gather   Nurse Made Aware JAMES Jade verbalized pt appropriate for PT eval  RN made aware of session outcomes/recs  Assessment   Prognosis Good   Problem List Decreased strength;Decreased endurance; Impaired balance;Decreased mobility   Assessment Pt is an 59-year-old female who was admitted to [address] [place] [address] on 19 with a diagnosis of UTI  PT consulted to assess functional mobility and anticipate d/c needs  PT eval/treat as well as up & OOB orders active  Pt's past medical history is significant for HTN, HLD, a fib with long-term anticoagulant use, CKD stage III, diastolic dysfunction, mitral annular calcification, type 2 DM, s/p colostomy, syncope, and intermittent palpitations  Prior to her admission, the pt was living alone in a two-story house and was independent with all functional mobility and ambulation  Personal factors include living alone in a multi-level home, decreased caregiver assistance, and a positive fall history  JAMES Jade verbalized pt appropriate for PT eval, and pt agreeable to the eval  Two patient identifiers assessed upon arrival, as the pt verified her full name and   Pt received OOB in bathroom upon arrival  Pt able to perform all transfers with modified independence  Pt able to ambulate 36' x2 with supervision without SOB  Pt's gait mildly unsteady and occasionally veered to the left but had no LOB   Upon assessment, pt's physical limitations include: decreased strength, decreased endurance, impaired balance, and decreased mobility, impacting the pt's ability to perform functional activities safely and independently  Other outcome measures: Barthel 75/100, Modified [place] 3  Pt's clinical presentation is currently unstable due to her multiple co-morbidities, ongoing medical assessment, and abnormal labs  At this time, pt is recommended for d/c home with family support and home PT to address the above impairments and return to her prior level of function  Pt would benefit from continued PT throughout her hospital stay with an emphasis on ambulation to improve overall physical functioning  Barriers to Discharge Inaccessible home environment;Decreased caregiver support   Goals   Patient Goals to return home   STG Expiration Date 12/25/19   Short Term Goal #1 In 7-10 days, pt will 1) ambulate 150' with least restrictive device with modified independence, 2) perform all transfers independently to improve overall mobility, 3) improve B LE strength 1/2 grade to improve ambulation, 4) maintain static standing balance for > 5 minutes to improve performance of functional activities, 5) improve all balance by 1/2 grade to improve functional mobility, 6) PT to see to establish stair goal as appropriate   PT Treatment Day 0   Plan   Treatment/Interventions Functional transfer training;LE strengthening/ROM; Elevations; Therapeutic exercise; Endurance training;Patient/family training;Equipment eval/education;Gait training;Spoke to case management   PT Frequency Other (Comment)  (3-5x/wk)   Recommendation   Recommendation Home with family support;Home PT  (anticipated pending progress)   PT - OK to Discharge No  (pending consistency with mobility at mod (I) level)   Modified [place] Scale   Modified [place] Scale 3   Barthel Index   Feeding 10   Bathing 5   Grooming Score 5   Dressing Score 10   Bladder Score 10   Bowels Score 10  (manages own colostomy per pt) Toilet Use Score 10   Transfers (Bed/Chair) Score 15   Mobility (Level Surface) Score 0   Stairs Score 0   Barthel Index Score 75     [facility], SPT

## 2019-12-15 NOTE — PLAN OF CARE
Problem: RESPIRATORY - ADULT  Goal: Achieves optimal ventilation and oxygenation  Description  INTERVENTIONS:  - Assess for changes in respiratory status  - Assess for changes in mentation and behavior  - Position to facilitate oxygenation and minimize respiratory effort  - Oxygen administered by appropriate delivery if ordered  - Initiate smoking cessation education as indicated  - Encourage broncho-pulmonary hygiene including cough, deep breathe, Incentive Spirometry  - Assess the need for suctioning and aspirate as needed  - Assess and instruct to report SOB or any respiratory difficulty  - Respiratory Therapy support as indicated  Outcome: Progressing     Problem: METABOLIC, FLUID AND ELECTROLYTES - ADULT  Goal: Glucose maintained within target range  Description  INTERVENTIONS:  - Monitor Blood Glucose as ordered  - Assess for signs and symptoms of hyperglycemia and hypoglycemia  - Administer ordered medications to maintain glucose within target range  - Assess nutritional intake and initiate nutrition service referral as needed  Outcome: Progressing

## 2019-12-15 NOTE — PROGRESS NOTES
Progress Note - Emmanuel Adames 2/25/1932, 80 y o  female MRN: [id number]    Unit/Bed#: -01 Encounter: [id number]    Primary Care Provider: Annabel Love MD   Date and time admitted to hospital: 12/14/2019  5:08 PM        Right upper lobe pneumonia [facility])  Assessment & Plan  Chest x-ray demonstrated right upper lobe infiltrate will treat as community-acquired pneumonia  Patient already on Rocephin will continue, and azithromycin  Respiratory protocol  Urine Legionella strep pneumo antigens, sputum cultures   as per Radiology will get CT of the chest    Cough  Assessment & Plan  Patient reports a cough that started approximately 2 days ago  Denies any fevers or chills  Denies any shortness of breath, does report occasional dyspnea with exertion    Influenza negative  Chest x-ray appears to have right upper lobe and infiltrate will start treatment for community-acquired pneumonia      Elevated troponin  Assessment & Plan  Denies any chest pain chest tightness shortness of breath or difficulty breathing  Etiology unknown  Chronically elevated troponin  Continue to monitor clinically      Weakness generalized  Assessment & Plan  Worsening over the last 2 days  PT/OT consult  Patient ambulates without difficulty at home    Chronic kidney disease, stage III (moderate) (HCC)  Assessment & Plan  Stable    Atrial fibrillation ([address] )  Assessment & Plan  Appears rate controlled  Continue home medications  Continue Eliquis    Benign essential hypertension  Assessment & Plan  Appears controlled  Continue home medications    * UTI (urinary tract infection)  Assessment & Plan  Questionable UA  Follow up urine culture  Denies any urinary symptoms    VTE Pharmacologic Prophylaxis:   Pharmacologic: Heparin  Mechanical VTE Prophylaxis in Place: Yes    Patient Centered Rounds: I have performed bedside rounds with nursing staff today      Discussions with Specialists or Other Care Team Provider case management    Education and Discussions with Family / Patient:  Patient    Time Spent for Care: 30 minutes  More than 50% of total time spent on counseling and coordination of care as described above  Current Length of Stay: 0 day(s)    Current Patient Status: Inpatient   Certification Statement: The patient will continue to require additional inpatient hospital stay due to Pneumonia    Discharge Plan:  Continue hospitalization    Code Status: Level 3 - DNAR and DNI      Subjective:   Patient seen and examined  No complaints at this time  Continues to have cough  No chest pain    Objective:     Vitals:   Temp (24hrs), Av 2 °F (36 8 °C), Min:98 °F (36 7 °C), Max:98 6 °F (37 °C)    Temp:  [98 °F (36 7 °C)-98 6 °F (37 °C)] 98 1 °F (36 7 °C)  HR:  [72-88] 86  Resp:  [16-18] 18  BP: (132-155)/(64-84) 139/84  SpO2:  [93 %-95 %] 94 %  Body mass index is 30 58 kg/m²  Input and Output Summary (last 24 hours): Intake/Output Summary (Last 24 hours) at 12/15/2019 1408  Last data filed at 12/15/2019 1300  Gross per 24 hour   Intake 740 ml   Output 300 ml   Net 440 ml       Physical Exam:     Physical Exam   Constitutional: She is oriented to person, place, and time  She appears well-developed and well-nourished  HENT:   Head: Normocephalic and atraumatic  Eyes: Pupils are equal, round, and reactive to light  EOM are normal    Neck: Normal range of motion  Neck supple  Cardiovascular: Normal rate and regular rhythm  Pulmonary/Chest: Effort normal and breath sounds normal    Abdominal: Soft  Bowel sounds are normal    Musculoskeletal: Normal range of motion  Neurological: She is alert and oriented to person, place, and time  Skin: Skin is warm and dry         Additional Data:     Labs:    Results from last 7 days   Lab Units 12/15/19  0533 19  1725   WBC Thousand/uL 5 31 6 61   HEMOGLOBIN g/dL 13 2 14 6   HEMATOCRIT % 41 6 46 5*   PLATELETS Thousands/uL 144* 189   NEUTROS PCT %  --  73   LYMPHS PCT %  --  14   MONOS PCT %  --  9   EOS PCT %  --  2     Results from last 7 days   Lab Units 12/15/19  0533 12/14/19  1727   SODIUM mmol/L 141 140   POTASSIUM mmol/L 3 9 4 0   CHLORIDE mmol/L 107 102   CO2 mmol/L 27 29   BUN mg/dL 21 21   CREATININE mg/dL 1 09 1 24   ANION GAP mmol/L 7 9   CALCIUM mg/dL 8 4 9 3   ALBUMIN g/dL  --  3 4*   TOTAL BILIRUBIN mg/dL  --  0 30   ALK PHOS U/L  --  81   ALT U/L  --  19   AST U/L  --  26   GLUCOSE RANDOM mg/dL 79 94     Results from last 7 days   Lab Units 12/14/19  1727   INR  1 27*     Results from last 7 days   Lab Units 12/15/19  1120 12/15/19  0614 12/14/19  2130 12/14/19  1940   POC GLUCOSE mg/dl 232* 82 231* 82         Results from last 7 days   Lab Units 12/14/19  1727   LACTIC ACID mmol/L 1 1     * I Have Reviewed All Lab Data Listed Above  * Additional Pertinent Lab Tests Reviewed:  Val 66 Admission Reviewed    Imaging:    Imaging Reports Reviewed Today Include:   Imaging Personally Reviewed by Myself Includes:     Recent Cultures (last 7 days):           Last 24 Hours Medication List:     Current Facility-Administered Medications:  acetaminophen 650 mg Oral Q6H PRN SULEMA Miguel   amLODIPine 10 mg Oral Daily SULEMA Miguel   apixaban 5 mg Oral BID SULEMA Miguel   atenolol 100 mg Oral Daily SULEMA Miguel   atorvastatin 20 mg Oral Daily SULEMA Miguel   azithromycin 500 mg Oral Q24H Rob Evans MD   benzonatate 100 mg Oral TID PRN SULEMA Miguel   calcium carbonate 1,000 mg Oral Daily PRN SULEMA Miguel   calcium carbonate-vitamin D 1 tablet Oral BID With Meals SULEMA Miguel   cefTRIAXone 1,000 mg Intravenous Q24H SULEMA Miguel   docusate sodium 100 mg Oral BID SULEMA Miguel   ferrous gluconate 324 mg Oral Daily With Breakfast SULEMA Miguel   guaiFENesin 600 mg Oral Q12H [facility] SULEMA [place]   insulin lispro 1-6 Units Subcutaneous 4x Daily (AC & HS) Donte Chandra, DO   losartan 100 mg Oral Daily Arnold Frees, CRNP   mesalamine 800 mg Oral TID Arnold Frees, CRNP   ondansetron 4 mg Intravenous Q6H PRN Arnold Frees, CRNP   polyethylene glycol 17 g Oral Daily Arnold Frees, CRNP        Today, Patient Was Seen By: Priscilla Gordon MD    ** Please Note: Dictation voice to text software may have been used in the creation of this document   **

## 2019-12-16 LAB
ANION GAP SERPL CALCULATED.3IONS-SCNC: 9 MMOL/L (ref 4–13)
ATRIAL RATE: 82 BPM
BACTERIA UR CULT: NORMAL
BUN SERPL-MCNC: 24 MG/DL (ref 5–25)
CALCIUM SERPL-MCNC: 8.3 MG/DL (ref 8.3–10.1)
CHLORIDE SERPL-SCNC: 107 MMOL/L (ref 100–108)
CO2 SERPL-SCNC: 25 MMOL/L (ref 21–32)
CREAT SERPL-MCNC: 1 MG/DL (ref 0.6–1.3)
ERYTHROCYTE [DISTWIDTH] IN BLOOD BY AUTOMATED COUNT: 12.7 % (ref 11.6–15.1)
GFR SERPL CREATININE-BSD FRML MDRD: 51 ML/MIN/1.73SQ M
GLUCOSE SERPL-MCNC: 113 MG/DL (ref 65–140)
GLUCOSE SERPL-MCNC: 141 MG/DL (ref 65–140)
GLUCOSE SERPL-MCNC: 149 MG/DL (ref 65–140)
GLUCOSE SERPL-MCNC: 153 MG/DL (ref 65–140)
GLUCOSE SERPL-MCNC: 189 MG/DL (ref 65–140)
HCT VFR BLD AUTO: 39.3 % (ref 34.8–46.1)
HGB BLD-MCNC: 12.8 G/DL (ref 11.5–15.4)
L PNEUMO1 AG UR QL IA.RAPID: NEGATIVE
MCH RBC QN AUTO: 31.3 PG (ref 26.8–34.3)
MCHC RBC AUTO-ENTMCNC: 32.6 G/DL (ref 31.4–37.4)
MCV RBC AUTO: 96 FL (ref 82–98)
P AXIS: 66 DEGREES
PLATELET # BLD AUTO: 144 THOUSANDS/UL (ref 149–390)
PMV BLD AUTO: 9.9 FL (ref 8.9–12.7)
POTASSIUM SERPL-SCNC: 3.8 MMOL/L (ref 3.5–5.3)
PR INTERVAL: 160 MS
PROCALCITONIN SERPL-MCNC: <0.05 NG/ML
QRS AXIS: 3 DEGREES
QRSD INTERVAL: 72 MS
QT INTERVAL: 398 MS
QTC INTERVAL: 464 MS
RBC # BLD AUTO: 4.09 MILLION/UL (ref 3.81–5.12)
S PNEUM AG UR QL: NEGATIVE
SODIUM SERPL-SCNC: 141 MMOL/L (ref 136–145)
T WAVE AXIS: 71 DEGREES
VENTRICULAR RATE: 82 BPM
WBC # BLD AUTO: 6.03 THOUSAND/UL (ref 4.31–10.16)

## 2019-12-16 PROCEDURE — 85027 COMPLETE CBC AUTOMATED: CPT | Performed by: INTERNAL MEDICINE

## 2019-12-16 PROCEDURE — 94668 MNPJ CHEST WALL SBSQ: CPT

## 2019-12-16 PROCEDURE — 93010 ELECTROCARDIOGRAM REPORT: CPT | Performed by: INTERNAL MEDICINE

## 2019-12-16 PROCEDURE — 84145 PROCALCITONIN (PCT): CPT | Performed by: STUDENT IN AN ORGANIZED HEALTH CARE EDUCATION/TRAINING PROGRAM

## 2019-12-16 PROCEDURE — 80048 BASIC METABOLIC PNL TOTAL CA: CPT | Performed by: INTERNAL MEDICINE

## 2019-12-16 PROCEDURE — 99232 SBSQ HOSP IP/OBS MODERATE 35: CPT | Performed by: STUDENT IN AN ORGANIZED HEALTH CARE EDUCATION/TRAINING PROGRAM

## 2019-12-16 PROCEDURE — 82948 REAGENT STRIP/BLOOD GLUCOSE: CPT

## 2019-12-16 RX ORDER — CEFDINIR 300 MG/1
300 CAPSULE ORAL EVERY 12 HOURS SCHEDULED
Status: DISCONTINUED | OUTPATIENT
Start: 2019-12-16 | End: 2019-12-17 | Stop reason: HOSPADM

## 2019-12-16 RX ADMIN — OYSTER SHELL CALCIUM WITH VITAMIN D 1 TABLET: 500; 200 TABLET, FILM COATED ORAL at 08:14

## 2019-12-16 RX ADMIN — MESALAMINE 800 MG: 400 CAPSULE, DELAYED RELEASE ORAL at 08:18

## 2019-12-16 RX ADMIN — INSULIN LISPRO 1 UNITS: 100 INJECTION, SOLUTION INTRAVENOUS; SUBCUTANEOUS at 12:40

## 2019-12-16 RX ADMIN — GUAIFENESIN 600 MG: 600 TABLET ORAL at 08:14

## 2019-12-16 RX ADMIN — DOCUSATE SODIUM 100 MG: 100 CAPSULE, LIQUID FILLED ORAL at 17:49

## 2019-12-16 RX ADMIN — MESALAMINE 800 MG: 400 CAPSULE, DELAYED RELEASE ORAL at 17:50

## 2019-12-16 RX ADMIN — APIXABAN 5 MG: 5 TABLET, FILM COATED ORAL at 17:49

## 2019-12-16 RX ADMIN — INSULIN LISPRO 1 UNITS: 100 INJECTION, SOLUTION INTRAVENOUS; SUBCUTANEOUS at 17:51

## 2019-12-16 RX ADMIN — ATORVASTATIN CALCIUM 20 MG: 20 TABLET, FILM COATED ORAL at 08:14

## 2019-12-16 RX ADMIN — AMLODIPINE BESYLATE 10 MG: 10 TABLET ORAL at 08:15

## 2019-12-16 RX ADMIN — FERROUS GLUCONATE 324 MG: 324 TABLET ORAL at 08:14

## 2019-12-16 RX ADMIN — ATENOLOL 100 MG: 50 TABLET ORAL at 08:14

## 2019-12-16 RX ADMIN — MESALAMINE 800 MG: 400 CAPSULE, DELAYED RELEASE ORAL at 21:50

## 2019-12-16 RX ADMIN — LOSARTAN POTASSIUM 100 MG: 50 TABLET, FILM COATED ORAL at 08:15

## 2019-12-16 RX ADMIN — GUAIFENESIN 600 MG: 600 TABLET ORAL at 21:50

## 2019-12-16 RX ADMIN — POLYETHYLENE GLYCOL 3350 17 G: 17 POWDER, FOR SOLUTION ORAL at 08:15

## 2019-12-16 RX ADMIN — DOCUSATE SODIUM 100 MG: 100 CAPSULE, LIQUID FILLED ORAL at 08:15

## 2019-12-16 RX ADMIN — APIXABAN 5 MG: 5 TABLET, FILM COATED ORAL at 08:14

## 2019-12-16 RX ADMIN — CEFDINIR 300 MG: 300 CAPSULE ORAL at 12:39

## 2019-12-16 RX ADMIN — CEFDINIR 300 MG: 300 CAPSULE ORAL at 21:50

## 2019-12-16 RX ADMIN — OYSTER SHELL CALCIUM WITH VITAMIN D 1 TABLET: 500; 200 TABLET, FILM COATED ORAL at 17:49

## 2019-12-16 NOTE — ASSESSMENT & PLAN NOTE
Chest x-ray demonstrated right upper lobe infiltrate will treat as community-acquired pneumonia  CT chest report noted for multifocal densities in right upper lobe, mild central bronchiectasis  Patient did well on IV Rocephin and azithromycin  Currently reports feeling much better  Reports cough has also improved  Will switch to [address] with incentive spirometry use  Encourage ambulation as much as tolerated  Continue supportive care  Tentative discharge tomorrow if continued well on p o  Antibiotics

## 2019-12-16 NOTE — RESPIRATORY THERAPY NOTE
RT Protocol Note  Randolph Hatfield 80 y o  female MRN: [id number]  Unit/Bed#: -01 Encounter: [id number]    Assessment    Principal Problem:    Right upper lobe pneumonia [facility])  Active Problems:    Benign essential hypertension    Atrial fibrillation (HCC)    Chronic kidney disease, stage III (moderate) (HCC)    UTI (urinary tract infection)    Weakness generalized    Dehydration    Elevated troponin    Cough      Home Pulmonary Medications:  none       Past Medical History:   Diagnosis Date    A-fib ([place] [address] )     Chronic kidney disease     Colostomy present ([place] [address] )     Diabetes mellitus ([place] [address] )     Hyperlipidemia     Hypertension      Social History     Socioeconomic History    Marital status:       Spouse name: None    Number of children: None    Years of education: None    Highest education level: None   Occupational History    None   Social Needs    Financial resource strain: None    Food insecurity:     Worry: None     Inability: None    Transportation needs:     Medical: None     Non-medical: None   Tobacco Use    Smoking status: Never Smoker    Smokeless tobacco: Never Used   Substance and Sexual Activity    Alcohol use: Never     Frequency: Never    Drug use: No    Sexual activity: None   Lifestyle    Physical activity:     Days per week: None     Minutes per session: None    Stress: None   Relationships    Social connections:     Talks on phone: None     Gets together: None     Attends Confucianism service: None     Active member of club or organization: None     Attends meetings of clubs or organizations: None     Relationship status: None    Intimate partner violence:     Fear of current or ex partner: None     Emotionally abused: None     Physically abused: None     Forced sexual activity: None   Other Topics Concern    None   Social History Narrative            Subjective         Objective    Physical Exam:   Assessment Type: Assess only  General Appearance: Alert, Awake  Respiratory Pattern: Normal  Chest Assessment: Chest expansion symmetrical  Bilateral Breath Sounds: Clear    Vitals:  Blood pressure (!) 86/56, pulse 74, temperature 97 9 °F (36 6 °C), resp  rate 16, height 5' 3" (1 6 m), weight 78 3 kg (172 lb 9 9 oz), SpO2 96 %  Imaging and other studies: I have personally reviewed pertinent reports  Plan    Respiratory Plan: Discontinue Protocol  Airway Clearance Plan: Incentive Spirometer     Resp Comments: Pt  admitted for pneumonia and has been here for 2 nights  Currently there is no SOB while ambulating from chair to bathroom etc   Saturation of 96% on room air  No treatments indicated at this time  Exceeded goal for the airway protocol  She will continue to use the IS as informed

## 2019-12-16 NOTE — PROGRESS NOTES
Progress Note - Edgar Starr 2/25/1932, 80 y o  female MRN: [id number]    Unit/Bed#: -01 Encounter: [id number]    Primary Care Provider: Yaima Raphael MD   Date and time admitted to hospital: 12/14/2019  5:08 PM        * Right upper lobe pneumonia [facility])  Assessment & Plan  Chest x-ray demonstrated right upper lobe infiltrate will treat as community-acquired pneumonia  CT chest report noted for multifocal densities in right upper lobe, mild central bronchiectasis  Patient did well on IV Rocephin and azithromycin  Currently reports feeling much better  Reports cough has also improved  Will switch to [address] with incentive spirometry use  Encourage ambulation as much as tolerated  Continue supportive care  Tentative discharge tomorrow if continued well on p o  Antibiotics  Cough  Assessment & Plan  Likely due to community-acquired pneumonia  Improving on antibiotics  Elevated troponin  Assessment & Plan  Denies any chest pain chest tightness shortness of breath or difficulty breathing  Etiology unknown  Chronically elevated troponin   Continue to monitor clinically      Dehydration  Assessment & Plan  Resolved    Weakness generalized  Assessment & Plan  Today reports feeling much better  PT recommendation noted home with family support and home PT     UTI (urinary tract infection)  Assessment & Plan  UA not strongly indicative of UTI  No growth in urine culture  Chronic kidney disease, stage III (moderate) (HCC)  Assessment & Plan  Stable    Atrial fibrillation (HCC)  Assessment & Plan  Appears rate controlled  Continue home medications  Continue Eliquis    Benign essential hypertension  Assessment & Plan  Appears controlled  Continue home medications    VTE Pharmacologic Prophylaxis:   Pharmacologic: Apixaban (Eliquis)    Patient Centered Rounds: I have performed bedside rounds with nursing staff today  Education and Discussions with Family / Patient:  Patient    Spoke with son Nixon Milling over the phone at length  Time Spent for Care: 30 minutes  More than 50% of total time spent on counseling and coordination of care as described above  Current Length of Stay: 1 day(s)    Current Patient Status: Inpatient   Certification Statement: The patient will continue to require additional inpatient hospital stay due to Pneumonia, will transition to p o  Antibiotics and likely discharge tomorrow if continues to do well  Discharge Plan:  Hopefully tomorrow    Code Status: Level 3 - DNAR and DNI      Subjective:   Afebrile, hemodynamically stable  Seen sitting in a chair  Patient reports feeling much better  Reports cough has improved  Saturating well on room air  No other events reported  Malik Mcneil to go home  Objective:     Vitals:   Temp (24hrs), Av 1 °F (36 7 °C), Min:97 9 °F (36 6 °C), Max:98 3 °F (36 8 °C)    Temp:  [97 9 °F (36 6 °C)-98 3 °F (36 8 °C)] 97 9 °F (36 6 °C)  HR:  [69-82] 69  Resp:  [17-19] 19  BP: ()/(56-83) 86/56  SpO2:  [95 %-96 %] 95 %  Body mass index is 30 58 kg/m²  Input and Output Summary (last 24 hours): Intake/Output Summary (Last 24 hours) at 2019 1611  Last data filed at 2019 0900  Gross per 24 hour   Intake 170 ml   Output 650 ml   Net -480 ml       Physical Exam:     Physical Exam   Constitutional: She is oriented to person, place, and time  She appears well-nourished  No distress  HENT:   Head: Normocephalic and atraumatic  Eyes: Pupils are equal, round, and reactive to light  EOM are normal    Neck: Normal range of motion  Neck supple  Cardiovascular: Normal rate and regular rhythm  Pulmonary/Chest: [place] normal and breath sounds normal  No stridor  No respiratory distress  Abdominal: Soft  Bowel sounds are normal  She exhibits no distension  There is no tenderness  Musculoskeletal: Normal range of motion  Neurological: She is alert and oriented to person, place, and time     Skin: She is not diaphoretic  Psychiatric: Her behavior is normal        Additional Data:     Labs:    Results from last 7 days   Lab Units 12/16/19  0519  12/14/19  1725   WBC Thousand/uL 6 03   < > 6 61   HEMOGLOBIN g/dL 12 8   < > 14 6   HEMATOCRIT % 39 3   < > 46 5*   PLATELETS Thousands/uL 144*   < > 189   NEUTROS PCT %  --   --  73   LYMPHS PCT %  --   --  14   MONOS PCT %  --   --  9   EOS PCT %  --   --  2    < > = values in this interval not displayed  Results from last 7 days   Lab Units 12/16/19  0519  12/14/19  1727   SODIUM mmol/L 141   < > 140   POTASSIUM mmol/L 3 8   < > 4 0   CHLORIDE mmol/L 107   < > 102   CO2 mmol/L 25   < > 29   BUN mg/dL 24   < > 21   CREATININE mg/dL 1 00   < > 1 24   ANION GAP mmol/L 9   < > 9   CALCIUM mg/dL 8 3   < > 9 3   ALBUMIN g/dL  --   --  3 4*   TOTAL BILIRUBIN mg/dL  --   --  0 30   ALK PHOS U/L  --   --  81   ALT U/L  --   --  19   AST U/L  --   --  26   GLUCOSE RANDOM mg/dL 141*   < > 94    < > = values in this interval not displayed  Results from last 7 days   Lab Units 12/14/19  1727   INR  1 27*     Results from last 7 days   Lab Units 12/16/19  1108 12/16/19  0627 12/15/19  2032 12/15/19  1619 12/15/19  1120 12/15/19  0614 12/14/19  2130 12/14/19  1940   POC GLUCOSE mg/dl 189* 113 156* 173* 232* 82 231* 82         Results from last 7 days   Lab Units 12/14/19  1727   LACTIC ACID mmol/L 1 1           * I Have Reviewed All Lab Data Listed Above  * Additional Pertinent Lab Tests Reviewed:  All Labs Within Last 24 Hours Reviewed      Recent Cultures (last 7 days):     Results from last 7 days   Lab Units 12/15/19  1351 12/14/19  2216   URINE CULTURE   --  No Growth <1000 cfu/mL   LEGIONELLA URINARY ANTIGEN  Negative  --        Last 24 Hours Medication List:     Current Facility-Administered Medications:  acetaminophen 650 mg Oral Q6H PRN SULEMA Horan   amLODIPine 10 mg Oral Daily SULEMA Horan   apixaban 5 mg Oral BID SULEMA Horan   atenolol 100 mg Oral Daily Vidal Jose Antonio, CRNP   atorvastatin 20 mg Oral Daily [place] Jose Antonio, CRNP   benzonatate 100 mg Oral TID PRN [place]ano Jose Antonio, CRNP   calcium carbonate 1,000 mg Oral Daily PRN [place]ano Jose Antonio, CRNP   calcium carbonate-vitamin D 1 tablet Oral BID With Meals U[place]ano Jose Antonio, CRNP   cefdinir 300 mg Oral Q12H [address] Levar ALLEN MD   docusate sodium 100 mg Oral BID Vidal Jose Antonio, CRNP   ferrous gluconate 324 mg Oral Daily With Breakfast [place]ano Jose Antonio, CRNP   guaiFENesin 600 mg Oral Q12H [address] Urbano Jose Antonio, CRNP   insulin lispro 1-6 Units Subcutaneous 4x Daily (AC & HS) Jayden Reynoso DO   losartan 100 mg Oral Daily Vidal Jose Antonio, CRNP   mesalamine 800 mg Oral TID Vidal Jose Antonio, CRNP   ondansetron 4 mg Intravenous Q6H PRN Vidal Jose Antonio, CRNP   polyethylene glycol 17 g Oral Daily Urbano Jose Antonio, CRNP        Today, Patient Was Seen By: Nahomy Roblero MD    ** Please Note: Dictation voice to text software may have been used in the creation of this document   **

## 2019-12-16 NOTE — UTILIZATION REVIEW
Notification of Inpatient Admission/Inpatient Authorization Request   This is a Notification of Inpatient Admission for [address]  Be advised that this patient was admitted to our facility under Inpatient Status  Contact Orin Cespedes at [phone] for additional admission information  [facility] DEPT DEDICATED Alina [place]asha [phone]  Patient Name:   Belinda Beasley   YOB: 1932       [address]   [address]:   [address]   Tax ID: [id number]  NPI: [id number] Attending Provider/NPI: Gardenia Musa [[id number]]   Place of Service Code: 24     Place of Service Name:  [address] [place] [address]   Start Date: 12/15/19 1408     Discharge Date & Time: No discharge date for patient encounter  Type of Admission: Inpatient Status Discharge Disposition   (if discharged): Home/Self Care   Patient Diagnoses: Dehydration [E86 0]  UTI (urinary tract infection) [N39 0]  Weakness generalized [R53 1]  Weakness [R53 1]     Orders: Admission Orders (From admission, onward)     Ordered        12/15/19 1408  Inpatient Admission  Once         12/14/19 1845  Place in Observation (expected length of stay for this patient is less than two midnights)  Once                    Assigned Utilization Review Contact: Orin Cespedes  Utilization   Network Utilization Review Department  Phone: [phone]; Fax [phone]  Email: [email or web address]   ATTENTION PAYERS: Please call the assigned Utilization  directly with any questions or concerns ALL voicemails in the department are confidential  Send all requests for admission clinical reviews, approved or denied determinations and any other requests to dedicated fax number belonging to the campus where the patient is receiving treatment

## 2019-12-16 NOTE — PLAN OF CARE
Problem: Potential for Falls  Goal: Patient will remain free of falls  Description  INTERVENTIONS:  - Assess patient frequently for physical needs  -  Identify cognitive and physical deficits and behaviors that affect risk of falls    -  [place] fall precautions as indicated by assessment   - Educate patient/family on patient safety including physical limitations  - Instruct patient to call for assistance with activity based on assessment  - Modify environment to reduce risk of injury  - Consider OT/PT consult to assist with strengthening/mobility  Outcome: Progressing     Problem: Prexisting or High Potential for Compromised Skin Integrity  Goal: Skin integrity is maintained or improved  Description  INTERVENTIONS:  - Identify patients at risk for skin breakdown  - Assess and monitor skin integrity  - Assess and monitor nutrition and hydration status  - Monitor labs   - Assess for incontinence   - Turn and reposition patient  - Assist with mobility/ambulation  - Relieve pressure over bony prominences  - Avoid friction and shearing  - Provide appropriate hygiene as needed including keeping skin clean and dry  - Evaluate need for skin moisturizer/barrier cream  - Collaborate with interdisciplinary team   - Patient/family teaching  - Consider wound care consult   Outcome: Progressing     Problem: RESPIRATORY - ADULT  Goal: Achieves optimal ventilation and oxygenation  Description  INTERVENTIONS:  - Assess for changes in respiratory status  - Assess for changes in mentation and behavior  - Position to facilitate oxygenation and minimize respiratory effort  - Oxygen administered by appropriate delivery if ordered  - Initiate smoking cessation education as indicated  - Encourage broncho-pulmonary hygiene including cough, deep breathe, Incentive Spirometry  - Assess the need for suctioning and aspirate as needed  - Assess and instruct to report SOB or any respiratory difficulty  - Respiratory Therapy support as indicated  Outcome: Progressing     Problem: METABOLIC, FLUID AND ELECTROLYTES - ADULT  Goal: Glucose maintained within target range  Description  INTERVENTIONS:  - Monitor Blood Glucose as ordered  - Assess for signs and symptoms of hyperglycemia and hypoglycemia  - Administer ordered medications to maintain glucose within target range  - Assess nutritional intake and initiate nutrition service referral as needed  Outcome: Progressing

## 2019-12-17 VITALS
HEIGHT: 63 IN | DIASTOLIC BLOOD PRESSURE: 86 MMHG | OXYGEN SATURATION: 96 % | HEART RATE: 84 BPM | TEMPERATURE: 98.1 F | WEIGHT: 172.62 LBS | RESPIRATION RATE: 17 BRPM | BODY MASS INDEX: 30.59 KG/M2 | SYSTOLIC BLOOD PRESSURE: 151 MMHG

## 2019-12-17 LAB
GLUCOSE SERPL-MCNC: 102 MG/DL (ref 65–140)
GLUCOSE SERPL-MCNC: 221 MG/DL (ref 65–140)

## 2019-12-17 PROCEDURE — 94668 MNPJ CHEST WALL SBSQ: CPT

## 2019-12-17 PROCEDURE — 99239 HOSP IP/OBS DSCHRG MGMT >30: CPT | Performed by: STUDENT IN AN ORGANIZED HEALTH CARE EDUCATION/TRAINING PROGRAM

## 2019-12-17 PROCEDURE — 82948 REAGENT STRIP/BLOOD GLUCOSE: CPT

## 2019-12-17 RX ORDER — CEFDINIR 300 MG/1
300 CAPSULE ORAL EVERY 12 HOURS SCHEDULED
Qty: 4 CAPSULE | Refills: 0 | Status: SHIPPED | OUTPATIENT
Start: 2019-12-17 | End: 2019-12-19

## 2019-12-17 RX ORDER — BENZONATATE 100 MG/1
100 CAPSULE ORAL 3 TIMES DAILY PRN
Qty: 20 CAPSULE | Refills: 0 | Status: SHIPPED | OUTPATIENT
Start: 2019-12-17 | End: 2020-03-11

## 2019-12-17 RX ADMIN — GUAIFENESIN 600 MG: 600 TABLET ORAL at 08:23

## 2019-12-17 RX ADMIN — APIXABAN 5 MG: 5 TABLET, FILM COATED ORAL at 08:23

## 2019-12-17 RX ADMIN — OYSTER SHELL CALCIUM WITH VITAMIN D 1 TABLET: 500; 200 TABLET, FILM COATED ORAL at 08:23

## 2019-12-17 RX ADMIN — AMLODIPINE BESYLATE 10 MG: 10 TABLET ORAL at 08:23

## 2019-12-17 RX ADMIN — MESALAMINE 800 MG: 400 CAPSULE, DELAYED RELEASE ORAL at 08:23

## 2019-12-17 RX ADMIN — CEFDINIR 300 MG: 300 CAPSULE ORAL at 08:23

## 2019-12-17 RX ADMIN — FERROUS GLUCONATE 324 MG: 324 TABLET ORAL at 08:24

## 2019-12-17 RX ADMIN — LOSARTAN POTASSIUM 100 MG: 50 TABLET, FILM COATED ORAL at 08:23

## 2019-12-17 RX ADMIN — ATENOLOL 100 MG: 50 TABLET ORAL at 08:23

## 2019-12-17 RX ADMIN — ATORVASTATIN CALCIUM 20 MG: 20 TABLET, FILM COATED ORAL at 08:23

## 2019-12-17 RX ADMIN — POLYETHYLENE GLYCOL 3350 17 G: 17 POWDER, FOR SOLUTION ORAL at 08:24

## 2019-12-17 RX ADMIN — DOCUSATE SODIUM 100 MG: 100 CAPSULE, LIQUID FILLED ORAL at 08:23

## 2019-12-17 NOTE — SOCIAL WORK
LOS 2 DAYS  HRR: 15  PATIENT IS NOT A BUNDLE  PATIENT IS NOT A READMISSION  CM met with patient at bedside, patient alert and oriented and seated on EOB  Patient resides alone in a mutli story home in [place]; patient's son Griffin Carpenter lives very close by and is a big support at home  Patient lives in the basement level as it is flat to enter and has a bedroom, bathroom and kitchenette  There is a walk in shower as well as raised toilet seat and patient is able to navigate her home without concern  She also reports she will take the stairs to the first floor a few times per day to get in some exercise  Patient denies the use of DME, though she does have a cane at home, and is independent with all ADLs  Patient reports history of HHC and SNF at [facility] years ago when she lived in [place]  Patient utilizes [facility] in [place], has Rx coverage and is able to afford all copays though she reports they are costly  Patient reports she is switching to [employer] order for her prescriptions next month as well as changing her insurance plan which should have smaller copays  Patient denies history of MH/substance use  Patient identifies her son Melvin Mayen as her POA and has AD as well  Patient receives SSI as well as a pension from her late spouse  Patient does drive short distances and reports her son will provide transport upon discharge  CM reviewed PT evaluation and recommendation for Home PT  Patient reports she feels she is ambulating at her baseline and doesn't feel she needs assistance at this time though she is aware she can call her PCP to arrange for home health care should she change her mind after discharge  No CM needs identified at this time  CM Department will continue to follow through discharge      CM reviewed discharge planning process including the following: identifying caregivers at home, preference for d/c planning needs, availability of treatment team to discuss questions or concerns patient and/or family may have regarding diagnosis, plan of care, old or new medications and discharge planning   CM will continue to follow for care coordination and update assessment as appropriate

## 2019-12-17 NOTE — DISCHARGE SUMMARY
Discharge- Porfirio Mcneil 2/25/1932, 80 y o  female MRN: [id number]    Unit/Bed#: -01 Encounter: [id number]    Primary Care Provider: Jonny Bailey MD   Date and time admitted to hospital: 12/14/2019  5:08 PM        * Right upper lobe pneumonia [facility])  Assessment & Plan  Chest x-ray demonstrated right upper lobe infiltrate will treat as community-acquired pneumonia  CT chest report noted for multifocal densities in right upper lobe, mild central bronchiectasis  Patient did well on IV Rocephin and azithromycin  Currently reports feeling much better  Reports cough has also improved  tolerated Omnicef well  Continue Omnicef for 2 more days to finish total course  Recommended to have repeat CT chest outpatient 6-8 weeks to monitor resolution  Recommended outpatient follow-up primary care provider and pulmonology  Patient agreeable with above plan  Cough  Assessment & Plan  Likely due to community-acquired pneumonia  Improving on antibiotics  Continue supportive care  Outpatient follow-up  Elevated troponin  Assessment & Plan  Chronically slightly elevated but flat troponin without any clear etiology  Currently denies chest pain, dyspnea, nausea, diaphoreses, any other new complaints  Crystal Reins to go home  Outpatient follow-up  Dehydration  Assessment & Plan  Resolved    Weakness generalized  Assessment & Plan  Today reports feeling much better  PT recommendation noted home with family support and home PT  Will discharge home with VNA services  UTI (urinary tract infection)  Assessment & Plan  UA not strongly indicative of UTI  No growth in urine culture    UTI ruled out UTI ruled out    Chronic kidney disease, stage III (moderate) ([facility] [address] )  Assessment & Plan  Stable  Outpatient follow-up    Atrial fibrillation [facility])  Assessment & Plan  Appears rate controlled  Continue home medications  Continue Eliquis    Benign essential hypertension  Assessment & Plan  Appears controlled  Continue home medications  Close outpatient follow-up  Discharging Physician / Practitioner: Bonnie Velasquez MD  PCP: Olga Lacy MD  Admission Date:   Admission Orders (From admission, onward)     Ordered        12/15/19 1408  Inpatient Admission  Once         12/14/19 1845  Place in Observation (expected length of stay for this patient is less than two midnights)  Once                   Discharge Date: 12/17/19    Resolved Problems  Date Reviewed: 12/17/2019    None               Outpatient Tests Requested:  · Recommended outpatient repeat CT chest in 6-8 weeks to monitor resolution  Reason for Admission:  Generalized weakness  Pneumonia    Hospital Course:     Kathy Fay is a 80 y o  female patient past history of hypothyroidism, diabetes, hypertension, AFib, CKD stage 3, a fib on eliquis,  history of colonic/rectal stricture status post partial colectomy and colostomy, chronically troponin of unclear significance, who originally presented to the hospital on 12/14/2019 due to generalized weakness due to pneumonia as evident on CT chest report  UA negative for UTI  Patient did well on IV systemic antibiotics  Cultures without growth  Subsequently antibiotics transition to p o  Omnicef  Patient tolerated well and continued remained afebrile, hemodynamically stable on Omnicef  Patient is feeling much better  Denies chest pain, fever, chills, nausea, vomiting, pain, dysuria, diarrhea  Denies any other new complaints  Maxim Marquez to go home  PT OT recommendation noted to DC home with family support, home PT  Will discharge home with VNA services  Discharge on 2 more days of Omnicef to complete total 5 day course  Recommended outpatient CT chest in 6-8 weeks to monitor resolution  Recommended close follow-up with PCP and pulmonology outpatient  Patient agrees with above outpatient follow-up plan  No other events reported    Refer to earlier notes for further clarification  Please see above list of diagnoses and related plan for additional information  Condition at Discharge: fair     Discharge Day Visit / Exam:     Subjective:  Afebrile, hemodynamically stable  Since sitting in a chair  Appears comfortable  Reports feeling much better and eager to go home  Denies chest pain, fever, chills, nausea, vomiting, abdominal pain, dysuria, diarrhea  No other events reported  Vitals: Blood Pressure: 151/86 (12/17/19 0822)  Pulse: 84 (12/17/19 0822)  Temperature: 98 1 °F (36 7 °C) (12/16/19 2330)  Temp Source: Oral (12/14/19 1656)  Respirations: 17 (12/16/19 2330)  Height: 5' 3" (160 cm) (12/14/19 2010)  Weight - Scale: 78 3 kg (172 lb 9 9 oz) (12/14/19 2010)  SpO2: 96 % (12/17/19 5104)  Exam:   Physical Exam   Constitutional: She is oriented to person, place, and time  She appears well-nourished  No distress  HENT:   Head: Normocephalic and atraumatic  Eyes: Pupils are equal, round, and reactive to light  EOM are normal    Neck: Normal range of motion  Neck supple  Cardiovascular: Normal rate and regular rhythm  Pulmonary/Chest: [place] normal and breath sounds normal  No stridor  No respiratory distress  Abdominal: Soft  Bowel sounds are normal  She exhibits no distension  There is no tenderness  Musculoskeletal: Normal range of motion  Neurological: She is alert and oriented to person, place, and time  Skin: She is not diaphoretic  Psychiatric: Her behavior is normal        Discharge instructions/Information to patient and family:   See after visit summary for information provided to patient and family  Provisions for Follow-Up Care:  See after visit summary for information related to follow-up care and any pertinent home health orders        Disposition:     Home with VNA Services (Reminder: Complete face to face encounter)    For Discharges to [facility] SNF:   · Not Applicable to this Patient - Not Applicable to this Patient    Planned Readmission: none     Discharge Statement:  I spent 35 minutes discharging the patient  This time was spent on the day of discharge  I had direct contact with the patient on the day of discharge  Greater than 50% of the total time was spent examining patient, answering all patient questions, arranging and discussing plan of care with patient as well as directly providing post-discharge instructions  Additional time then spent on discharge activities  Discharge Medications:  See after visit summary for reconciled discharge medications provided to patient and family        ** Please Note: This note has been constructed using a voice recognition system **

## 2019-12-17 NOTE — DISCHARGE INSTR - AVS FIRST PAGE
Recommended close follow-up with primary care provider in 3-5 days of discharge  Recommended to continue using the incentive spirometry at home  Recommended follow-up with pulmonology outpatient  Recommended to have repeat chest CT in 6-8 weeks outpatient to monitor resolution

## 2019-12-17 NOTE — DISCHARGE INSTRUCTIONS
Cefdinir (By mouth)   Cefdinir (SEF-di-ward)  Treats bacterial infections  This medicine is a cephalosporin antibiotic  Brand Name(s):   There may be other brand names for this medicine  When This Medicine Should Not Be Used: This medicine is not right for everyone  Do not use it if you had an allergic reaction to cefdinir or to any type of cephalosporin  How to Use This Medicine:   Capsule, Liquid  · Your doctor will tell you how much medicine to use  Do not use more than directed  · Shake the oral liquid well before use  · Measure the oral liquid medicine with a marked measuring spoon, oral syringe, or medicine cup  · Take all of the medicine in your prescription to clear up your infection, even if you feel better after the first few doses  · This medicine is not for long-term use  · Missed dose: Take a dose as soon as you remember  If it is almost time for your next dose, wait until then and take a regular dose  Do not take extra medicine to make up for a missed dose  · Store the medicine in a closed container at room temperature, away from heat, moisture, and direct light  Discard any unused portion of the oral liquid after 10 days  Drugs and Foods to Avoid:   Ask your doctor or pharmacist before using any other medicine, including over-the-counter medicines, vitamins, and herbal products  · Some medicines can affect how cefdinir works  Tell your doctor if you are also taking probenecid  · If you use antacids or iron supplements (including those found in multivitamins), take them at least 2 hours before or 2 hours after you take cefdinir  Warnings While Using This Medicine:   · Tell your doctor if you are pregnant or breastfeeding, have kidney disease, or had an allergic reaction to penicillin antibiotics or any other medicines  · This medicine can cause diarrhea  Call your doctor if the diarrhea becomes severe, does not stop, or is bloody   Do not take any medicine to stop diarrhea until you have talked to your doctor  Diarrhea can occur 2 months or more after you stop taking this medicine  · If you have diabetes, use Clinistix® or Saida-Tape® for urine glucose tests while you are taking cefdinir  Cefdinir capsules and liquid may cause incorrect results on the Clinitest® urine glucose test   · Tell any doctor or dentist who treats you that you are using this medicine  This medicine may affect certain medical test results  · Call your doctor if your symptoms do not improve or if they get worse  · Keep all medicine out of the reach of children  Never share your medicine with anyone  Possible Side Effects While Using This Medicine:   Call your doctor right away if you notice any of these side effects:  · Allergic reaction: Itching or hives, swelling in your face or hands, swelling or tingling in your mouth or throat, chest tightness, trouble breathing  · Blistering, peeling, red skin rash  · Red or black stools  · Severe diarrhea or stomach pain  · Sores or white patches on your lips, mouth, or throat  If you notice these less serious side effects, talk with your doctor:   · Bloated feeling, constipation, loss of appetite, nausea, vomiting, or upset stomach  · Dizziness, drowsiness, sleepiness, or unusual weakness  · Dry mouth  · Trouble sleeping  If you notice other side effects that you think are caused by this medicine, tell your doctor  Call your doctor for medical advice about side effects  You may report side effects to FDA at 9-309-FDA-4293  © 2017 [address] [employer] Information is for End User's use only and may not be sold, redistributed or otherwise used for commercial purposes  The above information is an  only  It is not intended as medical advice for individual conditions or treatments  Talk to your doctor, nurse or pharmacist before following any medical regimen to see if it is safe and effective for you        Bacterial Pneumonia   WHAT YOU NEED TO KNOW: Bacterial pneumonia is a lung infection caused by bacteria  It makes your lungs inflamed, which means they cannot work well  Bacterial pneumonia germs are easily spread when an infected person coughs, sneezes, or has close contact with others  DISCHARGE INSTRUCTIONS:   Seek care immediately if:   · You are confused and cannot think clearly  · You are urinating less or not at all  · You cough up blood  · You have more trouble breathing, or your breathing seems faster than normal     · Your heart or pulse beats more than 100 times in 1 minute  · Your lips or fingernails turn blue  Contact your healthcare provider if:   · Your symptoms are the same or get worse 48 hours after you start antibiotics  · You cannot eat or have loss of appetite, nausea, or are vomiting  · You have questions or concerns about your condition or care  Medicines:   · Antibiotics  treat pneumonia caused by bacteria  · Acetaminophen  decreases pain and fever  It is available without a doctor's order  Ask how much to take and how often to take it  Follow directions  Read the labels of all other medicines you are using to see if they also contain acetaminophen, or ask your doctor or pharmacist  Acetaminophen can cause liver damage if not taken correctly  Do not use more than 4 grams (4,000 milligrams) total of acetaminophen in one day  · NSAIDs , such as ibuprofen, help decrease swelling, pain, and fever  This medicine is available with or without a doctor's order  NSAIDs can cause stomach bleeding or kidney problems in certain people  If you take blood thinner medicine, always ask your healthcare provider if NSAIDs are safe for you  Always read the medicine label and follow directions  · Take your medicine as directed  Contact your healthcare provider if you think your medicine is not helping or if you have side effects  Tell him or her if you are allergic to any medicine   Keep a list of the medicines, vitamins, and herbs you take  Include the amounts, and when and why you take them  Bring the list or the pill bottles to follow-up visits  Carry your medicine list with you in case of an emergency  Follow up with your healthcare provider as directed:  Write down your questions so you remember to ask them during your visits  Manage your symptoms:   · Rest as needed  Rest often while you recover  Slowly start to do more each day  · Drink liquids as directed  Ask how much liquid to drink each day and which liquids are best for you  Liquids help thin your mucus, which may make it easier for you to cough it up  · Do not smoke  Avoid secondhand smoke  Smoking increases your risk for pneumonia  Smoking also makes it harder for you to get better after you have had pneumonia  Ask your healthcare provider for information if you currently smoke and need help to quit  E-cigarettes or smokeless tobacco still contain nicotine  Talk to your healthcare provider before you use these products  · Use a cool mist humidifier  to increase air moisture in your home  This may make it easier for you to breathe and help decrease your cough  · Keep your head elevated  You may be able to breathe better if you lie down with the head of your bed up  Prevent bacterial pneumonia:   · Prevent the spread of germs  Wash your hands often with soap and water  Use gel hand cleanser when there is no soap and water available  Do not touch your eyes, nose, or mouth unless you have washed your hands first  Cover your mouth when you cough  Cough into a tissue or your shirtsleeve so you do not spread germs from your hands  If you are sick, stay away from others as much as possible  · Limit alcohol  Women should limit alcohol to 1 drink a day  Men should limit alcohol to 2 drinks a day  A drink of alcohol is 12 ounces of beer, 5 ounces of wine, or 1½ ounces of liquor  · Ask about vaccines    You may need a vaccine to help prevent pneumonia  Get an influenza (flu) vaccine every year as soon as it becomes available  © 2017 [employer] Information is for End User's use only and may not be sold, redistributed or otherwise used for commercial purposes  All illustrations and images included in CareNotes® are the copyrighted property of [employer]  or Seamus Figueroa  The above information is an  only  It is not intended as medical advice for individual conditions or treatments  Talk to your doctor, nurse or pharmacist before following any medical regimen to see if it is safe and effective for you

## 2019-12-17 NOTE — PLAN OF CARE
Problem: Potential for Falls  Goal: Patient will remain free of falls  Description  INTERVENTIONS:  - Assess patient frequently for physical needs  -  Identify cognitive and physical deficits and behaviors that affect risk of falls    -  [place] fall precautions as indicated by assessment   - Educate patient/family on patient safety including physical limitations  - Instruct patient to call for assistance with activity based on assessment  - Modify environment to reduce risk of injury  - Consider OT/PT consult to assist with strengthening/mobility  Outcome: Progressing     Problem: Prexisting or High Potential for Compromised Skin Integrity  Goal: Skin integrity is maintained or improved  Description  INTERVENTIONS:  - Identify patients at risk for skin breakdown  - Assess and monitor skin integrity  - Assess and monitor nutrition and hydration status  - Monitor labs   - Assess for incontinence   - Turn and reposition patient  - Assist with mobility/ambulation  - Relieve pressure over bony prominences  - Avoid friction and shearing  - Provide appropriate hygiene as needed including keeping skin clean and dry  - Evaluate need for skin moisturizer/barrier cream  - Collaborate with interdisciplinary team   - Patient/family teaching  - Consider wound care consult   Outcome: Progressing     Problem: RESPIRATORY - ADULT  Goal: Achieves optimal ventilation and oxygenation  Description  INTERVENTIONS:  - Assess for changes in respiratory status  - Assess for changes in mentation and behavior  - Position to facilitate oxygenation and minimize respiratory effort  - Oxygen administered by appropriate delivery if ordered  - Initiate smoking cessation education as indicated  - Encourage broncho-pulmonary hygiene including cough, deep breathe, Incentive Spirometry  - Assess the need for suctioning and aspirate as needed  - Assess and instruct to report SOB or any respiratory difficulty  - Respiratory Therapy support as indicated  Outcome: Progressing     Problem: METABOLIC, FLUID AND ELECTROLYTES - ADULT  Goal: Glucose maintained within target range  Description  INTERVENTIONS:  - Monitor Blood Glucose as ordered  - Assess for signs and symptoms of hyperglycemia and hypoglycemia  - Administer ordered medications to maintain glucose within target range  - Assess nutritional intake and initiate nutrition service referral as needed  Outcome: Progressing

## 2019-12-18 ENCOUNTER — TRANSITIONAL CARE MANAGEMENT (OUTPATIENT)
Dept: FAMILY MEDICINE CLINIC | Facility: CLINIC | Age: 84
End: 2019-12-18

## 2019-12-18 NOTE — UTILIZATION REVIEW
Notification of Discharge  This is a Notification of Discharge from our facility [address]  Please be advised that this patient has been discharge from our facility  Below you will find the admission and discharge date and time including the patients disposition  PRESENTATION DATE: 12/14/2019  5:08 PM  OBS ADMISSION DATE: 12/14/2019  IP ADMISSION DATE: 12/15/19 1408   DISCHARGE DATE: 12/17/2019  3:16 PM  DISPOSITION: Home with [facility] with [address] [place] [address]   Admission Orders listed below:  Admission Orders (From admission, onward)     Ordered        12/15/19 1408  Inpatient Admission  Once         12/14/19 1845  Place in Observation (expected length of stay for this patient is less than two midnights)  Once                   Please contact the UR Department if additional information is required to close this patient's authorization/case  [facility] Utilization Review Department  Main: [phone] x carefully listen to the prompts  All voicemails are confidential   [email or web address]  Send all requests for admission clinical reviews, approved or denied determinations and any other requests to dedicated fax number below belonging to the campus where the patient is receiving treatment   List of dedicated fax numbers:  [address] [place] [address] DENIALS (Administrative/Medical Necessity) [phone]   [address] [place] [address] (Maternity/NICU/Pediatrics) [phone]   Aaron Vieira [phone]   Jose Ramon Dean [phone]   Mary Gomez [phone]   [address] [facility] [address] [place] [address] [phone]   [facility]  [phone]   [address] [place] [address], [facility]  [address] [facility] [address] [facility] [phone]

## 2019-12-21 NOTE — PROGRESS NOTES
Assessment/Plan:       Diagnoses and all orders for this visit:    Transition of care performed with sharing of clinical summary  -     XR chest pa & lateral; Future    Pneumonia of right upper lobe due to infectious organism [facility])  -     CT chest wo contrast; Future  -     XR chest pa & lateral; Future  -     cefdinir (OMNICEF) 300 mg capsule; Take 1 capsule (300 mg total) by mouth every 12 (twelve) hours for 7 days    Type 2 diabetes mellitus with stage 3 chronic kidney disease, without long-term current use of insulin (HCC)    Benign essential hypertension    Longstanding persistent atrial fibrillation        No problem-specific Assessment & Plan notes found for this encounter  Subjective:      Patient ID: King Weiss is a 80 y o  female  Patient is here for hospital follow up  She was admitted for [facility] PNA from 12/14 thru 12/17  She was treated with IV rocephin and azithromycin for CAP  She was sent home with omnicef  This morning, her "stomach didnt feel right"  She is running fever  She feeling fatigued  The following portions of the patient's history were reviewed and updated as appropriate:   She has a past medical history of A-fib ([facility] [address] ), Chronic kidney disease, Colostomy present ([facility] [address] ), Diabetes mellitus ([facility] [address] ), Hyperlipidemia, and Hypertension  ,  does not have any pertinent problems on file  ,   has a past surgical history that includes Colon surgery; Appendectomy; Hysterectomy; Carpal tunnel release (Bilateral); Cholecystectomy; Colostomy; and Cataract extraction  ,  family history includes Cancer in her father; Heart disease in her mother  ,   reports that she has never smoked  She has never used smokeless tobacco  She reports that she does not drink alcohol or use drugs  ,  is allergic to amoxicillin-pot clavulanate     Current Outpatient Medications   Medication Sig Dispense Refill    amLODIPine (NORVASC) 10 mg tablet Take 1 tablet (10 mg total) by mouth daily 90 tablet 3    apixaban (ELIQUIS) 5 mg Take 1 tablet (5 mg total) by mouth 2 (two) times a day 180 tablet 3    atenolol (TENORMIN) 100 mg tablet Take 1 tablet (100 mg total) by mouth daily 90 tablet 1    atorvastatin (LIPITOR) 20 mg tablet Take 1 tablet (20 mg total) by mouth daily 90 tablet 3    benzonatate (TESSALON PERLES) 100 mg capsule Take 1 capsule (100 mg total) by mouth 3 (three) times a day as needed for cough 20 capsule 0    Blood Glucose Monitoring Suppl (ONE TOUCH ULTRA 2) w/Device KIT by Does not apply route daily 1 each 0    calcium carbonate-vitamin D (OSCAL-D) 500 mg-200 units per tablet Take 1 tablet by mouth 2 (two) times a day with meals      cefdinir (OMNICEF) 300 mg capsule Take 1 capsule (300 mg total) by mouth every 12 (twelve) hours for 7 days 14 capsule 0    docusate sodium (COLACE) 100 mg capsule Take 100 mg by mouth 2 (two) times a day      ferrous gluconate (FERGON) 324 mg tablet Take 324 mg by mouth daily with breakfast      glipiZIDE (GLUCOTROL XL) 2 5 mg 24 hr tablet Take 1 tablet (2 5 mg total) by mouth daily 90 tablet 3    glucose blood (ONE TOUCH ULTRA TEST) test strip Test once daily 100 each 3    hydrocortisone-pramoxine (PROCTOFOAM-HC) rectal foam Insert 1 applicator into the rectum daily for 90 doses 30 g 3    losartan (COZAAR) 100 MG tablet Take 1 tablet (100 mg total) by mouth daily 90 tablet 3    mesalamine (ASACOL) 800 MG EC tablet Take 1 tablet (800 mg total) by mouth 3 (three) times a day 270 tablet 3    ONE TOUCH LANCETS MISC by Does not apply route daily 100 each 3    polyethylene glycol (MIRALAX) 17 g packet Take 17 g by mouth daily      Zoster Vac Recomb Adjuvanted 50 MCG/0 5ML SUSR as directed (Patient not taking: Reported on 8/14/2019) 1 each 0     No current facility-administered medications for this visit  Review of Systems   Constitutional: Positive for fatigue and fever  HENT: Negative  Negative for congestion  Eyes: Negative    Negative for visual disturbance  Respiratory: Positive for cough  Negative for chest tightness, shortness of breath and wheezing  Cardiovascular: Negative  Negative for chest pain, palpitations and leg swelling  Gastrointestinal: Negative  Negative for abdominal pain, blood in stool, diarrhea and nausea  Endocrine: Negative for polydipsia, polyphagia and polyuria  Genitourinary: Negative for difficulty urinating and flank pain  Musculoskeletal: Positive for gait problem  Negative for arthralgias, back pain and myalgias  Skin: Negative  Negative for color change, pallor and rash  Allergic/Immunologic: Negative for immunocompromised state  Neurological: Negative for dizziness, weakness, light-headedness, numbness and headaches  Hematological: Negative for adenopathy  Psychiatric/Behavioral: Negative  Negative for confusion, decreased concentration and sleep disturbance  All other systems reviewed and are negative  Objective:  Vitals:    12/24/19 1129   BP: 138/84   BP Location: Left arm   Patient Position: Sitting   Pulse: 72   Resp: 18   Temp: (!) 100 7 °F (38 2 °C)   SpO2: 97%   Weight: 73 5 kg (162 lb)   Height: 5' 3" (1 6 m)     Body mass index is 28 7 kg/m²  Physical Exam   Constitutional: She is oriented to person, place, and time  She appears well-developed and well-nourished  No distress  Appears fatigued   HENT:   Head: Normocephalic and atraumatic  Right Ear: External ear normal    Left Ear: External ear normal    Nose: Nose normal    Mouth/Throat: Oropharynx is clear and moist  No oropharyngeal exudate  Eyes: Pupils are equal, round, and reactive to light  Conjunctivae are normal  No scleral icterus  Neck: Normal range of motion  Neck supple  No JVD present  Cardiovascular: Normal rate, regular rhythm, normal heart sounds and intact distal pulses  Exam reveals no gallop and no friction rub  No murmur heard    Pulmonary/Chest: Effort normal and breath sounds normal  No respiratory distress  She exhibits no tenderness  Occasional cough   Abdominal: Soft  Bowel sounds are normal  She exhibits no distension  There is no tenderness  There is no guarding  Musculoskeletal: Normal range of motion  She exhibits no edema  Lymphadenopathy:     She has no cervical adenopathy  Neurological: She is alert and oriented to person, place, and time  Coordination normal    Skin: Skin is warm and dry  Capillary refill takes less than 2 seconds  No rash noted  She is not diaphoretic  Psychiatric: She has a normal mood and affect  Her behavior is normal  Judgment and thought content normal    Nursing note and vitals reviewed

## 2019-12-21 NOTE — ASSESSMENT & PLAN NOTE
Chest x-ray demonstrated right upper lobe infiltrate will treat as community-acquired pneumonia  CT chest report noted for multifocal densities in right upper lobe, mild central bronchiectasis  Patient did well on IV Rocephin and azithromycin  Currently reports feeling much better  Reports cough has also improved  tolerated Omnicef well  Continue Omnicef for 2 more days to finish total course  Recommended to have repeat CT chest outpatient 6-8 weeks to monitor resolution  Recommended outpatient follow-up primary care provider and pulmonology  Patient agreeable with above plan

## 2019-12-21 NOTE — ASSESSMENT & PLAN NOTE
Likely due to community-acquired pneumonia  Improving on antibiotics  Continue supportive care  Outpatient follow-up

## 2019-12-21 NOTE — ASSESSMENT & PLAN NOTE
Chronically slightly elevated but flat troponin without any clear etiology  Currently denies chest pain, dyspnea, nausea, diaphoreses, any other new complaints  [facility] to go home  Outpatient follow-up

## 2019-12-21 NOTE — ASSESSMENT & PLAN NOTE
Today reports feeling much better  PT recommendation noted home with family support and home PT  Will discharge home with [facility] services

## 2019-12-24 ENCOUNTER — OFFICE VISIT (OUTPATIENT)
Dept: FAMILY MEDICINE CLINIC | Facility: CLINIC | Age: 84
End: 2019-12-24
Payer: COMMERCIAL

## 2019-12-24 ENCOUNTER — APPOINTMENT (OUTPATIENT)
Dept: RADIOLOGY | Facility: CLINIC | Age: 84
End: 2019-12-24
Payer: COMMERCIAL

## 2019-12-24 VITALS
HEART RATE: 72 BPM | OXYGEN SATURATION: 97 % | WEIGHT: 162 LBS | DIASTOLIC BLOOD PRESSURE: 84 MMHG | HEIGHT: 63 IN | BODY MASS INDEX: 28.7 KG/M2 | RESPIRATION RATE: 18 BRPM | TEMPERATURE: 100.7 F | SYSTOLIC BLOOD PRESSURE: 138 MMHG

## 2019-12-24 DIAGNOSIS — IMO0001 TRANSITION OF CARE PERFORMED WITH SHARING OF CLINICAL SUMMARY: ICD-10-CM

## 2019-12-24 DIAGNOSIS — J18.9 PNEUMONIA OF RIGHT UPPER LOBE DUE TO INFECTIOUS ORGANISM: ICD-10-CM

## 2019-12-24 DIAGNOSIS — IMO0001 TRANSITION OF CARE PERFORMED WITH SHARING OF CLINICAL SUMMARY: Primary | ICD-10-CM

## 2019-12-24 DIAGNOSIS — I10 BENIGN ESSENTIAL HYPERTENSION: Chronic | ICD-10-CM

## 2019-12-24 DIAGNOSIS — N18.30 TYPE 2 DIABETES MELLITUS WITH STAGE 3 CHRONIC KIDNEY DISEASE, WITHOUT LONG-TERM CURRENT USE OF INSULIN (HCC): Chronic | ICD-10-CM

## 2019-12-24 DIAGNOSIS — I48.11 LONGSTANDING PERSISTENT ATRIAL FIBRILLATION (HCC): Chronic | ICD-10-CM

## 2019-12-24 DIAGNOSIS — E11.22 TYPE 2 DIABETES MELLITUS WITH STAGE 3 CHRONIC KIDNEY DISEASE, WITHOUT LONG-TERM CURRENT USE OF INSULIN (HCC): Chronic | ICD-10-CM

## 2019-12-24 PROBLEM — Z78.9 TRANSITION OF CARE PERFORMED WITH SHARING OF CLINICAL SUMMARY: Status: ACTIVE | Noted: 2019-12-24

## 2019-12-24 PROCEDURE — 99496 TRANSJ CARE MGMT HIGH F2F 7D: CPT | Performed by: NURSE PRACTITIONER

## 2019-12-24 PROCEDURE — 71046 X-RAY EXAM CHEST 2 VIEWS: CPT

## 2019-12-24 PROCEDURE — 1111F DSCHRG MED/CURRENT MED MERGE: CPT | Performed by: NURSE PRACTITIONER

## 2019-12-24 RX ORDER — CEFDINIR 300 MG/1
300 CAPSULE ORAL EVERY 12 HOURS SCHEDULED
Qty: 14 CAPSULE | Refills: 0 | Status: SHIPPED | OUTPATIENT
Start: 2019-12-24 | End: 2019-12-31

## 2019-12-24 NOTE — PATIENT INSTRUCTIONS
Hospital follow up  Community acquired pneumonia- patient still feeling fatigued  Still running fever  Obtain chest xray today  Start Omnicef for 7 days  Our office will call with results of xray  Hypertension- stable  Atrial fibrillation- rate controlled  Diabetes- sugars labile home readings     Follow up in 2 weeks

## 2019-12-30 NOTE — PROGRESS NOTES
Assessment/Plan:       Diagnoses and all orders for this visit:    Pneumonia of right upper lobe due to infectious organism ([facility] )    Ulcerative colitis with complication, unspecified location (HCC)  -     mesalamine (ASACOL) 800 MG EC tablet; Take 1 tablet (800 mg total) by mouth 3 (three) times a day    Benign essential hypertension  -     amLODIPine (NORVASC) 10 mg tablet; Take 1 tablet (10 mg total) by mouth daily    Mixed hyperlipidemia  -     atorvastatin (LIPITOR) 20 mg tablet; Take 1 tablet (20 mg total) by mouth daily        No problem-specific Assessment & Plan notes found for this encounter  Subjective:      Patient ID: Shamar Duffy is a 80 y o  female  Patient was here for NATE last week after being hospitlaized for PNA  She continued to be running high fever and not feeling well  She is here today for follow up  She still does not have any energy  She is "trying to push myself"  She doesn't fell "sick"  She drank a half cup of coca cola and this made her stomach feels better  She finished her zithromax abx yesterday  She still has low grade fever  She is eating small frequent meals instead of three large meals  CXR done last week showed resolution of the right upper lobe PNA  The following portions of the patient's history were reviewed and updated as appropriate:   She has a past medical history of [facility]fib ([facility] ), Chronic kidney disease, Colostomy present ([facility] ), Diabetes mellitus ([facility] ), Hyperlipidemia, and Hypertension  ,  does not have any pertinent problems on file  ,   has a past surgical history that includes Colon surgery; Appendectomy; Hysterectomy; Carpal tunnel release (Bilateral); Cholecystectomy; Colostomy; and Cataract extraction  ,  family history includes Cancer in her father; Heart disease in her mother  ,   reports that she has never smoked  She has never used smokeless tobacco  She reports that she does not drink alcohol or use drugs  ,  is allergic to amoxicillin-pot clavulanate     Current Outpatient Medications   Medication Sig Dispense Refill    amLODIPine (NORVASC) 10 mg tablet Take 1 tablet (10 mg total) by mouth daily 30 tablet 0    apixaban (ELIQUIS) 5 mg Take 1 tablet (5 mg total) by mouth 2 (two) times a day 180 tablet 3    atenolol (TENORMIN) 100 mg tablet Take 1 tablet (100 mg total) by mouth daily 90 tablet 1    atorvastatin (LIPITOR) 20 mg tablet Take 1 tablet (20 mg total) by mouth daily 30 tablet 0    benzonatate (TESSALON PERLES) 100 mg capsule Take 1 capsule (100 mg total) by mouth 3 (three) times a day as needed for cough 20 capsule 0    Blood Glucose Monitoring Suppl (ONE TOUCH ULTRA 2) w/Device KIT by Does not apply route daily 1 each 0    calcium carbonate-vitamin D (OSCAL-D) 500 mg-200 units per tablet Take 1 tablet by mouth 2 (two) times a day with meals      cefdinir (OMNICEF) 300 mg capsule Take 1 capsule (300 mg total) by mouth every 12 (twelve) hours for 7 days 14 capsule 0    docusate sodium (COLACE) 100 mg capsule Take 100 mg by mouth 2 (two) times a day      ferrous gluconate (FERGON) 324 mg tablet Take 324 mg by mouth daily with breakfast      glipiZIDE (GLUCOTROL XL) 2 5 mg 24 hr tablet Take 1 tablet (2 5 mg total) by mouth daily 90 tablet 3    glucose blood (ONE TOUCH ULTRA TEST) test strip Test once daily 100 each 3    hydrocortisone-pramoxine (PROCTOFOAM-HC) rectal foam Insert 1 applicator into the rectum daily for 90 doses 30 g 3    losartan (COZAAR) 100 MG tablet Take 1 tablet (100 mg total) by mouth daily 90 tablet 3    mesalamine (ASACOL) 800 MG EC tablet Take 1 tablet (800 mg total) by mouth 3 (three) times a day 90 tablet 0    ONE TOUCH LANCETS MISC by Does not apply route daily 100 each 3    polyethylene glycol (MIRALAX) 17 g packet Take 17 g by mouth daily      Zoster Vac Recomb Adjuvanted 50 MCG/0 5ML SUSR as directed (Patient not taking: Reported on 8/14/2019) 1 each 0     No current facility-administered medications for this visit  Review of Systems   Constitutional: Positive for fatigue  Negative for fever  HENT: Negative  Negative for congestion  Eyes: Negative  Negative for visual disturbance  Respiratory: Negative for cough, chest tightness, shortness of breath and wheezing  Cardiovascular: Negative  Negative for chest pain, palpitations and leg swelling  Gastrointestinal: Negative  Negative for abdominal pain, blood in stool, diarrhea and nausea  Endocrine: Negative for polydipsia, polyphagia and polyuria  Genitourinary: Negative for difficulty urinating and flank pain  Musculoskeletal: Negative  Negative for arthralgias, back pain and myalgias  Skin: Negative  Negative for color change, pallor and rash  Allergic/Immunologic: Negative for immunocompromised state  Neurological: Negative  Negative for dizziness, weakness, light-headedness, numbness and headaches  Hematological: Negative for adenopathy  Psychiatric/Behavioral: Negative  Negative for confusion, decreased concentration and sleep disturbance  All other systems reviewed and are negative  Objective:  Vitals:    12/31/19 1024   BP: 130/84   BP Location: Left arm   Patient Position: Sitting   Pulse: 74   Resp: 18   Temp: 99 6 °F (37 6 °C)   SpO2: 97%   Weight: 73 9 kg (163 lb)   Height: 5' 3" (1 6 m)     Body mass index is 28 87 kg/m²  Physical Exam   Constitutional: She is oriented to person, place, and time  She appears well-developed and well-nourished  No distress  HENT:   Head: Normocephalic and atraumatic  Nose: Nose normal    Mouth/Throat: Oropharynx is clear and moist  No oropharyngeal exudate  Eyes: Pupils are equal, round, and reactive to light  Conjunctivae are normal    Neck: Normal range of motion  Neck supple  Cardiovascular: Normal rate, regular rhythm and normal heart sounds  Exam reveals no gallop and no friction rub  No murmur heard    Pulmonary/Chest: Effort normal and breath sounds normal  No respiratory distress  She has no wheezes  She has no rales  Abdominal: Soft  Bowel sounds are normal  She exhibits no distension  There is no tenderness  Musculoskeletal: Normal range of motion  Lymphadenopathy:     She has no cervical adenopathy  Neurological: She is alert and oriented to person, place, and time  Skin: Skin is warm and dry  No rash noted  She is not diaphoretic  Psychiatric: She has a normal mood and affect  Her behavior is normal  Judgment and thought content normal    Nursing note and vitals reviewed

## 2019-12-31 ENCOUNTER — OFFICE VISIT (OUTPATIENT)
Dept: FAMILY MEDICINE CLINIC | Facility: CLINIC | Age: 84
End: 2019-12-31
Payer: COMMERCIAL

## 2019-12-31 VITALS
HEART RATE: 74 BPM | TEMPERATURE: 99.6 F | WEIGHT: 163 LBS | RESPIRATION RATE: 18 BRPM | DIASTOLIC BLOOD PRESSURE: 84 MMHG | BODY MASS INDEX: 28.88 KG/M2 | SYSTOLIC BLOOD PRESSURE: 130 MMHG | HEIGHT: 63 IN | OXYGEN SATURATION: 97 %

## 2019-12-31 DIAGNOSIS — I10 BENIGN ESSENTIAL HYPERTENSION: Chronic | ICD-10-CM

## 2019-12-31 DIAGNOSIS — J18.9 PNEUMONIA OF RIGHT UPPER LOBE DUE TO INFECTIOUS ORGANISM: Primary | ICD-10-CM

## 2019-12-31 DIAGNOSIS — E78.2 MIXED HYPERLIPIDEMIA: ICD-10-CM

## 2019-12-31 DIAGNOSIS — K51.919 ULCERATIVE COLITIS WITH COMPLICATION, UNSPECIFIED LOCATION (HCC): ICD-10-CM

## 2019-12-31 PROCEDURE — 99214 OFFICE O/P EST MOD 30 MIN: CPT | Performed by: NURSE PRACTITIONER

## 2019-12-31 PROCEDURE — 1101F PT FALLS ASSESS-DOCD LE1/YR: CPT | Performed by: NURSE PRACTITIONER

## 2019-12-31 RX ORDER — ATORVASTATIN CALCIUM 20 MG/1
20 TABLET, FILM COATED ORAL DAILY
Qty: 30 TABLET | Refills: 0 | Status: SHIPPED | OUTPATIENT
Start: 2019-12-31 | End: 2020-01-06 | Stop reason: SDUPTHER

## 2019-12-31 RX ORDER — MESALAMINE 800 MG/1
800 TABLET, DELAYED RELEASE ORAL 3 TIMES DAILY
Qty: 90 TABLET | Refills: 0 | Status: SHIPPED | OUTPATIENT
Start: 2019-12-31 | End: 2020-01-06 | Stop reason: SDUPTHER

## 2019-12-31 RX ORDER — AMLODIPINE BESYLATE 10 MG/1
10 TABLET ORAL DAILY
Qty: 30 TABLET | Refills: 0 | Status: SHIPPED | OUTPATIENT
Start: 2019-12-31 | End: 2020-01-06 | Stop reason: SDUPTHER

## 2019-12-31 NOTE — PATIENT INSTRUCTIONS
Pneumonia- is resolved  Patient is looking better  Low grade fever persisting along with fatigue  Hypertension- stable  Refill medication  HLD- stable  Refill medication  UC- refill meds  Follow up in March, as scheduled  Fall Prevention   AMBULATORY CARE:   Fall prevention  includes ways to make your home and other areas safer  It also includes ways you can move more carefully to prevent a fall  Health conditions that cause changes in your blood pressure, vision, or muscle strength and coordination may increase your risk for falls  Medicines may also increase your risk for falls if they make you dizzy, weak, or sleepy  Call 911 or have someone else call if:   · You have fallen and are unconscious  · You have fallen and cannot move part of your body  Contact your healthcare provider if:   · You have fallen and have pain or a headache  · You have questions or concerns about your condition or care  Fall prevention tips:   · Stand or sit up slowly  This may help you keep your balance and prevent falls  · Use assistive devices as directed  Your healthcare provider may suggest that you use a cane or walker to help you keep your balance  You may need to have grab bars put in your bathroom near the toilet or in the shower  · Wear shoes that fit well and have soles that   Wear shoes both inside and outside  Use slippers with good   Do not wear shoes with high heels  · Wear a personal alarm  This is a device that allows you to call 911 if you fall and need help  Ask your healthcare provider for more information  · Stay active  Exercise can help strengthen your muscles and improve your balance  Your healthcare provider may recommend water aerobics or walking  He or she may also recommend physical therapy to improve your coordination  Never start an exercise program without talking to your healthcare provider first      · Manage your medical conditions    Keep all appointments with your healthcare providers  Visit your eye doctor as directed  Home safety tips:   · Add items to prevent falls in the bathroom  Put nonslip strips on your bath or shower floor to prevent you from slipping  Use a bath mat if you do not have carpet in the bathroom  This will prevent you from falling when you step out of the bath or shower  Use a shower seat so you do not need to stand while you shower  Sit on the toilet or a chair in your bathroom to dry yourself and put on clothing  This will prevent you from losing your balance from drying or dressing yourself while you are standing  · Keep paths clear  Remove books, shoes, and other objects from walkways and stairs  Place cords for telephones and lamps out of the way so that you do not need to walk over them  Tape them down if you cannot move them  Remove small rugs  If you cannot remove a rug, secure it with double-sided tape  This will prevent you from tripping  · Install bright lights in your home  Use night lights to help light paths to the bathroom or kitchen  Always turn on the light before you start walking  · Keep items you use often on shelves within reach  Do not use a step stool to help you reach an item  · Paint or place reflective tape on the edges of your stairs  This will help you see the stairs better  Follow up with your healthcare provider as directed:  Write down your questions so you remember to ask them during your visits  © 2017 [address] [employer] Information is for End User's use only and may not be sold, redistributed or otherwise used for commercial purposes  All illustrations and images included in CareNotes® are the copyrighted property of [employer] , [employer]  or [employer] Figuero[employer]  The above information is an  only  It is not intended as medical advice for individual conditions or treatments   Talk to your doctor, nurse or pharmacist before following any medical regimen to see if it is safe and effective for you

## 2020-01-06 DIAGNOSIS — N18.30 TYPE 2 DIABETES MELLITUS WITH STAGE 3 CHRONIC KIDNEY DISEASE, WITHOUT LONG-TERM CURRENT USE OF INSULIN (HCC): Chronic | ICD-10-CM

## 2020-01-06 DIAGNOSIS — E11.22 TYPE 2 DIABETES MELLITUS WITH STAGE 3 CHRONIC KIDNEY DISEASE, WITHOUT LONG-TERM CURRENT USE OF INSULIN (HCC): Chronic | ICD-10-CM

## 2020-01-06 DIAGNOSIS — I48.0 PAROXYSMAL ATRIAL FIBRILLATION (HCC): ICD-10-CM

## 2020-01-06 DIAGNOSIS — I10 BENIGN ESSENTIAL HYPERTENSION: Chronic | ICD-10-CM

## 2020-01-06 DIAGNOSIS — E78.2 MIXED HYPERLIPIDEMIA: ICD-10-CM

## 2020-01-06 DIAGNOSIS — K51.919 ULCERATIVE COLITIS WITH COMPLICATION, UNSPECIFIED LOCATION (HCC): ICD-10-CM

## 2020-01-06 RX ORDER — MESALAMINE 800 MG/1
800 TABLET, DELAYED RELEASE ORAL 3 TIMES DAILY
Qty: 270 TABLET | Refills: 2 | Status: SHIPPED | OUTPATIENT
Start: 2020-01-06 | End: 2020-10-29

## 2020-01-06 RX ORDER — LOSARTAN POTASSIUM 100 MG/1
100 TABLET ORAL DAILY
Qty: 90 TABLET | Refills: 3 | Status: SHIPPED | OUTPATIENT
Start: 2020-01-06 | End: 2020-11-18 | Stop reason: SDUPTHER

## 2020-01-06 RX ORDER — ATENOLOL 100 MG/1
100 TABLET ORAL DAILY
Qty: 90 TABLET | Refills: 2 | Status: SHIPPED | OUTPATIENT
Start: 2020-01-06 | End: 2020-07-15 | Stop reason: SDUPTHER

## 2020-01-06 RX ORDER — GLIPIZIDE 2.5 MG/1
2.5 TABLET, EXTENDED RELEASE ORAL DAILY
Qty: 90 TABLET | Refills: 3 | Status: SHIPPED | OUTPATIENT
Start: 2020-01-06 | End: 2021-03-10

## 2020-01-06 RX ORDER — ATORVASTATIN CALCIUM 20 MG/1
20 TABLET, FILM COATED ORAL DAILY
Qty: 90 TABLET | Refills: 3 | Status: SHIPPED | OUTPATIENT
Start: 2020-01-06 | End: 2020-07-15 | Stop reason: SDUPTHER

## 2020-01-06 RX ORDER — AMLODIPINE BESYLATE 10 MG/1
10 TABLET ORAL DAILY
Qty: 90 TABLET | Refills: 3 | Status: SHIPPED | OUTPATIENT
Start: 2020-01-06 | End: 2020-07-15 | Stop reason: SDUPTHER

## 2020-01-24 ENCOUNTER — HOSPITAL ENCOUNTER (OUTPATIENT)
Dept: CT IMAGING | Facility: HOSPITAL | Age: 85
Discharge: HOME | End: 2020-01-24
Payer: COMMERCIAL

## 2020-01-24 DIAGNOSIS — J18.9 PNEUMONIA OF RIGHT UPPER LOBE DUE TO INFECTIOUS ORGANISM: ICD-10-CM

## 2020-01-24 PROCEDURE — 71250 CT THORAX DX C-: CPT

## 2020-01-29 ENCOUNTER — TELEPHONE (OUTPATIENT)
Dept: FAMILY MEDICINE CLINIC | Facility: CLINIC | Age: 85
End: 2020-01-29

## 2020-01-29 DIAGNOSIS — R91.1 LUNG NODULE SEEN ON IMAGING STUDY: ICD-10-CM

## 2020-01-29 DIAGNOSIS — R91.1 LUNG NODULE: Primary | ICD-10-CM

## 2020-03-05 ENCOUNTER — APPOINTMENT (OUTPATIENT)
Dept: LAB | Facility: CLINIC | Age: 85
End: 2020-03-05
Payer: COMMERCIAL

## 2020-03-05 DIAGNOSIS — I48.0 PAROXYSMAL ATRIAL FIBRILLATION (HCC): ICD-10-CM

## 2020-03-05 DIAGNOSIS — I10 BENIGN ESSENTIAL HYPERTENSION: Chronic | ICD-10-CM

## 2020-03-05 DIAGNOSIS — I48.11 LONGSTANDING PERSISTENT ATRIAL FIBRILLATION (HCC): Chronic | ICD-10-CM

## 2020-03-05 DIAGNOSIS — K51.919 ULCERATIVE COLITIS WITH COMPLICATION, UNSPECIFIED LOCATION (HCC): ICD-10-CM

## 2020-03-05 DIAGNOSIS — E03.8 SUBCLINICAL HYPOTHYROIDISM: Chronic | ICD-10-CM

## 2020-03-05 DIAGNOSIS — Z93.3 COLOSTOMY IN PLACE (HCC): ICD-10-CM

## 2020-03-05 DIAGNOSIS — E11.22 TYPE 2 DIABETES MELLITUS WITH STAGE 3 CHRONIC KIDNEY DISEASE, WITHOUT LONG-TERM CURRENT USE OF INSULIN (HCC): Chronic | ICD-10-CM

## 2020-03-05 DIAGNOSIS — E66.3 OVERWEIGHT (BMI 25.0-29.9): ICD-10-CM

## 2020-03-05 DIAGNOSIS — M85.89 OSTEOPENIA OF MULTIPLE SITES: ICD-10-CM

## 2020-03-05 DIAGNOSIS — Z79.01 ANTICOAGULANT LONG-TERM USE: ICD-10-CM

## 2020-03-05 DIAGNOSIS — N18.30 CHRONIC KIDNEY DISEASE, STAGE III (MODERATE) (HCC): ICD-10-CM

## 2020-03-05 DIAGNOSIS — N18.30 TYPE 2 DIABETES MELLITUS WITH STAGE 3 CHRONIC KIDNEY DISEASE, WITHOUT LONG-TERM CURRENT USE OF INSULIN (HCC): Chronic | ICD-10-CM

## 2020-03-05 DIAGNOSIS — R55 SYNCOPE, UNSPECIFIED SYNCOPE TYPE: ICD-10-CM

## 2020-03-05 DIAGNOSIS — R50.9 FEVER, UNSPECIFIED FEVER CAUSE: ICD-10-CM

## 2020-03-05 DIAGNOSIS — E78.2 MIXED HYPERLIPIDEMIA: ICD-10-CM

## 2020-03-05 LAB
ALBUMIN SERPL BCP-MCNC: 3.6 G/DL (ref 3.5–5)
ALP SERPL-CCNC: 99 U/L (ref 46–116)
ALT SERPL W P-5'-P-CCNC: 18 U/L (ref 12–78)
ANION GAP SERPL CALCULATED.3IONS-SCNC: 6 MMOL/L (ref 4–13)
AST SERPL W P-5'-P-CCNC: 17 U/L (ref 5–45)
BASOPHILS # BLD AUTO: 0.06 THOUSANDS/ΜL (ref 0–0.1)
BASOPHILS NFR BLD AUTO: 1 % (ref 0–1)
BILIRUB SERPL-MCNC: 0.54 MG/DL (ref 0.2–1)
BUN SERPL-MCNC: 20 MG/DL (ref 5–25)
CALCIUM SERPL-MCNC: 9.2 MG/DL (ref 8.3–10.1)
CHLORIDE SERPL-SCNC: 107 MMOL/L (ref 100–108)
CHOLEST SERPL-MCNC: 192 MG/DL (ref 50–200)
CO2 SERPL-SCNC: 28 MMOL/L (ref 21–32)
CREAT SERPL-MCNC: 1.21 MG/DL (ref 0.6–1.3)
EOSINOPHIL # BLD AUTO: 0.24 THOUSAND/ΜL (ref 0–0.61)
EOSINOPHIL NFR BLD AUTO: 4 % (ref 0–6)
ERYTHROCYTE [DISTWIDTH] IN BLOOD BY AUTOMATED COUNT: 13 % (ref 11.6–15.1)
EST. AVERAGE GLUCOSE BLD GHB EST-MCNC: 146 MG/DL
GFR SERPL CREATININE-BSD FRML MDRD: 40 ML/MIN/1.73SQ M
GLUCOSE P FAST SERPL-MCNC: 144 MG/DL (ref 65–99)
HBA1C MFR BLD: 6.7 %
HCT VFR BLD AUTO: 45.2 % (ref 34.8–46.1)
HDLC SERPL-MCNC: 61 MG/DL
HGB BLD-MCNC: 14.3 G/DL (ref 11.5–15.4)
IMM GRANULOCYTES # BLD AUTO: 0.03 THOUSAND/UL (ref 0–0.2)
IMM GRANULOCYTES NFR BLD AUTO: 0 % (ref 0–2)
LDLC SERPL CALC-MCNC: 108 MG/DL (ref 0–100)
LYMPHOCYTES # BLD AUTO: 1.32 THOUSANDS/ΜL (ref 0.6–4.47)
LYMPHOCYTES NFR BLD AUTO: 19 % (ref 14–44)
MCH RBC QN AUTO: 31.2 PG (ref 26.8–34.3)
MCHC RBC AUTO-ENTMCNC: 31.6 G/DL (ref 31.4–37.4)
MCV RBC AUTO: 99 FL (ref 82–98)
MONOCYTES # BLD AUTO: 0.54 THOUSAND/ΜL (ref 0.17–1.22)
MONOCYTES NFR BLD AUTO: 8 % (ref 4–12)
NEUTROPHILS # BLD AUTO: 4.65 THOUSANDS/ΜL (ref 1.85–7.62)
NEUTS SEG NFR BLD AUTO: 68 % (ref 43–75)
NONHDLC SERPL-MCNC: 131 MG/DL
NRBC BLD AUTO-RTO: 0 /100 WBCS
PLATELET # BLD AUTO: 251 THOUSANDS/UL (ref 149–390)
PMV BLD AUTO: 10.7 FL (ref 8.9–12.7)
POTASSIUM SERPL-SCNC: 4.3 MMOL/L (ref 3.5–5.3)
PROT SERPL-MCNC: 7.9 G/DL (ref 6.4–8.2)
RBC # BLD AUTO: 4.59 MILLION/UL (ref 3.81–5.12)
SODIUM SERPL-SCNC: 141 MMOL/L (ref 136–145)
TRIGL SERPL-MCNC: 116 MG/DL
WBC # BLD AUTO: 6.84 THOUSAND/UL (ref 4.31–10.16)

## 2020-03-05 PROCEDURE — 80053 COMPREHEN METABOLIC PANEL: CPT

## 2020-03-05 PROCEDURE — 80061 LIPID PANEL: CPT

## 2020-03-05 PROCEDURE — 36415 COLL VENOUS BLD VENIPUNCTURE: CPT

## 2020-03-05 PROCEDURE — 83036 HEMOGLOBIN GLYCOSYLATED A1C: CPT

## 2020-03-05 PROCEDURE — 85025 COMPLETE CBC W/AUTO DIFF WBC: CPT

## 2020-03-09 NOTE — PROGRESS NOTES
Assessment/Plan:       Diagnoses and all orders for this visit:    Subclinical hypothyroidism  -     TSH, 3rd generation; Future  -     T3, free; Future    Type 2 diabetes mellitus with stage 3 chronic kidney disease, without long-term current use of insulin (HCC)  -     Comprehensive metabolic panel; Future  -     Lipid panel; Future  -     Cancel: POCT hemoglobin A1c    Benign essential hypertension  -     Comprehensive metabolic panel; Future  -     Lipid panel; Future    Longstanding persistent atrial fibrillation    Chronic kidney disease, stage III (moderate) (HCC)  -     Comprehensive metabolic panel; Future    Anticoagulant long-term use  -     CBC and differential; Future    Mixed hyperlipidemia  -     Comprehensive metabolic panel; Future  -     Lipid panel; Future    Diastolic dysfunction  -     Comprehensive metabolic panel; Future    Colostomy in place [facility])    Medicare annual wellness visit, subsequent    BMI 29 0-29 9,adult    Need for shingles vaccine    Conductive hearing loss, bilateral        No problem-specific Assessment & Plan notes found for this encounter  Subjective:      Patient ID: Regulo Griffith is a 80 y o  female  Patient is here for follow up of chronic medical conditions  Diabetes-A1C rising from 6 3 to 6 7  Hypothyroid-stable, no symptoms  HTN-stable  Af-chronic anticoagulation with eliquis  hld-LDL not at goal  She describes an episode where she was sitting and writing notes and she lost feeling in two fingers of her right hand  This lasted a few minutes and has not had recurrent symptoms  She is noticing swelling in her legs by the evening      CKD - at baseline     Results for Jm Doyle (MRN [id number]) as of 3/11/2020 12:10    3/5/2020 08:25  Sodium: 141  Potassium: 4 3  Chloride: 107  CO2: 28  Anion Gap: 6  BUN: 20  Creatinine: 1 21  GLUCOSE FASTIN (H)  Calcium: 9 2  AST: 17  ALT: 18  Alkaline Phosphatase: 99  Total Protein: 7 9  Albumin: 3 6  TOTAL BILIRUBIN: 0 54  eGFR: 40  Cholesterol: 192  Triglycerides: 116  HDL: 61  Non-HDL Cholesterol: 131  LDL Direct: 108 (H)  WBC: 6 84  Red Blood Cell Count: 4 59  Hemoglobin: 14 3  HCT: 45 2  MCV: 99 (H)  MCH: 31 2  MCHC: 31 6  RDW: 13 0  Platelet Count: 013  MPV: 10 7  nRBC: 0  Neutrophils %: 68  Immat GRANS %: 0  Lymphocytes Relative: 19  Monocytes Relative: 8  Eosinophils: 4  Basophils Relative: 1  Immature Grans Absolute: 0 03  Absolute Neutrophils: 4 65  Lymphocytes Absolute: 1 32  Absolute Monocytes: 0 54  Absolute Eosinophils: 0 24  Basophils Absolute: 0 06  Hemoglobin A1C: 6 7 (H)  eAG, EST AVG Glucose: 146          The following portions of the patient's history were reviewed and updated as appropriate:   She has a past medical history of A-fib ([facility] ), Chronic kidney disease, Colostomy present ([facility] ), Diabetes mellitus ([facility] ), Hyperlipidemia, and Hypertension  ,  does not have any pertinent problems on file  ,   has a past surgical history that includes Colon surgery; Appendectomy; Hysterectomy; Carpal tunnel release (Bilateral); Cholecystectomy; Colostomy; and Cataract extraction  ,  family history includes Cancer in her father; Heart disease in her mother  ,   reports that she has never smoked  She has never used smokeless tobacco  She reports that she does not drink alcohol or use drugs  ,  is allergic to amoxicillin-pot clavulanate     Current Outpatient Medications   Medication Sig Dispense Refill    amLODIPine (NORVASC) 10 mg tablet Take 1 tablet (10 mg total) by mouth daily 90 tablet 3    apixaban (ELIQUIS) 5 mg Take 1 tablet (5 mg total) by mouth 2 (two) times a day 180 tablet 3    atenolol (TENORMIN) 100 mg tablet Take 1 tablet (100 mg total) by mouth daily 90 tablet 2    atorvastatin (LIPITOR) 20 mg tablet Take 1 tablet (20 mg total) by mouth daily 90 tablet 3    Blood Glucose Monitoring Suppl (ONE TOUCH ULTRA 2) w/Device KIT by Does not apply route daily 1 each 0    calcium carbonate-vitamin D (OSCAL-D) 500 mg-200 units per tablet Take 1 tablet by mouth 2 (two) times a day with meals      docusate sodium (COLACE) 100 mg capsule Take 100 mg by mouth 2 (two) times a day      ferrous gluconate (FERGON) 324 mg tablet Take 324 mg by mouth daily with breakfast      glipiZIDE (GLUCOTROL XL) 2 5 mg 24 hr tablet Take 1 tablet (2 5 mg total) by mouth daily 90 tablet 3    glucose blood (ONE TOUCH ULTRA TEST) test strip Test once daily 100 each 3    hydrocortisone-pramoxine (PROCTOFOAM-HC) rectal foam Insert 1 applicator into the rectum daily for 90 doses 30 g 3    losartan (COZAAR) 100 MG tablet Take 1 tablet (100 mg total) by mouth daily 90 tablet 3    mesalamine (ASACOL) 800 MG EC tablet Take 1 tablet (800 mg total) by mouth 3 (three) times a day 270 tablet 2    ONE TOUCH LANCETS MISC by Does not apply route daily 100 each 3    polyethylene glycol (MIRALAX) 17 g packet Take 17 g by mouth daily      Zoster Vac Recomb Adjuvanted 50 MCG/0 5ML SUSR as directed (Patient not taking: Reported on 8/14/2019) 1 each 0     No current facility-administered medications for this visit  Review of Systems   Constitutional: Negative  Negative for fatigue and fever  HENT: Negative  Negative for congestion  Eyes: Negative  Negative for visual disturbance  Respiratory: Negative for cough, chest tightness, shortness of breath and wheezing  Cardiovascular: Positive for leg swelling  Negative for chest pain and palpitations  Swelling in legs by end of the day   Gastrointestinal: Negative  Negative for abdominal distention, abdominal pain, blood in stool, diarrhea and nausea  Endocrine: Negative for polydipsia, polyphagia and polyuria  Genitourinary: Negative for difficulty urinating and flank pain  Musculoskeletal: Negative  Negative for arthralgias, back pain and myalgias  Skin: Negative  Negative for color change, pallor and rash     Allergic/Immunologic: Negative for immunocompromised state    Neurological: Positive for numbness  Negative for dizziness, weakness, light-headedness and headaches  Describes one episode of numbness of two fingers in her right hand  This occurred when she was writing letters   Hematological: Negative for adenopathy  Psychiatric/Behavioral: Negative  Negative for confusion, decreased concentration and sleep disturbance  All other systems reviewed and are negative  Objective:  Vitals:    03/11/20 1153   BP: 122/82   BP Location: Left arm   Patient Position: Sitting   Pulse: 86   Resp: 18   Temp: 98 7 °F (37 1 °C)   SpO2: 96%   Weight: 75 3 kg (166 lb)   Height: 5' 3" (1 6 m)     Body mass index is 29 41 kg/m²  Physical Exam   Constitutional: She is oriented to person, place, and time  She appears well-developed and well-nourished  No distress  HENT:   Head: Normocephalic and atraumatic  Right Ear: External ear normal    Left Ear: External ear normal    Nose: Nose normal    Mouth/Throat: Oropharynx is clear and moist  No oropharyngeal exudate  Eyes: Pupils are equal, round, and reactive to light  Conjunctivae are normal  No scleral icterus  Neck: Normal range of motion  Neck supple  No JVD present  No thyromegaly present  Cardiovascular: Normal rate, regular rhythm, normal heart sounds and intact distal pulses  Exam reveals no gallop and no friction rub  No murmur heard  Pulmonary/Chest: Effort normal and breath sounds normal  No respiratory distress  She exhibits no tenderness  Abdominal: Soft  Bowel sounds are normal  She exhibits no distension  There is no tenderness    + LLQ colostomy bag patent   Musculoskeletal: Normal range of motion  She exhibits no edema  Lymphadenopathy:     She has no cervical adenopathy  Neurological: She is alert and oriented to person, place, and time  No sensory deficit  Coordination normal    Skin: Skin is warm and dry  Capillary refill takes less than 2 seconds  No rash noted   She is not diaphoretic  Psychiatric: She has a normal mood and affect  Her behavior is normal  Judgment and thought content normal    Nursing note and vitals reviewed  BMI Counseling: Body mass index is 29 41 kg/m²  The BMI is above normal  Nutrition recommendations include reducing portion sizes, decreasing overall calorie intake, 3-5 servings of fruits/vegetables daily, reducing fast food intake, consuming healthier snacks, decreasing soda and/or juice intake, moderation in carbohydrate intake, increasing intake of lean protein, reducing intake of saturated fat and trans fat and reducing intake of cholesterol  Exercise recommendations include exercising 3-5 times per week and strength training exercises

## 2020-03-11 ENCOUNTER — OFFICE VISIT (OUTPATIENT)
Dept: FAMILY MEDICINE CLINIC | Facility: CLINIC | Age: 85
End: 2020-03-11
Payer: COMMERCIAL

## 2020-03-11 VITALS
SYSTOLIC BLOOD PRESSURE: 122 MMHG | HEART RATE: 86 BPM | OXYGEN SATURATION: 96 % | HEIGHT: 63 IN | TEMPERATURE: 98.7 F | RESPIRATION RATE: 18 BRPM | DIASTOLIC BLOOD PRESSURE: 82 MMHG | BODY MASS INDEX: 29.41 KG/M2 | WEIGHT: 166 LBS

## 2020-03-11 DIAGNOSIS — Z93.3 COLOSTOMY IN PLACE (HCC): ICD-10-CM

## 2020-03-11 DIAGNOSIS — E11.22 TYPE 2 DIABETES MELLITUS WITH STAGE 3 CHRONIC KIDNEY DISEASE, WITHOUT LONG-TERM CURRENT USE OF INSULIN (HCC): Chronic | ICD-10-CM

## 2020-03-11 DIAGNOSIS — Z79.01 ANTICOAGULANT LONG-TERM USE: ICD-10-CM

## 2020-03-11 DIAGNOSIS — Z23 NEED FOR SHINGLES VACCINE: ICD-10-CM

## 2020-03-11 DIAGNOSIS — I10 BENIGN ESSENTIAL HYPERTENSION: Chronic | ICD-10-CM

## 2020-03-11 DIAGNOSIS — N18.30 CHRONIC KIDNEY DISEASE, STAGE III (MODERATE) (HCC): ICD-10-CM

## 2020-03-11 DIAGNOSIS — I51.89 DIASTOLIC DYSFUNCTION: ICD-10-CM

## 2020-03-11 DIAGNOSIS — E03.8 SUBCLINICAL HYPOTHYROIDISM: Primary | Chronic | ICD-10-CM

## 2020-03-11 DIAGNOSIS — H90.0 CONDUCTIVE HEARING LOSS, BILATERAL: ICD-10-CM

## 2020-03-11 DIAGNOSIS — I48.11 LONGSTANDING PERSISTENT ATRIAL FIBRILLATION (HCC): Chronic | ICD-10-CM

## 2020-03-11 DIAGNOSIS — Z00.00 MEDICARE ANNUAL WELLNESS VISIT, SUBSEQUENT: ICD-10-CM

## 2020-03-11 DIAGNOSIS — E78.2 MIXED HYPERLIPIDEMIA: ICD-10-CM

## 2020-03-11 DIAGNOSIS — N18.30 TYPE 2 DIABETES MELLITUS WITH STAGE 3 CHRONIC KIDNEY DISEASE, WITHOUT LONG-TERM CURRENT USE OF INSULIN (HCC): Chronic | ICD-10-CM

## 2020-03-11 PROBLEM — R00.2 INTERMITTENT PALPITATIONS: Status: RESOLVED | Noted: 2018-08-14 | Resolved: 2020-03-11

## 2020-03-11 PROBLEM — R55 SYNCOPE: Status: RESOLVED | Noted: 2019-12-10 | Resolved: 2020-03-11

## 2020-03-11 PROBLEM — R79.89 ELEVATED TROPONIN: Status: RESOLVED | Noted: 2019-12-14 | Resolved: 2020-03-11

## 2020-03-11 PROBLEM — E86.0 DEHYDRATION: Status: RESOLVED | Noted: 2019-12-14 | Resolved: 2020-03-11

## 2020-03-11 PROBLEM — J18.9 RIGHT UPPER LOBE PNEUMONIA: Status: RESOLVED | Noted: 2019-12-15 | Resolved: 2020-03-11

## 2020-03-11 PROBLEM — R50.9 FEVER: Status: RESOLVED | Noted: 2019-12-10 | Resolved: 2020-03-11

## 2020-03-11 PROBLEM — N39.0 UTI (URINARY TRACT INFECTION): Status: RESOLVED | Noted: 2019-12-14 | Resolved: 2020-03-11

## 2020-03-11 PROBLEM — J18.9 PNEUMONIA OF RIGHT UPPER LOBE DUE TO INFECTIOUS ORGANISM: Status: RESOLVED | Noted: 2019-12-24 | Resolved: 2020-03-11

## 2020-03-11 PROBLEM — R05.9 COUGH: Status: RESOLVED | Noted: 2019-12-14 | Resolved: 2020-03-11

## 2020-03-11 PROBLEM — R53.1 WEAKNESS GENERALIZED: Status: RESOLVED | Noted: 2019-12-14 | Resolved: 2020-03-11

## 2020-03-11 PROBLEM — R77.8 ELEVATED TROPONIN: Status: RESOLVED | Noted: 2019-12-14 | Resolved: 2020-03-11

## 2020-03-11 PROCEDURE — 2022F DILAT RTA XM EVC RTNOPTHY: CPT | Performed by: NURSE PRACTITIONER

## 2020-03-11 PROCEDURE — 3074F SYST BP LT 130 MM HG: CPT | Performed by: NURSE PRACTITIONER

## 2020-03-11 PROCEDURE — 3079F DIAST BP 80-89 MM HG: CPT | Performed by: NURSE PRACTITIONER

## 2020-03-11 PROCEDURE — 99213 OFFICE O/P EST LOW 20 MIN: CPT | Performed by: NURSE PRACTITIONER

## 2020-03-11 PROCEDURE — 1036F TOBACCO NON-USER: CPT | Performed by: NURSE PRACTITIONER

## 2020-03-11 PROCEDURE — 3008F BODY MASS INDEX DOCD: CPT | Performed by: NURSE PRACTITIONER

## 2020-03-11 PROCEDURE — 4040F PNEUMOC VAC/ADMIN/RCVD: CPT | Performed by: NURSE PRACTITIONER

## 2020-03-11 PROCEDURE — 1170F FXNL STATUS ASSESSED: CPT | Performed by: NURSE PRACTITIONER

## 2020-03-11 PROCEDURE — 3066F NEPHROPATHY DOC TX: CPT | Performed by: NURSE PRACTITIONER

## 2020-03-11 PROCEDURE — 1160F RVW MEDS BY RX/DR IN RCRD: CPT | Performed by: NURSE PRACTITIONER

## 2020-03-11 PROCEDURE — 3044F HG A1C LEVEL LT 7.0%: CPT | Performed by: NURSE PRACTITIONER

## 2020-03-11 PROCEDURE — 1125F AMNT PAIN NOTED PAIN PRSNT: CPT | Performed by: NURSE PRACTITIONER

## 2020-03-11 PROCEDURE — G0439 PPPS, SUBSEQ VISIT: HCPCS | Performed by: NURSE PRACTITIONER

## 2020-03-11 NOTE — PATIENT INSTRUCTIONS
Follow up of chronic medical conditions  Diabetes-A1C rising from 6 3 to 6 7  Encouraged to be stricter with limiting carbs and sugars  Hypothyroid-stable, no symptoms  HTN-stable  Af-chronic anticoagulation with eliquis  hld-LDL not at goal  CKD- at baseline  Leg swelling 0 more at end of the day  Instructed to pay more attention to her sodium consumption  Limit potato chips etc  Elevate legs throughout the day  Encourage daily  exercise  Follow up in 4-6 months  Results for Nguyễn Garcia (MRN [id number]) as of 3/11/2020 12:10   Ref   Range 3/5/2020 08:25   Sodium Latest Ref Range: 136 - 145 mmol/L 141   Potassium Latest Ref Range: 3 5 - 5 3 mmol/L 4 3   Chloride Latest Ref Range: 100 - 108 mmol/L 107   CO2 Latest Ref Range: 21 - 32 mmol/L 28   Anion Gap Latest Ref Range: 4 - 13 mmol/L 6   BUN Latest Ref Range: 5 - 25 mg/dL 20   Creatinine Latest Ref Range: 0 60 - 1 30 mg/dL 1 21   GLUCOSE FASTING Latest Ref Range: 65 - 99 mg/dL 144 (H)   Calcium Latest Ref Range: 8 3 - 10 1 mg/dL 9 2   AST Latest Ref Range: 5 - 45 U/L 17   ALT Latest Ref Range: 12 - 78 U/L 18   Alkaline Phosphatase Latest Ref Range: 46 - 116 U/L 99   Total Protein Latest Ref Range: 6 4 - 8 2 g/dL 7 9   Albumin Latest Ref Range: 3 5 - 5 0 g/dL 3 6   TOTAL BILIRUBIN Latest Ref Range: 0 20 - 1 00 mg/dL 0 54   eGFR Latest Units: ml/min/1 73sq m 40   Cholesterol Latest Ref Range: 50 - 200 mg/dL 192   Triglycerides Latest Ref Range: <=150 mg/dL 116   HDL Latest Ref Range: >=40 mg/dL 61   Non-HDL Cholesterol Latest Units: mg/dl 131   LDL Direct Latest Ref Range: 0 - 100 mg/dL 108 (H)   WBC Latest Ref Range: 4 31 - 10 16 Thousand/uL 6 84   Red Blood Cell Count Latest Ref Range: 3 81 - 5 12 Million/uL 4 59   Hemoglobin Latest Ref Range: 11 5 - 15 4 g/dL 14 3   HCT Latest Ref Range: 34 8 - 46 1 % 45 2   MCV Latest Ref Range: 82 - 98 fL 99 (H)   MCH Latest Ref Range: 26 8 - 34 3 pg 31 2   MCHC Latest Ref Range: 31 4 - 37 4 g/dL 31 6   RDW Latest Ref Range: 11 6 - 15 1 % 13 0   Platelet Count Latest Ref Range: 149 - 390 Thousands/uL 251   MPV Latest Ref Range: 8 9 - 12 7 fL 10 7   nRBC Latest Units: /100 WBCs 0   Neutrophils % Latest Ref Range: 43 - 75 % 68   Immat GRANS % Latest Ref Range: 0 - 2 % 0   Lymphocytes Relative Latest Ref Range: 14 - 44 % 19   Monocytes Relative Latest Ref Range: 4 - 12 % 8   Eosinophils Latest Ref Range: 0 - 6 % 4   Basophils Relative Latest Ref Range: 0 - 1 % 1   Immature Grans Absolute Latest Ref Range: 0 00 - 0 20 Thousand/uL 0 03   Absolute Neutrophils Latest Ref Range: 1 85 - 7 62 Thousands/µL 4 65   Lymphocytes Absolute Latest Ref Range: 0 60 - 4 47 Thousands/µL 1 32   Absolute Monocytes Latest Ref Range: 0 17 - 1 22 Thousand/µL 0 54   Absolute Eosinophils Latest Ref Range: 0 00 - 0 61 Thousand/µL 0 24   Basophils Absolute Latest Ref Range: 0 00 - 0 10 Thousands/µL 0 06   Hemoglobin A1C Latest Ref Range: Normal 3 8-5 6%; PreDiabetic 5 7-6 4%; Diabetic >=6 5%; Glycemic control for adults with diabetes <7 0% % 6 7 (H)   eAG, EST AVG Glucose Latest Units: mg/dl 146         Medicare Preventive Visit Patient Instructions  Thank you for completing your Welcome to Medicare Visit or Medicare Annual Wellness Visit today  Your next wellness visit will be due in one year (3/11/2021)  The screening/preventive services that you may require over the next 5-10 years are detailed below  Some tests may not apply to you based off risk factors and/or age  Screening tests ordered at today's visit but not completed yet may show as past due  Also, please note that scanned in results may not display below    Preventive Screenings:  Service Recommendations Previous Testing/Comments   Colorectal Cancer Screening  * Colonoscopy    * Fecal Occult Blood Test (FOBT)/Fecal Immunochemical Test (FIT)  * Fecal DNA/Cologuard Test  * Flexible Sigmoidoscopy Age: 54-65 years old   Colonoscopy: every 10 years (may be performed more frequently if at higher risk)  OR  FOBT/FIT: every 1 year  OR  Cologuard: every 3 years  OR  Sigmoidoscopy: every 5 years  Screening may be recommended earlier than age 48 if at higher risk for colorectal cancer  Also, an individualized decision between you and your healthcare provider will decide whether screening between the ages of 74-80 would be appropriate  Colonoscopy: 07/19/2017  FOBT/FIT: Not on file  Cologuard: Not on file  Sigmoidoscopy: Not on file    Screening Not Indicated     Breast Cancer Screening Age: 36 years old  Frequency: every 1-2 years  Not required if history of left and right mastectomy Mammogram: Not on file       Cervical Cancer Screening Between the ages of 21-29, pap smear recommended once every 3 years  Between the ages of 33-67, can perform pap smear with HPV co-testing every 5 years  Recommendations may differ for women with a history of total hysterectomy, cervical cancer, or abnormal pap smears in past  Pap Smear: Not on file    Screening Not Indicated   Hepatitis C Screening Once for adults born between 1945 and 1965  More frequently in patients at high risk for Hepatitis C Hep C Antibody: Not on file       Diabetes Screening 1-2 times per year if you're at risk for diabetes or have pre-diabetes Fasting glucose: 144 mg/dL   A1C: 6 7 %    Screening Not Indicated  History Diabetes   Cholesterol Screening Once every 5 years if you don't have a lipid disorder  May order more often based on risk factors  Lipid panel: 03/05/2020    Screening Not Indicated  History Lipid Disorder     Other Preventive Screenings Covered by Medicare:  1  Abdominal Aortic Aneurysm (AAA) Screening: covered once if your at risk  You're considered to be at risk if you have a family history of AAA    2  Lung Cancer Screening: covers low dose CT scan once per year if you meet all of the following conditions: (1) Age 50-69; (2) No signs or symptoms of lung cancer; (3) Current smoker or have quit smoking within the last 15 years; (4) You have a tobacco smoking history of at least 30 pack years (packs per day multiplied by number of years you smoked); (5) You get a written order from a healthcare provider  3  Glaucoma Screening: covered annually if you're considered high risk: (1) You have diabetes OR (2) Family history of glaucoma OR (3)  aged 48 and older OR (3)  American aged 72 and older  3  Osteoporosis Screening: covered every 2 years if you meet one of the following conditions: (1) You're estrogen deficient and at risk for osteoporosis based off medical history and other findings; (2) Have a vertebral abnormality; (3) On glucocorticoid therapy for more than 3 months; (4) Have primary hyperparathyroidism; (5) On osteoporosis medications and need to assess response to drug therapy  · Last bone density test (DXA Scan): Not on file  5  HIV Screening: covered annually if you're between the age of 12-76  Also covered annually if you are younger than 13 and older than 72 with risk factors for HIV infection  For pregnant patients, it is covered up to 3 times per pregnancy  Immunizations:  Immunization Recommendations   Influenza Vaccine Annual influenza vaccination during flu season is recommended for all persons aged >= 6 months who do not have contraindications   Pneumococcal Vaccine (Prevnar and Pneumovax)  * Prevnar = PCV13  * Pneumovax = PPSV23   Adults 25-60 years old: 1-3 doses may be recommended based on certain risk factors  Adults 72 years old: Prevnar (PCV13) vaccine recommended followed by Pneumovax (PPSV23) vaccine  If already received PPSV23 since turning 65, then PCV13 recommended at least one year after PPSV23 dose     Hepatitis B Vaccine 3 dose series if at intermediate or high risk (ex: diabetes, end stage renal disease, liver disease)   Tetanus (Td) Vaccine - COST NOT COVERED BY MEDICARE PART B Following completion of primary series, a booster dose should be given every 10 years to maintain immunity against tetanus  Td may also be given as tetanus wound prophylaxis  Tdap Vaccine - COST NOT COVERED BY MEDICARE PART B Recommended at least once for all adults  For pregnant patients, recommended with each pregnancy  Shingles Vaccine (Shingrix) - COST NOT COVERED BY MEDICARE PART B  2 shot series recommended in those aged 48 and above     Health Maintenance Due:      Topic Date Due    CRC Screening: Colonoscopy  07/19/2027     Immunizations Due:  There are no preventive care reminders to display for this patient  Advance Directives   What are advance directives? Advance directives are legal documents that state your wishes and plans for medical care  These plans are made ahead of time in case you lose your ability to make decisions for yourself  Advance directives can apply to any medical decision, such as the treatments you want, and if you want to donate organs  What are the types of advance directives? There are many types of advance directives, and each state has rules about how to use them  You may choose a combination of any of the following:  · Living will: This is a written record of the treatment you want  You can also choose which treatments you do not want, which to limit, and which to stop at a certain time  This includes surgery, medicine, IV fluid, and tube feedings  · Durable power of  for healthcare [facility]): This is a written record that states who you want to make healthcare choices for you when you are unable to make them for yourself  This person, called a proxy, is usually a family member or a friend  You may choose more than 1 proxy  · Do not resuscitate (DNR) order:  A DNR order is used in case your heart stops beating or you stop breathing  It is a request not to have certain forms of treatment, such as CPR  A DNR order may be included in other types of advance directives  · Medical directive:   This covers the care that you want if you are in a coma, near death, or unable to make decisions for yourself  You can list the treatments you want for each condition  Treatment may include pain medicine, surgery, blood transfusions, dialysis, IV or tube feedings, and a ventilator (breathing machine)  · Values history: This document has questions about your views, beliefs, and how you feel and think about life  This information can help others choose the care that you would choose  Why are advance directives important? An advance directive helps you control your care  Although spoken wishes may be used, it is better to have your wishes written down  Spoken wishes can be misunderstood, or not followed  Treatments may be given even if you do not want them  An advance directive may make it easier for your family to make difficult choices about your care  Urinary Incontinence   Urinary incontinence (UI)  is when you lose control of your bladder  UI develops because your bladder cannot store or empty urine properly  The 3 most common types of UI are stress incontinence, urge incontinence, or both  Medicines:   · May be given to help strengthen your bladder control  Report any side effects of medication to your healthcare provider  Do pelvic muscle exercises often:  Your pelvic muscles help you stop urinating  Squeeze these muscles tight for 5 seconds, then relax for 5 seconds  Gradually work up to squeezing for 10 seconds  Do 3 sets of 15 repetitions a day, or as directed  This will help strengthen your pelvic muscles and improve bladder control  Train your bladder:  Go to the bathroom at set times, such as every 2 hours, even if you do not feel the urge to go  You can also try to hold your urine when you feel the urge to go  For example, hold your urine for 5 minutes when you feel the urge to go  As that becomes easier, hold your urine for 10 minutes  Self-care:   · Keep a UI record  Write down how often you leak urine and how much you leak   Make a note of what you were doing when you leaked urine  · Drink liquids as directed  You may need to limit the amount of liquid you drink to help control your urine leakage  Do not drink any liquid right before you go to bed  Limit or do not have drinks that contain caffeine or alcohol  · Prevent constipation  Eat a variety of high-fiber foods  Good examples are high-fiber cereals, beans, vegetables, and whole-grain breads  Walking is the best way to trigger your intestines to have a bowel movement  · Exercise regularly and maintain a healthy weight  Weight loss and exercise will decrease pressure on your bladder and help you control your leakage  · Use a catheter as directed  to help empty your bladder  A catheter is a tiny, plastic tube that is put into your bladder to drain your urine  · Go to behavior therapy as directed  Behavior therapy may be used to help you learn to control your urge to urinate  Weight Management   Why it is important to manage your weight:  Being overweight increases your risk of health conditions such as heart disease, high blood pressure, type 2 diabetes, and certain types of cancer  It can also increase your risk for osteoarthritis, sleep apnea, and other respiratory problems  Aim for a slow, steady weight loss  Even a small amount of weight loss can lower your risk of health problems  How to lose weight safely:  A safe and healthy way to lose weight is to eat fewer calories and get regular exercise  You can lose up about 1 pound a week by decreasing the number of calories you eat by 500 calories each day  Healthy meal plan for weight management:  A healthy meal plan includes a variety of foods, contains fewer calories, and helps you stay healthy  A healthy meal plan includes the following:  · Eat whole-grain foods more often  A healthy meal plan should contain fiber  Fiber is the part of grains, fruits, and vegetables that is not broken down by your body   Whole-grain foods are healthy and provide extra fiber in your diet  Some examples of whole-grain foods are whole-wheat breads and pastas, oatmeal, brown rice, and bulgur  · Eat a variety of vegetables every day  Include dark, leafy greens such as spinach, kale, tiffanie greens, and mustard greens  Eat yellow and orange vegetables such as carrots, sweet potatoes, and winter squash  · Eat a variety of fruits every day  Choose fresh or canned fruit (canned in its own juice or light syrup) instead of juice  Fruit juice has very little or no fiber  · Eat low-fat dairy foods  Drink fat-free (skim) milk or 1% milk  Eat fat-free yogurt and low-fat cottage cheese  Try low-fat cheeses such as mozzarella and other reduced-fat cheeses  · Choose meat and other protein foods that are low in fat  Choose beans or other legumes such as split peas or lentils  Choose fish, skinless poultry (chicken or turkey), or lean cuts of red meat (beef or pork)  Before you cook meat or poultry, cut off any visible fat  · Use less fat and oil  Try baking foods instead of frying them  Add less fat, such as margarine, sour cream, regular salad dressing and mayonnaise to foods  Eat fewer high-fat foods  Some examples of high-fat foods include french fries, doughnuts, ice cream, and cakes  · Eat fewer sweets  Limit foods and drinks that are high in sugar  This includes candy, cookies, regular soda, and sweetened drinks  Exercise:  Exercise at least 30 minutes per day on most days of the week  Some examples of exercise include walking, biking, dancing, and swimming  You can also fit in more physical activity by taking the stairs instead of the elevator or parking farther away from stores  Ask your healthcare provider about the best exercise plan for you  © Copyright [employer] 2018 Information is for End User's use only and may not be sold, redistributed or otherwise used for commercial purposes   All illustrations and images included in CareNotes® are the copyrighted property of [employer]  or Graham Sarah Maier     Weight Management   AMBULATORY CARE:   Why it is important to manage your weight:  Being overweight increases your risk of health conditions such as heart disease, high blood pressure, type 2 diabetes, and certain types of cancer  It can also increase your risk for osteoarthritis, sleep apnea, and other respiratory problems  Aim for a slow, steady weight loss  Even a small amount of weight loss can lower your risk of health problems  How to lose weight safely:  A safe and healthy way to lose weight is to eat fewer calories and get regular exercise  You can lose up about 1 pound a week by decreasing the number of calories you eat by 500 calories each day  You can decrease calories by eating smaller portion sizes or by cutting out high-calorie foods  Read labels to find out how many calories are in the foods you eat  You can also burn calories with exercise such as walking, swimming, or biking  You will be more likely to keep weight off if you make these changes part of your lifestyle  Healthy meal plan for weight management:  A healthy meal plan includes a variety of foods, contains fewer calories, and helps you stay healthy  A healthy meal plan includes the following:  · Eat whole-grain foods more often  A healthy meal plan should contain fiber  Fiber is the part of grains, fruits, and vegetables that is not broken down by your body  Whole-grain foods are healthy and provide extra fiber in your diet  Some examples of whole-grain foods are whole-wheat breads and pastas, oatmeal, brown rice, and bulgur  · Eat a variety of vegetables every day  Include dark, leafy greens such as spinach, kale, tiffanie greens, and mustard greens  Eat yellow and orange vegetables such as carrots, sweet potatoes, and winter squash  · Eat a variety of fruits every day  Choose fresh or canned fruit (canned in its own juice or light syrup) instead of juice   Fruit juice has very little or no fiber  · Eat low-fat dairy foods  Drink fat-free (skim) milk or 1% milk  Eat fat-free yogurt and low-fat cottage cheese  Try low-fat cheeses such as mozzarella and other reduced-fat cheeses  · Choose meat and other protein foods that are low in fat  Choose beans or other legumes such as split peas or lentils  Choose fish, skinless poultry (chicken or turkey), or lean cuts of red meat (beef or pork)  Before you cook meat or poultry, cut off any visible fat  · Use less fat and oil  Try baking foods instead of frying them  Add less fat, such as margarine, sour cream, regular salad dressing and mayonnaise to foods  Eat fewer high-fat foods  Some examples of high-fat foods include french fries, doughnuts, ice cream, and cakes  · Eat fewer sweets  Limit foods and drinks that are high in sugar  This includes candy, cookies, regular soda, and sweetened drinks  Ways to decrease calories:   · Eat smaller portions  ¨ Use a small plate with smaller servings  ¨ Do not eat second helpings  ¨ When you eat at a restaurant, ask for a box and place half of your meal in the box before you eat  ¨ Share an entrée with someone else  · Replace high-calorie snacks with healthy, low-calorie snacks  ¨ Choose fresh fruit, vegetables, fat-free rice cakes, or air-popped popcorn instead of potato chips, nuts, or chocolate  ¨ Choose water or calorie-free drinks instead of soda or sweetened drinks  · Eat regular meals  Skipping meals can lead to overeating later in the day  Eat a healthy snack in place of a meal if you do not have time to eat a regular meal      · Do not shop for groceries when you are hungry  You may be more likely to make unhealthy food choices  Take a grocery list of healthy foods and shop after you have eaten  Exercise:  Exercise at least 30 minutes per day on most days of the week   Some examples of exercise include walking, biking, dancing, and swimming  You can also fit in more physical activity by taking the stairs instead of the elevator or parking farther away from stores  Ask your healthcare provider about the best exercise plan for you  Other things to consider as you try to lose weight:   · Be aware of situations that may give you the urge to overeat, such as eating while watching television  Find ways to avoid these situations  For example, read a book, go for a walk, or do crafts  · Meet with a weight loss support group or friends who are also trying to lose weight  This may help you stay motivated to continue working on your weight loss goals  © 2017 [address] Information is for End User's use only and may not be sold, redistributed or otherwise used for commercial purposes  All illustrations and images included in CareNotes® are the copyrighted property of [employer]  or Seamus Figueroa  The above information is an  only  It is not intended as medical advice for individual conditions or treatments  Talk to your doctor, nurse or pharmacist before following any medical regimen to see if it is safe and effective for you  Low Fat Diet   AMBULATORY CARE:   A low-fat diet  is an eating plan that is low in total fat, unhealthy fat, and cholesterol  You may need to follow a low-fat diet if you have trouble digesting or absorbing fat  You may also need to follow this diet if you have high cholesterol  You can also lower your cholesterol by increasing the amount of fiber in your diet  Soluble fiber is a type of fiber that helps to decrease cholesterol levels  Different types of fat in food:   · Limit unhealthy fats  A diet that is high in cholesterol, saturated fat, and trans fat may cause unhealthy cholesterol levels  Unhealthy cholesterol levels increase your risk of heart disease  ¨ Cholesterol:  Limit intake of cholesterol to less than 200 mg per day   Cholesterol is found in meat, eggs, and dairy     ¨ Saturated fat:  Limit saturated fat to less than 7% of your total daily calories  Ask your dietitian how many calories you need each day  Saturated fat is found in butter, cheese, ice cream, whole milk, and palm oil  Saturated fat is also found in meat, such as beef, pork, chicken skin, and processed meats  Processed meats include sausage, hot dogs, and bologna  ¨ Trans fat:  Avoid trans fat as much as possible  Trans fat is used in fried and baked foods  Foods that say trans fat free on the label may still have up to 0 5 grams of trans fat per serving  · Include healthy fats  Replace foods that are high in saturated and trans fat with foods high in healthy fats  This may help to decrease high cholesterol levels  ¨ Monounsaturated fats: These are found in avocados, nuts, and vegetable oils, such as olive, canola, and sunflower oil  ¨ Polyunsaturated fats: These can be found in vegetable oils, such as soybean or corn oil  Omega-3 fats can help to decrease the risk of heart disease  Omega-3 fats are found in fish, such as salmon, herring, trout, and tuna  Omega-3 fats can also be found in plant foods, such as walnuts, flaxseed, soybeans, and canola oil    Foods to limit or avoid:   · Grains:      ¨ Snacks that are made with partially hydrogenated oils, such as chips, regular crackers, and butter-flavored popcorn    ¨ High-fat baked goods, such as biscuits, croissants, doughnuts, pies, cookies, and pastries    · Dairy:      ¨ Whole milk, 2% milk, and yogurt and ice cream made with whole milk    ¨ Half and half creamer, heavy cream, and whipping cream    ¨ Cheese, cream cheese, and sour cream    · Meats and proteins:      ¨ High-fat cuts of meat (T-bone steak, regular hamburger, and ribs)    ¨ Fried meat, poultry (turkey and chicken), and fish    ¨ Poultry (chicken and turkey) with skin    ¨ Cold cuts (salami or bologna), hot dogs, lopez, and sausage    ¨ Whole eggs and egg yolks    · Vegetables and fruits with added fat:      ¨ Fried vegetables or vegetables in butter or high-fat sauces, such as cream or cheese sauces    ¨ Fried fruit or fruit served with butter or cream    · Fats:      ¨ Butter, stick margarine, and shortening    ¨ Coconut, palm oil, and palm kernel oil  Foods to include:   · Grains:      ¨ Whole-grain breads, cereals, pasta, and brown rice    ¨ Low-fat crackers and pretzels    · Vegetables and fruits:      ¨ Fresh, frozen, or canned vegetables (no salt or low-sodium)    ¨ Fresh, frozen, dried, or canned fruit (canned in light syrup or fruit juice)    ¨ Avocado    · Low-fat dairy products:      ¨ Nonfat (skim) or 1% milk    ¨ Nonfat or low-fat cheese, yogurt, and cottage cheese    · Meats and proteins:      ¨ Chicken or turkey with no skin    ¨ Baked or broiled fish    ¨ Lean beef and pork (loin, round, extra lean hamburger)    ¨ Beans and peas, unsalted nuts, soy products    ¨ Egg whites and substitutes    ¨ Seeds and nuts    · Fats:      ¨ Unsaturated oil, such as canola, olive, peanut, soybean, or sunflower oil    ¨ Soft or liquid margarine and vegetable oil spread    ¨ Low-fat salad dressing  Other ways to decrease fat:   · Read food labels before you buy foods  Choose foods that have less than 30% of calories from fat  Choose low-fat or fat-free dairy products  Remember that fat free does not mean calorie free  These foods still contain calories, and too many calories can lead to weight gain  · Trim fat from meat and avoid fried food  Trim all visible fat from meat before you cook it  Remove the skin from poultry  Do not michaels meat, fish, or poultry  Bake, roast, boil, or broil these foods instead  Avoid fried foods  Eat a baked potato instead of [place] fries  Steam vegetables instead of sautéing them in butter  · Add less fat to foods  Use imitation lopez bits on salads and baked potatoes instead of regular lopez bits   Use fat-free or low-fat salad dressings instead of regular dressings  Use low-fat or nonfat butter-flavored topping instead of regular butter or margarine on popcorn and other foods  Ways to decrease fat in recipes:  Replace high-fat ingredients with low-fat or nonfat ones  This may cause baked goods to be drier than usual  You may need to use nonfat cooking spray on pans to prevent food from sticking  You also may need to change the amount of other ingredients, such as water, in the recipe  Try the following:  · Use low-fat or light margarine instead of regular margarine or shortening  · Use lean ground turkey breast or chicken, or lean ground beef (less than 5% fat) instead of hamburger  · Add 1 teaspoon of canola oil to 8 ounces of skim milk instead of using cream or half and half  · Use grated zucchini, carrots, or apples in breads instead of coconut  · Use blenderized, low-fat cottage cheese, plain tofu, or low-fat ricotta cheese instead of cream cheese  · Use 1 egg white and 1 teaspoon of canola oil, or use ¼ cup (2 ounces) of fat-free egg substitute instead of a whole egg  · Replace half of the oil that is called for in a recipe with applesauce when you bake  Use 3 tablespoons of cocoa powder and 1 tablespoon of canola oil instead of a square of baking chocolate  How to increase fiber:  Eat enough high-fiber foods to get 20 to 30 grams of fiber every day  Slowly increase your fiber intake to avoid stomach cramps, gas, and other problems  · Eat 3 ounces of whole-grain foods each day  An ounce is about 1 slice of bread  Eat whole-grain breads, such as whole-wheat bread  Whole wheat, whole-wheat flour, or other whole grains should be listed as the first ingredient on the food label  Replace white flour with whole-grain flour or use half of each in recipes  Whole-grain flour is heavier than white flour, so you may have to add more yeast or baking powder       · Eat a high-fiber cereal for breakfast   Jennifer Mahan is a

## 2020-03-11 NOTE — PROGRESS NOTES
Assessment and Plan:     Problem List Items Addressed This Visit        Endocrine    Subclinical hypothyroidism - Primary (Chronic)    Relevant Orders    TSH, 3rd generation    T3, free    Type 2 diabetes mellitus with stage 3 chronic kidney disease, without long-term current use of insulin (HCC) (Chronic)    Relevant Orders    Comprehensive metabolic panel    Lipid panel       Cardiovascular and Mediastinum    Benign essential hypertension (Chronic)    Relevant Orders    Comprehensive metabolic panel    Lipid panel    Atrial fibrillation (HCC) (Chronic)       Genitourinary    Chronic kidney disease, stage III (moderate) (HCC)    Relevant Orders    Comprehensive metabolic panel       Other    Anticoagulant long-term use    Relevant Orders    CBC and differential    Mixed hyperlipidemia    Relevant Orders    Comprehensive metabolic panel    Lipid panel    Diastolic dysfunction    Relevant Orders    Comprehensive metabolic panel    Colostomy in place ([facility] )    BMI 29 0-29 9,adult    Medicare annual wellness visit, subsequent    Need for shingles vaccine        BMI Counseling: Body mass index is 29 41 kg/m²  The BMI is above normal  Nutrition recommendations include decreasing portion sizes, encouraging healthy choices of fruits and vegetables, decreasing fast food intake, consuming healthier snacks, limiting drinks that contain sugar, moderation in carbohydrate intake, increasing intake of lean protein, reducing intake of saturated and trans fat and reducing intake of cholesterol  Exercise recommendations include exercising 3-5 times per week and strength training exercises  No pharmacotherapy was ordered  Preventive health issues were discussed with patient, and age appropriate screening tests were ordered as noted in patient's After Visit Summary  Personalized health advice and appropriate referrals for health education or preventive services given if needed, as noted in patient's After Visit Summary  History of Present Illness:     Patient presents for Medicare Annual Wellness visit    Patient Care Team:  Solon Opitz as PCP - General (Nurse Practitioner)  Matias Mi MD as PCP - [address] [place] [address] (RTE)     Problem List:     Patient Active Problem List   Diagnosis    Benign essential hypertension    [facility]    Anticoagulant long-term use    Mixed hyperlipidemia    Diastolic dysfunction    Mitral annular calcification    Chronic kidney disease, stage III (moderate) [facility] )    Colostomy in place ([place] [address] )    Osteopenia of multiple sites    Subclinical hypothyroidism    Type 2 diabetes mellitus with stage 3 chronic kidney disease, without long-term current use of insulin ([place] [address] )    BMI 29 0-29 9,adult    Ulcerative colitis with complication ([place] [address] )    Transition of care performed with sharing of clinical summary    Medicare annual wellness visit, subsequent    Need for shingles vaccine      Past Medical and Surgical History:     Past Medical History:   Diagnosis Date    A-fib ([place] [address] )     Chronic kidney disease     Colostomy present ([facility] )     Diabetes mellitus ([facility] )     Hyperlipidemia     Hypertension      Past Surgical History:   Procedure Laterality Date    APPENDECTOMY      CARPAL TUNNEL RELEASE Bilateral     CATARACT EXTRACTION      CHOLECYSTECTOMY      COLON SURGERY      COLOSTOMY      HYSTERECTOMY        Family History:     Family History   Problem Relation Age of Onset    Heart disease Mother     Cancer Father       Social History:        Social History     Socioeconomic History    Marital status:       Spouse name: None    Number of children: None    Years of education: None    Highest education level: None   Occupational History    None   Social Needs    Financial resource strain: None    Food insecurity:     Worry: None     Inability: None    Transportation needs:     Medical: None     Non-medical: None   Tobacco Use    Smoking status: Never Smoker    Smokeless tobacco: Never Used   Substance and Sexual Activity    Alcohol use: Never     Frequency: Never    Drug use: No    Sexual activity: None   Lifestyle    Physical activity:     Days per week: None     Minutes per session: None    Stress: None   Relationships    Social connections:     Talks on phone: None     Gets together: None     Attends [employer]ical service: None     Active member of club or organization: None     Attends meetings of clubs or organizations: None     Relationship status: None    Intimate partner violence:     Fear of current or ex partner: None     Emotionally abused: None     Physically abused: None     Forced sexual activity: None   Other Topics Concern    None   Social History Narrative           Medications and Allergies:     Current Outpatient Medications   Medication Sig Dispense Refill    amLODIPine (NORVASC) 10 mg tablet Take 1 tablet (10 mg total) by mouth daily 90 tablet 3    apixaban (ELIQUIS) 5 mg Take 1 tablet (5 mg total) by mouth 2 (two) times a day 180 tablet 3    atenolol (TENORMIN) 100 mg tablet Take 1 tablet (100 mg total) by mouth daily 90 tablet 2    atorvastatin (LIPITOR) 20 mg tablet Take 1 tablet (20 mg total) by mouth daily 90 tablet 3    Blood Glucose Monitoring Suppl (ONE TOUCH ULTRA 2) w/Device KIT by Does not apply route daily 1 each 0    calcium carbonate-vitamin D (OSCAL-D) 500 mg-200 units per tablet Take 1 tablet by mouth 2 (two) times a day with meals      docusate sodium (COLACE) 100 mg capsule Take 100 mg by mouth 2 (two) times a day      ferrous gluconate (FERGON) 324 mg tablet Take 324 mg by mouth daily with breakfast      glipiZIDE (GLUCOTROL XL) 2 5 mg 24 hr tablet Take 1 tablet (2 5 mg total) by mouth daily 90 tablet 3    glucose blood (ONE TOUCH ULTRA TEST) test strip Test once daily 100 each 3    hydrocortisone-pramoxine (PROCTOFOAM-HC) rectal foam Insert 1 applicator into the rectum daily for 90 doses 30 g 3    losartan (COZAAR) 100 MG tablet Take 1 tablet (100 mg total) by mouth daily 90 tablet 3    mesalamine (ASACOL) 800 MG EC tablet Take 1 tablet (800 mg total) by mouth 3 (three) times a day 270 tablet 2    ONE TOUCH LANCETS MISC by Does not apply route daily 100 each 3    polyethylene glycol (MIRALAX) 17 g packet Take 17 g by mouth daily      Zoster Vac Recomb Adjuvanted 50 MCG/0 5ML SUSR as directed (Patient not taking: Reported on 8/14/2019) 1 each 0     No current facility-administered medications for this visit  Allergies   Allergen Reactions    Amoxicillin-Pot Clavulanate Other (See Comments)     C-DIFF, x's 2  developed c diff taking drug      Immunizations:     Immunization History   Administered Date(s) Administered    INFLUENZA 10/28/2011, 11/12/2013, 10/07/2014, 09/29/2015, 09/21/2016, 09/19/2017, 10/03/2018, 10/03/2018, 09/17/2019    Influenza Split High Dose Preservative Free IM 09/17/2019    Influenza TIV (IM) 10/28/2011, 11/12/2013, 09/19/2017    Pneumococcal Conjugate 13-Valent 08/03/2015    Pneumococcal Conjugate PCV 7 01/01/2008    Pneumococcal Polysaccharide PPV23 01/01/2008    Tdap 08/10/2016    Zoster 03/03/2016      Health Maintenance:         Topic Date Due    CRC Screening: Colonoscopy  07/19/2027     There are no preventive care reminders to display for this patient  Medicare Health Risk Assessment:     /82 (BP Location: Left arm, Patient Position: Sitting)   Pulse 86   Temp 98 7 °F (37 1 °C)   Resp 18   Ht 5' 3" (1 6 m)   Wt 75 3 kg (166 lb)   SpO2 96%   BMI 29 41 kg/m²      Enoch Stover is here for her Subsequent Wellness visit  Health Risk Assessment:   Patient rates overall health as good  Patient feels that their physical health rating is same  Eyesight was rated as same  Hearing was rated as same  Patient feels that their emotional and mental health rating is same  Pain experienced in the last 7 days has been none   Patient states that she has experienced no weight loss or gain in last 6 months  Depression Screening:   PHQ-2 Score: 0      Fall Risk Screening: In the past year, patient has experienced: no history of falling in past year      Urinary Incontinence Screening:   Patient has leaked urine accidently in the last six months  Home Safety:  Patient does not have trouble with stairs inside or outside of their home  Patient has working smoke alarms and has working carbon monoxide detector  Home safety hazards include: none  Nutrition:   Current diet is Regular  Medications:   Patient is not currently taking any over-the-counter supplements  Patient is able to manage medications  Activities of Daily Living (ADLs)/Instrumental Activities of Daily Living (IADLs):   Walk and transfer into and out of bed and chair?: Yes  Dress and groom yourself?: Yes    Bathe or shower yourself?: Yes    Feed yourself?  Yes  Do your laundry/housekeeping?: Yes  Manage your money, pay your bills and track your expenses?: Yes  Make your own meals?: Yes    Do your own shopping?: Yes    Previous Hospitalizations:   Any hospitalizations or ED visits within the last 12 months?: No      Cognitive Screening:   Provider or family/friend/caregiver concerned regarding cognition?: No    PREVENTIVE SCREENINGS      Cardiovascular Screening:    General: Screening Not Indicated, History Lipid Disorder, Risks and Benefits Discussed and Screening Current    Due for: Lipid Panel      Diabetes Screening:     General: History Diabetes, Risks and Benefits Discussed and Screening Current    Due for: Blood Glucose      Colorectal Cancer Screening:     General: Screening Not Indicated      Cervical Cancer Screening:    General: Screening Not Indicated      Lung Cancer Screening:     General: Screening Not Indicated and Screening Current      Hepatitis C Screening:    General: Screening Not Indicated    Other Counseling Topics:   Car/seat belt/driving safety, skin self-exam, sunscreen and calcium and vitamin D intake and regular weightbearing exercise         SULEMA Boogie

## 2020-04-30 ENCOUNTER — TELEPHONE (OUTPATIENT)
Dept: FAMILY MEDICINE CLINIC | Facility: CLINIC | Age: 85
End: 2020-04-30

## 2020-04-30 DIAGNOSIS — K51.919 ULCERATIVE COLITIS WITH COMPLICATION, UNSPECIFIED LOCATION (HCC): Primary | ICD-10-CM

## 2020-07-07 ENCOUNTER — APPOINTMENT (OUTPATIENT)
Dept: LAB | Facility: CLINIC | Age: 85
End: 2020-07-07
Payer: COMMERCIAL

## 2020-07-07 DIAGNOSIS — I51.89 DIASTOLIC DYSFUNCTION: ICD-10-CM

## 2020-07-07 DIAGNOSIS — N18.30 TYPE 2 DIABETES MELLITUS WITH STAGE 3 CHRONIC KIDNEY DISEASE, WITHOUT LONG-TERM CURRENT USE OF INSULIN (HCC): Chronic | ICD-10-CM

## 2020-07-07 DIAGNOSIS — N18.30 CHRONIC KIDNEY DISEASE, STAGE III (MODERATE) (HCC): ICD-10-CM

## 2020-07-07 DIAGNOSIS — E03.8 SUBCLINICAL HYPOTHYROIDISM: Chronic | ICD-10-CM

## 2020-07-07 DIAGNOSIS — E78.2 MIXED HYPERLIPIDEMIA: ICD-10-CM

## 2020-07-07 DIAGNOSIS — Z79.01 ANTICOAGULANT LONG-TERM USE: ICD-10-CM

## 2020-07-07 DIAGNOSIS — I10 BENIGN ESSENTIAL HYPERTENSION: Chronic | ICD-10-CM

## 2020-07-07 DIAGNOSIS — E11.22 TYPE 2 DIABETES MELLITUS WITH STAGE 3 CHRONIC KIDNEY DISEASE, WITHOUT LONG-TERM CURRENT USE OF INSULIN (HCC): Chronic | ICD-10-CM

## 2020-07-07 LAB
ALBUMIN SERPL BCP-MCNC: 3.4 G/DL (ref 3.5–5)
ALP SERPL-CCNC: 86 U/L (ref 46–116)
ALT SERPL W P-5'-P-CCNC: 14 U/L (ref 12–78)
ANION GAP SERPL CALCULATED.3IONS-SCNC: 5 MMOL/L (ref 4–13)
AST SERPL W P-5'-P-CCNC: 14 U/L (ref 5–45)
BASOPHILS # BLD AUTO: 0.04 THOUSANDS/ΜL (ref 0–0.1)
BASOPHILS NFR BLD AUTO: 1 % (ref 0–1)
BILIRUB SERPL-MCNC: 0.41 MG/DL (ref 0.2–1)
BUN SERPL-MCNC: 18 MG/DL (ref 5–25)
CALCIUM SERPL-MCNC: 9.2 MG/DL (ref 8.3–10.1)
CHLORIDE SERPL-SCNC: 109 MMOL/L (ref 100–108)
CHOLEST SERPL-MCNC: 189 MG/DL (ref 50–200)
CO2 SERPL-SCNC: 27 MMOL/L (ref 21–32)
CREAT SERPL-MCNC: 1.08 MG/DL (ref 0.6–1.3)
EOSINOPHIL # BLD AUTO: 0.19 THOUSAND/ΜL (ref 0–0.61)
EOSINOPHIL NFR BLD AUTO: 3 % (ref 0–6)
ERYTHROCYTE [DISTWIDTH] IN BLOOD BY AUTOMATED COUNT: 13 % (ref 11.6–15.1)
GFR SERPL CREATININE-BSD FRML MDRD: 46 ML/MIN/1.73SQ M
GLUCOSE P FAST SERPL-MCNC: 132 MG/DL (ref 65–99)
HCT VFR BLD AUTO: 41.6 % (ref 34.8–46.1)
HDLC SERPL-MCNC: 56 MG/DL
HGB BLD-MCNC: 12.9 G/DL (ref 11.5–15.4)
IMM GRANULOCYTES # BLD AUTO: 0.02 THOUSAND/UL (ref 0–0.2)
IMM GRANULOCYTES NFR BLD AUTO: 0 % (ref 0–2)
LDLC SERPL CALC-MCNC: 113 MG/DL (ref 0–100)
LYMPHOCYTES # BLD AUTO: 1.39 THOUSANDS/ΜL (ref 0.6–4.47)
LYMPHOCYTES NFR BLD AUTO: 19 % (ref 14–44)
MCH RBC QN AUTO: 31 PG (ref 26.8–34.3)
MCHC RBC AUTO-ENTMCNC: 31 G/DL (ref 31.4–37.4)
MCV RBC AUTO: 100 FL (ref 82–98)
MONOCYTES # BLD AUTO: 0.55 THOUSAND/ΜL (ref 0.17–1.22)
MONOCYTES NFR BLD AUTO: 8 % (ref 4–12)
NEUTROPHILS # BLD AUTO: 4.98 THOUSANDS/ΜL (ref 1.85–7.62)
NEUTS SEG NFR BLD AUTO: 69 % (ref 43–75)
NONHDLC SERPL-MCNC: 133 MG/DL
NRBC BLD AUTO-RTO: 0 /100 WBCS
PLATELET # BLD AUTO: 220 THOUSANDS/UL (ref 149–390)
PMV BLD AUTO: 10.7 FL (ref 8.9–12.7)
POTASSIUM SERPL-SCNC: 4.6 MMOL/L (ref 3.5–5.3)
PROT SERPL-MCNC: 7.1 G/DL (ref 6.4–8.2)
RBC # BLD AUTO: 4.16 MILLION/UL (ref 3.81–5.12)
SODIUM SERPL-SCNC: 141 MMOL/L (ref 136–145)
T3FREE SERPL-MCNC: 2.56 PG/ML (ref 2.3–4.2)
TRIGL SERPL-MCNC: 99 MG/DL
TSH SERPL DL<=0.05 MIU/L-ACNC: 1.87 UIU/ML (ref 0.36–3.74)
WBC # BLD AUTO: 7.17 THOUSAND/UL (ref 4.31–10.16)

## 2020-07-07 PROCEDURE — 85025 COMPLETE CBC W/AUTO DIFF WBC: CPT

## 2020-07-07 PROCEDURE — 80061 LIPID PANEL: CPT

## 2020-07-07 PROCEDURE — 36415 COLL VENOUS BLD VENIPUNCTURE: CPT

## 2020-07-07 PROCEDURE — 84481 FREE ASSAY (FT-3): CPT

## 2020-07-07 PROCEDURE — 80053 COMPREHEN METABOLIC PANEL: CPT

## 2020-07-07 PROCEDURE — 84443 ASSAY THYROID STIM HORMONE: CPT

## 2020-07-15 ENCOUNTER — TELEMEDICINE (OUTPATIENT)
Dept: FAMILY MEDICINE CLINIC | Facility: CLINIC | Age: 85
End: 2020-07-15
Payer: COMMERCIAL

## 2020-07-15 VITALS
WEIGHT: 166 LBS | DIASTOLIC BLOOD PRESSURE: 75 MMHG | HEIGHT: 63 IN | HEART RATE: 70 BPM | BODY MASS INDEX: 29.41 KG/M2 | SYSTOLIC BLOOD PRESSURE: 133 MMHG

## 2020-07-15 DIAGNOSIS — Z79.01 ANTICOAGULANT LONG-TERM USE: ICD-10-CM

## 2020-07-15 DIAGNOSIS — N18.30 CHRONIC KIDNEY DISEASE, STAGE III (MODERATE) (HCC): ICD-10-CM

## 2020-07-15 DIAGNOSIS — M85.89 OSTEOPENIA OF MULTIPLE SITES: ICD-10-CM

## 2020-07-15 DIAGNOSIS — N18.30 TYPE 2 DIABETES MELLITUS WITH STAGE 3 CHRONIC KIDNEY DISEASE, WITHOUT LONG-TERM CURRENT USE OF INSULIN (HCC): Primary | Chronic | ICD-10-CM

## 2020-07-15 DIAGNOSIS — E78.2 MIXED HYPERLIPIDEMIA: ICD-10-CM

## 2020-07-15 DIAGNOSIS — E11.22 TYPE 2 DIABETES MELLITUS WITH STAGE 3 CHRONIC KIDNEY DISEASE, WITHOUT LONG-TERM CURRENT USE OF INSULIN (HCC): Primary | Chronic | ICD-10-CM

## 2020-07-15 DIAGNOSIS — E03.8 SUBCLINICAL HYPOTHYROIDISM: Chronic | ICD-10-CM

## 2020-07-15 DIAGNOSIS — I51.89 DIASTOLIC DYSFUNCTION: ICD-10-CM

## 2020-07-15 DIAGNOSIS — I48.11 LONGSTANDING PERSISTENT ATRIAL FIBRILLATION (HCC): Chronic | ICD-10-CM

## 2020-07-15 DIAGNOSIS — I10 BENIGN ESSENTIAL HYPERTENSION: Chronic | ICD-10-CM

## 2020-07-15 PROCEDURE — 3066F NEPHROPATHY DOC TX: CPT | Performed by: NURSE PRACTITIONER

## 2020-07-15 PROCEDURE — 3008F BODY MASS INDEX DOCD: CPT | Performed by: NURSE PRACTITIONER

## 2020-07-15 PROCEDURE — 3044F HG A1C LEVEL LT 7.0%: CPT | Performed by: NURSE PRACTITIONER

## 2020-07-15 PROCEDURE — 4040F PNEUMOC VAC/ADMIN/RCVD: CPT | Performed by: NURSE PRACTITIONER

## 2020-07-15 PROCEDURE — 2022F DILAT RTA XM EVC RTNOPTHY: CPT | Performed by: NURSE PRACTITIONER

## 2020-07-15 PROCEDURE — 3075F SYST BP GE 130 - 139MM HG: CPT | Performed by: NURSE PRACTITIONER

## 2020-07-15 PROCEDURE — 1160F RVW MEDS BY RX/DR IN RCRD: CPT | Performed by: NURSE PRACTITIONER

## 2020-07-15 PROCEDURE — 1036F TOBACCO NON-USER: CPT | Performed by: NURSE PRACTITIONER

## 2020-07-15 PROCEDURE — 4010F ACE/ARB THERAPY RXD/TAKEN: CPT | Performed by: NURSE PRACTITIONER

## 2020-07-15 PROCEDURE — 3078F DIAST BP <80 MM HG: CPT | Performed by: NURSE PRACTITIONER

## 2020-07-15 PROCEDURE — 99214 OFFICE O/P EST MOD 30 MIN: CPT | Performed by: NURSE PRACTITIONER

## 2020-07-15 RX ORDER — AMLODIPINE BESYLATE 10 MG/1
10 TABLET ORAL DAILY
Qty: 90 TABLET | Refills: 3 | Status: SHIPPED | OUTPATIENT
Start: 2020-07-15 | End: 2020-11-18 | Stop reason: SDUPTHER

## 2020-07-15 RX ORDER — HYDROCORTISONE 100 MG/60ML
SUSPENSION RECTAL
COMMUNITY
Start: 2020-06-29 | End: 2021-11-18 | Stop reason: ALTCHOICE

## 2020-07-15 RX ORDER — ATORVASTATIN CALCIUM 20 MG/1
20 TABLET, FILM COATED ORAL DAILY
Qty: 90 TABLET | Refills: 3 | Status: SHIPPED | OUTPATIENT
Start: 2020-07-15 | End: 2020-11-18 | Stop reason: SDUPTHER

## 2020-07-15 RX ORDER — ATENOLOL 100 MG/1
100 TABLET ORAL DAILY
Qty: 90 TABLET | Refills: 2 | Status: SHIPPED | OUTPATIENT
Start: 2020-07-15 | End: 2020-11-18 | Stop reason: SDUPTHER

## 2020-07-15 NOTE — PROGRESS NOTES
Virtual Brief Visit    Assessment/Plan:    Problem List Items Addressed This Visit        Endocrine    Subclinical hypothyroidism (Chronic)    Relevant Medications    atenolol (TENORMIN) 100 mg tablet    Type 2 diabetes mellitus with stage 3 chronic kidney disease, without long-term current use of insulin (HCC) - Primary (Chronic)       Cardiovascular and Mediastinum    Benign essential hypertension (Chronic)    Relevant Medications    atenolol (TENORMIN) 100 mg tablet    amLODIPine (NORVASC) 10 mg tablet    Atrial fibrillation (HCC) (Chronic)    Relevant Medications    atenolol (TENORMIN) 100 mg tablet    amLODIPine (NORVASC) 10 mg tablet       Musculoskeletal and Integument    Osteopenia of multiple sites       Genitourinary    Chronic kidney disease, stage III (moderate) (HCC)       Other    Anticoagulant long-term use    Mixed hyperlipidemia    Relevant Medications    atorvastatin (LIPITOR) 20 mg tablet    Diastolic dysfunction    BMI 29 0-29 9,adult        It was my intent to perform this visit via video technology but the patient was not able to do a video connection so the visit was completed via audio telephone only  Reason for visit is   Chief Complaint   Patient presents with    Virtual Regular Visit    Follow-up     labs in chart to review     Virtual Brief Visit        Encounter provider Jeremiah Altman [facility]    Provider located at [place] [address] [address]  [address] [place] [address]    Recent Visits  No visits were found meeting these conditions     Showing recent visits within past 7 days and meeting all other requirements     Today's Visits  Date Type Provider Dept   07/15/20 Telemedicine SULEMA Maza [address] today's visits and meeting all other requirements     Future Appointments  Date Type Provider Dept   07/15/20 Telemedicine Traci Lucretia [place]   Showing future appointments within next 150 days and meeting all other requirements        After connecting through telephone, the patient was identified by name and date of birth  Mimasusie Lombardi was informed that this is a telemedicine visit and that the visit is being conducted through telephone  My office door was closed  No one else was in the room  She acknowledged consent and understanding of privacy and security of the platform  The patient has agreed to participate and understands she can discontinue the visit at any time  Patient is aware this is a billable service  Julio Souza is a 80 y o  female follow up visit  Follow up for chronic medical conditions    DM-a1c hal from 6 3 to 6 7  HTN-stable  Feet and legs are swollen at end of the day  Hypothyroid-no symptoms  CKD-stage 3-creat and gfr at baseline  Osteopenia-taking D supplement daily   She fell outdoors about a month ago when she was cutting her bushes and weeds  HLD-LDL is not at goal  Overweight-minimal exercise  Bilateral shoulder pain-taking ES tylenol nightly, which doesn't seem to help (she uses the ES Tylenol more to help her sleep)      Results for Laz Prakash (MRN [id number]) as of 7/15/2020 11:43    2020 08:37  Sodium: 141  Potassium: 4 6  Chloride: 109 (H)  CO2: 27  Anion Gap: 5  BUN: 18  Creatinine: 1 08  GLUCOSE FASTIN (H)  Calcium: 9 2  AST: 14  ALT: 14  Alkaline Phosphatase: 86  Total Protein: 7 1  Albumin: 3 4 (L)  TOTAL BILIRUBIN: 0 41  eGFR: 46  Cholesterol: 189  Triglycerides: 99  HDL: 56  Non-HDL Cholesterol: 133  LDL Calculated: 113 (H)  WBC: 7 17  Red Blood Cell Count: 4 16  Hemoglobin: 12 9  HCT: 41 6  MCV: 100 (H)  MCH: 31 0  MCHC: 31 0 (L)  RDW: 13 0  Platelet Count: 390  MPV: 10 7  nRBC: 0  Neutrophils %: 69  Immat GRANS %: 0  Lymphocytes Relative: 19  Monocytes Relative: 8  Eosinophils: 3  Basophils Relative: 1  Immature Grans Absolute: 0 02  Absolute Neutrophils: 4 98  Lymphocytes Absolute: 1 39  Absolute Monocytes: 0 55  Absolute Eosinophils: 0 19  Basophils Absolute: 0 04  TSH 3RD GENERATON: 1 870  T3, Free: 2 56         Past Medical History:   Diagnosis Date    A-fib ([place] [address] )     Chronic kidney disease     [facility]     Diabetes mellitus ([place] [address] )     Hyperlipidemia     Hypertension        Past Surgical History:   Procedure Laterality Date    APPENDECTOMY      CARPAL TUNNEL RELEASE Bilateral     CATARACT EXTRACTION      CHOLECYSTECTOMY      COLON SURGERY      COLOSTOMY      HYSTERECTOMY         Current Outpatient Medications   Medication Sig Dispense Refill    amLODIPine (NORVASC) 10 mg tablet Take 1 tablet (10 mg total) by mouth daily 90 tablet 3    apixaban (ELIQUIS) 5 mg Take 1 tablet (5 mg total) by mouth 2 (two) times a day 180 tablet 3    atenolol (TENORMIN) 100 mg tablet Take 1 tablet (100 mg total) by mouth daily 90 tablet 2    atorvastatin (LIPITOR) 20 mg tablet Take 1 tablet (20 mg total) by mouth daily 90 tablet 3    Blood Glucose Monitoring Suppl (ONE TOUCH ULTRA 2) w/Device KIT by Does not apply route daily 1 each 0    calcium carbonate-vitamin D (OSCAL-D) 500 mg-200 units per tablet Take 1 tablet by mouth 2 (two) times a day with meals      docusate sodium (COLACE) 100 mg capsule Take 100 mg by mouth 2 (two) times a day      ferrous gluconate (FERGON) 324 mg tablet Take 324 mg by mouth daily with breakfast      glipiZIDE (GLUCOTROL XL) 2 5 mg 24 hr tablet Take 1 tablet (2 5 mg total) by mouth daily 90 tablet 3    glucose blood (ONE TOUCH ULTRA TEST) test strip Test once daily 100 each 3    hydrocortisone (CORTENEMA) 100 mg/60 mL enema       hydrocortisone-pramoxine (PROCTOFOAM-HC) 1-1 % FOAM rectal foam Insert 1 applicator into the rectum daily 10 g 2    hydrocortisone-pramoxine (PROCTOFOAM-HC) rectal foam Insert 1 applicator into the rectum daily for 90 doses 30 g 3    losartan (COZAAR) 100 MG tablet Take 1 tablet (100 mg total) by mouth daily 90 tablet 3    mesalamine (ASACOL) 800 MG EC tablet Take 1 tablet (800 mg total) by mouth 3 (three) times a day 270 tablet 2    ONE TOUCH LANCETS MISC by Does not apply route daily 100 each 3    polyethylene glycol (MIRALAX) 17 g packet Take 17 g by mouth daily      Zoster Vac Recomb Adjuvanted 50 MCG/0 5ML SUSR as directed (Patient not taking: Reported on 8/14/2019) 1 each 0     No current facility-administered medications for this visit  Allergies   Allergen Reactions    Amoxicillin-Pot Clavulanate Other (See Comments)     C-DIFF, x's 2  developed c diff taking drug       Review of Systems   Constitutional: Negative  Negative for fatigue and fever  HENT: Negative  Negative for congestion  Eyes: Negative  Negative for visual disturbance  Respiratory: Negative for cough, chest tightness, shortness of breath and wheezing  Cardiovascular: Positive for leg swelling  Gastrointestinal: Negative  Negative for abdominal distention, abdominal pain, blood in stool, diarrhea and nausea  Endocrine: Negative for polydipsia, polyphagia and polyuria  Genitourinary: Negative for difficulty urinating and flank pain  Musculoskeletal: Positive for gait problem  Negative for arthralgias, back pain and myalgias  Skin: Negative  Negative for color change, pallor and rash  Allergic/Immunologic: Negative for immunocompromised state  Neurological: Negative for dizziness, weakness, light-headedness, numbness and headaches  Hematological: Negative for adenopathy  Bruises/bleeds easily  Psychiatric/Behavioral: Negative  Negative for confusion, decreased concentration and sleep disturbance  All other systems reviewed and are negative  Vitals:    07/15/20 1138   BP: 133/75   Pulse: 70   Weight: 75 3 kg (166 lb)   Height: 5' 3" (1 6 m)         I spent 20 minutes directly with the patient during this visit    Yoni Connolly acknowledges that she has consented to an online visit or consultation   She understands that the online visit is based solely on information provided by her, and that, in the absence of a face-to-face physical evaluation by the physician, the diagnosis she receives is both limited and provisional in terms of accuracy and completeness  This is not intended to replace a full medical face-to-face evaluation by the physician  Sabine Alexis understands and accepts these terms

## 2020-07-15 NOTE — PATIENT INSTRUCTIONS
Follow up for chronic medical conditions    DM-a1c hal from 6 3 to 6 7  Encouraged to increase her daily exercise by at least walking more daily  Cut out some of her carbs and breads, pastries etcl  HTN-stable  Feet and legs are swollen at end of the day  Unable to place compression stockings alone  Hypothyroid-no symptoms  CKD-stage 3-creat and gfr at baseline  Osteopenia-taking D supplement daily   She fell outdoors about a month ago when she was cutting her bushes and weeds  HLD-LDL is not at goal  Overweight-minimal exercise  Bilateral shoulder pain-

## 2020-08-06 ENCOUNTER — OFFICE VISIT (OUTPATIENT)
Dept: FAMILY MEDICINE CLINIC | Facility: CLINIC | Age: 85
End: 2020-08-06
Payer: COMMERCIAL

## 2020-08-06 VITALS
OXYGEN SATURATION: 98 % | WEIGHT: 165 LBS | HEART RATE: 70 BPM | TEMPERATURE: 98.9 F | SYSTOLIC BLOOD PRESSURE: 136 MMHG | BODY MASS INDEX: 29.23 KG/M2 | DIASTOLIC BLOOD PRESSURE: 80 MMHG

## 2020-08-06 DIAGNOSIS — H11.32 SUBCONJUNCTIVAL BLEED, LEFT: Primary | ICD-10-CM

## 2020-08-06 PROBLEM — Z78.9 TRANSITION OF CARE PERFORMED WITH SHARING OF CLINICAL SUMMARY: Status: RESOLVED | Noted: 2019-12-24 | Resolved: 2020-08-06

## 2020-08-06 PROBLEM — IMO0001 TRANSITION OF CARE PERFORMED WITH SHARING OF CLINICAL SUMMARY: Status: RESOLVED | Noted: 2019-12-24 | Resolved: 2020-08-06

## 2020-08-06 PROCEDURE — 1160F RVW MEDS BY RX/DR IN RCRD: CPT | Performed by: FAMILY MEDICINE

## 2020-08-06 PROCEDURE — 3075F SYST BP GE 130 - 139MM HG: CPT | Performed by: FAMILY MEDICINE

## 2020-08-06 PROCEDURE — 3066F NEPHROPATHY DOC TX: CPT | Performed by: FAMILY MEDICINE

## 2020-08-06 PROCEDURE — 1036F TOBACCO NON-USER: CPT | Performed by: FAMILY MEDICINE

## 2020-08-06 PROCEDURE — 3044F HG A1C LEVEL LT 7.0%: CPT | Performed by: FAMILY MEDICINE

## 2020-08-06 PROCEDURE — 2022F DILAT RTA XM EVC RTNOPTHY: CPT | Performed by: FAMILY MEDICINE

## 2020-08-06 PROCEDURE — 4040F PNEUMOC VAC/ADMIN/RCVD: CPT | Performed by: FAMILY MEDICINE

## 2020-08-06 PROCEDURE — 3079F DIAST BP 80-89 MM HG: CPT | Performed by: FAMILY MEDICINE

## 2020-08-06 PROCEDURE — 99213 OFFICE O/P EST LOW 20 MIN: CPT | Performed by: FAMILY MEDICINE

## 2020-08-06 NOTE — PROGRESS NOTES
Herby Dubin 2/25/1932 female MRN: [id number]    Acute Visit        ASSESSMENT/PLAN  Problem List Items Addressed This Visit        Other    Subconjunctival bleed, left - Primary     Discussed diagnosis - should resolve over time on its own  Call if not improving  Future Appointments   Date Time Provider Logan Michelle   11/18/2020 11:00 AM [facility]        SUBJECTIVE  CC: Eye Problem (redness only )       80year old F here for redness of the L eye  She noticed it about 2 days ago when she woke up  No drainage or discharge  No itching  Vision is normal  Denies any trauma or straining  She is on Eliquis  Herby Dubin is a 80 y o  female who presented for an acute visit complaining of  Review of Systems   Constitutional: Negative for fever  Eyes: Positive for redness  Negative for photophobia, pain, discharge, itching and visual disturbance         Historical Information   The patient history was reviewed as follows:  Past Medical History:   Diagnosis Date    A-fib ([place] [address] )     Chronic kidney disease     [facility] present ([facility] )     Diabetes mellitus ([facility] )     Hyperlipidemia     Hypertension      Past Surgical History:   Procedure Laterality Date    APPENDECTOMY      CARPAL TUNNEL RELEASE Bilateral     CATARACT EXTRACTION      CHOLECYSTECTOMY      COLON SURGERY      COLOSTOMY      HYSTERECTOMY       Family History   Problem Relation Age of Onset    Heart disease Mother     Cancer Father       Social History   Social History     Substance and Sexual Activity   Alcohol Use Never    Frequency: Never     Social History     Substance and Sexual Activity   Drug Use No     Social History     Tobacco Use   Smoking Status Never Smoker   Smokeless Tobacco Never Used       Medications:   Meds/Allergies   Current Outpatient Medications   Medication Sig Dispense Refill    amLODIPine (NORVASC) 10 mg tablet Take 1 tablet (10 mg total) by mouth daily 90 tablet 3    apixaban (ELIQUIS) 5 mg Take 1 tablet (5 mg total) by mouth 2 (two) times a day 180 tablet 3    atenolol (TENORMIN) 100 mg tablet Take 1 tablet (100 mg total) by mouth daily 90 tablet 2    atorvastatin (LIPITOR) 20 mg tablet Take 1 tablet (20 mg total) by mouth daily 90 tablet 3    Blood Glucose Monitoring Suppl (ONE TOUCH ULTRA 2) w/Device KIT by Does not apply route daily 1 each 0    calcium carbonate-vitamin D (OSCAL-D) 500 mg-200 units per tablet Take 1 tablet by mouth 2 (two) times a day with meals      docusate sodium (COLACE) 100 mg capsule Take 100 mg by mouth 2 (two) times a day      ferrous gluconate (FERGON) 324 mg tablet Take 324 mg by mouth daily with breakfast      glipiZIDE (GLUCOTROL XL) 2 5 mg 24 hr tablet Take 1 tablet (2 5 mg total) by mouth daily 90 tablet 3    glucose blood (ONE TOUCH ULTRA TEST) test strip Test once daily 100 each 3    hydrocortisone (CORTENEMA) 100 mg/60 mL enema       losartan (COZAAR) 100 MG tablet Take 1 tablet (100 mg total) by mouth daily 90 tablet 3    mesalamine (ASACOL) 800 MG EC tablet Take 1 tablet (800 mg total) by mouth 3 (three) times a day 270 tablet 2    ONE TOUCH LANCETS MISC by Does not apply route daily 100 each 3    polyethylene glycol (MIRALAX) 17 g packet Take 17 g by mouth daily       No current facility-administered medications for this visit  Allergies   Allergen Reactions    Amoxicillin-Pot Clavulanate Other (See Comments)     C-DIFF, x's 2  developed c diff taking drug       OBJECTIVE  Vitals:   Vitals:    08/06/20 1306   BP: 136/80   Pulse: 70   Temp: 98 9 °F (37 2 °C)   SpO2: 98%   Weight: 74 8 kg (165 lb)       Invasive Devices     Drain            Colostomy LLQ 3668 days                Physical Exam   Constitutional: No distress  Eyes: Lids are normal  Right eye exhibits no chemosis, no discharge, no exudate and no hordeolum  No foreign body present in the right eye   Left eye exhibits no chemosis, no discharge, no exudate and no hordeolum  No foreign body present in the left eye  Right conjunctiva is not injected  Right conjunctiva has no hemorrhage  Left conjunctiva is not injected  Left conjunctiva has a hemorrhage  Right eye exhibits normal extraocular motion  Left eye exhibits normal extraocular motion  Abdominal: Normal appearance  Neurological: She is alert  Nursing note and vitals reviewed  Lab:  I have personally reviewed all pertinent results

## 2020-08-06 NOTE — PATIENT INSTRUCTIONS
Subconjunctival Hemorrhage   WHAT YOU NEED TO KNOW:   What is a subconjunctival hemorrhage? A subconjunctival hemorrhage is when blood collects under the conjunctiva in your eye  The conjunctiva is the clear lining that covers the white part of your eye  The blood comes from broken blood vessels under the conjunctiva  What causes a subconjunctival hemorrhage? The exact cause of your subconjunctival hemorrhage may not be known  The following are common causes:  · An accident or injury to the eye    · Hard coughs or sneezes    · Medical conditions, such as high blood pressure, diabetes, or a bleeding disorder    · Strain, such as when you lift a heavy object or have a bowel movement    · Blood thinning medicines    · Vomiting  What are the signs and symptoms of a subconjunctival hemorrhage? The most common sign is a red patch on the white part of your eye  The redness may be present for 2 to 3 weeks  You may have mild pain when you move your eye  How is a subconjunctival hemorrhage diagnosed? Your healthcare provider will examine your eye and check your vision  You may need tests to check for an underlying medical condition  How is a subconjunctival hemorrhage treated? A subconjunctival hemorrhage usually goes away on its own  You may need any of the following to help manage your symptoms:  · Cold or warm compress:  Use a cold pack during the first 24 hours  Ask how often to apply it and for how long each time  After the first 24 hours, apply a warm pack on your eye  Do this 3 times each day for about 10 to 15 minutes each time  · Eyedrops: You may need artificial tears to keep your eye moist  Use the drops as directed  When should I contact my healthcare provider? · The redness in your eye has not gone away after 3 weeks  · You have another subconjunctival hemorrhage  · You have subconjunctival hemorrhages in both eyes  · You have questions or concerns about your condition or care    When should I seek immediate care? · You have eye pain or sensitivity to light  · Your vision changes  · You have white or yellow discharge from your eye  CARE AGREEMENT:   You have the right to help plan your care  Learn about your health condition and how it may be treated  Discuss treatment options with your caregivers to decide what care you want to receive  You always have the right to refuse treatment  The above information is an  only  It is not intended as medical advice for individual conditions or treatments  Talk to your doctor, nurse or pharmacist before following any medical regimen to see if it is safe and effective for you  © 2017 [employer] Information is for End User's use only and may not be sold, redistributed or otherwise used for commercial purposes  All illustrations and images included in CareNotes® are the copyrighted property of A D A M , Inc  or [employer]

## 2020-08-28 LAB
LEFT EYE DIABETIC RETINOPATHY: NORMAL
RIGHT EYE DIABETIC RETINOPATHY: NORMAL

## 2020-10-16 ENCOUNTER — TELEPHONE (OUTPATIENT)
Dept: FAMILY MEDICINE CLINIC | Facility: CLINIC | Age: 85
End: 2020-10-16

## 2020-10-28 DIAGNOSIS — I48.0 PAROXYSMAL ATRIAL FIBRILLATION (HCC): ICD-10-CM

## 2020-10-29 DIAGNOSIS — K51.919 ULCERATIVE COLITIS WITH COMPLICATION, UNSPECIFIED LOCATION (HCC): ICD-10-CM

## 2020-10-29 RX ORDER — MESALAMINE 800 MG/1
TABLET, DELAYED RELEASE ORAL
Qty: 270 TABLET | Refills: 2 | Status: SHIPPED | OUTPATIENT
Start: 2020-10-29 | End: 2020-11-18 | Stop reason: SDUPTHER

## 2020-11-11 ENCOUNTER — LAB (OUTPATIENT)
Dept: LAB | Facility: CLINIC | Age: 85
End: 2020-11-11
Payer: COMMERCIAL

## 2020-11-11 DIAGNOSIS — N18.30 TYPE 2 DIABETES MELLITUS WITH STAGE 3 CHRONIC KIDNEY DISEASE, WITHOUT LONG-TERM CURRENT USE OF INSULIN (HCC): Chronic | ICD-10-CM

## 2020-11-11 DIAGNOSIS — E03.8 SUBCLINICAL HYPOTHYROIDISM: Chronic | ICD-10-CM

## 2020-11-11 DIAGNOSIS — I10 BENIGN ESSENTIAL HYPERTENSION: Chronic | ICD-10-CM

## 2020-11-11 DIAGNOSIS — I48.11 LONGSTANDING PERSISTENT ATRIAL FIBRILLATION (HCC): Chronic | ICD-10-CM

## 2020-11-11 DIAGNOSIS — E11.22 TYPE 2 DIABETES MELLITUS WITH STAGE 3 CHRONIC KIDNEY DISEASE, WITHOUT LONG-TERM CURRENT USE OF INSULIN (HCC): Chronic | ICD-10-CM

## 2020-11-11 LAB
ALBUMIN SERPL BCP-MCNC: 3.4 G/DL (ref 3.5–5)
ALP SERPL-CCNC: 89 U/L (ref 46–116)
ALT SERPL W P-5'-P-CCNC: 17 U/L (ref 12–78)
ANION GAP SERPL CALCULATED.3IONS-SCNC: 5 MMOL/L (ref 4–13)
AST SERPL W P-5'-P-CCNC: 20 U/L (ref 5–45)
BASOPHILS # BLD AUTO: 0.05 THOUSANDS/ΜL (ref 0–0.1)
BASOPHILS NFR BLD AUTO: 1 % (ref 0–1)
BILIRUB SERPL-MCNC: 0.34 MG/DL (ref 0.2–1)
BUN SERPL-MCNC: 19 MG/DL (ref 5–25)
CALCIUM ALBUM COR SERPL-MCNC: 10.2 MG/DL (ref 8.3–10.1)
CALCIUM SERPL-MCNC: 9.7 MG/DL (ref 8.3–10.1)
CHLORIDE SERPL-SCNC: 107 MMOL/L (ref 100–108)
CO2 SERPL-SCNC: 27 MMOL/L (ref 21–32)
CREAT SERPL-MCNC: 1.22 MG/DL (ref 0.6–1.3)
EOSINOPHIL # BLD AUTO: 0.15 THOUSAND/ΜL (ref 0–0.61)
EOSINOPHIL NFR BLD AUTO: 2 % (ref 0–6)
ERYTHROCYTE [DISTWIDTH] IN BLOOD BY AUTOMATED COUNT: 12.5 % (ref 11.6–15.1)
EST. AVERAGE GLUCOSE BLD GHB EST-MCNC: 137 MG/DL
GFR SERPL CREATININE-BSD FRML MDRD: 40 ML/MIN/1.73SQ M
GLUCOSE P FAST SERPL-MCNC: 140 MG/DL (ref 65–99)
HBA1C MFR BLD: 6.4 %
HCT VFR BLD AUTO: 43.3 % (ref 34.8–46.1)
HGB BLD-MCNC: 13.8 G/DL (ref 11.5–15.4)
IMM GRANULOCYTES # BLD AUTO: 0.02 THOUSAND/UL (ref 0–0.2)
IMM GRANULOCYTES NFR BLD AUTO: 0 % (ref 0–2)
LYMPHOCYTES # BLD AUTO: 1.22 THOUSANDS/ΜL (ref 0.6–4.47)
LYMPHOCYTES NFR BLD AUTO: 19 % (ref 14–44)
MCH RBC QN AUTO: 31.4 PG (ref 26.8–34.3)
MCHC RBC AUTO-ENTMCNC: 31.9 G/DL (ref 31.4–37.4)
MCV RBC AUTO: 98 FL (ref 82–98)
MONOCYTES # BLD AUTO: 0.47 THOUSAND/ΜL (ref 0.17–1.22)
MONOCYTES NFR BLD AUTO: 7 % (ref 4–12)
NEUTROPHILS # BLD AUTO: 4.47 THOUSANDS/ΜL (ref 1.85–7.62)
NEUTS SEG NFR BLD AUTO: 71 % (ref 43–75)
NRBC BLD AUTO-RTO: 0 /100 WBCS
PLATELET # BLD AUTO: 247 THOUSANDS/UL (ref 149–390)
PMV BLD AUTO: 10.7 FL (ref 8.9–12.7)
POTASSIUM SERPL-SCNC: 4 MMOL/L (ref 3.5–5.3)
PROT SERPL-MCNC: 7.7 G/DL (ref 6.4–8.2)
RBC # BLD AUTO: 4.4 MILLION/UL (ref 3.81–5.12)
SODIUM SERPL-SCNC: 139 MMOL/L (ref 136–145)
T3FREE SERPL-MCNC: 2.62 PG/ML (ref 2.3–4.2)
TSH SERPL DL<=0.05 MIU/L-ACNC: 1.89 UIU/ML (ref 0.36–3.74)
WBC # BLD AUTO: 6.38 THOUSAND/UL (ref 4.31–10.16)

## 2020-11-11 PROCEDURE — 83036 HEMOGLOBIN GLYCOSYLATED A1C: CPT

## 2020-11-11 PROCEDURE — 80053 COMPREHEN METABOLIC PANEL: CPT

## 2020-11-11 PROCEDURE — 85025 COMPLETE CBC W/AUTO DIFF WBC: CPT

## 2020-11-11 PROCEDURE — 84443 ASSAY THYROID STIM HORMONE: CPT

## 2020-11-11 PROCEDURE — 36415 COLL VENOUS BLD VENIPUNCTURE: CPT

## 2020-11-11 PROCEDURE — 84481 FREE ASSAY (FT-3): CPT

## 2020-11-18 ENCOUNTER — OFFICE VISIT (OUTPATIENT)
Dept: FAMILY MEDICINE CLINIC | Facility: CLINIC | Age: 85
End: 2020-11-18
Payer: COMMERCIAL

## 2020-11-18 VITALS
HEART RATE: 70 BPM | RESPIRATION RATE: 18 BRPM | OXYGEN SATURATION: 97 % | TEMPERATURE: 97.4 F | DIASTOLIC BLOOD PRESSURE: 78 MMHG | HEIGHT: 63 IN | BODY MASS INDEX: 28.88 KG/M2 | WEIGHT: 163 LBS | SYSTOLIC BLOOD PRESSURE: 124 MMHG

## 2020-11-18 DIAGNOSIS — Z79.01 ANTICOAGULANT LONG-TERM USE: ICD-10-CM

## 2020-11-18 DIAGNOSIS — N18.32 TYPE 2 DIABETES MELLITUS WITH STAGE 3B CHRONIC KIDNEY DISEASE, WITHOUT LONG-TERM CURRENT USE OF INSULIN (HCC): Primary | Chronic | ICD-10-CM

## 2020-11-18 DIAGNOSIS — K51.919 ULCERATIVE COLITIS WITH COMPLICATION, UNSPECIFIED LOCATION (HCC): ICD-10-CM

## 2020-11-18 DIAGNOSIS — I48.0 PAROXYSMAL ATRIAL FIBRILLATION (HCC): ICD-10-CM

## 2020-11-18 DIAGNOSIS — Z23 NEED FOR INFLUENZA VACCINATION: ICD-10-CM

## 2020-11-18 DIAGNOSIS — I48.11 LONGSTANDING PERSISTENT ATRIAL FIBRILLATION (HCC): Chronic | ICD-10-CM

## 2020-11-18 DIAGNOSIS — E78.2 MIXED HYPERLIPIDEMIA: ICD-10-CM

## 2020-11-18 DIAGNOSIS — E11.22 TYPE 2 DIABETES MELLITUS WITH STAGE 3B CHRONIC KIDNEY DISEASE, WITHOUT LONG-TERM CURRENT USE OF INSULIN (HCC): Primary | Chronic | ICD-10-CM

## 2020-11-18 DIAGNOSIS — I10 BENIGN ESSENTIAL HYPERTENSION: Chronic | ICD-10-CM

## 2020-11-18 DIAGNOSIS — I51.89 DIASTOLIC DYSFUNCTION: ICD-10-CM
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
## 2020-11-18 DIAGNOSIS — M85.89 OSTEOPENIA OF MULTIPLE SITES: ICD-10-CM

## 2020-11-18 DIAGNOSIS — E03.8 SUBCLINICAL HYPOTHYROIDISM: Chronic | ICD-10-CM

## 2020-11-18 PROCEDURE — 99214 OFFICE O/P EST MOD 30 MIN: CPT | Performed by: NURSE PRACTITIONER

## 2020-11-18 PROCEDURE — 90662 IIV NO PRSV INCREASED AG IM: CPT | Performed by: NURSE PRACTITIONER

## 2020-11-18 PROCEDURE — 1036F TOBACCO NON-USER: CPT | Performed by: NURSE PRACTITIONER

## 2020-11-18 PROCEDURE — 3725F SCREEN DEPRESSION PERFORMED: CPT | Performed by: NURSE PRACTITIONER

## 2020-11-18 PROCEDURE — 1160F RVW MEDS BY RX/DR IN RCRD: CPT | Performed by: NURSE PRACTITIONER

## 2020-11-18 PROCEDURE — G0008 ADMIN INFLUENZA VIRUS VAC: HCPCS | Performed by: NURSE PRACTITIONER

## 2020-11-18 RX ORDER — MESALAMINE 800 MG/1
800 TABLET, DELAYED RELEASE ORAL 3 TIMES DAILY
Qty: 270 TABLET | Refills: 3 | Status: SHIPPED | OUTPATIENT
Start: 2020-11-18 | End: 2021-09-21

## 2020-11-18 RX ORDER — LOSARTAN POTASSIUM 100 MG/1
100 TABLET ORAL DAILY
Qty: 90 TABLET | Refills: 3 | Status: SHIPPED | OUTPATIENT
Start: 2020-11-18 | End: 2021-11-18 | Stop reason: SDUPTHER

## 2020-11-18 RX ORDER — ATENOLOL 100 MG/1
100 TABLET ORAL DAILY
Qty: 90 TABLET | Refills: 3 | Status: SHIPPED | OUTPATIENT
Start: 2020-11-18 | End: 2021-11-18 | Stop reason: SDUPTHER

## 2020-11-18 RX ORDER — ATORVASTATIN CALCIUM 20 MG/1
20 TABLET, FILM COATED ORAL DAILY
Qty: 90 TABLET | Refills: 3 | Status: SHIPPED | OUTPATIENT
Start: 2020-11-18 | End: 2021-11-18 | Stop reason: SDUPTHER

## 2020-11-18 RX ORDER — AMLODIPINE BESYLATE 10 MG/1
10 TABLET ORAL DAILY
Qty: 90 TABLET | Refills: 3 | Status: SHIPPED | OUTPATIENT
Start: 2020-11-18 | End: 2021-11-18 | Stop reason: SDUPTHER

## 2020-12-19 ENCOUNTER — HOSPITAL ENCOUNTER (OUTPATIENT)
Dept: CT IMAGING | Facility: HOSPITAL | Age: 85
Discharge: HOME | End: 2020-12-19
Payer: COMMERCIAL

## 2020-12-19 DIAGNOSIS — R91.1 LUNG NODULE: ICD-10-CM

## 2020-12-19 DIAGNOSIS — R91.1 LUNG NODULE SEEN ON IMAGING STUDY: ICD-10-CM

## 2020-12-19 PROCEDURE — 71250 CT THORAX DX C-: CPT

## 2020-12-21 ENCOUNTER — TELEPHONE (OUTPATIENT)
Dept: FAMILY MEDICINE CLINIC | Facility: CLINIC | Age: 85
End: 2020-12-21

## 2020-12-30 ENCOUNTER — TELEPHONE (OUTPATIENT)
Dept: FAMILY MEDICINE CLINIC | Facility: CLINIC | Age: 85
End: 2020-12-30

## 2020-12-30 ENCOUNTER — OFFICE VISIT (OUTPATIENT)
Dept: FAMILY MEDICINE CLINIC | Facility: CLINIC | Age: 85
End: 2020-12-30
Payer: COMMERCIAL

## 2020-12-30 ENCOUNTER — APPOINTMENT (OUTPATIENT)
Dept: RADIOLOGY | Facility: CLINIC | Age: 85
End: 2020-12-30
Payer: COMMERCIAL

## 2020-12-30 VITALS
TEMPERATURE: 97.1 F | WEIGHT: 167.2 LBS | DIASTOLIC BLOOD PRESSURE: 86 MMHG | BODY MASS INDEX: 29.62 KG/M2 | SYSTOLIC BLOOD PRESSURE: 142 MMHG | OXYGEN SATURATION: 96 % | HEART RATE: 82 BPM

## 2020-12-30 DIAGNOSIS — Q74.1: ICD-10-CM

## 2020-12-30 DIAGNOSIS — M25.461 EFFUSION OF RIGHT KNEE: ICD-10-CM

## 2020-12-30 DIAGNOSIS — M25.461 EFFUSION OF RIGHT KNEE: Primary | ICD-10-CM

## 2020-12-30 PROCEDURE — 1036F TOBACCO NON-USER: CPT | Performed by: PHYSICIAN ASSISTANT

## 2020-12-30 PROCEDURE — 73564 X-RAY EXAM KNEE 4 OR MORE: CPT

## 2020-12-30 PROCEDURE — 1160F RVW MEDS BY RX/DR IN RCRD: CPT | Performed by: PHYSICIAN ASSISTANT

## 2020-12-30 PROCEDURE — 99214 OFFICE O/P EST MOD 30 MIN: CPT | Performed by: PHYSICIAN ASSISTANT

## 2021-01-06 ENCOUNTER — OFFICE VISIT (OUTPATIENT)
Dept: OBGYN CLINIC | Facility: CLINIC | Age: 86
End: 2021-01-06
Payer: COMMERCIAL

## 2021-01-06 VITALS
BODY MASS INDEX: 29.23 KG/M2 | HEART RATE: 68 BPM | WEIGHT: 165 LBS | SYSTOLIC BLOOD PRESSURE: 144 MMHG | DIASTOLIC BLOOD PRESSURE: 85 MMHG | HEIGHT: 63 IN

## 2021-01-06 DIAGNOSIS — M17.11 PRIMARY OSTEOARTHRITIS OF RIGHT KNEE: ICD-10-CM

## 2021-01-06 DIAGNOSIS — M70.41 PREPATELLAR BURSITIS OF RIGHT KNEE: Primary | ICD-10-CM

## 2021-01-06 PROCEDURE — 20610 DRAIN/INJ JOINT/BURSA W/O US: CPT | Performed by: FAMILY MEDICINE

## 2021-01-06 PROCEDURE — 1160F RVW MEDS BY RX/DR IN RCRD: CPT | Performed by: FAMILY MEDICINE

## 2021-01-06 PROCEDURE — 1036F TOBACCO NON-USER: CPT | Performed by: FAMILY MEDICINE

## 2021-01-06 PROCEDURE — 99203 OFFICE O/P NEW LOW 30 MIN: CPT | Performed by: FAMILY MEDICINE

## 2021-01-06 RX ORDER — LIDOCAINE HYDROCHLORIDE 10 MG/ML
2 INJECTION, SOLUTION INFILTRATION; PERINEURAL
Status: COMPLETED | OUTPATIENT
Start: 2021-01-06 | End: 2021-01-06

## 2021-01-06 RX ORDER — LIDOCAINE HYDROCHLORIDE 10 MG/ML
1 INJECTION, SOLUTION INFILTRATION; PERINEURAL
Status: COMPLETED | OUTPATIENT
Start: 2021-01-06 | End: 2021-01-06

## 2021-01-06 RX ORDER — TRIAMCINOLONE ACETONIDE 40 MG/ML
20 INJECTION, SUSPENSION INTRA-ARTICULAR; INTRAMUSCULAR
Status: COMPLETED | OUTPATIENT
Start: 2021-01-06 | End: 2021-01-06

## 2021-01-06 RX ADMIN — LIDOCAINE HYDROCHLORIDE 2 ML: 10 INJECTION, SOLUTION INFILTRATION; PERINEURAL at 12:27

## 2021-01-06 RX ADMIN — LIDOCAINE HYDROCHLORIDE 1 ML: 10 INJECTION, SOLUTION INFILTRATION; PERINEURAL at 12:27

## 2021-01-06 RX ADMIN — TRIAMCINOLONE ACETONIDE 20 MG: 40 INJECTION, SUSPENSION INTRA-ARTICULAR; INTRAMUSCULAR at 12:27

## 2021-01-06 NOTE — LETTER
January 6, 2021     Linette Lomax, [address] [facility]  [address]    Patient: Minus Schaumann   YOB: 1932   Date of Visit: 1/6/2021       Dear Dr Jose Gaytan: Thank you for referring Solomon Cordova to me for evaluation  Below are my notes for this consultation  If you have questions, please do not hesitate to call me  I look forward to following your patient along with you  Sincerely,        [employer], DO        CC: No Recipients  [employer], DO  1/6/2021 12:33 PM  Sign when Signing Visit  Assessment/Plan:  Assessment/Plan   Diagnoses and all orders for this visit:    Prepatellar bursitis of right knee  -     Large joint arthrocentesis: R prepatellar bursa    Primary osteoarthritis of right knee      80year-old female with right knee pain and swelling more than 3 weeks duration  Discussed with patient physical exam, radiographs, impression and plan  X-rays of the right knee noted for tricompartmental osteoarthritis and prepatellar soft tissue swelling  Physical exam noted for marked prepatellar soft tissue swelling that is nonerythematous and not indurated  It is  soft/compressible in texture and tenderness on deep palpation  There is mild tenderness at the medial and lateral joint lines  She has full extension of the knee and flexion limited to 90°  There is no appreciable collateral ligament laxity  She has normal sensation in the right lower extremity  Clinical impression is that she is symptomatic from prepatellar bursitis  I discussed with patient treatment in form of knee aspiration with corticosteroid injection and icing to which she agreed  I aspirated 60 cc of blood tinged yellow and administered mixture of 1 cc 1% lidocaine and 0 5 cc Kenalog to the prepatellar bursa without complication  Upon reassessment she demonstrated full extension and flexion to 110°  She is advised to continue with icing and she may ambulate/weightbear as tolerated    She will follow up in 5 weeks at which point she will be re-evaluated  Subjective:   Patient ID: Annita Gomez is a 80 y o  female  Chief Complaint   Patient presents with    Right Knee - Pain       75-year-old female presents for evaluation of right knee pain and swelling of 3 weeks duration  She denies any particular trauma or inciting event  Pain described as sudden in onset, localized to the anterior aspect of knee, throbbing and burning, nonradiating, worse with bending the knee, worse with standing and ambulating, associated with significant swelling at the anterior aspect of the knee, and improved with resting  She reports having difficulty sleeping at night due to being uncomfortable  She was seen by her primary care provider and referred for x-rays  She was advised on elevation, compression, icing, and referred to Sports Medicine  She reports that with icing over the past week swelling has started to improve  Pain is no longer severe but she feels sensation of tightness/pulling of the knee when she tries to bend  She has been taking extra-strength Tylenol to help with pain  She is currently on Eliquis so she does not take oral NSAIDs  Knee Pain  This is a new problem  The current episode started 1 to 4 weeks ago  The problem occurs daily  The problem has been gradually improving  Associated symptoms include arthralgias and joint swelling  Pertinent negatives include no abdominal pain, chest pain, chills, fever, numbness, rash, sore throat or weakness  The symptoms are aggravated by standing, twisting and walking  She has tried rest, ice, acetaminophen and position changes for the symptoms  The treatment provided mild relief  The following portions of the patient's history were reviewed and updated as appropriate: She  has a past medical history of A-fib ([facility] ), Chronic kidney disease, Colostomy present ([facility] ), Diabetes mellitus ([facility] ), Hyperlipidemia, and Hypertension    She  has a past surgical history that includes Colon surgery; Appendectomy; Hysterectomy; Carpal tunnel release (Bilateral); Cholecystectomy; Colostomy; and Cataract extraction  Her family history includes Cancer in her father; Heart disease in her mother  She  reports that she has never smoked  She has never used smokeless tobacco  She reports that she does not drink alcohol or use drugs  She is allergic to amoxicillin-pot clavulanate       Review of Systems   Constitutional: Negative for chills and fever  HENT: Negative for sore throat  Eyes: Negative for visual disturbance  Respiratory: Negative for shortness of breath  Cardiovascular: Negative for chest pain  Gastrointestinal: Negative for abdominal pain  Genitourinary: Negative for flank pain  Musculoskeletal: Positive for arthralgias and joint swelling  Skin: Negative for rash and wound  Neurological: Negative for weakness and numbness  Hematological: Does not bruise/bleed easily  Psychiatric/Behavioral: Negative for self-injury  Objective:  Vitals:    01/06/21 1127   BP: 144/85   Pulse: 68   Weight: 74 8 kg (165 lb)   Height: 5' 3" (1 6 m)     Right Ankle Exam     Tenderness   The patient is experiencing no tenderness  Swelling: none    Muscle Strength   Dorsiflexion:  5/5  Plantar flexion:  5/5      Right Knee Exam     Muscle Strength   The patient has normal right knee strength  Tenderness   The patient is experiencing tenderness in the lateral joint line, medial joint line and patella  Range of Motion   Extension: normal   Flexion: 90     Tests   Varus: negative Valgus: negative    Other   Swelling: severe  Effusion: no effusion present          Observations     Right Knee   Negative for effusion  Strength/Myotome Testing     Right Ankle/Foot   Dorsiflexion: 5  Plantar flexion: 5      Physical Exam  Vitals signs and nursing note reviewed  Constitutional:       General: She is not in acute distress       Appearance: She is well-developed  HENT:      Head: Normocephalic  Right Ear: External ear normal       Left Ear: External ear normal    Eyes:      Conjunctiva/sclera: Conjunctivae normal    Neck:      Trachea: No tracheal deviation  Cardiovascular:      Rate and Rhythm: Normal rate  Pulmonary:      Effort: Pulmonary effort is normal  No respiratory distress  Abdominal:      General: There is no distension  Musculoskeletal:         General: Swelling and tenderness present  Right knee: She exhibits no effusion  Skin:     General: Skin is warm and dry  Neurological:      Mental Status: She is alert and oriented to person, place, and time  Psychiatric:         Behavior: Behavior normal           I have personally reviewed pertinent films in PACS and my interpretation is Degenerative changes of the knee  Prepatellar swelling  Large joint arthrocentesis: R prepatellar bursa  Universal Protocol:  Consent: Verbal consent obtained  Risks and benefits: risks, benefits and alternatives were discussed  Consent given by: patient  Time out: Immediately prior to procedure a "time out" was called to verify the correct patient, procedure, equipment, support staff and site/side marked as required  Timeout called at: 1/6/2021 11:40 AM   Patient understanding: patient states understanding of the procedure being performed  Patient consent: the patient's understanding of the procedure matches consent given  Procedure consent: procedure consent matches procedure scheduled  Relevant documents: relevant documents present and verified  Test results: test results available and properly labeled  Site marked: the operative site was marked  Radiology Images displayed and confirmed   If images not available, report reviewed: imaging studies available  Required items: required blood products, implants, devices, and special equipment available  Patient identity confirmed: verbally with patient    Supporting Documentation  Indications: pain   Procedure Details  Location: knee - R prepatellar bursa  Preparation: Patient was prepped and draped in the usual sterile fashion  Needle size: 18 G  Ultrasound guidance: no  Approach: superior  Medications administered: 2 mL lidocaine 1 %; 1 mL lidocaine 1 %; 20 mg triamcinolone acetonide 40 mg/mL    Aspirate amount: 60 mL  Aspirate: yellow and blood-tinged    Patient tolerance: patient tolerated the procedure well with no immediate complications  Dressing:  Sterile dressing applied

## 2021-01-06 NOTE — PROGRESS NOTES
Assessment/Plan:  Assessment/Plan   Diagnoses and all orders for this visit:    Prepatellar bursitis of right knee  -     Large joint arthrocentesis: R prepatellar bursa    Primary osteoarthritis of right knee      80year-old female with right knee pain and swelling more than 3 weeks duration  Discussed with patient physical exam, radiographs, impression and plan  X-rays of the right knee noted for tricompartmental osteoarthritis and prepatellar soft tissue swelling  Physical exam noted for marked prepatellar soft tissue swelling that is nonerythematous and not indurated  It is  soft/compressible in texture and tenderness on deep palpation  There is mild tenderness at the medial and lateral joint lines  She has full extension of the knee and flexion limited to 90°  There is no appreciable collateral ligament laxity  She has normal sensation in the right lower extremity  Clinical impression is that she is symptomatic from prepatellar bursitis  I discussed with patient treatment in form of knee aspiration with corticosteroid injection and icing to which she agreed  I aspirated 60 cc of blood tinged yellow and administered mixture of 1 cc 1% lidocaine and 0 5 cc Kenalog to the prepatellar bursa without complication  Upon reassessment she demonstrated full extension and flexion to 110°  She is advised to continue with icing and she may ambulate/weightbear as tolerated  She was advised that corticosteroid can cause elevation of blood sugar over the course of next 7-10 days  She will follow up in 5 weeks at which point she will be re-evaluated  Subjective:   Patient ID: Allan Lu is a 80 y o  female  Chief Complaint   Patient presents with    Right Knee - Pain       43-year-old female presents for evaluation of right knee pain and swelling of 3 weeks duration  She denies any particular trauma or inciting event    Pain described as sudden in onset, localized to the anterior aspect of knee, throbbing and burning, nonradiating, worse with bending the knee, worse with standing and ambulating, associated with significant swelling at the anterior aspect of the knee, and improved with resting  She reports having difficulty sleeping at night due to being uncomfortable  She was seen by her primary care provider and referred for x-rays  She was advised on elevation, compression, icing, and referred to Sports Medicine  She reports that with icing over the past week swelling has started to improve  Pain is no longer severe but she feels sensation of tightness/pulling of the knee when she tries to bend  She has been taking extra-strength Tylenol to help with pain  She is currently on Eliquis so she does not take oral NSAIDs  Knee Pain  This is a new problem  The current episode started 1 to 4 weeks ago  The problem occurs daily  The problem has been gradually improving  Associated symptoms include arthralgias and joint swelling  Pertinent negatives include no abdominal pain, chest pain, chills, fever, numbness, rash, sore throat or weakness  The symptoms are aggravated by standing, twisting and walking  She has tried rest, ice, acetaminophen and position changes for the symptoms  The treatment provided mild relief  The following portions of the patient's history were reviewed and updated as appropriate: She  has a past medical history of A-fib ([facility] ), Chronic kidney disease, Colostomy present ([facility] ), Diabetes mellitus ([facility] ), Hyperlipidemia, and Hypertension  She  has a past surgical history that includes Colon surgery; Appendectomy; Hysterectomy; Carpal tunnel release (Bilateral); Cholecystectomy; Colostomy; and Cataract extraction  Her family history includes Cancer in her father; Heart disease in her mother  She  reports that she has never smoked  She has never used smokeless tobacco  She reports that she does not drink alcohol or use drugs    She is allergic to amoxicillin-pot clavulanate       Review of Systems   Constitutional: Negative for chills and fever  HENT: Negative for sore throat  Eyes: Negative for visual disturbance  Respiratory: Negative for shortness of breath  Cardiovascular: Negative for chest pain  Gastrointestinal: Negative for abdominal pain  Genitourinary: Negative for flank pain  Musculoskeletal: Positive for arthralgias and joint swelling  Skin: Negative for rash and wound  Neurological: Negative for weakness and numbness  Hematological: Does not bruise/bleed easily  Psychiatric/Behavioral: Negative for self-injury  Objective:  Vitals:    01/06/21 1127   BP: 144/85   Pulse: 68   Weight: 74 8 kg (165 lb)   Height: 5' 3" (1 6 m)     Right Ankle Exam     Tenderness   The patient is experiencing no tenderness  Swelling: none    Muscle Strength   Dorsiflexion:  5/5  Plantar flexion:  5/5      Right Knee Exam     Muscle Strength   The patient has normal right knee strength  Tenderness   The patient is experiencing tenderness in the lateral joint line, medial joint line and patella  Range of Motion   Extension: normal   Flexion: 90     Tests   Varus: negative Valgus: negative    Other   Swelling: severe  Effusion: no effusion present          Observations     Right Knee   Negative for effusion  Strength/Myotome Testing     Right Ankle/Foot   Dorsiflexion: 5  Plantar flexion: 5      Physical Exam  Vitals signs and nursing note reviewed  Constitutional:       General: She is not in acute distress  Appearance: She is well-developed  HENT:      Head: Normocephalic  Right Ear: External ear normal       Left Ear: External ear normal    Eyes:      Conjunctiva/sclera: Conjunctivae normal    Neck:      Trachea: No tracheal deviation  Cardiovascular:      Rate and Rhythm: Normal rate  Pulmonary:      Effort: Pulmonary effort is normal  No respiratory distress  Abdominal:      General: There is no distension  Musculoskeletal:         General: Swelling and tenderness present  Right knee: She exhibits no effusion  Skin:     General: Skin is warm and dry  Neurological:      Mental Status: She is alert and oriented to person, place, and time  Psychiatric:         Behavior: Behavior normal           I have personally reviewed pertinent films in PACS and my interpretation is Degenerative changes of the knee  Prepatellar swelling  Large joint arthrocentesis: R prepatellar bursa  Universal Protocol:  Consent: Verbal consent obtained  Risks and benefits: risks, benefits and alternatives were discussed  Consent given by: patient  Time out: Immediately prior to procedure a "time out" was called to verify the correct patient, procedure, equipment, support staff and site/side marked as required  Timeout called at: 1/6/2021 11:40 AM   Patient understanding: patient states understanding of the procedure being performed  Patient consent: the patient's understanding of the procedure matches consent given  Procedure consent: procedure consent matches procedure scheduled  Relevant documents: relevant documents present and verified  Test results: test results available and properly labeled  Site marked: the operative site was marked  Radiology Images displayed and confirmed   If images not available, report reviewed: imaging studies available  Required items: required blood products, implants, devices, and special equipment available  Patient identity confirmed: verbally with patient    Supporting Documentation  Indications: pain   Procedure Details  Location: knee - R prepatellar bursa  Preparation: Patient was prepped and draped in the usual sterile fashion  Needle size: 18 G  Ultrasound guidance: no  Approach: superior  Medications administered: 2 mL lidocaine 1 %; 1 mL lidocaine 1 %; 20 mg triamcinolone acetonide 40 mg/mL    Aspirate amount: 60 mL  Aspirate: yellow and blood-tinged    Patient tolerance: patient tolerated the procedure well with no immediate complications  Dressing:  Sterile dressing applied

## 2021-01-06 NOTE — LETTER
January 6, 2021     Austyn Overton, [address] [facility]  [address]    Patient: Charles Jarrett   YOB: 1932   Date of Visit: 1/6/2021       Dear Dr Jaydon Vega: Thank you for referring Humberto Poole to me for evaluation  Below are my notes for this consultation  If you have questions, please do not hesitate to call me  I look forward to following your patient along with you  Sincerely,        [employer], DO        CC: No Recipients  [employer], DO  1/6/2021 12:35 PM  Sign when Signing Visit  Assessment/Plan:  Assessment/Plan   Diagnoses and all orders for this visit:    Prepatellar bursitis of right knee  -     Large joint arthrocentesis: R prepatellar bursa    Primary osteoarthritis of right knee      80year-old female with right knee pain and swelling more than 3 weeks duration  Discussed with patient physical exam, radiographs, impression and plan  X-rays of the right knee noted for tricompartmental osteoarthritis and prepatellar soft tissue swelling  Physical exam noted for marked prepatellar soft tissue swelling that is nonerythematous and not indurated  It is  soft/compressible in texture and tenderness on deep palpation  There is mild tenderness at the medial and lateral joint lines  She has full extension of the knee and flexion limited to 90°  There is no appreciable collateral ligament laxity  She has normal sensation in the right lower extremity  Clinical impression is that she is symptomatic from prepatellar bursitis  I discussed with patient treatment in form of knee aspiration with corticosteroid injection and icing to which she agreed  I aspirated 60 cc of blood tinged yellow and administered mixture of 1 cc 1% lidocaine and 0 5 cc Kenalog to the prepatellar bursa without complication  Upon reassessment she demonstrated full extension and flexion to 110°  She is advised to continue with icing and she may ambulate/weightbear as tolerated    She was advised that corticosteroid can cause elevation of blood sugar over the course of next 7-10 days  She will follow up in 5 weeks at which point she will be re-evaluated  Subjective:   Patient ID: Emmanuel Adames is a 80 y o  female  Chief Complaint   Patient presents with    Right Knee - Pain       72-year-old female presents for evaluation of right knee pain and swelling of 3 weeks duration  She denies any particular trauma or inciting event  Pain described as sudden in onset, localized to the anterior aspect of knee, throbbing and burning, nonradiating, worse with bending the knee, worse with standing and ambulating, associated with significant swelling at the anterior aspect of the knee, and improved with resting  She reports having difficulty sleeping at night due to being uncomfortable  She was seen by her primary care provider and referred for x-rays  She was advised on elevation, compression, icing, and referred to Sports Medicine  She reports that with icing over the past week swelling has started to improve  Pain is no longer severe but she feels sensation of tightness/pulling of the knee when she tries to bend  She has been taking extra-strength Tylenol to help with pain  She is currently on Eliquis so she does not take oral NSAIDs  Knee Pain  This is a new problem  The current episode started 1 to 4 weeks ago  The problem occurs daily  The problem has been gradually improving  Associated symptoms include arthralgias and joint swelling  Pertinent negatives include no abdominal pain, chest pain, chills, fever, numbness, rash, sore throat or weakness  The symptoms are aggravated by standing, twisting and walking  She has tried rest, ice, acetaminophen and position changes for the symptoms  The treatment provided mild relief             The following portions of the patient's history were reviewed and updated as appropriate: She  has a past medical history of A-fib ([address] ), Chronic kidney disease, Colostomy present ([facility] [address] ), Diabetes mellitus ([facility] [address] ), Hyperlipidemia, and Hypertension  She  has a past surgical history that includes Colon surgery; Appendectomy; Hysterectomy; Carpal tunnel release (Bilateral); Cholecystectomy; Colostomy; and Cataract extraction  Her family history includes Cancer in her father; Heart disease in her mother  She  reports that she has never smoked  She has never used smokeless tobacco  She reports that she does not drink alcohol or use drugs  She is allergic to amoxicillin-pot clavulanate       Review of Systems   Constitutional: Negative for chills and fever  HENT: Negative for sore throat  Eyes: Negative for visual disturbance  Respiratory: Negative for shortness of breath  Cardiovascular: Negative for chest pain  Gastrointestinal: Negative for abdominal pain  Genitourinary: Negative for flank pain  Musculoskeletal: Positive for arthralgias and joint swelling  Skin: Negative for rash and wound  Neurological: Negative for weakness and numbness  Hematological: Does not bruise/bleed easily  Psychiatric/Behavioral: Negative for self-injury  Objective:  Vitals:    01/06/21 1127   BP: 144/85   Pulse: 68   Weight: 74 8 kg (165 lb)   Height: 5' 3" (1 6 m)     Right Ankle Exam     Tenderness   The patient is experiencing no tenderness  Swelling: none    Muscle Strength   Dorsiflexion:  5/5  Plantar flexion:  5/5      Right Knee Exam     Muscle Strength   The patient has normal right knee strength  Tenderness   The patient is experiencing tenderness in the lateral joint line, medial joint line and patella  Range of Motion   Extension: normal   Flexion: 90     Tests   Varus: negative Valgus: negative    Other   Swelling: severe  Effusion: no effusion present          Observations     Right Knee   Negative for effusion       Strength/Myotome Testing     Right Ankle/Foot   Dorsiflexion: 5  Plantar flexion: 5      Physical Exam  Vitals signs and nursing note reviewed  Constitutional:       General: She is not in acute distress  Appearance: She is well-developed  HENT:      Head: Normocephalic  Right Ear: External ear normal       Left Ear: External ear normal    Eyes:      Conjunctiva/sclera: Conjunctivae normal    Neck:      Trachea: No tracheal deviation  Cardiovascular:      Rate and Rhythm: Normal rate  Pulmonary:      Effort: Pulmonary effort is normal  No respiratory distress  Abdominal:      General: There is no distension  Musculoskeletal:         General: Swelling and tenderness present  Right knee: She exhibits no effusion  Skin:     General: Skin is warm and dry  Neurological:      Mental Status: She is alert and oriented to person, place, and time  Psychiatric:         Behavior: Behavior normal           I have personally reviewed pertinent films in PACS and my interpretation is Degenerative changes of the knee  Prepatellar swelling  Large joint arthrocentesis: R prepatellar bursa  Universal Protocol:  Consent: Verbal consent obtained  Risks and benefits: risks, benefits and alternatives were discussed  Consent given by: patient  Time out: Immediately prior to procedure a "time out" was called to verify the correct patient, procedure, equipment, support staff and site/side marked as required  Timeout called at: 1/6/2021 11:40 AM   Patient understanding: patient states understanding of the procedure being performed  Patient consent: the patient's understanding of the procedure matches consent given  Procedure consent: procedure consent matches procedure scheduled  Relevant documents: relevant documents present and verified  Test results: test results available and properly labeled  Site marked: the operative site was marked  Radiology Images displayed and confirmed   If images not available, report reviewed: imaging studies available  Required items: required blood products, implants, devices, and special equipment available  Patient identity confirmed: verbally with patient    Supporting Documentation  Indications: pain   Procedure Details  Location: knee - R prepatellar bursa  Preparation: Patient was prepped and draped in the usual sterile fashion  Needle size: 18 G  Ultrasound guidance: no  Approach: superior  Medications administered: 2 mL lidocaine 1 %; 1 mL lidocaine 1 %; 20 mg triamcinolone acetonide 40 mg/mL    Aspirate amount: 60 mL  Aspirate: yellow and blood-tinged    Patient tolerance: patient tolerated the procedure well with no immediate complications  Dressing:  Sterile dressing applied

## 2021-02-24 ENCOUNTER — TELEPHONE (OUTPATIENT)
Dept: OBGYN CLINIC | Facility: HOSPITAL | Age: 86
End: 2021-02-24

## 2021-02-24 ENCOUNTER — OFFICE VISIT (OUTPATIENT)
Dept: OBGYN CLINIC | Facility: CLINIC | Age: 86
End: 2021-02-24
Payer: COMMERCIAL

## 2021-02-24 VITALS
HEART RATE: 73 BPM | WEIGHT: 165 LBS | DIASTOLIC BLOOD PRESSURE: 83 MMHG | SYSTOLIC BLOOD PRESSURE: 140 MMHG | HEIGHT: 63 IN | BODY MASS INDEX: 29.23 KG/M2

## 2021-02-24 DIAGNOSIS — M17.11 PRIMARY OSTEOARTHRITIS OF RIGHT KNEE: ICD-10-CM

## 2021-02-24 DIAGNOSIS — M70.41 PREPATELLAR BURSITIS OF RIGHT KNEE: Primary | ICD-10-CM

## 2021-02-24 PROCEDURE — 1036F TOBACCO NON-USER: CPT | Performed by: FAMILY MEDICINE

## 2021-02-24 PROCEDURE — 99213 OFFICE O/P EST LOW 20 MIN: CPT | Performed by: FAMILY MEDICINE

## 2021-02-24 PROCEDURE — 1160F RVW MEDS BY RX/DR IN RCRD: CPT | Performed by: FAMILY MEDICINE

## 2021-02-24 NOTE — PROGRESS NOTES
Assessment/Plan:  Assessment/Plan   Diagnoses and all orders for this visit:    Prepatellar bursitis of right knee    Primary osteoarthritis of right knee        80-year-old female with onset of right knee pain 2 months ago  Discussed with patient physical exam, impression and plan  Physical exam the right knee is noted for mild prepatellar swelling  She has mild suprapatellar tenderness  She has full extension of the knee flexion to 120°  Clinical impression that she is improved regard to her flare prepatellar bursitis  She also has underlying osteoarthritis  I recommend she start taking tumeric 500 mg twice daily, tart cherry at least 1000 mg daily, and glucosamine-chondroitin supplement 2 to 3 times a day  She may apply topical Voltaren gel as needed  I provided her printed instructions of home exercises to do on a regular basis  She will follow up with me as needed  Subjective:   Patient ID: Benita Burkett is a 80 y o  female  Chief Complaint   Patient presents with    Right Knee - Follow-up       80-year-old female following up for right knee pain that started more than 2 months ago  She was last seen by me 7 weeks ago which point she was assessed to have prepatellar bursitis and 60 cc of fluid was aspirated from the prepatellar bursa and she was given corticosteroid injection  She reports having noted significant improvement pain immediately following the procedure  Since that time she has been feeling very well and has not had any pain of the right knee  She has been active around the home with carrying firewood from a yd into the home without any pain  She does experiences weakness in the knee with stairs  Knee Pain  This is a new problem  The current episode started more than 1 month ago  The problem has been rapidly improving  Associated symptoms include joint swelling  Pertinent negatives include no arthralgias, numbness or weakness  Nothing aggravates the symptoms   She has tried rest (Aspiration, corticosteroid injection) for the symptoms  The treatment provided significant relief  Review of Systems   Musculoskeletal: Positive for joint swelling  Negative for arthralgias  Neurological: Negative for weakness and numbness  Objective:  Vitals:    02/24/21 1120   BP: 140/83   Pulse: 73   Weight: 74 8 kg (165 lb)   Height: 5' 3" (1 6 m)     Right Knee Exam     Muscle Strength   The patient has normal right knee strength  Tenderness   Right knee tenderness location: Suprapatellar  Range of Motion   Extension: normal   Flexion: 120     Other   Swelling: mild (Mild prepatellar swelling)            Physical Exam  Vitals signs and nursing note reviewed  Constitutional:       General: She is not in acute distress  Appearance: She is well-developed  HENT:      Head: Normocephalic  Right Ear: External ear normal       Left Ear: External ear normal    Eyes:      Conjunctiva/sclera: Conjunctivae normal    Neck:      Trachea: No tracheal deviation  Cardiovascular:      Rate and Rhythm: Normal rate  Pulmonary:      Effort: Pulmonary effort is normal  No respiratory distress  Abdominal:      General: There is no distension  Musculoskeletal:         General: Swelling and tenderness present  Skin:     General: Skin is warm and dry  Neurological:      Mental Status: She is alert and oriented to person, place, and time     Psychiatric:         Behavior: Behavior normal

## 2021-02-24 NOTE — TELEPHONE ENCOUNTER
Patient sees Dr Jodi Lorenzo  Patient is calling stating she is going to be 10-15 mins late and wanted to know if that was ok  Placed patient on hold to check and she hung up  If patient calls back, please let her know its ok per the Dr Jaime Keenan calling patient and there was no answer

## 2021-02-24 NOTE — PATIENT INSTRUCTIONS
Contact your healthcare provider if:   · You have sharp pain during exercise or at rest     · You have questions or concerns about stretches or exercises  Decrease pain and swelling:   · Apply ice  on your hip, knee, or thigh for 15 to 20 minutes every hour or as directed  Use an ice pack, or put crushed ice in a plastic bag  Cover it with a towel  Ice helps prevent tissue damage and decreases swelling and pain  · Apply heat  on your hip, knee, or thigh for 20 to 30 minutes before you stretch or exercise  Use a heat pack or wet a washcloth and heat it for 15 seconds in the microwave  Heat helps decrease pain and makes it easier to stretch your muscles  · Massage painful areas as directed  Use a foam roller to gently massage your painful areas  Place the foam roller on a flat surface  Lie on your side with the foam roller against your painful leg  Move your body so that it rolls up and down from your hip to above your knee  Do not lie with it against the outside of your knee cap  Exercise safety:  Do not start an exercise program before you talk to your healthcare provider  You may need to wait until your swelling and pain have gone down before you start to exercise  · Move slowly and smoothly  Avoid fast or jerky motions  This will help prevent another injury  · Breathe normally  Do not hold your breath  It is important to breathe in and out so you do not tense up during exercise  Tension could prevent you from moving your joint in a full range of motion  · Do the exercises and stretches on both legs  Do this so both ITBs remain strong and flexible  · Stop if you feel sharp pain or an increase in pain  Stop the exercise and contact your healthcare provider if you have these symptoms  It is normal to feel some discomfort, such as a dull ache, during exercise  Regular exercise will help decrease your discomfort over time  · Warm up before you stretch and exercise    This will help prevent an injury  Walk or ride a stationary bike for 5 to 10 minutes  How to perform ITB stretches:  Always stretch before and after you do strengthening exercises  Hold each stretch for 30 seconds to 1 minute  Repeat each stretch 2 to 3 times or as directed  · Standing ITB stretch:  Stand with your injured leg behind your other leg  Cross your front leg over your injured leg  Bend sideways toward the hip that is not injured  Stop when you feel a stretch in the hip of your injured leg  Repeat on the other side  · Lying ITB stretch:  Lie on your back  Bend the knee of your injured leg toward your chest  Place your hand on the outside of your thigh  Slowly pull your knee across your body  Stop when you feel a stretch in your hip and outside of your thigh  Repeat on the other side  · Hip stretch:  Lie on the ground  Place both hands on the shin of 1 leg  Pull your knee toward your chest  Repeat on the other side  · Standing quadriceps stretch:  Stand and place one hand against a wall or hold the back of a chair for balance  With your weight on one leg, bend your other leg and grab your ankle  Pull your heel toward your buttocks  · Sitting hamstring stretch:  Sit with both legs straight in front of you  Place your palms on the floor and slide your hands forward until you feel the stretch  If possible, grab your toes  Do not round your back  How to perform ITB strengthening exercises: Your healthcare provider will tell you how often to do the following exercises:  · Standing half squats:  Stand with your feet shoulder-width apart  Lean your back against a wall or hold the back of a chair for balance, if needed  Slowly sit down about 10 inches, as if you are going to sit in a chair  Put most of your weight in your heels  Hold the squat for 5 seconds, then slowly rise to a standing position  Do 3 sets of 10 squats           · Sitting leg lifts:  Sit in a chair with both feet flat on the floor  Slowly straighten and raise one leg  Squeeze your thigh muscles and hold for 5 seconds  Relax and return your foot to the floor  Do 3 sets of 10 lifts on each leg  · Single leg dips:  Stand on your injured leg, between 2 chairs  The backs of the chairs should be toward you  Put 1 hand on each chair  Straighten your leg that is not injured and lift it off the floor  Use the chairs to hold some of your weight  Bend the knee of your injured leg  Slowly lower your body toward the floor a few inches  Your weight should be in your heel  Hold for 5 seconds  Slowly return to a standing position  Do 3 sets of 10 on each leg  · Standing hamstring curls: Face a wall and place both palms flat on the wall  Instead you can hold the back of a chair for balance  With your weight on 1 leg, lift your other foot as close to your buttocks as you can  Hold for 5 seconds and then lower your leg  Do 3 sets of 10 curls on each leg  · Straight leg lifts:  Lie on your stomach with straight legs  Fold your arms in front of you and rest your head in your arms  Tighten your leg muscles and raise one leg as high as you can  Hold for 5 seconds, then lower your leg  Do 2 sets of 10 lifts on each leg to strengthen your buttocks  · Hip adduction:  Lie on your injured side  Cross your top leg over your injured leg  Put your foot on the floor in front of you  Raise your injured leg until it touches the other leg  Slowly lower the leg to the floor  Do 3 sets of 10 on each leg  · Hip abduction:  Lie on your side that is not injured  Straighten your legs  Slowly raise your injured leg as high as you can  Keep your foot pointing straight  Hold for 5 seconds then slowly lower your leg  Do 3 sets of 10 on each leg  Follow up with your healthcare provider as directed:  Write down your questions so you remember to ask them during your visits     © Copyright [address] 2020 Information is for End User's use only and may not be sold, redistributed or otherwise used for commercial purposes  All illustrations and images included in CareNotes® are the copyrighted property of [employer]  or Graham Ro[employer]  The above information is an  only  It is not intended as medical advice for individual conditions or treatments  Talk to your doctor, nurse or pharmacist before following any medical regimen to see if it is safe and effective for you

## 2021-03-10 DIAGNOSIS — E11.22 TYPE 2 DIABETES MELLITUS WITH STAGE 3 CHRONIC KIDNEY DISEASE, WITHOUT LONG-TERM CURRENT USE OF INSULIN (HCC): Chronic | ICD-10-CM

## 2021-03-10 DIAGNOSIS — N18.30 TYPE 2 DIABETES MELLITUS WITH STAGE 3 CHRONIC KIDNEY DISEASE, WITHOUT LONG-TERM CURRENT USE OF INSULIN (HCC): Chronic | ICD-10-CM

## 2021-03-10 RX ORDER — GLIPIZIDE 2.5 MG/1
TABLET, EXTENDED RELEASE ORAL
Qty: 90 TABLET | Refills: 3 | Status: SHIPPED | OUTPATIENT
Start: 2021-03-10 | End: 2021-05-18 | Stop reason: ALTCHOICE

## 2021-03-11 ENCOUNTER — IMMUNIZATIONS (OUTPATIENT)
Dept: FAMILY MEDICINE CLINIC | Facility: HOSPITAL | Age: 86
End: 2021-03-11

## 2021-03-11 DIAGNOSIS — Z23 ENCOUNTER FOR IMMUNIZATION: Primary | ICD-10-CM

## 2021-03-11 PROCEDURE — 91300 SARS-COV-2 / COVID-19 MRNA VACCINE (PFIZER-BIONTECH) 30 MCG: CPT

## 2021-03-11 PROCEDURE — 0001A SARS-COV-2 / COVID-19 MRNA VACCINE (PFIZER-BIONTECH) 30 MCG: CPT

## 2021-04-07 ENCOUNTER — IMMUNIZATIONS (OUTPATIENT)
Dept: FAMILY MEDICINE CLINIC | Facility: HOSPITAL | Age: 86
End: 2021-04-07

## 2021-04-07 DIAGNOSIS — Z23 ENCOUNTER FOR IMMUNIZATION: Primary | ICD-10-CM

## 2021-04-07 PROCEDURE — 91300 SARS-COV-2 / COVID-19 MRNA VACCINE (PFIZER-BIONTECH) 30 MCG: CPT

## 2021-04-07 PROCEDURE — 0002A SARS-COV-2 / COVID-19 MRNA VACCINE (PFIZER-BIONTECH) 30 MCG: CPT

## 2021-05-13 ENCOUNTER — APPOINTMENT (OUTPATIENT)
Dept: LAB | Facility: CLINIC | Age: 86
End: 2021-05-13
Payer: COMMERCIAL

## 2021-05-13 DIAGNOSIS — Z79.01 ANTICOAGULANT LONG-TERM USE: ICD-10-CM

## 2021-05-13 DIAGNOSIS — N18.32 TYPE 2 DIABETES MELLITUS WITH STAGE 3B CHRONIC KIDNEY DISEASE, WITHOUT LONG-TERM CURRENT USE OF INSULIN (HCC): Chronic | ICD-10-CM

## 2021-05-13 DIAGNOSIS — E78.2 MIXED HYPERLIPIDEMIA: ICD-10-CM

## 2021-05-13 DIAGNOSIS — M85.89 OSTEOPENIA OF MULTIPLE SITES: ICD-10-CM

## 2021-05-13 DIAGNOSIS — Z23 NEED FOR INFLUENZA VACCINATION: ICD-10-CM

## 2021-05-13 DIAGNOSIS — K51.919 ULCERATIVE COLITIS WITH COMPLICATION, UNSPECIFIED LOCATION (HCC): ICD-10-CM

## 2021-05-13 DIAGNOSIS — I10 BENIGN ESSENTIAL HYPERTENSION: Chronic | ICD-10-CM

## 2021-05-13 DIAGNOSIS — E03.8 SUBCLINICAL HYPOTHYROIDISM: Chronic | ICD-10-CM

## 2021-05-13 DIAGNOSIS — I48.0 PAROXYSMAL ATRIAL FIBRILLATION (HCC): ICD-10-CM

## 2021-05-13 DIAGNOSIS — E11.22 TYPE 2 DIABETES MELLITUS WITH STAGE 3B CHRONIC KIDNEY DISEASE, WITHOUT LONG-TERM CURRENT USE OF INSULIN (HCC): Chronic | ICD-10-CM

## 2021-05-13 DIAGNOSIS — I51.89 DIASTOLIC DYSFUNCTION: ICD-10-CM

## 2021-05-13 DIAGNOSIS — I48.11 LONGSTANDING PERSISTENT ATRIAL FIBRILLATION (HCC): Chronic | ICD-10-CM

## 2021-05-13 LAB
25(OH)D3 SERPL-MCNC: 22.4 NG/ML (ref 30–100)
ALBUMIN SERPL BCP-MCNC: 3.2 G/DL (ref 3.5–5)
ALP SERPL-CCNC: 80 U/L (ref 46–116)
ALT SERPL W P-5'-P-CCNC: 15 U/L (ref 12–78)
ANION GAP SERPL CALCULATED.3IONS-SCNC: 3 MMOL/L (ref 4–13)
AST SERPL W P-5'-P-CCNC: 11 U/L (ref 5–45)
BASOPHILS # BLD AUTO: 0.04 THOUSANDS/ΜL (ref 0–0.1)
BASOPHILS NFR BLD AUTO: 1 % (ref 0–1)
BILIRUB SERPL-MCNC: 0.26 MG/DL (ref 0.2–1)
BUN SERPL-MCNC: 21 MG/DL (ref 5–25)
CALCIUM ALBUM COR SERPL-MCNC: 9.5 MG/DL (ref 8.3–10.1)
CALCIUM SERPL-MCNC: 8.9 MG/DL (ref 8.3–10.1)
CHLORIDE SERPL-SCNC: 111 MMOL/L (ref 100–108)
CHOLEST SERPL-MCNC: 159 MG/DL (ref 50–200)
CO2 SERPL-SCNC: 28 MMOL/L (ref 21–32)
CREAT SERPL-MCNC: 1.04 MG/DL (ref 0.6–1.3)
EOSINOPHIL # BLD AUTO: 0.14 THOUSAND/ΜL (ref 0–0.61)
EOSINOPHIL NFR BLD AUTO: 2 % (ref 0–6)
ERYTHROCYTE [DISTWIDTH] IN BLOOD BY AUTOMATED COUNT: 12.5 % (ref 11.6–15.1)
EST. AVERAGE GLUCOSE BLD GHB EST-MCNC: 140 MG/DL
GFR SERPL CREATININE-BSD FRML MDRD: 48 ML/MIN/1.73SQ M
GLUCOSE P FAST SERPL-MCNC: 126 MG/DL (ref 65–99)
HBA1C MFR BLD: 6.5 %
HCT VFR BLD AUTO: 41 % (ref 34.8–46.1)
HDLC SERPL-MCNC: 55 MG/DL
HGB BLD-MCNC: 12.8 G/DL (ref 11.5–15.4)
IMM GRANULOCYTES # BLD AUTO: 0.02 THOUSAND/UL (ref 0–0.2)
IMM GRANULOCYTES NFR BLD AUTO: 0 % (ref 0–2)
LDLC SERPL CALC-MCNC: 78 MG/DL (ref 0–100)
LYMPHOCYTES # BLD AUTO: 1.01 THOUSANDS/ΜL (ref 0.6–4.47)
LYMPHOCYTES NFR BLD AUTO: 15 % (ref 14–44)
MCH RBC QN AUTO: 31.3 PG (ref 26.8–34.3)
MCHC RBC AUTO-ENTMCNC: 31.2 G/DL (ref 31.4–37.4)
MCV RBC AUTO: 100 FL (ref 82–98)
MONOCYTES # BLD AUTO: 0.47 THOUSAND/ΜL (ref 0.17–1.22)
MONOCYTES NFR BLD AUTO: 7 % (ref 4–12)
NEUTROPHILS # BLD AUTO: 4.93 THOUSANDS/ΜL (ref 1.85–7.62)
NEUTS SEG NFR BLD AUTO: 75 % (ref 43–75)
NONHDLC SERPL-MCNC: 104 MG/DL
NRBC BLD AUTO-RTO: 0 /100 WBCS
PLATELET # BLD AUTO: 214 THOUSANDS/UL (ref 149–390)
PMV BLD AUTO: 10.8 FL (ref 8.9–12.7)
POTASSIUM SERPL-SCNC: 4.1 MMOL/L (ref 3.5–5.3)
PROT SERPL-MCNC: 7.1 G/DL (ref 6.4–8.2)
RBC # BLD AUTO: 4.09 MILLION/UL (ref 3.81–5.12)
SODIUM SERPL-SCNC: 142 MMOL/L (ref 136–145)
TRIGL SERPL-MCNC: 129 MG/DL
TSH SERPL DL<=0.05 MIU/L-ACNC: 1.5 UIU/ML (ref 0.36–3.74)
WBC # BLD AUTO: 6.61 THOUSAND/UL (ref 4.31–10.16)

## 2021-05-13 PROCEDURE — 82306 VITAMIN D 25 HYDROXY: CPT

## 2021-05-13 PROCEDURE — 83036 HEMOGLOBIN GLYCOSYLATED A1C: CPT

## 2021-05-13 PROCEDURE — 36415 COLL VENOUS BLD VENIPUNCTURE: CPT

## 2021-05-13 PROCEDURE — 80053 COMPREHEN METABOLIC PANEL: CPT

## 2021-05-13 PROCEDURE — 84443 ASSAY THYROID STIM HORMONE: CPT

## 2021-05-13 PROCEDURE — 80061 LIPID PANEL: CPT

## 2021-05-13 PROCEDURE — 85025 COMPLETE CBC W/AUTO DIFF WBC: CPT

## 2021-05-18 ENCOUNTER — OFFICE VISIT (OUTPATIENT)
Dept: FAMILY MEDICINE CLINIC | Facility: CLINIC | Age: 86
End: 2021-05-18
Payer: COMMERCIAL

## 2021-05-18 VITALS
SYSTOLIC BLOOD PRESSURE: 122 MMHG | BODY MASS INDEX: 28.67 KG/M2 | DIASTOLIC BLOOD PRESSURE: 78 MMHG | OXYGEN SATURATION: 98 % | HEIGHT: 63 IN | TEMPERATURE: 97.7 F | WEIGHT: 161.8 LBS | HEART RATE: 74 BPM

## 2021-05-18 DIAGNOSIS — E11.22 TYPE 2 DIABETES MELLITUS WITH STAGE 3B CHRONIC KIDNEY DISEASE, WITHOUT LONG-TERM CURRENT USE OF INSULIN (HCC): Primary | Chronic | ICD-10-CM

## 2021-05-18 DIAGNOSIS — I05.9 MITRAL ANNULAR CALCIFICATION: ICD-10-CM

## 2021-05-18 DIAGNOSIS — K51.919 ULCERATIVE COLITIS WITH COMPLICATION, UNSPECIFIED LOCATION (HCC): ICD-10-CM

## 2021-05-18 DIAGNOSIS — M85.89 OSTEOPENIA OF MULTIPLE SITES: ICD-10-CM

## 2021-05-18 DIAGNOSIS — E03.8 SUBCLINICAL HYPOTHYROIDISM: Chronic | ICD-10-CM

## 2021-05-18 DIAGNOSIS — E78.2 MIXED HYPERLIPIDEMIA: ICD-10-CM

## 2021-05-18 DIAGNOSIS — I48.11 LONGSTANDING PERSISTENT ATRIAL FIBRILLATION (HCC): Chronic | ICD-10-CM

## 2021-05-18 DIAGNOSIS — H90.0 CONDUCTIVE HEARING LOSS, BILATERAL: ICD-10-CM

## 2021-05-18 DIAGNOSIS — N18.31 STAGE 3A CHRONIC KIDNEY DISEASE (HCC): ICD-10-CM

## 2021-05-18 DIAGNOSIS — I51.89 DIASTOLIC DYSFUNCTION: ICD-10-CM

## 2021-05-18 DIAGNOSIS — I10 BENIGN ESSENTIAL HYPERTENSION: Chronic | ICD-10-CM

## 2021-05-18 DIAGNOSIS — Z79.01 ANTICOAGULANT LONG-TERM USE: ICD-10-CM

## 2021-05-18 DIAGNOSIS — N18.32 TYPE 2 DIABETES MELLITUS WITH STAGE 3B CHRONIC KIDNEY DISEASE, WITHOUT LONG-TERM CURRENT USE OF INSULIN (HCC): Primary | Chronic | ICD-10-CM

## 2021-05-18 DIAGNOSIS — Z00.00 MEDICARE ANNUAL WELLNESS VISIT, SUBSEQUENT: ICD-10-CM

## 2021-05-18 DIAGNOSIS — Z93.3 COLOSTOMY IN PLACE (HCC): ICD-10-CM

## 2021-05-18 DIAGNOSIS — Z23 NEED FOR STREPTOCOCCUS PNEUMONIAE VACCINATION: ICD-10-CM

## 2021-05-18 PROBLEM — Q74.1: Status: RESOLVED | Noted: 2020-12-30 | Resolved: 2021-05-18
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
## 2021-05-18 PROBLEM — M25.461 EFFUSION OF RIGHT KNEE: Status: RESOLVED | Noted: 2020-12-30 | Resolved: 2021-05-18

## 2021-05-18 PROBLEM — H11.32 SUBCONJUNCTIVAL BLEED, LEFT: Status: RESOLVED | Noted: 2020-08-06 | Resolved: 2021-05-18
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
## 2021-05-18 PROCEDURE — 1125F AMNT PAIN NOTED PAIN PRSNT: CPT | Performed by: NURSE PRACTITIONER

## 2021-05-18 PROCEDURE — 99214 OFFICE O/P EST MOD 30 MIN: CPT | Performed by: NURSE PRACTITIONER

## 2021-05-18 PROCEDURE — G0439 PPPS, SUBSEQ VISIT: HCPCS | Performed by: NURSE PRACTITIONER

## 2021-05-18 PROCEDURE — 1036F TOBACCO NON-USER: CPT | Performed by: NURSE PRACTITIONER

## 2021-05-18 PROCEDURE — 1170F FXNL STATUS ASSESSED: CPT | Performed by: NURSE PRACTITIONER

## 2021-05-18 PROCEDURE — G0009 ADMIN PNEUMOCOCCAL VACCINE: HCPCS

## 2021-05-18 PROCEDURE — 1100F PTFALLS ASSESS-DOCD GE2>/YR: CPT | Performed by: NURSE PRACTITIONER

## 2021-05-18 PROCEDURE — 90732 PPSV23 VACC 2 YRS+ SUBQ/IM: CPT

## 2021-05-18 PROCEDURE — 3725F SCREEN DEPRESSION PERFORMED: CPT | Performed by: NURSE PRACTITIONER

## 2021-05-18 PROCEDURE — 4040F PNEUMOC VAC/ADMIN/RCVD: CPT | Performed by: NURSE PRACTITIONER

## 2021-05-18 PROCEDURE — 1160F RVW MEDS BY RX/DR IN RCRD: CPT | Performed by: NURSE PRACTITIONER

## 2021-05-18 PROCEDURE — 3288F FALL RISK ASSESSMENT DOCD: CPT | Performed by: NURSE PRACTITIONER

## 2021-05-18 NOTE — PROGRESS NOTES
Assessment and Plan:     Problem List Items Addressed This Visit        Other    Medicare annual wellness visit, subsequent - Primary    Need for Streptococcus pneumoniae vaccination    Relevant Orders    PNEUMOCOCCAL POLYSACCHARIDE VACCINE 23-VALENT =>1YO SQ IM        BMI Counseling: Body mass index is 28 66 kg/m²  Follow-up plan was not completed due to elderly patient (72 years old) where weight reduction/weight gain would complicate underlying health condition such as: illness or physical disability  Preventive health issues were discussed with patient, and age appropriate screening tests were ordered as noted in patient's After Visit Summary  Personalized health advice and appropriate referrals for health education or preventive services given if needed, as noted in patient's After Visit Summary       History of Present Illness:     Patient presents for Medicare Annual Wellness visit    Patient Care Team:  SULEMA Goodman as PCP - General (Family Medicine)  Patti Cevallos MD as PCP - [address] [place] [address] ([facility])     Problem List:     Patient Active Problem List   Diagnosis    Benign essential hypertension    Atrial fibrillation ([facility]    Anticoagulant long-term use    Mixed hyperlipidemia    Diastolic dysfunction    Mitral annular calcification    Chronic kidney disease, stage III (moderate) ([facility])    Col[facility] in place ([facility]    Osteopenia of multiple sites    Subclinical hypothyroidism    Type 2 diabetes mellitus with stage 3 chronic kidney disease, without long-term current use of insulin ([facility] )    BMI 28 0-28 9,adult    Ulcerative colitis with complication ([facility] )    Medicare annual wellness visit, subsequent    Need for shingles vaccine    Conductive hearing loss, bilateral    Subconjunctival bleed, left    Fall at home    Need for influenza vaccination    Effusion of right knee    Abnormal prominence of patella    Need for Streptococcus pneumoniae vaccination      Past Medical and Surgical History:     Past Medical History:   Diagnosis Date    A-fib [facility])     Chronic kidney disease     Colostomy present ([facility] )     Diabetes mellitus ([facility] )     Hyperlipidemia     Hypertension      Past Surgical History:   Procedure Laterality Date    APPENDECTOMY      CARPAL TUNNEL RELEASE Bilateral     CATARACT EXTRACTION      CHOLECYSTECTOMY      COLON SURGERY      COLOSTOMY      HYSTERECTOMY        Family History:     Family History   Problem Relation Age of Onset    Heart disease Mother     Cancer Father       Social History:        Social History     Socioeconomic History    Marital status:       Spouse name: None    Number of children: None    Years of education: None    Highest education level: None   Occupational History    None   Social Needs    Financial resource strain: None    Food insecurity     Worry: None     Inability: None    Transportation needs     Medical: None     Non-medical: None   Tobacco Use    Smoking status: Never Smoker    Smokeless tobacco: Never Used   Substance and Sexual Activity    Alcohol use: Never     Frequency: Never    Drug use: No    Sexual activity: None   Lifestyle    Physical activity     Days per week: None     Minutes per session: None    Stress: None   Relationships    Social connections     Talks on phone: None     Gets together: None     Attends Hindu service: None     Active member of club or organization: None     Attends meetings of clubs or organizations: None     Relationship status: None    Intimate partner violence     Fear of current or ex partner: None     Emotionally abused: None     Physically abused: None     Forced sexual activity: None   Other Topics Concern    None   Social History Narrative           Medications and Allergies:     Current Outpatient Medications   Medication Sig Dispense Refill    amLODIPine (NORVASC) 10 mg tablet Take 1 tablet (10 mg total) by mouth daily 90 tablet 3    apixaban (Eliquis) 5 mg Take 1 tablet (5 mg total) by mouth 2 (two) times a day 180 tablet 3    atenolol (TENORMIN) 100 mg tablet Take 1 tablet (100 mg total) by mouth daily 90 tablet 3    atorvastatin (LIPITOR) 20 mg tablet Take 1 tablet (20 mg total) by mouth daily 90 tablet 3    Blood Glucose Monitoring Suppl (ONE TOUCH ULTRA 2) w/Device KIT by Does not apply route daily 1 each 0    calcium carbonate-vitamin D (OSCAL-D) 500 mg-200 units per tablet Take 1 tablet by mouth 2 (two) times a day with meals      docusate sodium (COLACE) 100 mg capsule Take 100 mg by mouth 2 (two) times a day      ferrous gluconate (FERGON) 324 mg tablet Take 324 mg by mouth daily with breakfast      glipiZIDE (GLUCOTROL XL) 2 5 mg 24 hr tablet TAKE ONE TABLET BY MOUTH EVERY DAY 90 tablet 3    glucose blood (ONE TOUCH ULTRA TEST) test strip Test once daily 100 each 3    hydrocortisone (CORTENEMA) 100 mg/60 mL enema       losartan (COZAAR) 100 MG tablet Take 1 tablet (100 mg total) by mouth daily 90 tablet 3    mesalamine (ASACOL) 800 MG EC tablet Take 1 tablet (800 mg total) by mouth 3 (three) times a day 270 tablet 3    ONE TOUCH LANCETS MISC by Does not apply route daily 100 each 3    polyethylene glycol (MIRALAX) 17 g packet Take 17 g by mouth daily       No current facility-administered medications for this visit        Allergies   Allergen Reactions    Amoxicillin-Pot Clavulanate Other (See Comments)     C-DIFF, x's 2  developed c diff taking drug      Immunizations:     Immunization History   Administered Date(s) Administered    INFLUENZA 10/28/2011, 11/12/2013, 10/07/2014, 09/29/2015, 09/21/2016, 09/19/2017, 10/03/2018, 10/03/2018, 09/17/2019    Influenza Split High Dose Preservative Free IM 09/17/2019    Influenza, high dose seasonal 0 7 mL 11/18/2020    Influenza, seasonal, injectable 10/28/2011, 11/12/2013, 09/19/2017    Pneumococcal Conjugate 13-Valent 08/03/2015    Pneumococcal Conjugate PCV 7 01/01/2008  Pneumococcal Polysaccharide PPV23 01/01/2008    SARS-CoV-2 / COVID-19 mRNA IM (Pfizer-BioNTech) 03/11/2021, 04/07/2021    Tdap 08/10/2016    Zoster 03/03/2016      Health Maintenance: There are no preventive care reminders to display for this patient  Topic Date Due    Pneumococcal Vaccine: 65+ Years (2 of 2 - PPSV23) 08/03/2016      Medicare Health Risk Assessment:     /78 (BP Location: Left arm, Patient Position: Sitting, Cuff Size: Large)   Pulse 74   Temp 97 7 °F (36 5 °C)   Ht 5' 3" (1 6 m)   Wt 73 4 kg (161 lb 12 8 oz)   SpO2 98%   BMI 28 66 kg/m²      Last Medicare Wellness visit information reviewed, patient interviewed and updates made to the record today  Health Risk Assessment:   Patient rates overall health as very good  Patient feels that their physical health rating is slightly worse  Patient is satisfied with their life  Eyesight was rated as same  Hearing was rated as slightly worse  Patient feels that their emotional and mental health rating is same  Patients states they are never, rarely angry  Patient states they are sometimes unusually tired/fatigued  Pain experienced in the last 7 days has been a lot  Patient's pain rating has been 5/10  Patient states that she has experienced no weight loss or gain in last 6 months  Depression Screening:   PHQ-2 Score: 0      Fall Risk Screening: In the past year, patient has experienced: history of falling in past year    Number of falls: 2 or more  Injured during fall?: No    Feels unsteady when standing or walking?: No    Worried about falling?: No      Urinary Incontinence Screening:   Patient has leaked urine accidently in the last six months  Home Safety:  Patient does not have trouble with stairs inside or outside of their home  Patient has working smoke alarms and has working carbon monoxide detector  Home safety hazards include: uneven floors  Nutrition:   Current diet is Diabetic and Regular  Medications:   Patient is currently taking over-the-counter supplements  OTC medications include: see medication list  Patient is able to manage medications  Activities of Daily Living (ADLs)/Instrumental Activities of Daily Living (IADLs):   Walk and transfer into and out of bed and chair?: Yes  Dress and groom yourself?: Yes    Bathe or shower yourself?: Yes    Feed yourself? Yes  Do your laundry/housekeeping?: Yes  Manage your money, pay your bills and track your expenses?: Yes  Make your own meals?: Yes    Do your own shopping?: No    Previous Hospitalizations:   Any hospitalizations or ED visits within the last 12 months?: No      Advance Care Planning:   Living will: Yes    Advanced directive: Yes      Cognitive Screening:   Provider or family/friend/caregiver concerned regarding cognition?: No    PREVENTIVE SCREENINGS      Cardiovascular Screening:    General: Screening Not Indicated and History Lipid Disorder      Diabetes Screening:     General: Screening Not Indicated and History Diabetes      Colorectal Cancer Screening:     General: Screening Not Indicated      Breast Cancer Screening:     General: Risks and Benefits Discussed and Screening Not Indicated      Cervical Cancer Screening:    General: Screening Not Indicated      Osteoporosis Screening:    General: Screening Not Indicated      Abdominal Aortic Aneurysm (AAA) Screening:        General: Screening Not Indicated      Lung Cancer Screening:     General: Screening Not Indicated      Hepatitis C Screening:    General: Screening Not Indicated    Screening, Brief Intervention, and Referral to Treatment (SBIRT)    Screening  Typical number of drinks in a day: 0  Typical number of drinks in a week: 0  Interpretation: Low risk drinking behavior      Single Item Drug Screening:  How often have you used an illegal drug (including marijuana) or a prescription medication for non-medical reasons in the past year? never    Single Item Drug Screen Score: 0  Interpretation: Negative screen for possible drug use disorder      SULEMA Chawla

## 2021-05-18 NOTE — PATIENT INSTRUCTIONS
Medicare Preventive Visit Patient Instructions  Thank you for completing your Welcome to Medicare Visit or Medicare Annual Wellness Visit today  Your next wellness visit will be due in one year (5/19/2022)  The screening/preventive services that you may require over the next 5-10 years are detailed below  Some tests may not apply to you based off risk factors and/or age  Screening tests ordered at today's visit but not completed yet may show as past due  Also, please note that scanned in results may not display below  Preventive Screenings:  Service Recommendations Previous Testing/Comments   Colorectal Cancer Screening  * Colonoscopy    * Fecal Occult Blood Test (FOBT)/Fecal Immunochemical Test (FIT)  * Fecal DNA/Cologuard Test  * Flexible Sigmoidoscopy Age: 54-65 years old   Colonoscopy: every 10 years (may be performed more frequently if at higher risk)  OR  FOBT/FIT: every 1 year  OR  Cologuard: every 3 years  OR  Sigmoidoscopy: every 5 years  Screening may be recommended earlier than age 48 if at higher risk for colorectal cancer  Also, an individualized decision between you and your healthcare provider will decide whether screening between the ages of 74-80 would be appropriate  Colonoscopy: 07/19/2017  FOBT/FIT: Not on file  Cologuard: Not on file  Sigmoidoscopy: Not on file    Screening Not Indicated     Breast Cancer Screening Age: 36 years old  Frequency: every 1-2 years  Not required if history of left and right mastectomy Mammogram: Not on file        Cervical Cancer Screening Between the ages of 21-29, pap smear recommended once every 3 years  Between the ages of 33-67, can perform pap smear with HPV co-testing every 5 years     Recommendations may differ for women with a history of total hysterectomy, cervical cancer, or abnormal pap smears in past  Pap Smear: Not on file    Screening Not Indicated   Hepatitis C Screening Once for adults born between 1945 and 1965  More frequently in patients at high risk for Hepatitis C Hep C Antibody: Not on file        Diabetes Screening 1-2 times per year if you're at risk for diabetes or have pre-diabetes Fasting glucose: 126 mg/dL   A1C: 6 5 %    Screening Not Indicated  History Diabetes   Cholesterol Screening Once every 5 years if you don't have a lipid disorder  May order more often based on risk factors  Lipid panel: 05/13/2021    Screening Not Indicated  History Lipid Disorder     Other Preventive Screenings Covered by Medicare:  1  Abdominal Aortic Aneurysm (AAA) Screening: covered once if your at risk  You're considered to be at risk if you have a family history of AAA  2  Lung Cancer Screening: covers low dose CT scan once per year if you meet all of the following conditions: (1) Age 50-69; (2) No signs or symptoms of lung cancer; (3) Current smoker or have quit smoking within the last 15 years; (4) You have a tobacco smoking history of at least 30 pack years (packs per day multiplied by number of years you smoked); (5) You get a written order from a healthcare provider  3  Glaucoma Screening: covered annually if you're considered high risk: (1) You have diabetes OR (2) Family history of glaucoma OR (3)  aged 48 and older OR (3)  American aged 72 and older  3  Osteoporosis Screening: covered every 2 years if you meet one of the following conditions: (1) You're estrogen deficient and at risk for osteoporosis based off medical history and other findings; (2) Have a vertebral abnormality; (3) On glucocorticoid therapy for more than 3 months; (4) Have primary hyperparathyroidism; (5) On osteoporosis medications and need to assess response to drug therapy  · Last bone density test (DXA Scan): Not on file  5  HIV Screening: covered annually if you're between the age of 12-76  Also covered annually if you are younger than 13 and older than 72 with risk factors for HIV infection   For pregnant patients, it is covered up to 3 times per pregnancy  Immunizations:  Immunization Recommendations   Influenza Vaccine Annual influenza vaccination during flu season is recommended for all persons aged >= 6 months who do not have contraindications   Pneumococcal Vaccine (Prevnar and Pneumovax)  * Prevnar = PCV13  * Pneumovax = PPSV23   Adults 25-60 years old: 1-3 doses may be recommended based on certain risk factors  Adults 72 years old: Prevnar (PCV13) vaccine recommended followed by Pneumovax (PPSV23) vaccine  If already received PPSV23 since turning 65, then PCV13 recommended at least one year after PPSV23 dose  Hepatitis B Vaccine 3 dose series if at intermediate or high risk (ex: diabetes, end stage renal disease, liver disease)   Tetanus (Td) Vaccine - COST NOT COVERED BY MEDICARE PART B Following completion of primary series, a booster dose should be given every 10 years to maintain immunity against tetanus  Td may also be given as tetanus wound prophylaxis  Tdap Vaccine - COST NOT COVERED BY MEDICARE PART B Recommended at least once for all adults  For pregnant patients, recommended with each pregnancy  Shingles Vaccine (Shingrix) - COST NOT COVERED BY MEDICARE PART B  2 shot series recommended in those aged 48 and above     Health Maintenance Due:  There are no preventive care reminders to display for this patient  Immunizations Due:      Topic Date Due    Pneumococcal Vaccine: 65+ Years (2 of 2 - PPSV23) 08/03/2016     Advance Directives   What are advance directives? Advance directives are legal documents that state your wishes and plans for medical care  These plans are made ahead of time in case you lose your ability to make decisions for yourself  Advance directives can apply to any medical decision, such as the treatments you want, and if you want to donate organs  What are the types of advance directives? There are many types of advance directives, and each state has rules about how to use them   You may choose a combination of any of the following:  · Living will: This is a written record of the treatment you want  You can also choose which treatments you do not want, which to limit, and which to stop at a certain time  This includes surgery, medicine, IV fluid, and tube feedings  · Durable power of  for healthcare [place] [facility]): This is a written record that states who you want to make healthcare choices for you when you are unable to make them for yourself  This person, called a proxy, is usually a family member or a friend  You may choose more than 1 proxy  · Do not resuscitate (DNR) order:  A DNR order is used in case your heart stops beating or you stop breathing  It is a request not to have certain forms of treatment, such as CPR  A DNR order may be included in other types of advance directives  · Medical directive: This covers the care that you want if you are in a coma, near death, or unable to make decisions for yourself  You can list the treatments you want for each condition  Treatment may include pain medicine, surgery, blood transfusions, dialysis, IV or tube feedings, and a ventilator (breathing machine)  · Values history: This document has questions about your views, beliefs, and how you feel and think about life  This information can help others choose the care that you would choose  Why are advance directives important? An advance directive helps you control your care  Although spoken wishes may be used, it is better to have your wishes written down  Spoken wishes can be misunderstood, or not followed  Treatments may be given even if you do not want them  An advance directive may make it easier for your family to make difficult choices about your care  Fall Prevention    Fall prevention  includes ways to make your home and other areas safer  It also includes ways you can move more carefully to prevent a fall   Health conditions that cause changes in your blood pressure, vision, or muscle strength and coordination may increase your risk for falls  Medicines may also increase your risk for falls if they make you dizzy, weak, or sleepy  Fall prevention tips:   · Stand or sit up slowly  · Use assistive devices as directed  · Wear shoes that fit well and have soles that   · Wear a personal alarm  · Stay active  · Manage your medical conditions  Home Safety Tips:  · Add items to prevent falls in the bathroom  · Keep paths clear  · Install bright lights in your home  · Keep items you use often on shelves within reach  · Paint or place reflective tape on the edges of your stairs  Urinary Incontinence   Urinary incontinence (UI)  is when you lose control of your bladder  UI develops because your bladder cannot store or empty urine properly  The 3 most common types of UI are stress incontinence, urge incontinence, or both  Medicines:   · May be given to help strengthen your bladder control  Report any side effects of medication to your healthcare provider  Do pelvic muscle exercises often:  Your pelvic muscles help you stop urinating  Squeeze these muscles tight for 5 seconds, then relax for 5 seconds  Gradually work up to squeezing for 10 seconds  Do 3 sets of 15 repetitions a day, or as directed  This will help strengthen your pelvic muscles and improve bladder control  Train your bladder:  Go to the bathroom at set times, such as every 2 hours, even if you do not feel the urge to go  You can also try to hold your urine when you feel the urge to go  For example, hold your urine for 5 minutes when you feel the urge to go  As that becomes easier, hold your urine for 10 minutes  Self-care:   · Keep a UI record  Write down how often you leak urine and how much you leak  Make a note of what you were doing when you leaked urine  · Drink liquids as directed  You may need to limit the amount of liquid you drink to help control your urine leakage   Do not drink any liquid right before you go to bed  Limit or do not have drinks that contain caffeine or alcohol  · Prevent constipation  Eat a variety of high-fiber foods  Good examples are high-fiber cereals, beans, vegetables, and whole-grain breads  Walking is the best way to trigger your intestines to have a bowel movement  · Exercise regularly and maintain a healthy weight  Weight loss and exercise will decrease pressure on your bladder and help you control your leakage  · Use a catheter as directed  to help empty your bladder  A catheter is a tiny, plastic tube that is put into your bladder to drain your urine  · Go to behavior therapy as directed  Behavior therapy may be used to help you learn to control your urge to urinate  Weight Management   Why it is important to manage your weight:  Being overweight increases your risk of health conditions such as heart disease, high blood pressure, type 2 diabetes, and certain types of cancer  It can also increase your risk for osteoarthritis, sleep apnea, and other respiratory problems  Aim for a slow, steady weight loss  Even a small amount of weight loss can lower your risk of health problems  How to lose weight safely:  A safe and healthy way to lose weight is to eat fewer calories and get regular exercise  You can lose up about 1 pound a week by decreasing the number of calories you eat by 500 calories each day  Healthy meal plan for weight management:  A healthy meal plan includes a variety of foods, contains fewer calories, and helps you stay healthy  A healthy meal plan includes the following:  · Eat whole-grain foods more often  A healthy meal plan should contain fiber  Fiber is the part of grains, fruits, and vegetables that is not broken down by your body  Whole-grain foods are healthy and provide extra fiber in your diet  Some examples of whole-grain foods are whole-wheat breads and pastas, oatmeal, brown rice, and bulgur  · Eat a variety of vegetables every day    Include dark, leafy greens such as spinach, kale, tiffanie greens, and mustard greens  Eat yellow and orange vegetables such as carrots, sweet potatoes, and winter squash  · Eat a variety of fruits every day  Choose fresh or canned fruit (canned in its own juice or light syrup) instead of juice  Fruit juice has very little or no fiber  · Eat low-fat dairy foods  Drink fat-free (skim) milk or 1% milk  Eat fat-free yogurt and low-fat cottage cheese  Try low-fat cheeses such as mozzarella and other reduced-fat cheeses  · Choose meat and other protein foods that are low in fat  Choose beans or other legumes such as split peas or lentils  Choose fish, skinless poultry (chicken or turkey), or lean cuts of red meat (beef or pork)  Before you cook meat or poultry, cut off any visible fat  · Use less fat and oil  Try baking foods instead of frying them  Add less fat, such as margarine, sour cream, regular salad dressing and mayonnaise to foods  Eat fewer high-fat foods  Some examples of high-fat foods include french fries, doughnuts, ice cream, and cakes  · Eat fewer sweets  Limit foods and drinks that are high in sugar  This includes candy, cookies, regular soda, and sweetened drinks  Exercise:  Exercise at least 30 minutes per day on most days of the week  Some examples of exercise include walking, biking, dancing, and swimming  You can also fit in more physical activity by taking the stairs instead of the elevator or parking farther away from stores  Ask your healthcare provider about the best exercise plan for you  © Copyright "2,10E+07" 2018 Information is for End User's use only and may not be sold, redistributed or otherwise used for commercial purposes   All illustrations and images included in CareNotes® are the copyrighted property of A D [employer] M [employer]  or [address] [place] [address]

## 2021-05-18 NOTE — ASSESSMENT & PLAN NOTE
Patient taking the Mesalamine and is not doing the enemas as recommended by her gastroenterology Patient has a colostomy

## 2021-05-18 NOTE — ASSESSMENT & PLAN NOTE
Lab Results   Component Value Date    EGFR 48 05/13/2021    EGFR 40 11/11/2020    EGFR 46 07/07/2020    CREATININE 1 04 05/13/2021    CREATININE 1 22 11/11/2020    CREATININE 1 08 07/07/2020   creatinine is well controlled avoid NSAIDS

## 2021-05-18 NOTE — PROGRESS NOTES
Assessment/Plan:           Problem List Items Addressed This Visit        Digestive    Ulcerative colitis with complication [facility])     Patient taking the Mesalamine and is not doing the enemas as recommended by her gastroenterology Patient has a colostomy          Relevant Orders    Comprehensive metabolic panel    CBC and differential       Endocrine    Subclinical hypothyroidism (Chronic)     Not currently on any thyroid medication in a normal range          Relevant Orders    TSH, 3rd generation with Free T4 reflex    Type 2 diabetes mellitus with stage 3 chronic kidney disease, without long-term current use of insulin (HCC) - Primary (Chronic)       Lab Results   Component Value Date    HGBA1C 6 5 (H) 05/13/2021   well controlled at 6 5% will stop the glipizide 2 5 mg at this point concerns for low blood sugar         Relevant Orders    Comprehensive metabolic panel    HEMOGLOBIN A1C W/ EAG ESTIMATION       Cardiovascular and Mediastinum    Benign essential hypertension (Chronic)     Well controlled on the Losartan, Atenolol and Amlodipine          Relevant Orders    CBC and differential    Iron Panel (Includes Ferritin, Iron Sat%, Iron, and TIBC)    Atrial fibrillation (HCC) (Chronic)     Patient is currently on the Eliquis 5 mg bid  on rate control currently Atenolol 100 mg         Mitral annular calcification       Nervous and Auditory    Conductive hearing loss, bilateral     Patient has b/l hearing aides             Musculoskeletal and Integument    Osteopenia of multiple sites       Genitourinary    Chronic kidney disease, stage III (moderate) ([facility])     Lab Results   Component Value Date    EGFR 48 05/13/2021    EGFR 40 11/11/2020    EGFR 46 07/07/2020    CREATININE 1 04 05/13/2021    CREATININE 1 22 11/11/2020    CREATININE 1 08 07/07/2020   creatinine is well controlled avoid NSAIDS         Relevant Orders    Comprehensive metabolic panel       Other    Anticoagulant long-term use    Mixed hyperlipidemia Patient is well controlled on the Atorvastatin 20 mg          Relevant Orders    Lipid Panel with Direct LDL reflex    Diastolic dysfunction    Colostomy in place [facility])     Colostomy is working well without issues          BMI 28 0-28 9,adult    Medicare annual wellness visit, subsequent    Need for Streptococcus pneumoniae vaccination    Relevant Orders    PNEUMOCOCCAL POLYSACCHARIDE VACCINE 23-VALENT =>1YO SQ IM            Subjective:      Patient ID: Angelo Carroll is a 80 y o  female  Patient here for her six month check up and reports that she had her labs completed showing normal cbc, cmp with stable CKD and HA1C was 6 4%  Patient saw the orthopedics for her knee and was having some swelling and had drained in 2/2021  Patient having some issues with her left ear and not fitting in as well with her hearing aides       The following portions of the patient's history were reviewed and updated as appropriate: She  has a past medical history of A-fib ([facility] [address] ), Chronic kidney disease, Colostomy present ([facility] [address] ), Diabetes mellitus ([facility] [address] ), Hyperlipidemia, and Hypertension    She   Patient Active Problem List    Diagnosis Date Noted    Need for Streptococcus pneumoniae vaccination 05/18/2021    Medicare annual wellness visit, subsequent 03/11/2020    Conductive hearing loss, bilateral 03/11/2020    Ulcerative colitis with complication (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 [address] ) 11/21/2018    Anticoagulant long-term use 08/14/2018    Mixed hyperlipidemia 57/46/7751    Diastolic dysfunction 70/23/6768    Mitral annular calcification 08/14/2018    Subclinical hypothyroidism 07/30/2018    Atrial fibrillation ([facility] [address] ) 07/24/2018    Benign essential hypertension 07/21/2018    Osteopenia of multiple sites 02/22/2016    Colostomy in place [facility]) 01/27/2016    Chronic [facility], [facility] III [facility]) [facility] ) 06/24/2015     She  has a past surgical history that includes Colon surgery; Appendectomy; Hysterectomy; Carpal tunnel release (Bilateral); Cholecystectomy; Colostomy; and Cataract extraction  Her family history includes Cancer in her father; Heart disease in her mother  She  reports that she has never smoked  She has never used smokeless tobacco  She reports that she does not drink alcohol or use drugs  Current Outpatient Medications   Medication Sig Dispense Refill    amLODIPine (NORVASC) 10 mg tablet Take 1 tablet (10 mg total) by mouth daily 90 tablet 3    apixaban (Eliquis) 5 mg Take 1 tablet (5 mg total) by mouth 2 (two) times a day 180 tablet 3    atenolol (TENORMIN) 100 mg tablet Take 1 tablet (100 mg total) by mouth daily 90 tablet 3    atorvastatin (LIPITOR) 20 mg tablet Take 1 tablet (20 mg total) by mouth daily 90 tablet 3    Blood Glucose Monitoring Suppl (ONE TOUCH ULTRA 2) w/Device KIT by Does not apply route daily 1 each 0    calcium carbonate-vitamin D (OSCAL-D) 500 mg-200 units per tablet Take 1 tablet by mouth 2 (two) times a day with meals      docusate sodium (COLACE) 100 mg capsule Take 100 mg by mouth 2 (two) times a day      ferrous gluconate (FERGON) 324 mg tablet Take 324 mg by mouth daily with breakfast      glucose blood (ONE TOUCH ULTRA TEST) test strip Test once daily 100 each 3    hydrocortisone (CORTENEMA) 100 mg/60 mL enema       losartan (COZAAR) 100 MG tablet Take 1 tablet (100 mg total) by mouth daily 90 tablet 3    mesalamine (ASACOL) 800 MG EC tablet Take 1 tablet (800 mg total) by mouth 3 (three) times a day 270 tablet 3    ONE TOUCH LANCETS MISC by Does not apply route daily 100 each 3    polyethylene glycol (MIRALAX) 17 g packet Take 17 g by mouth daily       No current facility-administered medications for this visit  She is allergic to amoxicillin-pot clavulanate       Review of Systems   Constitutional: Negative      HENT: Positive for hearing loss  Negative for congestion, dental problem, drooling, ear discharge, ear pain, facial swelling, mouth sores, nosebleeds, postnasal drip, rhinorrhea, sinus pressure, sinus pain, sneezing, sore throat, tinnitus, trouble swallowing and voice change  Eyes: Negative  Respiratory: Negative  Cardiovascular: Negative  Gastrointestinal: Negative  Endocrine: Negative  Genitourinary: Negative  Musculoskeletal: Positive for arthralgias  Allergic/Immunologic: Negative  Neurological: Negative  Hematological: Negative  Psychiatric/Behavioral: Negative  Objective:      /78 (BP Location: Left arm, Patient Position: Sitting, Cuff Size: Large)   Pulse 74   Temp 97 7 °F (36 5 °C)   Ht 5' 3" (1 6 m)   Wt 73 4 kg (161 lb 12 8 oz)   SpO2 98%   BMI 28 66 kg/m²          Physical Exam  Vitals signs and nursing note reviewed  Constitutional:       General: She is not in acute distress  Appearance: She is well-developed  HENT:      Head: Normocephalic and atraumatic  Eyes:      Pupils: Pupils are equal, round, and reactive to light  Neck:      Musculoskeletal: Normal range of motion  Thyroid: No thyromegaly  Cardiovascular:      Rate and Rhythm: Normal rate and regular rhythm  Pulses: no weak pulses          Dorsalis pedis pulses are 2+ on the right side and 2+ on the left side  Posterior tibial pulses are 2+ on the right side and 2+ on the left side  Heart sounds: Murmur present  Systolic murmur present with a grade of 2/6  Pulmonary:      Effort: Pulmonary effort is normal  No respiratory distress  Breath sounds: Normal breath sounds  No wheezing  Abdominal:      General: Bowel sounds are normal       Palpations: Abdomen is soft  Musculoskeletal: Normal range of motion  Right lower leg: No edema  Left lower leg: No edema     Feet:      Right foot:      Skin integrity: No ulcer, skin breakdown, erythema, warmth, callus or dry skin  Left foot:      Skin integrity: No ulcer, skin breakdown, erythema, warmth, callus or dry skin  Skin:     General: Skin is warm and dry  Neurological:      Mental Status: She is alert and oriented to person, place, and time  Patient's shoes and socks removed  Right Foot/Ankle   Right Foot Inspection  Skin Exam: skin normal and skin intact no dry skin, no warmth, no callus, no erythema, no maceration, no abnormal color, no pre-ulcer, no ulcer and no callus                          Toe Exam: ROM and strength within normal limits  Sensory   Vibration: intact  Proprioception: intact   Monofilament testing: intact  Vascular  Capillary refills: < 3 seconds  The right DP pulse is 2+  The right PT pulse is 2+  Left Foot/Ankle  Left Foot Inspection  Skin Exam: skin normal and skin intactno dry skin, no warmth, no erythema, no maceration, normal color, no pre-ulcer, no ulcer and no callus                         Toe Exam: ROM and strength within normal limits                   Sensory   Vibration: intact  Proprioception: intact  Monofilament: intact  Vascular  Capillary refills: < 3 seconds  The left DP pulse is 2+  The left PT pulse is 2+  Assign Risk Category:  No deformity present; No loss of protective sensation;  No weak pulses       Risk: 0

## 2021-05-18 NOTE — ASSESSMENT & PLAN NOTE
Lab Results   Component Value Date    HGBA1C 6 5 (H) 05/13/2021   well controlled at 6 5% will stop the glipizide 2 5 mg at this point concerns for low blood sugar

## 2021-07-13 ENCOUNTER — OFFICE VISIT (OUTPATIENT)
Dept: FAMILY MEDICINE CLINIC | Facility: CLINIC | Age: 86
End: 2021-07-13
Payer: COMMERCIAL

## 2021-07-13 VITALS
HEART RATE: 72 BPM | OXYGEN SATURATION: 99 % | TEMPERATURE: 98.3 F | BODY MASS INDEX: 28.77 KG/M2 | DIASTOLIC BLOOD PRESSURE: 72 MMHG | WEIGHT: 162.4 LBS | SYSTOLIC BLOOD PRESSURE: 128 MMHG | HEIGHT: 63 IN

## 2021-07-13 DIAGNOSIS — H61.21 IMPACTED CERUMEN OF RIGHT EAR: Primary | ICD-10-CM

## 2021-07-13 PROCEDURE — 69209 REMOVE IMPACTED EAR WAX UNI: CPT | Performed by: PHYSICIAN ASSISTANT

## 2021-07-13 PROCEDURE — 99213 OFFICE O/P EST LOW 20 MIN: CPT | Performed by: PHYSICIAN ASSISTANT

## 2021-07-13 PROCEDURE — 1160F RVW MEDS BY RX/DR IN RCRD: CPT | Performed by: PHYSICIAN ASSISTANT

## 2021-07-13 PROCEDURE — 1036F TOBACCO NON-USER: CPT | Performed by: PHYSICIAN ASSISTANT

## 2021-07-13 NOTE — PROGRESS NOTES
[facility]  Acute Visit        NAME: Johnathan Hernandez is a 80 y o  female  : 1932    MRN: [id number]  DATE: 2021  TIME: 12:36 PM    Assessment and Plan   Impacted cerumen of right ear [H61 21]  1  Impacted cerumen of right ear           Patient Instructions     Notify the office or proceed to ER if symptoms worsen  Chief Complaint     Chief Complaint   Patient presents with    Cerumen Impaction     Needs right ear flushed  History of Present Illness       66-year-old female presents for cerumen impaction the right ear  Patient is to have a hearing aid in the right ear placed next week and needs cerumen removed  Review of Systems   Review of Systems   Constitutional: Negative for chills, fatigue and fever  HENT: Negative for congestion, ear discharge, sinus pain, sore throat and trouble swallowing  Eyes: Negative for pain, discharge and redness  Respiratory: Negative for cough, chest tightness, shortness of breath and wheezing  Cardiovascular: Negative for chest pain, palpitations and leg swelling  Gastrointestinal: Negative for abdominal pain, diarrhea, nausea and vomiting  Musculoskeletal: Negative for arthralgias, joint swelling and myalgias  Skin: Negative for rash  Neurological: Negative for dizziness, weakness, numbness and headaches           Current Medications       Current Outpatient Medications:     amLODIPine (NORVASC) 10 mg tablet, Take 1 tablet (10 mg total) by mouth daily, Disp: 90 tablet, Rfl: 3    apixaban (Eliquis) 5 mg, Take 1 tablet (5 mg total) by mouth 2 (two) times a day, Disp: 180 tablet, Rfl: 3    atenolol (TENORMIN) 100 mg tablet, Take 1 tablet (100 mg total) by mouth daily, Disp: 90 tablet, Rfl: 3    atorvastatin (LIPITOR) 20 mg tablet, Take 1 tablet (20 mg total) by mouth daily, Disp: 90 tablet, Rfl: 3    Blood Glucose Monitoring Suppl (ONE TOUCH ULTRA 2) w/Device KIT, by Does not apply route daily, Disp: 1 each, Rfl: 0    calcium carbonate-vitamin D (OSCAL-D) 500 mg-200 units per tablet, Take 1 tablet by mouth 2 (two) times a day with meals, Disp: , Rfl:     docusate sodium (COLACE) 100 mg capsule, Take 100 mg by mouth 2 (two) times a day, Disp: , Rfl:     ferrous gluconate (FERGON) 324 mg tablet, Take 324 mg by mouth daily with breakfast, Disp: , Rfl:     glucose blood (ONE TOUCH ULTRA TEST) test strip, Test once daily, Disp: 100 each, Rfl: 3    hydrocortisone (CORTENEMA) 100 mg/60 mL enema, , Disp: , Rfl:     losartan (COZAAR) 100 MG tablet, Take 1 tablet (100 mg total) by mouth daily, Disp: 90 tablet, Rfl: 3    mesalamine (ASACOL) 800 MG EC tablet, Take 1 tablet (800 mg total) by mouth 3 (three) times a day, Disp: 270 tablet, Rfl: 3    ONE TOUCH LANCETS MISC, by Does not apply route daily, Disp: 100 each, Rfl: 3    polyethylene glycol (MIRALAX) 17 g packet, Take 17 g by mouth daily, Disp: , Rfl:     Current Allergies     Allergies as of 07/13/2021 - Reviewed 07/13/2021   Allergen Reaction Noted    Amoxicillin-pot clavulanate Other (See Comments) 01/06/2011            The following portions of the patient's history were reviewed and updated as appropriate: allergies, current medications, past family history, past medical history, past social history, past surgical history and problem list      Past Medical History:   Diagnosis Date    [facility]fib ([facility] )     Chronic kidney disease     [facility]     Diabetes mellitus ([facility] )     Hyperlipidemia     Hypertension        Past Surgical History:   Procedure Laterality Date    APPENDECTOMY      CARPAL TUNNEL RELEASE Bilateral     CATARACT EXTRACTION      CHOLECYSTECTOMY      COLON SURGERY      COLOSTOMY      HYSTERECTOMY         Family History   Problem Relation Age of Onset    Heart disease Mother     Cancer Father          Medications have been verified          Objective   /72 (BP Location: Left arm, Patient Position: Sitting, Cuff Size: Standard)   Pulse 72   Temp 98 3 °F (36 8 °C)   Ht 5' 3" (1 6 m)   Wt 73 7 kg (162 lb 6 4 oz)   SpO2 99%   BMI 28 77 kg/m²        Physical Exam     Physical Exam  Constitutional:       General: She is not in acute distress  HENT:      Right Ear: There is impacted cerumen  Left Ear: There is no impacted cerumen  Neurological:      Mental Status: She is alert  Ear cerumen removal    Date/Time: 7/13/2021 12:38 PM  Performed by: Cristiane Meredith PA-C  Authorized by: Cristiane Meredith PA-C   Universal Protocol:  Procedure performed by:  Consent: Verbal consent obtained  Consent given by: patient      Patient location:  Clinic  Procedure details:     Location:  R ear    Procedure type: irrigation only      Approach:  External  Post-procedure details:     Complication:  None    Hearing quality:  Improved    Patient tolerance of procedure:   Tolerated well, no immediate complications

## 2021-09-19 DIAGNOSIS — K51.919 ULCERATIVE COLITIS WITH COMPLICATION, UNSPECIFIED LOCATION (HCC): ICD-10-CM

## 2021-09-21 RX ORDER — MESALAMINE 800 MG/1
TABLET, DELAYED RELEASE ORAL
Qty: 270 TABLET | Refills: 3 | Status: SHIPPED | OUTPATIENT
Start: 2021-09-21 | End: 2022-08-04

## 2021-11-10 ENCOUNTER — APPOINTMENT (OUTPATIENT)
Dept: LAB | Facility: CLINIC | Age: 86
End: 2021-11-10
Payer: COMMERCIAL

## 2021-11-10 DIAGNOSIS — E03.8 SUBCLINICAL HYPOTHYROIDISM: Chronic | ICD-10-CM

## 2021-11-10 DIAGNOSIS — E11.22 TYPE 2 DIABETES MELLITUS WITH STAGE 3B CHRONIC KIDNEY DISEASE, WITHOUT LONG-TERM CURRENT USE OF INSULIN (HCC): Chronic | ICD-10-CM

## 2021-11-10 DIAGNOSIS — E78.2 MIXED HYPERLIPIDEMIA: ICD-10-CM

## 2021-11-10 DIAGNOSIS — K51.919 ULCERATIVE COLITIS WITH COMPLICATION, UNSPECIFIED LOCATION (HCC): ICD-10-CM

## 2021-11-10 DIAGNOSIS — N18.32 TYPE 2 DIABETES MELLITUS WITH STAGE 3B CHRONIC KIDNEY DISEASE, WITHOUT LONG-TERM CURRENT USE OF INSULIN (HCC): Chronic | ICD-10-CM

## 2021-11-10 DIAGNOSIS — N18.31 STAGE 3A CHRONIC KIDNEY DISEASE (HCC): ICD-10-CM

## 2021-11-10 DIAGNOSIS — I10 BENIGN ESSENTIAL HYPERTENSION: ICD-10-CM

## 2021-11-10 LAB
ALBUMIN SERPL BCP-MCNC: 3.3 G/DL (ref 3.5–5)
ALP SERPL-CCNC: 112 U/L (ref 46–116)
ALT SERPL W P-5'-P-CCNC: 18 U/L (ref 12–78)
ANION GAP SERPL CALCULATED.3IONS-SCNC: 10 MMOL/L (ref 4–13)
AST SERPL W P-5'-P-CCNC: 15 U/L (ref 5–45)
BASOPHILS # BLD AUTO: 0.06 THOUSANDS/ΜL (ref 0–0.1)
BASOPHILS NFR BLD AUTO: 1 % (ref 0–1)
BILIRUB SERPL-MCNC: 0.39 MG/DL (ref 0.2–1)
BUN SERPL-MCNC: 21 MG/DL (ref 5–25)
CALCIUM ALBUM COR SERPL-MCNC: 9.8 MG/DL (ref 8.3–10.1)
CALCIUM SERPL-MCNC: 9.2 MG/DL (ref 8.3–10.1)
CHLORIDE SERPL-SCNC: 104 MMOL/L (ref 100–108)
CHOLEST SERPL-MCNC: 199 MG/DL (ref 50–200)
CO2 SERPL-SCNC: 27 MMOL/L (ref 21–32)
CREAT SERPL-MCNC: 1.12 MG/DL (ref 0.6–1.3)
EOSINOPHIL # BLD AUTO: 0.23 THOUSAND/ΜL (ref 0–0.61)
EOSINOPHIL NFR BLD AUTO: 3 % (ref 0–6)
ERYTHROCYTE [DISTWIDTH] IN BLOOD BY AUTOMATED COUNT: 11.9 % (ref 11.6–15.1)
EST. AVERAGE GLUCOSE BLD GHB EST-MCNC: 171 MG/DL
FERRITIN SERPL-MCNC: 153 NG/ML (ref 8–388)
GFR SERPL CREATININE-BSD FRML MDRD: 44 ML/MIN/1.73SQ M
GLUCOSE P FAST SERPL-MCNC: 175 MG/DL (ref 65–99)
HBA1C MFR BLD: 7.6 %
HCT VFR BLD AUTO: 41 % (ref 34.8–46.1)
HDLC SERPL-MCNC: 57 MG/DL
HGB BLD-MCNC: 13 G/DL (ref 11.5–15.4)
IMM GRANULOCYTES # BLD AUTO: 0.05 THOUSAND/UL (ref 0–0.2)
IMM GRANULOCYTES NFR BLD AUTO: 1 % (ref 0–2)
IRON SATN MFR SERPL: 24 % (ref 15–50)
IRON SERPL-MCNC: 63 UG/DL (ref 50–170)
LDLC SERPL CALC-MCNC: 113 MG/DL (ref 0–100)
LYMPHOCYTES # BLD AUTO: 0.9 THOUSANDS/ΜL (ref 0.6–4.47)
LYMPHOCYTES NFR BLD AUTO: 13 % (ref 14–44)
MCH RBC QN AUTO: 31.2 PG (ref 26.8–34.3)
MCHC RBC AUTO-ENTMCNC: 31.7 G/DL (ref 31.4–37.4)
MCV RBC AUTO: 98 FL (ref 82–98)
MONOCYTES # BLD AUTO: 0.55 THOUSAND/ΜL (ref 0.17–1.22)
MONOCYTES NFR BLD AUTO: 8 % (ref 4–12)
NEUTROPHILS # BLD AUTO: 5.35 THOUSANDS/ΜL (ref 1.85–7.62)
NEUTS SEG NFR BLD AUTO: 74 % (ref 43–75)
NRBC BLD AUTO-RTO: 0 /100 WBCS
PLATELET # BLD AUTO: 246 THOUSANDS/UL (ref 149–390)
PMV BLD AUTO: 10.1 FL (ref 8.9–12.7)
POTASSIUM SERPL-SCNC: 4.5 MMOL/L (ref 3.5–5.3)
PROT SERPL-MCNC: 7.9 G/DL (ref 6.4–8.2)
RBC # BLD AUTO: 4.17 MILLION/UL (ref 3.81–5.12)
SODIUM SERPL-SCNC: 141 MMOL/L (ref 136–145)
TIBC SERPL-MCNC: 264 UG/DL (ref 250–450)
TRIGL SERPL-MCNC: 147 MG/DL
TSH SERPL DL<=0.05 MIU/L-ACNC: 0.93 UIU/ML (ref 0.36–3.74)
WBC # BLD AUTO: 7.14 THOUSAND/UL (ref 4.31–10.16)

## 2021-11-10 PROCEDURE — 36415 COLL VENOUS BLD VENIPUNCTURE: CPT

## 2021-11-10 PROCEDURE — 83540 ASSAY OF IRON: CPT

## 2021-11-10 PROCEDURE — 83550 IRON BINDING TEST: CPT

## 2021-11-10 PROCEDURE — 82728 ASSAY OF FERRITIN: CPT

## 2021-11-10 PROCEDURE — 85025 COMPLETE CBC W/AUTO DIFF WBC: CPT

## 2021-11-10 PROCEDURE — 80053 COMPREHEN METABOLIC PANEL: CPT

## 2021-11-10 PROCEDURE — 83036 HEMOGLOBIN GLYCOSYLATED A1C: CPT

## 2021-11-10 PROCEDURE — 84443 ASSAY THYROID STIM HORMONE: CPT

## 2021-11-10 PROCEDURE — 80061 LIPID PANEL: CPT

## 2021-11-18 ENCOUNTER — OFFICE VISIT (OUTPATIENT)
Dept: FAMILY MEDICINE CLINIC | Facility: CLINIC | Age: 86
End: 2021-11-18
Payer: COMMERCIAL

## 2021-11-18 VITALS
HEART RATE: 79 BPM | DIASTOLIC BLOOD PRESSURE: 80 MMHG | WEIGHT: 159 LBS | TEMPERATURE: 97.6 F | OXYGEN SATURATION: 95 % | SYSTOLIC BLOOD PRESSURE: 128 MMHG | BODY MASS INDEX: 28.17 KG/M2 | HEIGHT: 63 IN

## 2021-11-18 DIAGNOSIS — M85.89 OSTEOPENIA OF MULTIPLE SITES: ICD-10-CM

## 2021-11-18 DIAGNOSIS — E03.8 SUBCLINICAL HYPOTHYROIDISM: Chronic | ICD-10-CM

## 2021-11-18 DIAGNOSIS — K51.919 ULCERATIVE COLITIS WITH COMPLICATION, UNSPECIFIED LOCATION (HCC): ICD-10-CM

## 2021-11-18 DIAGNOSIS — N18.32 TYPE 2 DIABETES MELLITUS WITH STAGE 3B CHRONIC KIDNEY DISEASE, WITHOUT LONG-TERM CURRENT USE OF INSULIN (HCC): Primary | Chronic | ICD-10-CM

## 2021-11-18 DIAGNOSIS — I48.11 LONGSTANDING PERSISTENT ATRIAL FIBRILLATION (HCC): Chronic | ICD-10-CM

## 2021-11-18 DIAGNOSIS — I48.0 PAROXYSMAL ATRIAL FIBRILLATION (HCC): ICD-10-CM

## 2021-11-18 DIAGNOSIS — E78.2 MIXED HYPERLIPIDEMIA: ICD-10-CM

## 2021-11-18 DIAGNOSIS — G89.29 CHRONIC LEFT SHOULDER PAIN: ICD-10-CM

## 2021-11-18 DIAGNOSIS — N18.31 STAGE 3A CHRONIC KIDNEY DISEASE (HCC): ICD-10-CM

## 2021-11-18 DIAGNOSIS — I10 BENIGN ESSENTIAL HYPERTENSION: Chronic | ICD-10-CM

## 2021-11-18 DIAGNOSIS — M25.512 CHRONIC LEFT SHOULDER PAIN: ICD-10-CM

## 2021-11-18 DIAGNOSIS — Z93.3 COLOSTOMY IN PLACE (HCC): ICD-10-CM

## 2021-11-18 DIAGNOSIS — Z23 FLU VACCINE NEED: ICD-10-CM

## 2021-11-18 DIAGNOSIS — R13.14 PHARYNGOESOPHAGEAL DYSPHAGIA: ICD-10-CM

## 2021-11-18 DIAGNOSIS — I51.89 DIASTOLIC DYSFUNCTION: ICD-10-CM

## 2021-11-18 DIAGNOSIS — H90.0 CONDUCTIVE HEARING LOSS, BILATERAL: ICD-10-CM

## 2021-11-18 DIAGNOSIS — E11.22 TYPE 2 DIABETES MELLITUS WITH STAGE 3B CHRONIC KIDNEY DISEASE, WITHOUT LONG-TERM CURRENT USE OF INSULIN (HCC): Primary | Chronic | ICD-10-CM

## 2021-11-18 DIAGNOSIS — I05.9 MITRAL ANNULAR CALCIFICATION: ICD-10-CM

## 2021-11-18 DIAGNOSIS — Z79.01 ANTICOAGULANT LONG-TERM USE: ICD-10-CM

## 2021-11-18 PROBLEM — Z00.00 MEDICARE ANNUAL WELLNESS VISIT, SUBSEQUENT: Status: RESOLVED | Noted: 2020-03-11 | Resolved: 2021-11-18

## 2021-11-18 PROCEDURE — 1036F TOBACCO NON-USER: CPT | Performed by: NURSE PRACTITIONER

## 2021-11-18 PROCEDURE — G0008 ADMIN INFLUENZA VIRUS VAC: HCPCS | Performed by: FAMILY MEDICINE

## 2021-11-18 PROCEDURE — 99214 OFFICE O/P EST MOD 30 MIN: CPT | Performed by: NURSE PRACTITIONER

## 2021-11-18 PROCEDURE — 3725F SCREEN DEPRESSION PERFORMED: CPT | Performed by: NURSE PRACTITIONER

## 2021-11-18 PROCEDURE — 1160F RVW MEDS BY RX/DR IN RCRD: CPT | Performed by: NURSE PRACTITIONER

## 2021-11-18 PROCEDURE — 90662 IIV NO PRSV INCREASED AG IM: CPT | Performed by: FAMILY MEDICINE

## 2021-11-18 RX ORDER — AMLODIPINE BESYLATE 10 MG/1
10 TABLET ORAL DAILY
Qty: 90 TABLET | Refills: 3 | Status: SHIPPED | OUTPATIENT
Start: 2021-11-18 | End: 2022-06-09

## 2021-11-18 RX ORDER — LOSARTAN POTASSIUM 100 MG/1
100 TABLET ORAL DAILY
Qty: 90 TABLET | Refills: 3 | Status: SHIPPED | OUTPATIENT
Start: 2021-11-18

## 2021-11-18 RX ORDER — ATORVASTATIN CALCIUM 20 MG/1
20 TABLET, FILM COATED ORAL DAILY
Qty: 90 TABLET | Refills: 3 | Status: SHIPPED | OUTPATIENT
Start: 2021-11-18 | End: 2022-06-09

## 2021-11-18 RX ORDER — ATENOLOL 100 MG/1
100 TABLET ORAL DAILY
Qty: 90 TABLET | Refills: 3 | Status: SHIPPED | OUTPATIENT
Start: 2021-11-18 | End: 2022-06-09

## 2021-11-19 DIAGNOSIS — G89.29 CHRONIC LEFT SHOULDER PAIN: ICD-10-CM

## 2021-11-19 DIAGNOSIS — M25.512 CHRONIC LEFT SHOULDER PAIN: ICD-10-CM

## 2021-12-07 ENCOUNTER — TELEPHONE (OUTPATIENT)
Dept: FAMILY MEDICINE CLINIC | Facility: CLINIC | Age: 86
End: 2021-12-07

## 2022-02-17 ENCOUNTER — HOSPITAL ENCOUNTER (OUTPATIENT)
Dept: RADIOLOGY | Facility: HOSPITAL | Age: 87
Discharge: HOME | End: 2022-02-17
Payer: COMMERCIAL

## 2022-02-17 DIAGNOSIS — R13.14 PHARYNGOESOPHAGEAL DYSPHAGIA: ICD-10-CM

## 2022-02-17 PROCEDURE — 92611 MOTION FLUOROSCOPY/SWALLOW: CPT

## 2022-02-17 PROCEDURE — 74230 X-RAY XM SWLNG FUNCJ C+: CPT

## 2022-02-17 NOTE — PROCEDURES
Speech-Language Pathology Video Barium Swallow Study        Patient Name: Maci Room    Today's Date: 2/17/2022     Problem List  Patient Active Problem List   Diagnosis    Benign essential hypertension    At[facility] fibrillation ([facility] )    Anticoagulant long-term use    Mixed hyperlipidemia    Diastolic dysfunction    Mitral annular calcification    Chronic kidney disease, stage III (moderate) (HCC)    Colostomy in place ([facility] )    Osteopenia of multiple sites    Subclinical hypothyroidism    Type 2 diabetes mellitus with stage 3 chronic kidney disease, without long-term current use of insulin [facility]    BMI 28 0-28 9,adult    Ulcerative colitis with complication [facility]    Conductive hearing loss, bilateral    Chronic left shoulder pain    Pharyngoesophageal dysphagia       Past Medical History  Past Medical History:   Diagnosis Date    A-fib ([place] [address] )     Chronic kidney disease     [facility] present ([facility] )     [facility]     Hyperlipidemia     Hypertension        Past Surgical History  Past Surgical History:   Procedure Laterality Date    APPENDECTOMY      CARPAL TUNNEL RELEASE Bilateral     CATARACT EXTRACTION      CHOLECYSTECTOMY      COLON SURGERY      COLOSTOMY      HYSTERECTOMY           General Information;  79 yo female referred to [facility]  for a VBS by SULEMA Najera for unknown complaints  Pt suspects referral was made 2/2 occasional difficulty swallowing pills       Swallow Information   Current Risks for Dysphagia & Aspiration: reported new dysphagia     Current Symptoms/Concerns: None reported    Current Diet: regular diet and thin liquids      Baseline Diet: regular diet and thin liquids      Baseline Assessment   Behavior/Cognition: alert    Speech/Language Status: able to participate in conversation    Patient Positioning: Laterally at 90 degrees      Speech/Swallow Mech: Oral motor movements appeared    Facial: symmetrical  Labial: WFL  Lingual: WFL  Velum: symmetrical  Mandible: adequate ROM  Dentition: adequate  - partial bridge (upper)  Vocal quality:clear/adequate   Volitional Cough: strong/productive   Tracheostomy: n/a  Respiratory: RA    Previous VBS: None    Consistencies Assessed and Performance   Pt was seen in radiology for a Video Barium Swallow Study, seated in the upright position and viewed laterally with the following consistencies: thin liquids, puree, hard solids and mixed consistency and 13mm barium pill with water  Results are as follows:   Oral stage; Pt presents with minimal oral dysphagia characterized by prolonged oral processing and transfer of whole pill with water  Otherwise GWFL  Pharyngeal stage; Pt presents with WFL pharyngeal dysphagia characterized by timely swallow response for current age  No evidence of penetration, aspiration, pharyngeal residue or retention  Esophageal stage;  Esophageal screening was completed  Barium tablet noted to travel through the esophagus with no apparent obstruction/retention or reflux  Assessment Summary;  Pts oropharyngeal swallow function appears generally WFL at this time with the materials administered today  *Images available for direct review in PACS     Diagnosis/Prognosis;  WFL oropharyngeal dysphagia    good prognosis for continued safe po intake given the above findings  Prognosis Considerations: age    Recommendations; Recommend regular diet and thin liquids  Recommended Form of Meds: whole with puree     Results reviewed with patient and Shane Zavala [address], [address] [facility]  Speech-Language Pathologist  [place] #[id number]  [place] #[id number]

## 2022-05-12 ENCOUNTER — APPOINTMENT (OUTPATIENT)
Dept: LAB | Facility: CLINIC | Age: 87
End: 2022-05-12
Payer: COMMERCIAL

## 2022-05-12 DIAGNOSIS — E03.8 SUBCLINICAL HYPOTHYROIDISM: Chronic | ICD-10-CM

## 2022-05-12 DIAGNOSIS — E11.22 TYPE 2 DIABETES MELLITUS WITH STAGE 3B CHRONIC KIDNEY DISEASE, WITHOUT LONG-TERM CURRENT USE OF INSULIN (HCC): Chronic | ICD-10-CM

## 2022-05-12 DIAGNOSIS — E78.2 MIXED HYPERLIPIDEMIA: ICD-10-CM

## 2022-05-12 DIAGNOSIS — I48.0 PAROXYSMAL ATRIAL FIBRILLATION (HCC): ICD-10-CM

## 2022-05-12 DIAGNOSIS — I10 BENIGN ESSENTIAL HYPERTENSION: Chronic | ICD-10-CM

## 2022-05-12 DIAGNOSIS — N18.32 TYPE 2 DIABETES MELLITUS WITH STAGE 3B CHRONIC KIDNEY DISEASE, WITHOUT LONG-TERM CURRENT USE OF INSULIN (HCC): Chronic | ICD-10-CM

## 2022-05-12 DIAGNOSIS — M85.89 OSTEOPENIA OF MULTIPLE SITES: ICD-10-CM

## 2022-05-12 LAB
25(OH)D3 SERPL-MCNC: 25.7 NG/ML (ref 30–100)
ALBUMIN SERPL BCP-MCNC: 3.5 G/DL (ref 3.5–5)
ALP SERPL-CCNC: 89 U/L (ref 46–116)
ALT SERPL W P-5'-P-CCNC: 20 U/L (ref 12–78)
ANION GAP SERPL CALCULATED.3IONS-SCNC: 5 MMOL/L (ref 4–13)
AST SERPL W P-5'-P-CCNC: 18 U/L (ref 5–45)
BASOPHILS # BLD AUTO: 0.04 THOUSANDS/ΜL (ref 0–0.1)
BASOPHILS NFR BLD AUTO: 1 % (ref 0–1)
BILIRUB SERPL-MCNC: 0.39 MG/DL (ref 0.2–1)
BUN SERPL-MCNC: 21 MG/DL (ref 5–25)
CALCIUM SERPL-MCNC: 9.5 MG/DL (ref 8.3–10.1)
CHLORIDE SERPL-SCNC: 108 MMOL/L (ref 100–108)
CHOLEST SERPL-MCNC: 211 MG/DL
CO2 SERPL-SCNC: 27 MMOL/L (ref 21–32)
CREAT SERPL-MCNC: 1.15 MG/DL (ref 0.6–1.3)
CREAT UR-MCNC: 169 MG/DL
EOSINOPHIL # BLD AUTO: 0.13 THOUSAND/ΜL (ref 0–0.61)
EOSINOPHIL NFR BLD AUTO: 2 % (ref 0–6)
ERYTHROCYTE [DISTWIDTH] IN BLOOD BY AUTOMATED COUNT: 12.8 % (ref 11.6–15.1)
GFR SERPL CREATININE-BSD FRML MDRD: 41 ML/MIN/1.73SQ M
GLUCOSE P FAST SERPL-MCNC: 170 MG/DL (ref 65–99)
HCT VFR BLD AUTO: 40.9 % (ref 34.8–46.1)
HDLC SERPL-MCNC: 59 MG/DL
HGB BLD-MCNC: 13.1 G/DL (ref 11.5–15.4)
IMM GRANULOCYTES # BLD AUTO: 0.05 THOUSAND/UL (ref 0–0.2)
IMM GRANULOCYTES NFR BLD AUTO: 1 % (ref 0–2)
LDLC SERPL CALC-MCNC: 121 MG/DL (ref 0–100)
LYMPHOCYTES # BLD AUTO: 1.13 THOUSANDS/ΜL (ref 0.6–4.47)
LYMPHOCYTES NFR BLD AUTO: 15 % (ref 14–44)
MCH RBC QN AUTO: 31.3 PG (ref 26.8–34.3)
MCHC RBC AUTO-ENTMCNC: 32 G/DL (ref 31.4–37.4)
MCV RBC AUTO: 98 FL (ref 82–98)
MICROALBUMIN UR-MCNC: 44 MG/L (ref 0–20)
MICROALBUMIN/CREAT 24H UR: 26 MG/G CREATININE (ref 0–30)
MONOCYTES # BLD AUTO: 0.46 THOUSAND/ΜL (ref 0.17–1.22)
MONOCYTES NFR BLD AUTO: 6 % (ref 4–12)
NEUTROPHILS # BLD AUTO: 5.82 THOUSANDS/ΜL (ref 1.85–7.62)
NEUTS SEG NFR BLD AUTO: 75 % (ref 43–75)
NRBC BLD AUTO-RTO: 0 /100 WBCS
PLATELET # BLD AUTO: 253 THOUSANDS/UL (ref 149–390)
PMV BLD AUTO: 10.8 FL (ref 8.9–12.7)
POTASSIUM SERPL-SCNC: 4.7 MMOL/L (ref 3.5–5.3)
PROT SERPL-MCNC: 7.9 G/DL (ref 6.4–8.2)
RBC # BLD AUTO: 4.18 MILLION/UL (ref 3.81–5.12)
SODIUM SERPL-SCNC: 140 MMOL/L (ref 136–145)
TRIGL SERPL-MCNC: 156 MG/DL
TSH SERPL DL<=0.05 MIU/L-ACNC: 1.37 UIU/ML (ref 0.45–4.5)
WBC # BLD AUTO: 7.63 THOUSAND/UL (ref 4.31–10.16)

## 2022-05-12 PROCEDURE — 82043 UR ALBUMIN QUANTITATIVE: CPT | Performed by: NURSE PRACTITIONER

## 2022-05-12 PROCEDURE — 80061 LIPID PANEL: CPT

## 2022-05-12 PROCEDURE — 85025 COMPLETE CBC W/AUTO DIFF WBC: CPT

## 2022-05-12 PROCEDURE — 82570 ASSAY OF URINE CREATININE: CPT | Performed by: NURSE PRACTITIONER

## 2022-05-12 PROCEDURE — 83036 HEMOGLOBIN GLYCOSYLATED A1C: CPT

## 2022-05-12 PROCEDURE — 80053 COMPREHEN METABOLIC PANEL: CPT

## 2022-05-12 PROCEDURE — 84443 ASSAY THYROID STIM HORMONE: CPT

## 2022-05-12 PROCEDURE — 36415 COLL VENOUS BLD VENIPUNCTURE: CPT

## 2022-05-12 PROCEDURE — 82306 VITAMIN D 25 HYDROXY: CPT

## 2022-05-13 LAB
EST. AVERAGE GLUCOSE BLD GHB EST-MCNC: 171 MG/DL
HBA1C MFR BLD: 7.6 %

## 2022-05-24 DIAGNOSIS — M25.512 CHRONIC LEFT SHOULDER PAIN: ICD-10-CM

## 2022-05-24 DIAGNOSIS — G89.29 CHRONIC LEFT SHOULDER PAIN: ICD-10-CM

## 2022-06-09 ENCOUNTER — OFFICE VISIT (OUTPATIENT)
Dept: FAMILY MEDICINE CLINIC | Facility: CLINIC | Age: 87
End: 2022-06-09
Payer: COMMERCIAL

## 2022-06-09 VITALS
HEIGHT: 63 IN | DIASTOLIC BLOOD PRESSURE: 80 MMHG | SYSTOLIC BLOOD PRESSURE: 132 MMHG | HEART RATE: 82 BPM | OXYGEN SATURATION: 97 % | BODY MASS INDEX: 28.49 KG/M2 | TEMPERATURE: 97.8 F | WEIGHT: 160.8 LBS

## 2022-06-09 DIAGNOSIS — R13.14 PHARYNGOESOPHAGEAL DYSPHAGIA: ICD-10-CM

## 2022-06-09 DIAGNOSIS — H90.0 CONDUCTIVE HEARING LOSS, BILATERAL: ICD-10-CM

## 2022-06-09 DIAGNOSIS — I51.89 DIASTOLIC DYSFUNCTION: ICD-10-CM

## 2022-06-09 DIAGNOSIS — Z93.3 COLOSTOMY IN PLACE (HCC): ICD-10-CM

## 2022-06-09 DIAGNOSIS — Z00.00 MEDICARE ANNUAL WELLNESS VISIT, SUBSEQUENT: Primary | ICD-10-CM

## 2022-06-09 DIAGNOSIS — I48.11 LONGSTANDING PERSISTENT ATRIAL FIBRILLATION (HCC): Chronic | ICD-10-CM

## 2022-06-09 DIAGNOSIS — Z79.01 ANTICOAGULANT LONG-TERM USE: ICD-10-CM

## 2022-06-09 DIAGNOSIS — K51.919 ULCERATIVE COLITIS WITH COMPLICATION, UNSPECIFIED LOCATION (HCC): ICD-10-CM

## 2022-06-09 DIAGNOSIS — E03.8 SUBCLINICAL HYPOTHYROIDISM: Chronic | ICD-10-CM

## 2022-06-09 DIAGNOSIS — N18.31 STAGE 3A CHRONIC KIDNEY DISEASE (HCC): ICD-10-CM

## 2022-06-09 DIAGNOSIS — N18.32 TYPE 2 DIABETES MELLITUS WITH STAGE 3B CHRONIC KIDNEY DISEASE, WITHOUT LONG-TERM CURRENT USE OF INSULIN (HCC): Chronic | ICD-10-CM

## 2022-06-09 DIAGNOSIS — E11.22 TYPE 2 DIABETES MELLITUS WITH STAGE 3B CHRONIC KIDNEY DISEASE, WITHOUT LONG-TERM CURRENT USE OF INSULIN (HCC): Chronic | ICD-10-CM

## 2022-06-09 DIAGNOSIS — I10 BENIGN ESSENTIAL HYPERTENSION: Chronic | ICD-10-CM

## 2022-06-09 DIAGNOSIS — E78.2 MIXED HYPERLIPIDEMIA: ICD-10-CM

## 2022-06-09 DIAGNOSIS — I05.9 MITRAL ANNULAR CALCIFICATION: ICD-10-CM

## 2022-06-09 PROBLEM — M25.511 CHRONIC RIGHT SHOULDER PAIN: Status: ACTIVE | Noted: 2021-11-18

## 2022-06-09 PROCEDURE — 1170F FXNL STATUS ASSESSED: CPT | Performed by: NURSE PRACTITIONER

## 2022-06-09 PROCEDURE — 3725F SCREEN DEPRESSION PERFORMED: CPT | Performed by: NURSE PRACTITIONER

## 2022-06-09 PROCEDURE — 1160F RVW MEDS BY RX/DR IN RCRD: CPT | Performed by: NURSE PRACTITIONER

## 2022-06-09 PROCEDURE — 99214 OFFICE O/P EST MOD 30 MIN: CPT | Performed by: NURSE PRACTITIONER

## 2022-06-09 PROCEDURE — 1125F AMNT PAIN NOTED PAIN PRSNT: CPT | Performed by: NURSE PRACTITIONER

## 2022-06-09 PROCEDURE — 1036F TOBACCO NON-USER: CPT | Performed by: NURSE PRACTITIONER

## 2022-06-09 PROCEDURE — G0439 PPPS, SUBSEQ VISIT: HCPCS | Performed by: NURSE PRACTITIONER

## 2022-06-09 PROCEDURE — 3288F FALL RISK ASSESSMENT DOCD: CPT | Performed by: NURSE PRACTITIONER

## 2022-06-09 NOTE — ASSESSMENT & PLAN NOTE
Lab Results   Component Value Date    EGFR 41 05/12/2022    EGFR 44 11/10/2021    EGFR 48 05/13/2021    CREATININE 1 15 05/12/2022    CREATININE 1 12 11/10/2021    CREATININE 1 04 05/13/2021   Stable CKD

## 2022-06-09 NOTE — PROGRESS NOTES
Assessment/Plan:         Problem List Items Addressed This Visit        Digestive    Ulcerative colitis with complication ([address] )     Using the mesalamine as needed and is having colostomy and is using intermittent enemas            Relevant Orders    CBC and differential    RESOLVED: Pharyngoesophageal dysphagia       Endocrine    Subclinical hypothyroidism (Chronic)     Thyroid is in normal            Relevant Orders    TSH, 3rd generation with Free T4 reflex    Type 2 diabetes mellitus with stage 3 chronic kidney disease, without long-term current use of insulin (HCC) (Chronic)       Lab Results   Component Value Date    HGBA1C 7 6 (H) 05/12/2022   Not on any current medications            Relevant Orders    Comprehensive metabolic panel    HEMOGLOBIN A1C W/ EAG ESTIMATION       Cardiovascular and Mediastinum    Benign essential hypertension (Chronic)     BP is well controlled on the calcium channel blocker, ARB and BB           Atrial fibrillation (HCC) (Chronic)     No recent episodes of atrial fibrillation and is taking Eliquis and atenolol            Mitral annular calcification     Patient is appearing asymptomatic               Nervous and Auditory    Conductive hearing loss, bilateral       Genitourinary    Chronic kidney disease, stage III (moderate) ([facility])     Lab Results   Component Value Date    EGFR 41 05/12/2022    EGFR 44 11/10/2021    EGFR 48 05/13/2021    CREATININE 1 15 05/12/2022    CREATININE 1 12 11/10/2021    CREATININE 1 04 05/13/2021   Stable CKD              Other    Anticoagulant long-term use    Mixed hyperlipidemia    Relevant Orders    Lipid Panel with Direct LDL reflex    Diastolic dysfunction    Colostomy in place ([facility] )    BMI 28 0-28 9,adult    Medicare annual wellness visit, subsequent - Primary            Subjective:      Patient ID: Edgar Starr is a 80 y o  female  Patient here today for her follow up on her chronic conditions and had her recent labs checked   Patient has no new issues to report  Patient HA1C was 7 6%, CBC, CMP was normal, Lipid panel stable  Patient did take a fall about three months ago and reports that her colostomy bag failed has not happened since  The following portions of the patient's history were reviewed and updated as appropriate:   She  has a past medical history of A-fib ([facility] ), Chronic kidney disease, Colostomy present ([facility] ), Diabetes mellitus ([facility] ), Hyperlipidemia, and Hypertension  She   Patient Active Problem List    Diagnosis Date Noted    Medicare annual wellness visit, subsequent 06/09/2022    Chronic right shoulder pain 11/18/2021    Conductive hearing loss, bilateral 03/11/2020    Ulcerative colitis with complication ([place] [address] ) 33/30/6432    BMI 28 0-28 9,adult 03/06/2019    Type 2 diabetes mellitus with stage 3 chronic kidney disease, without long-term current use of insulin ([facility] ) 11/21/2018    Anticoagulant long-term use 08/14/2018    Mixed hyperlipidemia 92/45/6686    Diastolic dysfunction 59/90/7686    Mitral annular calcification 08/14/2018    Subclinical hypothyroidism 07/30/2018    Atrial fibrillation ([facility] ) 07/24/2018    Benign essential hypertension 07/21/2018    Osteopenia of multiple sites 02/22/2016    Colostomy in place [facility]) 01/27/2016    Chronic kidney disease, stage III (moderate) ([facility] ) 06/24/2015     She  has a past surgical history that includes Colon surgery; Appendectomy; Hysterectomy; Carpal tunnel release (Bilateral); Cholecystectomy; Colostomy; and Cataract extraction  Her family history includes Cancer in her father; Heart disease in her mother  She  reports that she has never smoked  She has never used smokeless tobacco  She reports that she does not drink alcohol and does not use drugs    Current Outpatient Medications   Medication Sig Dispense Refill    amLODIPine (NORVASC) 10 mg tablet Take 1 tablet (10 mg total) by mouth daily 90 tablet 3    apixaban (Eliquis) 5 mg Take 1 tablet (5 mg total) by mouth 2 (two) times a day 180 tablet 3    atenolol (TENORMIN) 100 mg tablet Take 1 tablet (100 mg total) by mouth daily 90 tablet 3    atorvastatin (LIPITOR) 20 mg tablet Take 1 tablet (20 mg total) by mouth daily 90 tablet 3    Blood Glucose Monitoring Suppl (ONE TOUCH ULTRA 2) w/Device KIT by Does not apply route daily 1 each 0    calcium carbonate-vitamin D (OSCAL-D) 500 mg-200 units per tablet Take 1 tablet by mouth 2 (two) times a day with meals      Diclofenac Sodium (VOLTAREN) 1 % APPLY TWO GRAMS TOPICALLY TWO TIMES A  g 1    docusate sodium (COLACE) 100 mg capsule Take 100 mg by mouth 2 (two) times a day      ferrous gluconate (FERGON) 324 mg tablet Take 324 mg by mouth daily with breakfast      glucose blood (ONE TOUCH ULTRA TEST) test strip Test once daily 100 each 3    losartan (COZAAR) 100 MG tablet Take 1 tablet (100 mg total) by mouth daily 90 tablet 3    mesalamine (ASACOL) 800 MG EC tablet TAKE ONE TABLET BY MOUTH THREE TIMES A  tablet 3    ONE TOUCH LANCETS MISC by Does not apply route daily 100 each 3    Ostomy Supplies MISC Use once a week [id number] Barrier 10 each 6    Ostomy Supplies Pouch MISC Use once a week [id number] 10 each 6    polyethylene glycol (MIRALAX) 17 g packet Take 17 g by mouth daily       No current facility-administered medications for this visit  She is allergic to amoxicillin-pot clavulanate       Review of Systems   Constitutional: Negative for activity change, appetite change, chills, diaphoresis, fatigue, fever and unexpected weight change  HENT: Negative for congestion, ear pain, hearing loss, nosebleeds, postnasal drip, sinus pressure, sinus pain, sneezing, sore throat, trouble swallowing and voice change  Eyes: Negative for pain, discharge, redness, itching and visual disturbance  Recent eye exam wearing glasses    Respiratory: Negative for apnea, cough, choking, chest tightness, shortness of breath, wheezing and stridor  Cardiovascular: Negative for chest pain, palpitations and leg swelling  Gastrointestinal: Negative for abdominal distention, abdominal pain, anal bleeding, blood in stool, constipation, diarrhea, nausea, rectal pain and vomiting  Endocrine: Negative  Genitourinary: Negative  Musculoskeletal: Positive for arthralgias and myalgias  Right upper arm pain in the rotator cuff    Skin: Negative  Allergic/Immunologic: Negative  Neurological: Negative for dizziness and light-headedness  Hematological: Negative  Psychiatric/Behavioral: Negative for behavioral problems and dysphoric mood  Objective:      /80 (BP Location: Left arm, Patient Position: Sitting)   Pulse 82   Temp 97 8 °F (36 6 °C) (Temporal)   Ht 5' 3" (1 6 m)   Wt 72 9 kg (160 lb 12 8 oz)   SpO2 97%   BMI 28 48 kg/m²          Physical Exam  Vitals and nursing note reviewed  Constitutional:       General: She is not in acute distress  Appearance: She is well-developed  HENT:      Head: Normocephalic and atraumatic  Right Ear: Tympanic membrane normal       Left Ear: Tympanic membrane normal       Nose: Nose normal       Mouth/Throat:      Mouth: Mucous membranes are moist    Eyes:      Pupils: Pupils are equal, round, and reactive to light  Neck:      Thyroid: No thyromegaly  Cardiovascular:      Rate and Rhythm: Normal rate and regular rhythm  Pulses: no weak pulses          Dorsalis pedis pulses are 2+ on the right side and 2+ on the left side  Posterior tibial pulses are 2+ on the right side and 2+ on the left side  Heart sounds: Murmur heard  Systolic murmur is present with a grade of 2/6  Pulmonary:      Effort: Pulmonary effort is normal  No respiratory distress  Breath sounds: Normal breath sounds  No wheezing  Abdominal:      General: Bowel sounds are normal       Palpations: Abdomen is soft  Musculoskeletal:         General: Normal range of motion  Cervical back: Normal range of motion  Right lower leg: No edema  Left lower leg: No edema  Feet:      Right foot:      Skin integrity: No ulcer, skin breakdown, erythema, warmth, callus or dry skin  Left foot:      Skin integrity: No ulcer, skin breakdown, erythema, warmth, callus or dry skin  Skin:     General: Skin is warm and dry  Neurological:      General: No focal deficit present  Mental Status: She is alert and oriented to person, place, and time  Psychiatric:         Mood and Affect: Mood normal          Behavior: Behavior normal          Thought Content: Thought content normal          Judgment: Judgment normal          Patient's shoes and socks removed  Right Foot/Ankle   Right Foot Inspection  Skin Exam: skin normal and skin intact  No dry skin, no warmth, no callus, no erythema, no maceration, no abnormal color, no pre-ulcer, no ulcer and no callus  Toe Exam: ROM and strength within normal limits  Sensory   Vibration: intact  Proprioception: intact  Monofilament testing: intact    Vascular  Capillary refills: < 3 seconds  The right DP pulse is 2+  The right PT pulse is 2+  Left Foot/Ankle  Left Foot Inspection  Skin Exam: skin normal and skin intact  No dry skin, no warmth, no erythema, no maceration, normal color, no pre-ulcer, no ulcer and no callus  Toe Exam: ROM and strength within normal limits  Sensory   Vibration: intact  Proprioception: intact  Monofilament testing: intact    Vascular  Capillary refills: < 3 seconds  The left DP pulse is 2+  The left PT pulse is 2+       Assign Risk Category  No deformity present  No loss of protective sensation  No weak pulses  Risk: 0

## 2022-06-09 NOTE — PROGRESS NOTES
Assessment and Plan:     Problem List Items Addressed This Visit        Digestive    [facility] complication [facility] )    Pharyngoesophageal dysphagia       Endocrine    Subclinical hypothyroidism (Chronic)    Type 2 diabetes mellitus with stage 3 chronic kidney disease, without long-term current use of insulin (HCC) (Chronic)       Cardiovascular and Mediastinum    Benign essential hypertension (Chronic)    Atrial fibrillation (HCC) (Chronic)    Mitral annular calcification       Nervous and Auditory    Conductive hearing loss, bilateral       Genitourinary    Chronic kidney disease, stage III (moderate) (HCC)       Other    Anticoagulant long-term use    Mixed hyperlipidemia    Diastolic dysfunction    Colostomy in place ([facility] )    BMI 28 0-28 9,adult    Medicare annual wellness visit, subsequent - Primary        BMI Counseling: Body mass index is 28 48 kg/m²  The BMI is above normal  Nutrition recommendations include decreasing portion sizes, encouraging healthy choices of fruits and vegetables, decreasing fast food intake, consuming healthier snacks, limiting drinks that contain sugar, moderation in carbohydrate intake, increasing intake of lean protein, reducing intake of saturated and trans fat and reducing intake of cholesterol  Exercise recommendations include moderate physical activity 150 minutes/week  No pharmacotherapy was ordered  Patient referred to PCP  Rationale for BMI follow-up plan is due to patient being overweight or obese  Depression Screening and Follow-up Plan: Patient was screened for depression during today's encounter  They screened negative with a PHQ-2 score of 0  Preventive health issues were discussed with patient, and age appropriate screening tests were ordered as noted in patient's After Visit Summary  Personalized health advice and appropriate referrals for health education or preventive services given if needed, as noted in patient's After Visit Summary  History of Present Illness:     Patient presents for Medicare Annual Wellness visit    Patient Care Team:  SULEMA Carrillo as PCP - General (Family Medicine)  Radha Montano MD as PCP - [address] [place] [address] (RTE)  Radha Montano MD as PCP - [facility] (RTE)     Problem List:     Patient Active Problem List   Diagnosis    Benign essential hypertension    Atrial fibrillation ([facility] )    Anticoagulant long-term use    Mixed hyperlipidemia    Diastolic dysfunction    Mitral annular calcification    Chronic kidney disease, stage III (moderate) ([facility] )    Colostomy in place ([facility] )    Osteopenia of multiple sites    Subclinical hypothyroidism    Type 2 diabetes mellitus with stage 3 chronic kidney disease, without long-term current use of insulin ([facility] )    BMI 28 0-28 9,adult    Ulcerative colitis with complication ([facility] )    Conductive hearing loss, bilateral    Chronic left shoulder pain    Pharyngoesophageal dysphagia    Medicare annual wellness visit, subsequent      Past Medical and Surgical History:     Past Medical History:   Diagnosis Date    A-fib ([facility] )     Chronic kidney disease     Colostomy present ([facility] )     Diabetes mellitus ([facility] )     Hyperlipidemia     Hypertension      Past Surgical History:   Procedure Laterality Date    APPENDECTOMY      CARPAL TUNNEL RELEASE Bilateral     CATARACT EXTRACTION      CHOLECYSTECTOMY      COLON SURGERY      COLOSTOMY      HYSTERECTOMY        Family History:     Family History   Problem Relation Age of Onset    Heart disease Mother     Cancer Father       Social History:     Social History     Socioeconomic History    Marital status:       Spouse name: None    Number of children: None    Years of education: None    Highest education level: None   Occupational History    None   Tobacco Use    Smoking status: Never Smoker    Smokeless tobacco: Never Used   Substance and Sexual Activity    Alcohol use: Never    Drug use: No    Sexual activity: None   Other Topics Concern    None   Social History Narrative          Social Determinants of Health     Financial Resource Strain: Not on file   Food Insecurity: Not on file   Transportation Needs: Not on file   Physical Activity: Not on file   Stress: Not on file   Social Connections: Not on file   Intimate Partner Violence: Not on file   Housing Stability: Not on file      Medications and Allergies:     Current Outpatient Medications   Medication Sig Dispense Refill    amLODIPine (NORVASC) 10 mg tablet Take 1 tablet (10 mg total) by mouth daily 90 tablet 3    apixaban (Eliquis) 5 mg Take 1 tablet (5 mg total) by mouth 2 (two) times a day 180 tablet 3    atenolol (TENORMIN) 100 mg tablet Take 1 tablet (100 mg total) by mouth daily 90 tablet 3    atorvastatin (LIPITOR) 20 mg tablet Take 1 tablet (20 mg total) by mouth daily 90 tablet 3    Blood Glucose Monitoring Suppl (ONE TOUCH ULTRA 2) w/Device KIT by Does not apply route daily 1 each 0    calcium carbonate-vitamin D (OSCAL-D) 500 mg-200 units per tablet Take 1 tablet by mouth 2 (two) times a day with meals      Diclofenac Sodium (VOLTAREN) 1 % APPLY TWO GRAMS TOPICALLY TWO TIMES A  g 1    docusate sodium (COLACE) 100 mg capsule Take 100 mg by mouth 2 (two) times a day      ferrous gluconate (FERGON) 324 mg tablet Take 324 mg by mouth daily with breakfast      glucose blood (ONE TOUCH ULTRA TEST) test strip Test once daily 100 each 3    losartan (COZAAR) 100 MG tablet Take 1 tablet (100 mg total) by mouth daily 90 tablet 3    mesalamine (ASACOL) 800 MG EC tablet TAKE ONE TABLET BY MOUTH THREE TIMES A  tablet 3    ONE TOUCH LANCETS MISC by Does not apply route daily 100 each 3    Ostomy Supplies MISC Use once a week [id number] Barrier 10 each 6    Ostomy Supplies Pouch MISC Use once a week [id number] 10 each 6    polyethylene glycol (MIRALAX) 17 g packet Take 17 g by mouth daily       No current facility-administered medications for this visit  Allergies   Allergen Reactions    Amoxicillin-Pot Clavulanate Other (See Comments)     C-DIFF, x's 2  developed c diff taking drug      Immunizations:     Immunization History   Administered Date(s) Administered    COVID-19 PFIZER VACCINE 0 3 ML IM 03/11/2021, 04/07/2021    INFLUENZA 10/28/2011, 11/12/2013, 10/07/2014, 09/29/2015, 09/21/2016, 09/19/2017, 10/03/2018, 10/03/2018, 09/17/2019    Influenza Split High Dose Preservative Free IM 09/17/2019    Influenza, high dose seasonal 0 7 mL 11/18/2020, 11/18/2021    Influenza, seasonal, injectable 10/28/2011, 11/12/2013, 09/19/2017    Pneumococcal Conjugate 13-Valent 08/03/2015    Pneumococcal Conjugate PCV 7 01/01/2008    Pneumococcal Polysaccharide PPV23 01/01/2008, 05/18/2021    Tdap 08/10/2016    Zoster 03/03/2016      Health Maintenance: There are no preventive care reminders to display for this patient  Topic Date Due    COVID-19 Vaccine (3 - Booster for Proton Therapy series) 09/07/2021      Medicare Health Risk Assessment:     /80 (BP Location: Left arm, Patient Position: Sitting)   Pulse 82   Temp 97 8 °F (36 6 °C) (Temporal)   Ht 5' 3" (1 6 m)   Wt 72 9 kg (160 lb 12 8 oz)   SpO2 97%   BMI 28 48 kg/m²      Alfonza Lesch is here for her Subsequent Wellness visit  Last Medicare Wellness visit information reviewed, patient interviewed and updates made to the record today  Health Risk Assessment:   Patient rates overall health as very good  Patient feels that their physical health rating is same  Patient is very satisfied with their life  Eyesight was rated as same  Hearing was rated as same  Patient feels that their emotional and mental health rating is much better  Patients states they are never, rarely angry  Patient states they are sometimes unusually tired/fatigued  Pain experienced in the last 7 days has been some  Patient's pain rating has been 5/10   Patient states that she has experienced no weight loss or gain in last 6 months  Depression Screening:   PHQ-2 Score: 0      Fall Risk Screening: In the past year, patient has experienced: history of falling in past year    Number of falls: 1  Injured during fall?: No      Urinary Incontinence Screening:   Patient has leaked urine accidently in the last six months  Home Safety:  Patient does not have trouble with stairs inside or outside of their home  Patient has no working smoke alarms and has no working carbon monoxide detector  Home safety hazards include: none  Nutrition:   Current diet is Diabetic  Medications:   Patient is not currently taking any over-the-counter supplements  Patient is able to manage medications  Activities of Daily Living (ADLs)/Instrumental Activities of Daily Living (IADLs):   Walk and transfer into and out of bed and chair?: Yes  Dress and groom yourself?: Yes    Bathe or shower yourself?: Yes    Feed yourself?  Yes  Do your laundry/housekeeping?: Yes  Manage your money, pay your bills and track your expenses?: Yes  Make your own meals?: Yes    Do your own shopping?: Yes    Previous Hospitalizations:   Any hospitalizations or ED visits within the last 12 months?: No      Advance Care Planning:   Living will: Yes    Advanced directive: Yes    Advanced directive counseling given: Yes    Five wishes given: Yes    End of Life Decisions reviewed with patient: Yes    Provider agrees with end of life decisions: Yes      Cognitive Screening:   Provider or family/friend/caregiver concerned regarding cognition?: No    PREVENTIVE SCREENINGS      Cardiovascular Screening:    General: Screening Not Indicated and History Lipid Disorder      Diabetes Screening:     General: Screening Not Indicated and History Diabetes      Colorectal Cancer Screening:     General: Screening Not Indicated      Breast Cancer Screening:     General: Risks and Benefits Discussed and Screening Not Indicated      Cervical Cancer Screening:    General: Screening Not Indicated and Risks and Benefits Discussed      Osteoporosis Screening:    General: Risks and Benefits Discussed and Screening Not Indicated      Abdominal Aortic Aneurysm (AAA) Screening:        General: Risks and Benefits Discussed and Screening Not Indicated      Lung Cancer Screening:     General: Screening Not Indicated and Risks and Benefits Discussed      Hepatitis C Screening:    General: Risks and Benefits Discussed and Screening Not Indicated    Screening, Brief Intervention, and Referral to Treatment (SBIRT)    Screening  Typical number of drinks in a day: 0    Single Item Drug Screening:  How often have you used an illegal drug (including marijuana) or a prescription medication for non-medical reasons in the past year? never    Single Item Drug Screen Score: 0  Interpretation: Negative screen for possible drug use disorder      SULEMA Obrien

## 2022-06-23 DIAGNOSIS — Z93.3 COLOSTOMY IN PLACE (HCC): ICD-10-CM

## 2022-06-23 NOTE — TELEPHONE ENCOUNTER
Patient called to request we call [employer] for her coloscopy supplies  She has maybe a week or so left

## 2022-06-24 NOTE — TELEPHONE ENCOUNTER
Pended orders for you, [employer] is not listed in parachute and Ostomy supplies are not listed either  This is how you ordered these last time    Please print and sign orders and we will fax to [employer]

## 2022-08-04 DIAGNOSIS — K51.919 ULCERATIVE COLITIS WITH COMPLICATION, UNSPECIFIED LOCATION (HCC): ICD-10-CM

## 2022-08-04 RX ORDER — MESALAMINE 800 MG/1
TABLET, DELAYED RELEASE ORAL
Qty: 270 TABLET | Refills: 3 | Status: SHIPPED | OUTPATIENT
Start: 2022-08-04

## 2022-09-28 ENCOUNTER — RA CDI HCC (OUTPATIENT)
Dept: OTHER | Facility: HOSPITAL | Age: 87
End: 2022-09-28

## 2022-09-29 ENCOUNTER — OFFICE VISIT (OUTPATIENT)
Dept: FAMILY MEDICINE CLINIC | Facility: CLINIC | Age: 87
End: 2022-09-29
Payer: COMMERCIAL

## 2022-09-29 VITALS
SYSTOLIC BLOOD PRESSURE: 126 MMHG | HEIGHT: 63 IN | BODY MASS INDEX: 28.14 KG/M2 | OXYGEN SATURATION: 99 % | TEMPERATURE: 97.7 F | WEIGHT: 158.8 LBS | HEART RATE: 88 BPM | DIASTOLIC BLOOD PRESSURE: 84 MMHG

## 2022-09-29 DIAGNOSIS — E11.22 TYPE 2 DIABETES MELLITUS WITH STAGE 3B CHRONIC KIDNEY DISEASE, WITHOUT LONG-TERM CURRENT USE OF INSULIN (HCC): ICD-10-CM

## 2022-09-29 DIAGNOSIS — S01.01XD SCALP LACERATION, SUBSEQUENT ENCOUNTER: ICD-10-CM

## 2022-09-29 DIAGNOSIS — G89.29 CHRONIC RIGHT SHOULDER PAIN: ICD-10-CM

## 2022-09-29 DIAGNOSIS — N18.32 TYPE 2 DIABETES MELLITUS WITH STAGE 3B CHRONIC KIDNEY DISEASE, WITHOUT LONG-TERM CURRENT USE OF INSULIN (HCC): ICD-10-CM

## 2022-09-29 DIAGNOSIS — E03.8 SUBCLINICAL HYPOTHYROIDISM: Chronic | ICD-10-CM

## 2022-09-29 DIAGNOSIS — M25.511 CHRONIC RIGHT SHOULDER PAIN: ICD-10-CM

## 2022-09-29 DIAGNOSIS — K86.9 PANCREATIC LESION: ICD-10-CM

## 2022-09-29 DIAGNOSIS — Z93.3 COLOSTOMY IN PLACE (HCC): ICD-10-CM

## 2022-09-29 DIAGNOSIS — I48.11 LONGSTANDING PERSISTENT ATRIAL FIBRILLATION (HCC): ICD-10-CM
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
## 2022-09-29 DIAGNOSIS — S72.002D CLOSED FRACTURE OF LEFT HIP WITH ROUTINE HEALING, SUBSEQUENT ENCOUNTER: ICD-10-CM

## 2022-09-29 DIAGNOSIS — K51.919 ULCERATIVE COLITIS WITH COMPLICATION, UNSPECIFIED LOCATION (HCC): ICD-10-CM

## 2022-09-29 DIAGNOSIS — S72.145D CLOSED NONDISPLACED INTERTROCHANTERIC FRACTURE OF LEFT FEMUR WITH ROUTINE HEALING, SUBSEQUENT ENCOUNTER: ICD-10-CM

## 2022-09-29 PROBLEM — S72.145A CLOSED NONDISPLACED INTERTROCHANTERIC FRACTURE OF LEFT FEMUR (HCC): Status: ACTIVE | Noted: 2022-09-29

## 2022-09-29 PROBLEM — S72.002A CLOSED FRACTURE OF LEFT HIP (HCC): Status: ACTIVE | Noted: 2022-09-29

## 2022-09-29 PROCEDURE — 99214 OFFICE O/P EST MOD 30 MIN: CPT | Performed by: NURSE PRACTITIONER

## 2022-09-29 NOTE — ASSESSMENT & PLAN NOTE
S/p surgery repair and is doing well has been to [address] [place] [address] for 2 weeks and is now home for two weeks with services

## 2022-09-29 NOTE — PROGRESS NOTES
Assessment/Plan:         Problem List Items Addressed This Visit        Digestive    Ulcerative colitis with complication ([facility] [address] )     Patient is taking the Mesalamine and is working for her but has high copay         Relevant Orders    Comprehensive metabolic panel    CBC and differential    Pancreatic lesion    Relevant Orders    MRI abdomen w wo contrast and mrcp    Lipase       Endocrine    Subclinical hypothyroidism (Chronic)    Relevant Orders    TSH, 3rd generation with Free T4 reflex    Type 2 diabetes mellitus with stage 3 chronic kidney disease, without long-term current use of insulin (HCC) (Chronic)    Relevant Medications    metFORMIN (GLUCOPHAGE) 500 mg tablet    Other Relevant Orders    Comprehensive metabolic panel    CBC and differential    Lipid Panel with Direct LDL reflex    HEMOGLOBIN A1C W/ EAG ESTIMATION    Microalbumin / creatinine urine ratio       Cardiovascular and Mediastinum    Atrial fibrillation (HCC) (Chronic)     On the Eliquis 5 mg bid and BB            Musculoskeletal and Integument    Closed fracture of left hip (HCC)    Closed nondisplaced intertrochanteric fracture of left femur (HCC)     S/p surgery repair and is doing well has been to [address] [place] [address] for 2 weeks and is now home for two weeks with services             Other    Colostomy in place [facility])    Fall - Primary     Patient has no recurrent falls and will order the rollator          Scalp laceration, subsequent encounter     Patient laceration has healed                  Subjective:      Patient ID: Candida Lewis is a 80 y o  female  Hospital Course   Candida Lewis was admitted on 8/29/2022 to the trauma surgery service for left Intertrochanteric Fx and left scalp laceration s/p mechanical fall  Pt was admitted to med surg in stable condition  Pt underwent appropriate imaging  The above diagnoses were found and consultations were ordered as appropriate  Scalp was closed primarily via staple     Patient was evaluated by orthopedics and underwent ORIF left hip on 8/30  Patient experienced ABLA during the postop period, labs were trended and hemoglobin remained stable  During hospitalization patient experienced positional dizziness/lightheadedness - she was evaluated by hospital medicine and determined to likely be secondary to deconditioning, condition consistently improving at time of discharge  Patient stable for discharge and agreeable to SNF     9/29/2022 Hospital Follow up   Patient here today for her check up from her hospital stay  Patient has been back home about two weeks and patient was in [address] [place] [address] for two weeks  Patient is currently ambulating with a walker  Patient has services coming to her home to help with her rehab and is doing well  She has a slight redness in her right groin  Patient is also having pain right shoulder with using her walker  She was using the topicals She is using a rolling walker and has a  Shower chair     Patient also had been in the hospital in June 2022 and reports that she had an episode of colitis and was treated with flagyl and cipro  CT scan at the time showed    Minimal colitis involving a small segment of descending colon including   involvement of the colostomy  No mechanical bowel obstruction or abscess  No   acute intra-abdominal abnormality  Questionable low-attenuation focus 1 3 cm in   the mid body of the pancreas could represent a lesion are slightly ectatic   ducts  Follow-up can be performed in 6-12 months  The following portions of the patient's history were reviewed and updated as appropriate:   She  has a past medical history of A-fib ([address] ), Chronic kidney disease, Colostomy present ([address] ), Diabetes mellitus ([facility] [address] ), Hyperlipidemia, and Hypertension    She   Patient Active Problem List    Diagnosis Date Noted    Closed fracture of left hip ([facility] [address] ) 09/29/2022    Scalp laceration, subsequent encounter 09/29/2022    Closed nondisplaced intertrochanteric fracture of left femur ([facility] ) 09/29/2022    Pancreatic lesion 09/29/2022    Medicare annual wellness visit, subsequent 06/09/2022    Chronic right shoulder pain 11/18/2021    Fall 11/18/2020    Conductive hearing loss, bilateral 03/11/2020    Ulcerative colitis with complication ([place] [address] ) 79/60/7635    BMI 28 0-28 9,adult 03/06/2019    Type 2 diabetes mellitus with stage 3 chronic kidney disease, without long-term current use of insulin ([place] [address] ) 11/21/2018    Anticoagulant long-term use 08/14/2018    Mixed hyperlipidemia 90/25/9405    Diastolic dysfunction 25/35/9707    Mitral annular calcification 08/14/2018    Subclinical hypothyroidism 07/30/2018    Atrial fibrillation ([place] [address] ) 07/24/2018    Benign essential hypertension 07/21/2018    Osteopenia of multiple sites 02/22/2016    Colostomy in place [facility]) 01/27/2016    Chronic kidney disease, stage III (moderate) ([place] [address] ) 06/24/2015     She  has a past surgical history that includes Colon surgery; Appendectomy; Hysterectomy; Carpal tunnel release (Bilateral); Cholecystectomy; Colostomy; and Cataract extraction  Her family history includes Cancer in her father; Heart disease in her mother  She  reports that she has never smoked  She has never used smokeless tobacco  She reports that she does not drink alcohol and does not use drugs    Current Outpatient Medications   Medication Sig Dispense Refill    amLODIPine (NORVASC) 10 mg tablet Take 1 tablet (10 mg total) by mouth daily 90 tablet 3    apixaban (Eliquis) 5 mg Take 1 tablet (5 mg total) by mouth 2 (two) times a day 180 tablet 3    atenolol (TENORMIN) 100 mg tablet Take 1 tablet (100 mg total) by mouth daily 90 tablet 3    atorvastatin (LIPITOR) 20 mg tablet Take 1 tablet (20 mg total) by mouth daily 90 tablet 3    Blood Glucose Monitoring Suppl (ONE TOUCH ULTRA 2) w/Device KIT by Does not apply route daily 1 each 0    calcium carbonate-vitamin D (OSCAL-D) 500 mg-200 units per tablet Take 1 tablet by mouth 2 (two) times a day with meals      Diclofenac Sodium (VOLTAREN) 1 % APPLY TWO GRAMS TOPICALLY TWO TIMES A  g 1    docusate sodium (COLACE) 100 mg capsule Take 100 mg by mouth 2 (two) times a day      ferrous gluconate (FERGON) 324 mg tablet Take 324 mg by mouth daily with breakfast      glucose blood (ONE TOUCH ULTRA TEST) test strip Test once daily 100 each 3    losartan (COZAAR) 100 MG tablet Take 1 tablet (100 mg total) by mouth daily 90 tablet 3    mesalamine (ASACOL) 800 MG EC tablet TAKE ONE TABLET BY MOUTH THREE TIMES A  tablet 3    metFORMIN (GLUCOPHAGE) 500 mg tablet Take 1 tablet (500 mg total) by mouth 2 (two) times a day with meals 180 tablet 0    ONE TOUCH LANCETS MISC by Does not apply route daily 100 each 3    Ostomy Supplies MISC Use once a week [id number] Barrier 10 each 6    Ostomy Supplies Pouch MISC Use once a week [id number] 10 each 6    polyethylene glycol (MIRALAX) 17 g packet Take 17 g by mouth daily       No current facility-administered medications for this visit  She is allergic to amoxicillin-pot clavulanate       Review of Systems   Constitutional: Negative for activity change, appetite change, chills, diaphoresis, fatigue, fever and unexpected weight change  HENT: Negative for congestion, ear pain, hearing loss, postnasal drip, sinus pressure, sinus pain, sneezing, sore throat and trouble swallowing  Eyes: Negative for pain, redness and visual disturbance  Respiratory: Negative for cough and shortness of breath  Cardiovascular: Negative for chest pain, palpitations and leg swelling  Gastrointestinal: Negative for abdominal pain, diarrhea, nausea and vomiting  Has a colostomy is drianing and had two episodes of her stool having two episodes of severe abdominal pain and then drained large volume of stool prior to hospital stay   Endocrine: Negative  Genitourinary: Negative  Musculoskeletal: Positive for arthralgias and gait problem     Skin: Positive for wound  Allergic/Immunologic: Negative  Neurological: Negative for dizziness and light-headedness  Hematological: Negative  Psychiatric/Behavioral: Negative for behavioral problems and dysphoric mood  Objective:      /84 (BP Location: Left arm, Patient Position: Sitting)   Pulse 88   Temp 97 7 °F (36 5 °C) (Temporal)   Ht 5' 3" (1 6 m)   Wt 72 kg (158 lb 12 8 oz)   SpO2 99%   BMI 28 13 kg/m²          Physical Exam  Vitals and nursing note reviewed  Constitutional:       General: She is not in acute distress  Appearance: She is well-developed and well-groomed  HENT:      Head: Normocephalic and atraumatic  Eyes:      Pupils: Pupils are equal, round, and reactive to light  Neck:      Thyroid: No thyromegaly  Cardiovascular:      Rate and Rhythm: Normal rate and regular rhythm  Heart sounds: Normal heart sounds  No murmur heard  Pulmonary:      Effort: Pulmonary effort is normal  No respiratory distress  Breath sounds: Normal breath sounds  No wheezing  Abdominal:      General: Bowel sounds are normal       Palpations: Abdomen is soft  Musculoskeletal:         General: Normal range of motion  Cervical back: Normal range of motion  Right lower leg: No edema  Left lower leg: No edema  Comments: Walking with rolling walker   Skin:     General: Skin is warm and dry  Neurological:      General: No focal deficit present  Mental Status: She is alert and oriented to person, place, and time  GCS: GCS eye subscore is 4  GCS verbal subscore is 5  GCS motor subscore is 6  Psychiatric:         Attention and Perception: Attention normal          Mood and Affect: Mood normal          Speech: Speech normal          Behavior: Behavior normal  Behavior is cooperative  Thought Content:  Thought content normal          Cognition and Memory: Cognition normal

## 2022-09-29 NOTE — PROGRESS NOTES
[address]  coding opportunities       Chart reviewed, no opportunity found:   [address]        Patients Insurance     Medicare Insurance: [employer]

## 2022-09-30 ENCOUNTER — TELEPHONE (OUTPATIENT)
Dept: FAMILY MEDICINE CLINIC | Facility: CLINIC | Age: 87
End: 2022-09-30

## 2022-09-30 LAB
DME PARACHUTE DELIVERY DATE EXPECTED: NORMAL
DME PARACHUTE DELIVERY DATE REQUESTED: NORMAL
DME PARACHUTE ITEM DESCRIPTION: NORMAL
DME PARACHUTE ITEM DESCRIPTION: NORMAL
DME PARACHUTE ORDER STATUS: NORMAL
DME PARACHUTE SUPPLIER NAME: NORMAL
DME PARACHUTE SUPPLIER PHONE: NORMAL

## 2022-09-30 NOTE — TELEPHONE ENCOUNTER
[facility]  Tom Pharmacist    Candida Joshua is a 80 y o  female who was referred to the pharmacist for medications cost education and management, referred by Bernadette Riggs, [address]       1  High cost Medication Plan  · Will call insurance to get more information on medication cost and to see if an alternative is preferred  · If not, plan to look into patient assistance programs for Eliquis and mesalamine     Follow-up: 1 week       Assessment     1  Maria E Saenz / Constantine Frazier  a  Age >72 yo  b  Resident of [place] for > 90 days   c  PACE Criteria (2022): For a single person, total income must be $14,500 or less  For a  couple, combined total income must be $17,700 or less  d  PACENET Criteria (2022): For a single person, total income can be between $14,500 and $33,500  For a  couple, combined total income can be between $17,700 and $41,500  a  Patient  DOES NOT meet the above criteria    2  Medicare Extra Help  a  Criteria:   b  Full extra help:  i  Income limit: Up to $1,549 ($2,080 for couples) per month  ii  Asset limit: Up to $9,900 ($15,600 for couples)  c  Partial Extra help  i  Income limit: Below $1,719 ($2,309 for couples) per month  ii  Asset limit: Up to $15,510 ($30,950 for couples)  d  Patient DOES NOT meet the above criteria    3  Patient assistance program   a  Program Criteria:   b  Patient meets income criteria  Unsure about spend down criteria for Eliquis (3% of total household income)  Need to get information on spending from pharmacy    Subjective     1  Medication cost-   Eliquis cost (3 month supply) and Mesalamine last fill between $900 - $1000   She is unsure what was the cost of each - she only knows the total    Pharmacist Tracking Tool     Pharmacist Tracking Tool  Reason For Outreach: Embedded Pharmacist  Demographics:  Intervention Method: Phone  Type of Intervention: New  Topics Addressed: Anticoagulation and GI Problem: Patient Care Overview  Goal: Plan of Care Review  Outcome: Ongoing (interventions implemented as appropriate)   05/07/18 1703   Coping/Psychosocial   Plan of Care Reviewed With patient   Plan of Care Review   Progress improving   OTHER   Outcome Summary VSS; post op some mod gas pain in back mostly, encouraged ambulation, medicated as well; also reports nausea, medicated; voided post op; SCD's in use and IS being used as well; wants to walk soon; will continue to monitor     Goal: Individualization and Mutuality  Outcome: Ongoing (interventions implemented as appropriate)    Goal: Discharge Needs Assessment  Outcome: Ongoing (interventions implemented as appropriate)    Goal: Interprofessional Rounds/Family Conf  Outcome: Ongoing (interventions implemented as appropriate)      Problem: Bariatric Surgery (Adult,Pediatric)  Goal: Signs and Symptoms of Listed Potential Problems Will be Absent, Minimized or Managed (Bariatric Surgery)  Outcome: Ongoing (interventions implemented as appropriate)    Goal: Anesthesia/Sedation Recovery  Outcome: Ongoing (interventions implemented as appropriate)         Disorders  Pharmacologic Interventions: N/A  Non-Pharmacologic Interventions: Cost  Time:  Direct Patient Care: 20 mins  Care Coordination: 15 mins  Recommendation Recipient: Patient/Caregiver  Outcome: Accepted

## 2022-10-04 ENCOUNTER — TELEPHONE (OUTPATIENT)
Dept: FAMILY MEDICINE CLINIC | Facility: CLINIC | Age: 87
End: 2022-10-04

## 2022-10-04 NOTE — TELEPHONE ENCOUNTER
[facility]  Marva Arora, Pharmacist    Neysa Najjar is a 80 y o  female who was referred to the pharmacist for medications cost education and management, referred by Bernabe Shah, [address]       1  High cost Medication Plan  · Spoke with insurance- patient in coverage gap phase of medicare  No longer cost alternatives on plan  · Will assist patient with applying for patient assistance program for Eliquis ([facility] program) and Mesalamine DR Tatiana Galarza program)    Follow-up: 1 week       Assessment     1  Gale Saez / [id number]  a  Age >72 yo  b  Resident of [place] for > 90 days   c  PACE Criteria (2022): For a single person, total income must be $14,500 or less  For a  couple, combined total income must be $17,700 or less  d  PACENET Criteria (2022): For a single person, total income can be between $14,500 and $33,500  For a  couple, combined total income can be between $17,700 and $41,500  a  Patient  DOES NOT meet the above criteria    2  Medicare Extra Help  a  Criteria:   b  Full extra help:  i  Income limit: Up to $1,549 ($2,080 for couples) per month  ii  Asset limit: Up to $9,900 ($15,600 for couples)  c  Partial Extra help  i  Income limit: Below $1,719 ($2,309 for couples) per month  ii  Asset limit: Up to $15,510 ($30,950 for couples)  d  Patient DOES NOT meet the above criteria    3  Patient assistance program   a  Program Criteria:   i  Eliquis: Patient meets income criteria  Patient meets spend down criteria for Eliquis (3% of total household income)  [facility] was able to send me a report of this  ii  [employer] (Jeanna)- patient meets income criteria  For medicare they determine financial need based on RX spend at pharmacy  Subjective     1  Medication cost-   Eliquis cost (3 month supply) and Mesalamine last fill between $900 - $1000   She is unsure what was the cost of each - she only knows the total    Pharmacist Tracking Tool     Pharmacist Tracking Tool  Reason For Outreach: Embedded Pharmacist  Demographics:  Intervention Method: Phone  Type of Intervention: New  Topics Addressed: Anticoagulation and GI Disorders  Pharmacologic Interventions: N/A  Non-Pharmacologic Interventions: Cost  Time:  Direct Patient Care: 5 mins  Care Coordination: 60 mins  Recommendation Recipient: Patient/Caregiver  Outcome: Accepted

## 2022-10-11 PROBLEM — Z00.00 MEDICARE ANNUAL WELLNESS VISIT, SUBSEQUENT: Status: RESOLVED | Noted: 2022-06-09 | Resolved: 2022-10-11

## 2022-10-13 LAB
DME PARACHUTE DELIVERY DATE ACTUAL: NORMAL
DME PARACHUTE DELIVERY DATE EXPECTED: NORMAL
DME PARACHUTE DELIVERY DATE REQUESTED: NORMAL
DME PARACHUTE ITEM DESCRIPTION: NORMAL
DME PARACHUTE ITEM DESCRIPTION: NORMAL
DME PARACHUTE ORDER STATUS: NORMAL
DME PARACHUTE SUPPLIER NAME: NORMAL
DME PARACHUTE SUPPLIER PHONE: NORMAL

## 2022-11-09 ENCOUNTER — HOME HEALTH ADMISSION (OUTPATIENT)
Dept: HOME HEALTH SERVICES | Facility: HOME HEALTHCARE | Age: 87
End: 2022-11-09

## 2022-11-09 ENCOUNTER — HOME CARE VISIT (OUTPATIENT)
Dept: HOME HEALTH SERVICES | Facility: HOME HEALTHCARE | Age: 87
End: 2022-11-09

## 2022-11-09 ENCOUNTER — APPOINTMENT (OUTPATIENT)
Dept: RADIOLOGY | Facility: CLINIC | Age: 87
End: 2022-11-09

## 2022-11-09 ENCOUNTER — OFFICE VISIT (OUTPATIENT)
Dept: OBGYN CLINIC | Facility: CLINIC | Age: 87
End: 2022-11-09

## 2022-11-09 VITALS
DIASTOLIC BLOOD PRESSURE: 90 MMHG | WEIGHT: 158 LBS | HEART RATE: 69 BPM | BODY MASS INDEX: 28 KG/M2 | HEIGHT: 63 IN | SYSTOLIC BLOOD PRESSURE: 159 MMHG

## 2022-11-09 DIAGNOSIS — M19.011 ARTHRITIS OF RIGHT GLENOHUMERAL JOINT: Primary | ICD-10-CM

## 2022-11-09 DIAGNOSIS — M75.102 ROTATOR CUFF SYNDROME OF BOTH SHOULDERS: ICD-10-CM

## 2022-11-09 DIAGNOSIS — M75.101 ROTATOR CUFF SYNDROME OF BOTH SHOULDERS: ICD-10-CM

## 2022-11-09 DIAGNOSIS — M75.41 SUBACROMIAL IMPINGEMENT OF RIGHT SHOULDER: ICD-10-CM

## 2022-11-09 DIAGNOSIS — M19.011 ARTHRITIS OF RIGHT GLENOHUMERAL JOINT: ICD-10-CM

## 2022-11-09 RX ORDER — BUPIVACAINE HYDROCHLORIDE 2.5 MG/ML
2 INJECTION, SOLUTION INFILTRATION; PERINEURAL
Status: COMPLETED | OUTPATIENT
Start: 2022-11-09 | End: 2022-11-09

## 2022-11-09 RX ORDER — TRIAMCINOLONE ACETONIDE 40 MG/ML
40 INJECTION, SUSPENSION INTRA-ARTICULAR; INTRAMUSCULAR
Status: COMPLETED | OUTPATIENT
Start: 2022-11-09 | End: 2022-11-09

## 2022-11-09 RX ORDER — LIDOCAINE HYDROCHLORIDE 10 MG/ML
3 INJECTION, SOLUTION INFILTRATION; PERINEURAL
Status: COMPLETED | OUTPATIENT
Start: 2022-11-09 | End: 2022-11-09

## 2022-11-09 RX ORDER — LIDOCAINE HYDROCHLORIDE 10 MG/ML
2 INJECTION, SOLUTION INFILTRATION; PERINEURAL
Status: COMPLETED | OUTPATIENT
Start: 2022-11-09 | End: 2022-11-09

## 2022-11-09 RX ADMIN — BUPIVACAINE HYDROCHLORIDE 2 ML: 2.5 INJECTION, SOLUTION INFILTRATION; PERINEURAL at 15:13

## 2022-11-09 RX ADMIN — LIDOCAINE HYDROCHLORIDE 2 ML: 10 INJECTION, SOLUTION INFILTRATION; PERINEURAL at 15:09

## 2022-11-09 RX ADMIN — BUPIVACAINE HYDROCHLORIDE 2 ML: 2.5 INJECTION, SOLUTION INFILTRATION; PERINEURAL at 15:09

## 2022-11-09 RX ADMIN — TRIAMCINOLONE ACETONIDE 40 MG: 40 INJECTION, SUSPENSION INTRA-ARTICULAR; INTRAMUSCULAR at 15:13

## 2022-11-09 RX ADMIN — LIDOCAINE HYDROCHLORIDE 2 ML: 10 INJECTION, SOLUTION INFILTRATION; PERINEURAL at 15:13

## 2022-11-09 RX ADMIN — LIDOCAINE HYDROCHLORIDE 3 ML: 10 INJECTION, SOLUTION INFILTRATION; PERINEURAL at 15:09

## 2022-11-09 RX ADMIN — TRIAMCINOLONE ACETONIDE 40 MG: 40 INJECTION, SUSPENSION INTRA-ARTICULAR; INTRAMUSCULAR at 15:09

## 2022-11-09 NOTE — PROGRESS NOTES
Assessment/Plan:  Assessment/Plan   Diagnoses and all orders for this visit:    Arthritis of right glenohumeral joint  -     Ambulatory Referral to Orthopedic Surgery  -     XR shoulder 2+ vw right; Future  -     Large joint arthrocentesis: R glenohumeral  -     Referral to [address] [place] [address]; Future    Subacromial impingement of right shoulder  -     Referral to [address] [place] [address]; Future  -     Large joint arthrocentesis: R subacromial bursa    Rotator cuff syndrome of both shoulders  -     Referral to [address] [place] [address]; Future        27-year-old right-hand-dominant female with right shoulder pain more than 2 years duration  Discussed with patient physical exam, radiographs, impression and plan  X-rays right shoulder noted for severe glenohumeral arthritis  Physical right shoulder noted for tenderness proximal humerus  She has range of motion limited to forward flexion 90°, abduction 90°, and internal rotation to the sacrum  She has 4+/5 external rotation and supraspinatus  There is pain with empty can test and Hammer maneuver  She has normal sensation, biceps reflex, and radial pulse both upper extremities  Clinical impression is that she is symptomatic from combination of degenerative changes and rotator cuff pathology  I discussed regimen of steroid injection and formal therapy  Surgery is not warranted at this time  She was advised injections can cause elevation of blood sugar that may last for 1 week  I administered mixtures of 2 cc 1% lidocaine, 2 cc 0 25% bupivacaine, and 1 cc Kenalog to the right shoulder joint and subacromial space without complication  She is to start formal therapy as soon as possible and do home exercises as directed  I  Advised she may apply topical diclofenac gel 3 times a day  She will follow up as needed  Subjective:   Patient ID: Rodriguez Dee is a 80 y o  female    Chief Complaint   Patient presents with   • Right Shoulder - Pain       75-year-old right-hand-dominant female presents for evaluation of right shoulder pain more than 2 years duration  She denies particular trauma  She reports onset of symptoms after doing a lot of painting  She had pain described as gradual in onset, generalized to the shoulder radiating distally along the lateral aspect the upper arm, achy and throbbing, worse with moving the arm, associated limited range of motion, associated with crepitus, and improved with resting  Symptoms started off as mild intensity however have been progressively worsening  She saw primary care provider and was referred to orthopedic care  She has been applying topical diclofenac gel once daily to help with symptoms  Shoulder Pain  This is a chronic problem  The current episode started more than 1 year ago  The problem occurs daily  The problem has been gradually worsening  Associated symptoms include arthralgias, numbness and weakness  Pertinent negatives include no joint swelling  Exacerbated by: Arm movement  She has tried rest (Topical diclofenac) for the symptoms  Review of Systems   Musculoskeletal: Positive for arthralgias  Negative for joint swelling  Neurological: Positive for weakness and numbness  Objective:  Vitals:    11/09/22 1411   BP: 159/90   Pulse: 69   Weight: 71 7 kg (158 lb)   Height: 5' 3" (1 6 m)     Right Hand Exam     Muscle Strength   The patient has normal right wrist strength  Other   Sensation: normal  Pulse: present      Left Hand Exam     Muscle Strength   The patient has normal left wrist strength  Other   Sensation: normal  Pulse: present      Right Elbow Exam     Tenderness   The patient is experiencing no tenderness  Range of Motion   The patient has normal right elbow ROM  Muscle Strength   The patient has normal right elbow strength (5/5 flexion and extension)      Other   Sensation: normal      Left Elbow Exam     Other   Sensation: normal      Right Shoulder Exam     Tenderness   Right shoulder tenderness location: Proximal humerus, anterior aspect  Range of Motion   Active abduction: 90   Forward flexion: 90   Internal rotation 0 degrees: Sacrum     Muscle Strength   Right shoulder normal muscle strength: 4+/5 external rotation and supraspinatus  Abduction: 5/5   Internal rotation: 5/5     Tests   Hammer test: positive    Other   Sensation: normal    Comments:  Pain with empty can test      Left Shoulder Exam     Other   Sensation: normal           Strength/Myotome Testing     Left Wrist/Hand   Normal wrist strength    Right Wrist/Hand   Normal wrist strength      Physical Exam  Vitals and nursing note reviewed  Constitutional:       General: She is not in acute distress  Appearance: She is well-developed  She is not ill-appearing or diaphoretic  HENT:      Head: Normocephalic  Right Ear: External ear normal       Left Ear: External ear normal    Eyes:      Conjunctiva/sclera: Conjunctivae normal    Neck:      Trachea: No tracheal deviation  Cardiovascular:      Rate and Rhythm: Normal rate  Pulmonary:      Effort: Pulmonary effort is normal  No respiratory distress  Abdominal:      General: There is no distension  Musculoskeletal:         General: No swelling, tenderness, deformity or signs of injury  Skin:     General: Skin is warm and dry  Coloration: Skin is not jaundiced or pale  Neurological:      Mental Status: She is alert and oriented to person, place, and time  Psychiatric:         Mood and Affect: Mood normal          Behavior: Behavior normal          Thought Content: Thought content normal          Judgment: Judgment normal          I have personally reviewed pertinent films in PACS and my interpretation is Severe glenohumeral arthritis  Large joint arthrocentesis: R glenohumeral  [place] Protocol:  Consent: Verbal consent obtained    Risks and benefits: risks, benefits and alternatives were discussed  Consent given by: patient  Time out: Immediately prior to procedure a "time out" was called to verify the correct patient, procedure, equipment, support staff and site/side marked as required  Patient understanding: patient states understanding of the procedure being performed  Patient consent: the patient's understanding of the procedure matches consent given  Procedure consent: procedure consent matches procedure scheduled  Relevant documents: relevant documents present and verified  Test results: test results available and properly labeled  Site marked: the operative site was marked  Radiology Images displayed and confirmed  If images not available, report reviewed: imaging studies available  Required items: required blood products, implants, devices, and special equipment available  Patient identity confirmed: verbally with patient    Supporting Documentation  Indications: pain   Procedure Details  Location: shoulder - R glenohumeral  Preparation: Patient was prepped and draped in the usual sterile fashion  Needle gauge: 21G 2"  Ultrasound guidance: no  Approach: posterolateral  Medications administered: 2 mL bupivacaine 0 25 %; 2 mL lidocaine 1 %; 3 mL lidocaine 1 %; 40 mg triamcinolone acetonide 40 mg/mL    Patient tolerance: patient tolerated the procedure well with no immediate complications  Dressing:  Sterile dressing applied    Large joint arthrocentesis: R subacromial bursa  Universal Protocol:  Consent: Verbal consent obtained  Risks and benefits: risks, benefits and alternatives were discussed  Consent given by: patient  Time out: Immediately prior to procedure a "time out" was called to verify the correct patient, procedure, equipment, support staff and site/side marked as required    Patient understanding: patient states understanding of the procedure being performed  Patient consent: the patient's understanding of the procedure matches consent given  Procedure consent: procedure consent matches procedure scheduled  Relevant documents: relevant documents present and verified  Test results: test results available and properly labeled  Site marked: the operative site was marked  Radiology Images displayed and confirmed   If images not available, report reviewed: imaging studies available  Required items: required blood products, implants, devices, and special equipment available  Patient identity confirmed: verbally with patient    Supporting Documentation  Indications: pain   Procedure Details  Location: shoulder - R subacromial bursa  Preparation: Patient was prepped and draped in the usual sterile fashion  Needle gauge: 21G 2"  Ultrasound guidance: no  Approach: posterolateral  Medications administered: 2 mL bupivacaine 0 25 %; 2 mL lidocaine 1 %; 40 mg triamcinolone acetonide 40 mg/mL    Patient tolerance: patient tolerated the procedure well with no immediate complications  Dressing:  Sterile dressing applied

## 2022-11-09 NOTE — CASE COMMUNICATION
Contacted patient to set up [facility] PT start of care for tomorrow  She indicated she already has [facility] seeing her for mobility issues and was going to give them the information for her shoulder  Therapist encouraged her to do so  Therapist explained that if they are unable to followthrough with therapy for the shoulder, she could let her doctors know and we would reschedule  SOC not completed

## 2022-11-09 NOTE — LETTER
November 9, 2022     Garland Baez, [address]  [facility]  [address]    Patient: Porfirio Mcneil   YOB: 1932   Date of Visit: 11/9/2022       Dear Dr Serina Barkley: Thank you for referring Alisha Matt to me for evaluation  Below are my notes for this consultation  If you have questions, please do not hesitate to call me  I look forward to following your patient along with you  Sincerely,        [employer], DO        CC: No Recipients  [employer], DO  11/9/2022  3:14 PM  Sign when Signing Visit  Assessment/Plan:  Assessment/Plan   Diagnoses and all orders for this visit:    Arthritis of right glenohumeral joint  -     Ambulatory Referral to Orthopedic Surgery  -     XR shoulder 2+ vw right; Future  -     Large joint arthrocentesis: R glenohumeral  -     Referral to [address] [place] [address]; Future    Subacromial impingement of right shoulder  -     Referral to [address] [place] [address]; Future  -     Large joint arthrocentesis: R subacromial bursa    Rotator cuff syndrome of both shoulders  -     Referral to [address] [place] [address]; Future        77-year-old right-hand-dominant female with right shoulder pain more than 2 years duration  Discussed with patient physical exam, radiographs, impression and plan  X-rays right shoulder noted for severe glenohumeral arthritis  Physical right shoulder noted for tenderness proximal humerus  She has range of motion limited to forward flexion 90°, abduction 90°, and internal rotation to the sacrum  She has 4+/5 external rotation and supraspinatus  There is pain with empty can test and Hammer maneuver  She has normal sensation, biceps reflex, and radial pulse both upper extremities  Clinical impression is that she is symptomatic from combination of degenerative changes and rotator cuff pathology  I discussed regimen of steroid injection and formal therapy  Surgery is not warranted at this time    She was advised injections can cause elevation of blood sugar that may last for 1 week  I administered mixtures of 2 cc 1% lidocaine, 2 cc 0 25% bupivacaine, and 1 cc Kenalog to the right shoulder joint and subacromial space without complication  She is to start formal therapy as soon as possible and do home exercises as directed  I  Advised she may apply topical diclofenac gel 3 times a day  She will follow up as needed  Subjective:   Patient ID: Sergo Greene is a 80 y o  female  Chief Complaint   Patient presents with   • Right Shoulder - Pain       51-year-old right-hand-dominant female presents for evaluation of right shoulder pain more than 2 years duration  She denies particular trauma  She reports onset of symptoms after doing a lot of painting  She had pain described as gradual in onset, generalized to the shoulder radiating distally along the lateral aspect the upper arm, achy and throbbing, worse with moving the arm, associated limited range of motion, associated with crepitus, and improved with resting  Symptoms started off as mild intensity however have been progressively worsening  She saw primary care provider and was referred to orthopedic care  She has been applying topical diclofenac gel once daily to help with symptoms  Shoulder Pain  This is a chronic problem  The current episode started more than 1 year ago  The problem occurs daily  The problem has been gradually worsening  Associated symptoms include arthralgias, numbness and weakness  Pertinent negatives include no joint swelling  Exacerbated by: Arm movement  She has tried rest (Topical diclofenac) for the symptoms  Review of Systems   Musculoskeletal: Positive for arthralgias  Negative for joint swelling  Neurological: Positive for weakness and numbness         Objective:  Vitals:    11/09/22 1411   BP: 159/90   Pulse: 69   Weight: 71 7 kg (158 lb)   Height: 5' 3" (1 6 m)     Right Hand Exam     Muscle Strength The patient has normal right wrist strength  Other   Sensation: normal  Pulse: present      Left Hand Exam     Muscle Strength   The patient has normal left wrist strength  Other   Sensation: normal  Pulse: present      Right Elbow Exam     Tenderness   The patient is experiencing no tenderness  Range of Motion   The patient has normal right elbow ROM  Muscle Strength   The patient has normal right elbow strength (5/5 flexion and extension)  Other   Sensation: normal      Left Elbow Exam     Other   Sensation: normal      Right Shoulder Exam     Tenderness   Right shoulder tenderness location: Proximal humerus, anterior aspect  Range of Motion   Active abduction: 90   Forward flexion: 90   Internal rotation 0 degrees: Sacrum     Muscle Strength   Right shoulder normal muscle strength: 4+/5 external rotation and supraspinatus  Abduction: 5/5   Internal rotation: 5/5     Tests   Hammer test: positive    Other   Sensation: normal    Comments:  Pain with empty can test      Left Shoulder Exam     Other   Sensation: normal           Strength/Myotome Testing     Left Wrist/Hand   Normal wrist strength    Right Wrist/Hand   Normal wrist strength      Physical Exam  Vitals and nursing note reviewed  Constitutional:       General: She is not in acute distress  Appearance: She is well-developed  She is not ill-appearing or diaphoretic  HENT:      Head: Normocephalic  Right Ear: External ear normal       Left Ear: External ear normal    Eyes:      Conjunctiva/sclera: Conjunctivae normal    Neck:      Trachea: No tracheal deviation  Cardiovascular:      Rate and Rhythm: Normal rate  Pulmonary:      Effort: Pulmonary effort is normal  No respiratory distress  Abdominal:      General: There is no distension  Musculoskeletal:         General: No swelling, tenderness, deformity or signs of injury  Skin:     General: Skin is warm and dry  Coloration: Skin is not jaundiced or pale  Neurological:      Mental Status: She is alert and oriented to person, place, and time  Psychiatric:         Mood and Affect: Mood normal          Behavior: Behavior normal          Thought Content: Thought content normal          Judgment: Judgment normal          I have personally reviewed pertinent films in PACS and my interpretation is Severe glenohumeral arthritis  Large joint arthrocentesis: R glenohumeral  [place] Protocol:  Consent: Verbal consent obtained  Risks and benefits: risks, benefits and alternatives were discussed  Consent given by: patient  Time out: Immediately prior to procedure a "time out" was called to verify the correct patient, procedure, equipment, support staff and site/side marked as required  Patient understanding: patient states understanding of the procedure being performed  Patient consent: the patient's understanding of the procedure matches consent given  Procedure consent: procedure consent matches procedure scheduled  Relevant documents: relevant documents present and verified  Test results: test results available and properly labeled  Site marked: the operative site was marked  Radiology Images displayed and confirmed   If images not available, report reviewed: imaging studies available  Required items: required blood products, implants, devices, and special equipment available  Patient identity confirmed: verbally with patient    Supporting Documentation  Indications: pain   Procedure Details  Location: shoulder - R glenohumeral  Preparation: Patient was prepped and draped in the usual sterile fashion  Needle gauge: 21G 2"  Ultrasound guidance: no  Approach: posterolateral  Medications administered: 2 mL bupivacaine 0 25 %; 2 mL lidocaine 1 %; 3 mL lidocaine 1 %; 40 mg triamcinolone acetonide 40 mg/mL    Patient tolerance: patient tolerated the procedure well with no immediate complications  Dressing:  Sterile dressing applied    Large joint arthrocentesis: R subacromial bursa  Universal Protocol:  Consent: Verbal consent obtained  Risks and benefits: risks, benefits and alternatives were discussed  Consent given by: patient  Time out: Immediately prior to procedure a "time out" was called to verify the correct patient, procedure, equipment, support staff and site/side marked as required  Patient understanding: patient states understanding of the procedure being performed  Patient consent: the patient's understanding of the procedure matches consent given  Procedure consent: procedure consent matches procedure scheduled  Relevant documents: relevant documents present and verified  Test results: test results available and properly labeled  Site marked: the operative site was marked  Radiology Images displayed and confirmed   If images not available, report reviewed: imaging studies available  Required items: required blood products, implants, devices, and special equipment available  Patient identity confirmed: verbally with patient    Supporting Documentation  Indications: pain   Procedure Details  Location: shoulder - R subacromial bursa  Preparation: Patient was prepped and draped in the usual sterile fashion  Needle gauge: 21G 2"  Ultrasound guidance: no  Approach: posterolateral  Medications administered: 2 mL bupivacaine 0 25 %; 2 mL lidocaine 1 %; 40 mg triamcinolone acetonide 40 mg/mL    Patient tolerance: patient tolerated the procedure well with no immediate complications  Dressing:  Sterile dressing applied

## 2022-11-18 ENCOUNTER — APPOINTMENT (OUTPATIENT)
Dept: LAB | Facility: CLINIC | Age: 87
End: 2022-11-18

## 2022-11-18 DIAGNOSIS — E03.8 SUBCLINICAL HYPOTHYROIDISM: Chronic | ICD-10-CM

## 2022-11-18 DIAGNOSIS — K86.9 PANCREATIC LESION: ICD-10-CM

## 2022-11-18 DIAGNOSIS — E11.22 TYPE 2 DIABETES MELLITUS WITH STAGE 3B CHRONIC KIDNEY DISEASE, WITHOUT LONG-TERM CURRENT USE OF INSULIN (HCC): ICD-10-CM

## 2022-11-18 DIAGNOSIS — K51.919 ULCERATIVE COLITIS WITH COMPLICATION, UNSPECIFIED LOCATION (HCC): ICD-10-CM

## 2022-11-18 DIAGNOSIS — N18.32 TYPE 2 DIABETES MELLITUS WITH STAGE 3B CHRONIC KIDNEY DISEASE, WITHOUT LONG-TERM CURRENT USE OF INSULIN (HCC): ICD-10-CM

## 2022-11-18 LAB
ALBUMIN SERPL BCP-MCNC: 3.4 G/DL (ref 3.5–5)
ALP SERPL-CCNC: 132 U/L (ref 46–116)
ALT SERPL W P-5'-P-CCNC: 25 U/L (ref 12–78)
ANION GAP SERPL CALCULATED.3IONS-SCNC: 5 MMOL/L (ref 4–13)
AST SERPL W P-5'-P-CCNC: 15 U/L (ref 5–45)
BASOPHILS # BLD AUTO: 0.05 THOUSANDS/ÂΜL (ref 0–0.1)
BASOPHILS NFR BLD AUTO: 1 % (ref 0–1)
BILIRUB SERPL-MCNC: 0.4 MG/DL (ref 0.2–1)
BUN SERPL-MCNC: 25 MG/DL (ref 5–25)
CALCIUM ALBUM COR SERPL-MCNC: 10.2 MG/DL (ref 8.3–10.1)
CALCIUM SERPL-MCNC: 9.7 MG/DL (ref 8.3–10.1)
CHLORIDE SERPL-SCNC: 108 MMOL/L (ref 96–108)
CHOLEST SERPL-MCNC: 160 MG/DL
CO2 SERPL-SCNC: 26 MMOL/L (ref 21–32)
CREAT SERPL-MCNC: 1.1 MG/DL (ref 0.6–1.3)
EOSINOPHIL # BLD AUTO: 0.1 THOUSAND/ÂΜL (ref 0–0.61)
EOSINOPHIL NFR BLD AUTO: 1 % (ref 0–6)
ERYTHROCYTE [DISTWIDTH] IN BLOOD BY AUTOMATED COUNT: 12.8 % (ref 11.6–15.1)
GFR SERPL CREATININE-BSD FRML MDRD: 44 ML/MIN/1.73SQ M
GLUCOSE P FAST SERPL-MCNC: 153 MG/DL (ref 65–99)
HCT VFR BLD AUTO: 40.2 % (ref 34.8–46.1)
HDLC SERPL-MCNC: 64 MG/DL
HGB BLD-MCNC: 12.6 G/DL (ref 11.5–15.4)
IMM GRANULOCYTES # BLD AUTO: 0.1 THOUSAND/UL (ref 0–0.2)
IMM GRANULOCYTES NFR BLD AUTO: 1 % (ref 0–2)
LDLC SERPL CALC-MCNC: 72 MG/DL (ref 0–100)
LIPASE SERPL-CCNC: 82 U/L (ref 73–393)
LYMPHOCYTES # BLD AUTO: 1.35 THOUSANDS/ÂΜL (ref 0.6–4.47)
LYMPHOCYTES NFR BLD AUTO: 13 % (ref 14–44)
MCH RBC QN AUTO: 30.8 PG (ref 26.8–34.3)
MCHC RBC AUTO-ENTMCNC: 31.3 G/DL (ref 31.4–37.4)
MCV RBC AUTO: 98 FL (ref 82–98)
MONOCYTES # BLD AUTO: 0.72 THOUSAND/ÂΜL (ref 0.17–1.22)
MONOCYTES NFR BLD AUTO: 7 % (ref 4–12)
NEUTROPHILS # BLD AUTO: 8.49 THOUSANDS/ÂΜL (ref 1.85–7.62)
NEUTS SEG NFR BLD AUTO: 77 % (ref 43–75)
NRBC BLD AUTO-RTO: 0 /100 WBCS
PLATELET # BLD AUTO: 291 THOUSANDS/UL (ref 149–390)
PMV BLD AUTO: 10.8 FL (ref 8.9–12.7)
POTASSIUM SERPL-SCNC: 4.6 MMOL/L (ref 3.5–5.3)
PROT SERPL-MCNC: 7.6 G/DL (ref 6.4–8.4)
RBC # BLD AUTO: 4.09 MILLION/UL (ref 3.81–5.12)
SODIUM SERPL-SCNC: 139 MMOL/L (ref 135–147)
TRIGL SERPL-MCNC: 119 MG/DL
TSH SERPL DL<=0.05 MIU/L-ACNC: 1.47 UIU/ML (ref 0.45–4.5)
WBC # BLD AUTO: 10.81 THOUSAND/UL (ref 4.31–10.16)

## 2022-11-19 LAB
EST. AVERAGE GLUCOSE BLD GHB EST-MCNC: 146 MG/DL
HBA1C MFR BLD: 6.7 %

## 2022-11-25 ENCOUNTER — HOSPITAL ENCOUNTER (OUTPATIENT)
Dept: MRI IMAGING | Facility: HOSPITAL | Age: 87
End: 2022-11-25

## 2022-11-25 DIAGNOSIS — K86.9 PANCREATIC LESION: ICD-10-CM

## 2022-11-25 RX ADMIN — GADOBUTROL 7 ML: 604.72 INJECTION INTRAVENOUS at 13:26

## 2022-11-26 DIAGNOSIS — I10 BENIGN ESSENTIAL HYPERTENSION: Chronic | ICD-10-CM

## 2022-11-26 DIAGNOSIS — E78.2 MIXED HYPERLIPIDEMIA: ICD-10-CM

## 2022-11-28 PROBLEM — S01.01XD SCALP LACERATION, SUBSEQUENT ENCOUNTER: Status: RESOLVED | Noted: 2022-09-29 | Resolved: 2022-11-28

## 2022-11-28 RX ORDER — LOSARTAN POTASSIUM 100 MG/1
TABLET ORAL
Qty: 90 TABLET | Refills: 3 | Status: SHIPPED | OUTPATIENT
Start: 2022-11-28

## 2022-11-28 RX ORDER — ATENOLOL 100 MG/1
TABLET ORAL
Qty: 90 TABLET | Refills: 3 | Status: SHIPPED | OUTPATIENT
Start: 2022-11-28

## 2022-11-28 RX ORDER — ATORVASTATIN CALCIUM 20 MG/1
TABLET, FILM COATED ORAL
Qty: 90 TABLET | Refills: 3 | Status: SHIPPED | OUTPATIENT
Start: 2022-11-28

## 2022-11-28 RX ORDER — AMLODIPINE BESYLATE 10 MG/1
TABLET ORAL
Qty: 90 TABLET | Refills: 3 | Status: SHIPPED | OUTPATIENT
Start: 2022-11-28

## 2022-12-09 ENCOUNTER — TELEPHONE (OUTPATIENT)
Dept: FAMILY MEDICINE CLINIC | Facility: CLINIC | Age: 87
End: 2022-12-09

## 2022-12-09 NOTE — TELEPHONE ENCOUNTER
Pt came for an appt  which she thought was today, but isn't until 12/30/22    She is asking that Tracy Coates call her with her MRI results rather then she have to wait until 12/30/22    She will keep the appt but would appreciate hearing from

## 2022-12-22 ENCOUNTER — RA CDI HCC (OUTPATIENT)
Dept: OTHER | Facility: HOSPITAL | Age: 87
End: 2022-12-22

## 2022-12-22 NOTE — PROGRESS NOTES
[facility]  coding opportunities       Chart reviewed, no opportunity found:   [address]        Patients Insurance     Medicare Insurance: [employer]

## 2022-12-30 ENCOUNTER — OFFICE VISIT (OUTPATIENT)
Dept: FAMILY MEDICINE CLINIC | Facility: CLINIC | Age: 87
End: 2022-12-30

## 2022-12-30 ENCOUNTER — APPOINTMENT (EMERGENCY)
Dept: CT IMAGING | Facility: HOSPITAL | Age: 87
End: 2022-12-30

## 2022-12-30 ENCOUNTER — HOSPITAL ENCOUNTER (EMERGENCY)
Facility: HOSPITAL | Age: 87
Discharge: HOME | End: 2022-12-30
Attending: EMERGENCY MEDICINE

## 2022-12-30 VITALS
DIASTOLIC BLOOD PRESSURE: 78 MMHG | TEMPERATURE: 98.6 F | HEART RATE: 82 BPM | OXYGEN SATURATION: 96 % | SYSTOLIC BLOOD PRESSURE: 165 MMHG | RESPIRATION RATE: 16 BRPM

## 2022-12-30 VITALS
OXYGEN SATURATION: 98 % | HEART RATE: 79 BPM | HEIGHT: 63 IN | WEIGHT: 153.2 LBS | BODY MASS INDEX: 27.14 KG/M2 | TEMPERATURE: 98 F | SYSTOLIC BLOOD PRESSURE: 148 MMHG | DIASTOLIC BLOOD PRESSURE: 82 MMHG

## 2022-12-30 DIAGNOSIS — R10.9 ABDOMINAL PAIN: Primary | ICD-10-CM

## 2022-12-30 DIAGNOSIS — R10.84 ABDOMINAL PAIN, ACUTE, GENERALIZED: ICD-10-CM

## 2022-12-30 DIAGNOSIS — K51.919 ULCERATIVE COLITIS WITH COMPLICATION, UNSPECIFIED LOCATION (HCC): Primary | ICD-10-CM

## 2022-12-30 DIAGNOSIS — Z93.3 COLOSTOMY IN PLACE (HCC): ICD-10-CM
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
## 2022-12-30 LAB
ALBUMIN SERPL BCP-MCNC: 3.8 G/DL (ref 3.5–5)
ALP SERPL-CCNC: 103 U/L (ref 34–104)
ALT SERPL W P-5'-P-CCNC: 8 U/L (ref 7–52)
ANION GAP SERPL CALCULATED.3IONS-SCNC: 6 MMOL/L (ref 4–13)
AST SERPL W P-5'-P-CCNC: 13 U/L (ref 13–39)
BASOPHILS # BLD AUTO: 0.04 THOUSANDS/ÂΜL (ref 0–0.1)
BASOPHILS NFR BLD AUTO: 1 % (ref 0–1)
BILIRUB SERPL-MCNC: 0.35 MG/DL (ref 0.2–1)
BUN SERPL-MCNC: 17 MG/DL (ref 5–25)
CALCIUM SERPL-MCNC: 9.7 MG/DL (ref 8.4–10.2)
CHLORIDE SERPL-SCNC: 103 MMOL/L (ref 96–108)
CO2 SERPL-SCNC: 28 MMOL/L (ref 21–32)
CREAT SERPL-MCNC: 0.93 MG/DL (ref 0.6–1.3)
EOSINOPHIL # BLD AUTO: 0.11 THOUSAND/ÂΜL (ref 0–0.61)
EOSINOPHIL NFR BLD AUTO: 1 % (ref 0–6)
ERYTHROCYTE [DISTWIDTH] IN BLOOD BY AUTOMATED COUNT: 13.2 % (ref 11.6–15.1)
GFR SERPL CREATININE-BSD FRML MDRD: 54 ML/MIN/1.73SQ M
GLUCOSE SERPL-MCNC: 147 MG/DL (ref 65–140)
HCT VFR BLD AUTO: 38.1 % (ref 34.8–46.1)
HGB BLD-MCNC: 12.3 G/DL (ref 11.5–15.4)
IMM GRANULOCYTES # BLD AUTO: 0.07 THOUSAND/UL (ref 0–0.2)
IMM GRANULOCYTES NFR BLD AUTO: 1 % (ref 0–2)
LACTATE SERPL-SCNC: 1 MMOL/L (ref 0.5–2)
LIPASE SERPL-CCNC: 12 U/L (ref 11–82)
LYMPHOCYTES # BLD AUTO: 1.01 THOUSANDS/ÂΜL (ref 0.6–4.47)
LYMPHOCYTES NFR BLD AUTO: 12 % (ref 14–44)
MCH RBC QN AUTO: 32 PG (ref 26.8–34.3)
MCHC RBC AUTO-ENTMCNC: 32.3 G/DL (ref 31.4–37.4)
MCV RBC AUTO: 99 FL (ref 82–98)
MONOCYTES # BLD AUTO: 0.57 THOUSAND/ÂΜL (ref 0.17–1.22)
MONOCYTES NFR BLD AUTO: 7 % (ref 4–12)
NEUTROPHILS # BLD AUTO: 6.59 THOUSANDS/ÂΜL (ref 1.85–7.62)
NEUTS SEG NFR BLD AUTO: 78 % (ref 43–75)
NRBC BLD AUTO-RTO: 0 /100 WBCS
PLATELET # BLD AUTO: 259 THOUSANDS/UL (ref 149–390)
PMV BLD AUTO: 9.7 FL (ref 8.9–12.7)
POTASSIUM SERPL-SCNC: 4.2 MMOL/L (ref 3.5–5.3)
PROT SERPL-MCNC: 6.8 G/DL (ref 6.4–8.4)
RBC # BLD AUTO: 3.84 MILLION/UL (ref 3.81–5.12)
SODIUM SERPL-SCNC: 137 MMOL/L (ref 135–147)
WBC # BLD AUTO: 8.39 THOUSAND/UL (ref 4.31–10.16)

## 2022-12-30 RX ORDER — DICYCLOMINE HCL 20 MG
20 TABLET ORAL 2 TIMES DAILY PRN
Qty: 20 TABLET | Refills: 0 | Status: SHIPPED | OUTPATIENT
Start: 2022-12-30

## 2022-12-30 RX ADMIN — IOHEXOL 100 ML: 350 INJECTION, SOLUTION INTRAVENOUS at 15:35

## 2022-12-30 RX ADMIN — SODIUM CHLORIDE 1000 ML: 0.9 INJECTION, SOLUTION INTRAVENOUS at 15:27

## 2022-12-30 NOTE — ED PROVIDER NOTES
History  Chief Complaint   Patient presents with   • Abdominal Pain     Pt referred to ED for eval of intermittent ABD pain, pt reports "extreme pain" at times just prior to bowel emptying into colostomy  Pt reports onset of first episode in June, Dx with a colitis at that time  59-year-old female presents emergency department complaining of intermittent sharp colicky abdominal pain that has her hunch over and almost passed out  The patient had this today and went to her family doctor where they sent her to the ER thinking that she was dehydrated and needed to get her colostomy "checked "  Patient denies any fever chills patient denies vomiting but admits to some nausea  Patient notes that she had a colostomy bag for the last 10 years  Prior to Admission Medications   Prescriptions Last Dose Informant Patient Reported? Taking?    Blood Glucose Monitoring Suppl (ONE TOUCH ULTRA 2) w/Device KIT   No No   Sig: by Does not apply route daily   Diclofenac Sodium (VOLTAREN) 1 %   No No   Sig: APPLY TWO GRAMS TOPICALLY TWO TIMES A DAY   ONE TOUCH LANCETS MISC   No No   Sig: by Does not apply route daily   Ostomy Supplies MISC   No No   Sig: Use once a week [id number] Barrier   Ostomy Supplies Pouch MISC   No No   Sig: Use once a week [id number]   amLODIPine (NORVASC) 10 mg tablet   No No   Sig: TAKE ONE TABLET BY MOUTH EVERY DAY   apixaban (Eliquis) 5 mg   No No   Sig: Take 1 tablet (5 mg total) by mouth 2 (two) times a day   atenolol (TENORMIN) 100 mg tablet   No No   Sig: TAKE ONE TABLET BY MOUTH EVERY DAY   atorvastatin (LIPITOR) 20 mg tablet   No No   Sig: TAKE ONE TABLET BY MOUTH EVERY DAY   calcium carbonate-vitamin D (OSCAL-D) 500 mg-200 units per tablet   Yes No   Sig: Take 1 tablet by mouth 2 (two) times a day with meals   docusate sodium (COLACE) 100 mg capsule   Yes No   Sig: Take 100 mg by mouth 2 (two) times a day   ferrous gluconate (FERGON) 324 mg tablet   Yes No   Sig: Take 324 mg by mouth daily with breakfast   glucose blood (ONE TOUCH ULTRA TEST) test strip   No No   Sig: Test once daily   losartan (COZAAR) 100 MG tablet   No No   Sig: TAKE ONE TABLET BY MOUTH EVERY DAY   mesalamine (ASACOL) 800 MG EC tablet   No No   Sig: TAKE ONE TABLET BY MOUTH THREE TIMES A DAY   metFORMIN (GLUCOPHAGE) 500 mg tablet   No No   Sig: Take 1 tablet (500 mg total) by mouth 2 (two) times a day with meals   polyethylene glycol (MIRALAX) 17 g packet   Yes No   Sig: Take 17 g by mouth daily      Facility-Administered Medications: None       Past Medical History:   Diagnosis Date   • A-fib (HCC)    • Chronic kidney disease    • Colostomy present ([facility] [address] )    • Diabetes mellitus ([facility] )    • Hyperlipidemia    • Hypertension        Past Surgical History:   Procedure Laterality Date   • APPENDECTOMY     • CARPAL TUNNEL RELEASE Bilateral    • CATARACT EXTRACTION     • CHOLECYSTECTOMY     • COLON SURGERY     • COLOSTOMY     • HYSTERECTOMY         Family History   Problem Relation Age of Onset   • Heart disease Mother    • Cancer Father      I have reviewed and agree with the history as documented      E-Cigarette/Vaping     E-Cigarette/Vaping Substances     Social History     Tobacco Use   • Smoking status: Never   • Smokeless tobacco: Never   Substance Use Topics   • Alcohol use: Never   • Drug use: No       Review of Systems    Physical Exam  Physical Exam    Vital Signs  ED Triage Vitals   Temperature Pulse Respirations Blood Pressure SpO2   12/30/22 1325 12/30/22 1325 12/30/22 1325 12/30/22 1325 12/30/22 1325   98 6 °F (37 °C) 74 16 138/68 94 %      Temp Source Heart Rate Source Patient Position - Orthostatic VS BP Location FiO2 (%)   12/30/22 1325 12/30/22 1529 12/30/22 1529 12/30/22 1529 --   Oral Monitor Sitting Left arm       Pain Score       12/30/22 1325       4           Vitals:    12/30/22 1600 12/30/22 1630 12/30/22 1700 12/30/22 1734   BP: (!) 173/78 143/68 (!) 185/83 165/78   Pulse: 84 80 82    Patient Position - Orthostatic VS: Lying Lying Sitting          Visual Acuity      ED Medications  Medications   sodium chloride 0 9 % bolus 1,000 mL (0 mL Intravenous Stopped 12/30/22 1627)   iohexol (OMNIPAQUE) 350 MG/ML injection (SINGLE-DOSE) 100 mL (100 mL Intravenous Given 12/30/22 1535)       Diagnostic Studies  Results Reviewed     Procedure Component Value Units Date/Time    Blood culture #2 [[id number]] Collected: 12/30/22 1526    Lab Status: Preliminary result Specimen: Blood from Arm, Right Updated: 12/30/22 2101     Blood Culture Received in Microbiology Lab  Culture in Progress  Blood culture #1 [[id number]] Collected: 12/30/22 1526    Lab Status: Preliminary result Specimen: Blood from Arm, Left Updated: 12/30/22 2101     Blood Culture Received in Microbiology Lab  Culture in Progress  Lactic acid [[id number]]  (Normal) Collected: 12/30/22 1526    Lab Status: Final result Specimen: Blood from Arm, Left Updated: 12/30/22 1601     LACTIC ACID 1 0 mmol/L     Narrative:      Result may be elevated if tourniquet was used during collection      Comprehensive metabolic panel [[id number]]  (Abnormal) Collected: 12/30/22 1331    Lab Status: Final result Specimen: Blood from Arm, Right Updated: 12/30/22 1357     Sodium 137 mmol/L      Potassium 4 2 mmol/L      Chloride 103 mmol/L      CO2 28 mmol/L      ANION GAP 6 mmol/L      BUN 17 mg/dL      Creatinine 0 93 mg/dL      Glucose 147 mg/dL      Calcium 9 7 mg/dL      AST 13 U/L      ALT 8 U/L      Alkaline Phosphatase 103 U/L      Total Protein 6 8 g/dL      Albumin 3 8 g/dL      Total Bilirubin 0 35 mg/dL      eGFR 54 ml/min/1 73sq m     Narrative:      [facility] guidelines for Chronic Kidney Disease (CKD):   •  Stage 1 with normal or high GFR (GFR > 90 mL/min/1 73 square meters)  •  Stage 2 Mild CKD (GFR = 60-89 mL/min/1 73 square meters)  •  Stage 3A Moderate CKD (GFR = 45-59 mL/min/1 73 square meters)  •  Stage 3B Moderate CKD (GFR = 30-44 mL/min/1 73 square meters)  •  Stage 4 Severe CKD (GFR = 15-29 mL/min/1 73 square meters)  •  Stage 5 End Stage CKD (GFR <15 mL/min/1 73 square meters)  Note: GFR calculation is accurate only with a steady state creatinine    Lipase [[id number]]  (Normal) Collected: 12/30/22 1331    Lab Status: Final result Specimen: Blood from Arm, Right Updated: 12/30/22 1357     Lipase 12 u/L     CBC and differential [[id number]]  (Abnormal) Collected: 12/30/22 1331    Lab Status: Final result Specimen: Blood from Arm, Right Updated: 12/30/22 1341     WBC 8 39 Thousand/uL      RBC 3 84 Million/uL      Hemoglobin 12 3 g/dL      Hematocrit 38 1 %      MCV 99 fL      MCH 32 0 pg      MCHC 32 3 g/dL      RDW 13 2 %      MPV 9 7 fL      Platelets 123 Thousands/uL      nRBC 0 /100 WBCs      Neutrophils Relative 78 %      Immat GRANS % 1 %      Lymphocytes Relative 12 %      Monocytes Relative 7 %      Eosinophils Relative 1 %      Basophils Relative 1 %      Neutrophils Absolute 6 59 Thousands/µL      Immature Grans Absolute 0 07 Thousand/uL      Lymphocytes Absolute 1 01 Thousands/µL      Monocytes Absolute 0 57 Thousand/µL      Eosinophils Absolute 0 11 Thousand/µL      Basophils Absolute 0 04 Thousands/µL                  CT abdomen pelvis with contrast   Final Result by Brock Howard MD (12/30 1645)      No acute inflammatory process identified in the abdomen or pelvis  Stable 1 3 cm pancreatic body cyst   Please see previous MRI report for further follow-up recommendations  Workstation performed: [id number]                    Procedures  Procedures         ED Course  ED Course as of 12/31/22 0003   Fri Dec 30, 2022   1639 Case signed out to Dr Trevor Rojas pending CT results                               SBIRT 20yo+    Flowsheet Row Most Recent Value   SBIRT (25 yo +)    In order to provide better care to our patients, we are screening all of our patients for alcohol and drug use  Would it be okay to ask you these screening questions?  Yes Filed at: 12/30/2022 1529   Initial Alcohol Screen: US AUDIT-C     1  How often do you have a drink containing alcohol? 0 Filed at: 12/30/2022 1529   2  How many drinks containing alcohol do you have on a typical day you are drinking? 0 Filed at: 12/30/2022 1529   3a  Male UNDER 65: How often do you have five or more drinks on one occasion? 0 Filed at: 12/30/2022 1529   3b  FEMALE Any Age, or MALE 65+: How often do you have 4 or more drinks on one occassion? 0 Filed at: 12/30/2022 1529   Audit-C Score 0 Filed at: 12/30/2022 1529   ANATOLIY: How many times in the past year have you    Used an illegal drug or used a prescription medication for non-medical reasons? Never Filed at: 12/30/2022 1529                    MDM    Disposition  Final diagnoses:   Abdominal pain     Time reflects when diagnosis was documented in both MDM as applicable and the Disposition within this note     Time User Action Codes Description Comment    12/30/2022  5:25 PM Bernard Richards Add [R10 9] Abdominal pain       ED Disposition     ED Disposition   Discharge    Condition   Stable    Date/Time   Fri Dec 30, 2022  5:25 PM    Comment   Noe Shown discharge to home/self care                 Follow-up Information     Follow up With Specialties Details Why [address] Emergency Department Emergency Medicine  If symptoms worsen [address] [place] [id number]  [phone] [facility] Emergency Department, [address] [place] [address], [facility]          Discharge Medication List as of 12/30/2022  5:25 PM      START taking these medications    Details   dicyclomine (BENTYL) 20 mg tablet Take 1 tablet (20 mg total) by mouth 2 (two) times a day as needed (abd pain), Starting Fri 12/30/2022, Normal         CONTINUE these medications which have NOT CHANGED    Details   amLODIPine (NORVASC) 10 mg tablet TAKE ONE TABLET BY MOUTH EVERY DAY, Normal apixaban (Eliquis) 5 mg Take 1 tablet (5 mg total) by mouth 2 (two) times a day, Starting Thu 11/18/2021, Until Fri 12/30/2022, Normal      atenolol (TENORMIN) 100 mg tablet TAKE ONE TABLET BY MOUTH EVERY DAY, Normal      atorvastatin (LIPITOR) 20 mg tablet TAKE ONE TABLET BY MOUTH EVERY DAY, Normal      Blood Glucose Monitoring Suppl (ONE TOUCH ULTRA 2) w/Device KIT by Does not apply route daily, Starting Sun 3/10/2019, Normal      calcium carbonate-vitamin D (OSCAL-D) 500 mg-200 units per tablet Take 1 tablet by mouth 2 (two) times a day with meals, Historical Med      Diclofenac Sodium (VOLTAREN) 1 % APPLY TWO GRAMS TOPICALLY TWO TIMES A DAY, Normal      docusate sodium (COLACE) 100 mg capsule Take 100 mg by mouth 2 (two) times a day, Historical Med      ferrous gluconate (FERGON) 324 mg tablet Take 324 mg by mouth daily with breakfast, Historical Med      glucose blood (ONE TOUCH ULTRA TEST) test strip Test once daily, Normal      losartan (COZAAR) 100 MG tablet TAKE ONE TABLET BY MOUTH EVERY DAY, Normal      mesalamine (ASACOL) 800 MG EC tablet TAKE ONE TABLET BY MOUTH THREE TIMES A DAY, Normal      metFORMIN (GLUCOPHAGE) 500 mg tablet Take 1 tablet (500 mg total) by mouth 2 (two) times a day with meals, Starting u 9/29/2022, Until Fri 12/30/2022, Normal      ONE TOUCH LANCETS MISC by Does not apply route daily, Starting Sun 3/10/2019, Normal      !! [employer] Use once a week [id number] Barrier, Starting Fri 6/24/2022, Until Fri 12/30/2022, Print      !! Ostomy Supplies Pouch MISC Use once a week [id number], Starting Fri 6/24/2022, Print      polyethylene glycol (MIRALAX) 17 g packet Take 17 g by mouth daily, Historical Med       !! - Potential duplicate medications found  Please discuss with provider  No discharge procedures on file      PDMP Review     None          ED Provider  Electronically Signed by           Morris Gaspar ,   12/31/22 0003 CHANGED    Details   amLODIPine (NORVASC) 10 mg tablet TAKE ONE TABLET BY MOUTH EVERY DAY, Normal      apixaban (Eliquis) 5 mg Take 1 tablet (5 mg total) by mouth 2 (two) times a day, Starting u 11/18/2021, Until Fri 12/30/2022, Normal      atenolol (TENORMIN) 100 mg tablet TAKE ONE TABLET BY MOUTH EVERY DAY, Normal      atorvastatin (LIPITOR) 20 mg tablet TAKE ONE TABLET BY MOUTH EVERY DAY, Normal      Blood Glucose Monitoring Suppl (ONE TOUCH ULTRA 2) w/Device KIT by Does not apply route daily, Starting Sun 3/10/2019, Normal      calcium carbonate-vitamin D (OSCAL-D) 500 mg-200 units per tablet Take 1 tablet by mouth 2 (two) times a day with meals, Historical Med      Diclofenac Sodium (VOLTAREN) 1 % APPLY TWO GRAMS TOPICALLY TWO TIMES A DAY, Normal      docusate sodium (COLACE) 100 mg capsule Take 100 mg by mouth 2 (two) times a day, Historical Med      ferrous gluconate (FERGON) 324 mg tablet Take 324 mg by mouth daily with breakfast, Historical Med      glucose blood (ONE TOUCH ULTRA TEST) test strip Test once daily, Normal      losartan (COZAAR) 100 MG tablet TAKE ONE TABLET BY MOUTH EVERY DAY, Normal      mesalamine (ASACOL) 800 MG EC tablet TAKE ONE TABLET BY MOUTH THREE TIMES A DAY, Normal      metFORMIN (GLUCOPHAGE) 500 mg tablet Take 1 tablet (500 mg total) by mouth 2 (two) times a day with meals, Starting Thu 9/29/2022, Until Fri 12/30/2022, Normal      ONE TOUCH LANCETS MISC by Does not apply route daily, Starting Sun 3/10/2019, Normal      !! [employer] Use once a week [id number] Barrier, Starting Fri 6/24/2022, Until Fri 12/30/2022, Print      !! Ostomy Supplies Pouch MISC Use once a week [id number], Starting Fri 6/24/2022, Print      polyethylene glycol (MIRALAX) 17 g packet Take 17 g by mouth daily, Historical Med       !! - Potential duplicate medications found  Please discuss with provider  No discharge procedures on file      PDMP Review     None          ED Provider  Electronically Signed by           Christi Leon ,   12/31/22 0003       Christi Leon ,   01/09/23 2200

## 2022-12-30 NOTE — ASSESSMENT & PLAN NOTE
DW patient advised to go to the ED for immediate evaluation of her acute intermittent abdominal pain Fax received from     [facility] [address], [address] [facility]  Phone [phone]  Fax [phone]    Office has been notified that pt is requiring Prior Authorization for the following medication:  -- Xifaxan 550 mg tablets    Please initiate this request through CoverMyMeds, contacting the following Payor/Insurance:  -- Medicare    Please see below, or the documentation attached to this encounter for any additional information that may assist in processing PA:  --  Please provide ICD 10 code and diagnosis then forward this encounter to PA pool. Thank you!

## 2022-12-30 NOTE — ED NOTES
Patient transported to [facility], [address] [place] [address]  12/30/22 1076

## 2023-01-01 LAB
BACTERIA BLD CULT: NORMAL
BACTERIA BLD CULT: NORMAL

## 2023-01-04 LAB
BACTERIA BLD CULT: NORMAL
BACTERIA BLD CULT: NORMAL

## 2023-01-12 ENCOUNTER — OFFICE VISIT (OUTPATIENT)
Dept: FAMILY MEDICINE CLINIC | Facility: CLINIC | Age: 88
End: 2023-01-12

## 2023-01-12 VITALS
WEIGHT: 153.6 LBS | DIASTOLIC BLOOD PRESSURE: 82 MMHG | HEIGHT: 63 IN | OXYGEN SATURATION: 97 % | BODY MASS INDEX: 27.21 KG/M2 | SYSTOLIC BLOOD PRESSURE: 138 MMHG | HEART RATE: 82 BPM | TEMPERATURE: 97.9 F

## 2023-01-12 DIAGNOSIS — Z23 FLU VACCINE NEED: Primary | ICD-10-CM

## 2023-01-12 DIAGNOSIS — N18.31 STAGE 3A CHRONIC KIDNEY DISEASE (HCC): ICD-10-CM

## 2023-01-12 DIAGNOSIS — N18.32 TYPE 2 DIABETES MELLITUS WITH STAGE 3B CHRONIC KIDNEY DISEASE, WITHOUT LONG-TERM CURRENT USE OF INSULIN (HCC): Chronic | ICD-10-CM

## 2023-01-12 DIAGNOSIS — E78.2 MIXED HYPERLIPIDEMIA: ICD-10-CM

## 2023-01-12 DIAGNOSIS — I48.11 LONGSTANDING PERSISTENT ATRIAL FIBRILLATION (HCC): Chronic | ICD-10-CM

## 2023-01-12 DIAGNOSIS — E03.8 SUBCLINICAL HYPOTHYROIDISM: Chronic | ICD-10-CM

## 2023-01-12 DIAGNOSIS — H90.0 CONDUCTIVE HEARING LOSS, BILATERAL: ICD-10-CM

## 2023-01-12 DIAGNOSIS — E11.22 TYPE 2 DIABETES MELLITUS WITH STAGE 3B CHRONIC KIDNEY DISEASE, WITHOUT LONG-TERM CURRENT USE OF INSULIN (HCC): Chronic | ICD-10-CM

## 2023-01-12 DIAGNOSIS — K51.919 ULCERATIVE COLITIS WITH COMPLICATION, UNSPECIFIED LOCATION (HCC): ICD-10-CM

## 2023-01-12 DIAGNOSIS — Z93.3 COLOSTOMY IN PLACE (HCC): ICD-10-CM

## 2023-01-12 DIAGNOSIS — Z79.01 ANTICOAGULANT LONG-TERM USE: ICD-10-CM

## 2023-01-12 DIAGNOSIS — I10 BENIGN ESSENTIAL HYPERTENSION: Chronic | ICD-10-CM

## 2023-01-12 NOTE — PROGRESS NOTES
Assessment/Plan:           Problem List Items Addressed This Visit        Digestive    Ulcerative colitis with complication ([facility] [address] ) - Primary     No active complaints and is draining brown stool from her colostomy             Endocrine    Subclinical hypothyroidism (Chronic)     Thyroid is well controlled          Relevant Orders    TSH, 3rd generation with Free T4 reflex    Type 2 diabetes mellitus with stage 3 chronic kidney disease, without long-term current use of insulin (HCC) (Chronic)       Lab Results   Component Value Date    HGBA1C 6 7 (H) 11/18/2022   Patient not currently taking Metformin          Relevant Orders    Comprehensive metabolic panel    CBC and differential    Lipid Panel with Direct LDL reflex    CBC and differential    HEMOGLOBIN A1C W/ EAG ESTIMATION    Microalbumin / creatinine urine ratio       Cardiovascular and Mediastinum    Benign essential hypertension (Chronic)     Doing well on the Losartan 100 mg          Atrial fibrillation (HCC) (Chronic)     Doing well on the Atenolol 100 mg Eliquis 5 mg RRR            Nervous and Auditory    Conductive hearing loss, bilateral     Using hearing aides             Genitourinary    Chronic kidney disease, stage III (moderate) (HCC)     Lab Results   Component Value Date    EGFR 54 12/30/2022    EGFR 44 11/18/2022    EGFR 41 05/12/2022    CREATININE 0 93 12/30/2022    CREATININE 1 10 11/18/2022    CREATININE 1 15 05/12/2022   stable CKD         Relevant Orders    Comprehensive metabolic panel       Other    Anticoagulant long-term use     On the Eliquis for atrial fibrillation          Mixed hyperlipidemia     Atorvastatin 20 mg Lipid panel is in normal range          Relevant Orders    Comprehensive metabolic panel    Lipid Panel with Direct LDL reflex    Colostomy in place [facility])     Patient is managing her colostomy and is brown and draining and 2010 ostomy               Subjective:      Patient ID: Ashlyn Alford is a 80 y o  female      Patient is having abdominal pain at the last visit and we sent her to the hospital   75-year-old female presents emergency department complaining of intermittent sharp colicky abdominal pain that has her hunch over and almost passed out  The patient had this today and went to her family doctor where they sent her to the ER thinking that she was dehydrated and needed to get her colostomy "checked "  Patient denies any fever chills patient denies vomiting but admits to some nausea  Patient notes that she had a colostomy bag for the last 10 years   Yoan Calhoun had a CT scan and was normal and she was given the Bentyl and was effective for her for her pain she was discharged home for f/ u    Patient is here today for follow up and reports that she has not had any additional attacks of painful abdominal cramping  Patient is having right shoulder pain and had PT and had cortisone injection and voltaren and using the topical       BMI Counseling: Body mass index is 27 21 kg/m²  The BMI is above normal  Nutrition recommendations include decreasing portion sizes, encouraging healthy choices of fruits and vegetables, decreasing fast food intake, consuming healthier snacks, limiting drinks that contain sugar, moderation in carbohydrate intake, increasing intake of lean protein, reducing intake of saturated and trans fat and reducing intake of cholesterol  Exercise recommendations include moderate physical activity 150 minutes/week  No pharmacotherapy was ordered  Patient referred to PCP  Rationale for BMI follow-up plan is due to patient being overweight or obese  The following portions of the patient's history were reviewed and updated as appropriate:   She  has a past medical history of A-fib ([facility] [address] ), Chronic kidney disease, Colostomy present ([facility] [address] ), Diabetes mellitus ([facility] [address] ), Hyperlipidemia, and Hypertension    She   Patient Active Problem List    Diagnosis Date Noted   • Abdominal pain, acute, generalized 12/30/2022   • Closed fracture of left hip ([place] [address] ) 09/29/2022   • Closed nondisplaced intertrochanteric fracture of left femur ([place] [address] ) 09/29/2022   • Pancreatic lesion 09/29/2022   • Chronic right shoulder pain 11/18/2021   • Conductive hearing loss, bilateral 03/11/2020   • Ulcerative colitis with complication ([place] [address] ) 04/62/4446   • BMI 28 0-28 9,adult 03/06/2019   • Type 2 diabetes mellitus with stage 3 chronic kidney disease, without long-term current use of insulin ([place] [address] ) 11/21/2018   • Anticoagulant long-term use 08/14/2018   • Mixed hyperlipidemia 54/00/8116   • Diastolic dysfunction 10/95/6036   • Mitral annular calcification 08/14/2018   • Subclinical hypothyroidism 07/30/2018   • Atrial fibrillation ([place] [address] ) 07/24/2018   • Benign essential hypertension 07/21/2018   • Osteopenia of multiple sites 02/22/2016   • Colostomy in place [facility]) 01/27/2016   • Chronic kidney disease, stage III (moderate) ([place] [address] ) 06/24/2015     She  has a past surgical history that includes Colon surgery; Appendectomy; Hysterectomy; Carpal tunnel release (Bilateral); Cholecystectomy; Colostomy; and Cataract extraction  Her family history includes Cancer in her father; Heart disease in her mother  She  reports that she has never smoked  She has never used smokeless tobacco  She reports that she does not drink alcohol and does not use drugs    Current Outpatient Medications   Medication Sig Dispense Refill   • amLODIPine (NORVASC) 10 mg tablet TAKE ONE TABLET BY MOUTH EVERY DAY 90 tablet 3   • apixaban (Eliquis) 5 mg Take 1 tablet (5 mg total) by mouth 2 (two) times a day 180 tablet 3   • atenolol (TENORMIN) 100 mg tablet TAKE ONE TABLET BY MOUTH EVERY DAY 90 tablet 3   • atorvastatin (LIPITOR) 20 mg tablet TAKE ONE TABLET BY MOUTH EVERY DAY 90 tablet 3   • Blood Glucose Monitoring Suppl (ONE TOUCH ULTRA 2) w/Device KIT by Does not apply route daily 1 each 0   • calcium carbonate-vitamin D (OSCAL-D) 500 mg-200 units per tablet Take 1 tablet by mouth 2 (two) times a day with meals     • Diclofenac Sodium (VOLTAREN) 1 % APPLY TWO GRAMS TOPICALLY TWO TIMES A  g 1   • dicyclomine (BENTYL) 20 mg tablet Take 1 tablet (20 mg total) by mouth 2 (two) times a day as needed (abd pain) 20 tablet 0   • docusate sodium (COLACE) 100 mg capsule Take 100 mg by mouth 2 (two) times a day     • ferrous gluconate (FERGON) 324 mg tablet Take 324 mg by mouth daily with breakfast     • glucose blood (ONE TOUCH ULTRA TEST) test strip Test once daily 100 each 3   • losartan (COZAAR) 100 MG tablet TAKE ONE TABLET BY MOUTH EVERY DAY 90 tablet 3   • mesalamine (ASACOL) 800 MG EC tablet TAKE ONE TABLET BY MOUTH THREE TIMES A  tablet 3   • ONE TOUCH LANCETS MISC by Does not apply route daily 100 each 3   • Ostomy Supplies MISC Use once a week [id number] Barrier 10 each 6   • Ostomy Supplies Pouch MISC Use once a week [id number] 10 each 6   • polyethylene glycol (MIRALAX) 17 g packet Take 17 g by mouth daily       No current facility-administered medications for this visit  She is allergic to amoxicillin-pot clavulanate       Review of Systems   Constitutional: Negative for activity change, appetite change, chills, diaphoresis, fatigue, fever and unexpected weight change  HENT: Negative for congestion, ear pain, hearing loss, postnasal drip, sinus pressure, sinus pain, sneezing and sore throat  Eyes: Negative for pain, redness and visual disturbance  Respiratory: Negative for cough and shortness of breath  Cardiovascular: Negative for chest pain and leg swelling  Gastrointestinal: Negative for abdominal pain, blood in stool, constipation, diarrhea, nausea and vomiting  Denies any abdominal cramps draining brown from her stomach    Endocrine: Negative  Genitourinary: Negative  Musculoskeletal: Positive for myalgias  Negative for arthralgias  Skin: Negative  Allergic/Immunologic: Negative  Neurological: Negative for dizziness and light-headedness     Hematological: Negative  Psychiatric/Behavioral: Negative for behavioral problems and dysphoric mood  Objective:      /82 (BP Location: Left arm, Patient Position: Sitting)   Pulse 82   Temp 97 9 °F (36 6 °C) (Temporal)   Ht 5' 3" (1 6 m)   Wt 69 7 kg (153 lb 9 6 oz)   SpO2 97%   BMI 27 21 kg/m²          Physical Exam  Vitals and nursing note reviewed  Constitutional:       General: She is not in acute distress  Appearance: She is well-developed  HENT:      Head: Normocephalic and atraumatic  Right Ear: Tympanic membrane normal       Left Ear: Tympanic membrane normal       Nose: Nose normal       Mouth/Throat:      Mouth: Mucous membranes are moist    Eyes:      Pupils: Pupils are equal, round, and reactive to light  Neck:      Thyroid: No thyromegaly  Cardiovascular:      Rate and Rhythm: Normal rate and regular rhythm  Pulses: no weak pulses          Dorsalis pedis pulses are 2+ on the right side and 2+ on the left side  Posterior tibial pulses are 2+ on the right side and 2+ on the left side  Heart sounds: Normal heart sounds  No murmur heard  Pulmonary:      Effort: Pulmonary effort is normal  No respiratory distress  Breath sounds: Normal breath sounds  No wheezing  Abdominal:      General: Bowel sounds are normal       Palpations: Abdomen is soft  Tenderness: There is no abdominal tenderness  Comments: Ostomy in place    Musculoskeletal:         General: Normal range of motion  Cervical back: Normal range of motion  Right lower le+ Pitting Edema present  Left lower le+ Pitting Edema present  Feet:      Right foot:      Skin integrity: No ulcer, skin breakdown, erythema, warmth, callus or dry skin  Left foot:      Skin integrity: No ulcer, skin breakdown, erythema, warmth, callus or dry skin  Skin:     General: Skin is warm and dry  Neurological:      General: No focal deficit present        Mental Status: She is alert and oriented to person, place, and time  Psychiatric:         Mood and Affect: Mood normal          Behavior: Behavior normal          Thought Content: Thought content normal          Judgment: Judgment normal        Patient's shoes and socks removed  Right Foot/Ankle   Right Foot Inspection  Skin Exam: skin normal and skin intact  No dry skin, no warmth, no callus, no erythema, no maceration, no abnormal color, no pre-ulcer, no ulcer and no callus  Toe Exam: ROM and strength within normal limits  Sensory   Vibration: diminished  Proprioception: intact  Monofilament testing: diminished    Vascular  Capillary refills: < 3 seconds  The right DP pulse is 2+  The right PT pulse is 2+  Left Foot/Ankle  Left Foot Inspection  Skin Exam: skin normal and skin intact  No dry skin, no warmth, no erythema, no maceration, normal color, no pre-ulcer, no ulcer and no callus  Toe Exam: ROM and strength within normal limits  Sensory   Vibration: diminished  Proprioception: intact  Monofilament testing: diminished    Vascular  Capillary refills: < 3 seconds  The left DP pulse is 2+  The left PT pulse is 2+       Assign Risk Category  No deformity present  Loss of protective sensation  No weak pulses  Risk: 1

## 2023-01-12 NOTE — ASSESSMENT & PLAN NOTE
Lab Results   Component Value Date    EGFR 54 12/30/2022    EGFR 44 11/18/2022    EGFR 41 05/12/2022    CREATININE 0 93 12/30/2022    CREATININE 1 10 11/18/2022    CREATININE 1 15 05/12/2022   stable CKD

## 2023-01-12 NOTE — ASSESSMENT & PLAN NOTE
Lab Results   Component Value Date    HGBA1C 6 7 (H) 11/18/2022   Patient not currently taking Metformin

## 2023-02-15 DIAGNOSIS — I48.0 PAROXYSMAL ATRIAL FIBRILLATION (HCC): ICD-10-CM

## 2023-02-15 RX ORDER — APIXABAN 5 MG/1
TABLET, FILM COATED ORAL
Qty: 180 TABLET | Refills: 3 | Status: SHIPPED | OUTPATIENT
Start: 2023-02-15

## 2023-05-08 LAB
LEFT EYE DIABETIC RETINOPATHY: NORMAL
RIGHT EYE DIABETIC RETINOPATHY: NORMAL

## 2023-06-16 ENCOUNTER — TELEPHONE (OUTPATIENT)
Dept: FAMILY MEDICINE CLINIC | Facility: CLINIC | Age: 88
End: 2023-06-16

## 2023-06-16 DIAGNOSIS — M25.561 CHRONIC PAIN OF RIGHT KNEE: Primary | ICD-10-CM

## 2023-06-16 DIAGNOSIS — G89.29 CHRONIC PAIN OF RIGHT KNEE: Primary | ICD-10-CM

## 2023-06-16 NOTE — TELEPHONE ENCOUNTER
Pt having right knee pain  Would like to know if you need to see her or if she needs a new referral to ortho  Pt does not drive and depends on transportation services or her son       OK to leave detailed msg

## 2023-07-18 ENCOUNTER — OFFICE VISIT (OUTPATIENT)
Dept: OBGYN CLINIC | Facility: CLINIC | Age: 88
End: 2023-07-18
Payer: COMMERCIAL

## 2023-07-18 ENCOUNTER — APPOINTMENT (OUTPATIENT)
Dept: RADIOLOGY | Facility: CLINIC | Age: 88
End: 2023-07-18
Payer: COMMERCIAL

## 2023-07-18 VITALS
DIASTOLIC BLOOD PRESSURE: 87 MMHG | WEIGHT: 153 LBS | HEIGHT: 63 IN | HEART RATE: 70 BPM | BODY MASS INDEX: 27.11 KG/M2 | SYSTOLIC BLOOD PRESSURE: 149 MMHG

## 2023-07-18 DIAGNOSIS — M25.461 EFFUSION OF RIGHT KNEE: ICD-10-CM

## 2023-07-18 DIAGNOSIS — M25.561 RIGHT KNEE PAIN, UNSPECIFIED CHRONICITY: ICD-10-CM

## 2023-07-18 DIAGNOSIS — M25.561 CHRONIC PAIN OF RIGHT KNEE: ICD-10-CM

## 2023-07-18 DIAGNOSIS — G89.29 CHRONIC PAIN OF RIGHT KNEE: ICD-10-CM

## 2023-07-18 DIAGNOSIS — M17.11 PRIMARY OSTEOARTHRITIS OF RIGHT KNEE: Primary | ICD-10-CM

## 2023-07-18 PROCEDURE — 73564 X-RAY EXAM KNEE 4 OR MORE: CPT

## 2023-07-18 PROCEDURE — 99214 OFFICE O/P EST MOD 30 MIN: CPT | Performed by: ORTHOPAEDIC SURGERY

## 2023-07-18 PROCEDURE — 20610 DRAIN/INJ JOINT/BURSA W/O US: CPT | Performed by: ORTHOPAEDIC SURGERY

## 2023-07-18 PROCEDURE — 73560 X-RAY EXAM OF KNEE 1 OR 2: CPT

## 2023-07-18 RX ORDER — BUPIVACAINE HYDROCHLORIDE 2.5 MG/ML
2 INJECTION, SOLUTION INFILTRATION; PERINEURAL
Status: COMPLETED | OUTPATIENT
Start: 2023-07-18 | End: 2023-07-18

## 2023-07-18 RX ORDER — LIDOCAINE HYDROCHLORIDE 10 MG/ML
2 INJECTION, SOLUTION INFILTRATION; PERINEURAL
Status: COMPLETED | OUTPATIENT
Start: 2023-07-18 | End: 2023-07-18

## 2023-07-18 RX ORDER — METHYLPREDNISOLONE ACETATE 40 MG/ML
2 INJECTION, SUSPENSION INTRA-ARTICULAR; INTRALESIONAL; INTRAMUSCULAR; SOFT TISSUE
Status: COMPLETED | OUTPATIENT
Start: 2023-07-18 | End: 2023-07-18

## 2023-07-18 RX ADMIN — BUPIVACAINE HYDROCHLORIDE 2 ML: 2.5 INJECTION, SOLUTION INFILTRATION; PERINEURAL at 13:00

## 2023-07-18 RX ADMIN — METHYLPREDNISOLONE ACETATE 2 ML: 40 INJECTION, SUSPENSION INTRA-ARTICULAR; INTRALESIONAL; INTRAMUSCULAR; SOFT TISSUE at 13:00

## 2023-07-18 RX ADMIN — LIDOCAINE HYDROCHLORIDE 2 ML: 10 INJECTION, SOLUTION INFILTRATION; PERINEURAL at 13:00

## 2023-07-18 NOTE — PROGRESS NOTES
Patient Name:  Rody Valentine  MRN:  [id number]    [address] [address]     1. Primary osteoarthritis of right knee  -     Large joint arthrocentesis: R knee    2. Effusion of right knee  -     Large joint arthrocentesis: R knee    3. Chronic pain of right knee  -     XR knee 4+ vw right injury; Future; Expected date: 07/18/2023  -     XR knee 1 or 2 vw left; Future; Expected date: 07/18/2023  -     Ambulatory Referral to Orthopedic Surgery  -     Large joint arthrocentesis: R knee      Right knee osteoarthritis  · X-rays reviewed in office today with patient  · Discussed nonoperative treatments for knee osteoarthritis and patient wished to move forward with aspiration and CSI. Risks and benefits discussed in detail. Aspirate yielded 12 cc of clear synovial fluid. Tolerated procedure well. · Continue activities as tolerated and OTC medications as needed  · Follow up in office as needed for Right knee osteoarthritis    Chief Complaint     Right knee pain    History of the Present Illness     Rody Valentine is a 80 y.o. female with Right knee pain ongoing for about a few months. She admits to having some fluid being removed a few years ago, but not significant pain until a few months ago. She has seen Dr Alex Odom in the past for the Right knee. Patient does have history of diabetes with most recent HGBA1c of 6.7 performed on 11/18/2022. Review of Systems     Review of Systems   Constitutional: Negative for chills and fever. HENT: Negative for congestion. Respiratory: Negative for cough, chest tightness and shortness of breath. Cardiovascular: Negative for chest pain and palpitations. Gastrointestinal: Negative for abdominal pain. Endocrine: Negative for cold intolerance and heat intolerance. Neurological: Negative for syncope. Psychiatric/Behavioral: Negative for confusion.        Physical Exam     /87   Pulse 70   Ht 5' 3" (1.6 m)   Wt 69.4 kg (153 lb)   BMI 27.10 kg/m²     Right Knee  Range of motion from 0 to 110 degrees without pain at terminal flexion. There is no crepitus with range of motion. There is small effusion. There is tenderness over the lateral joint line. There is 5/5 quadriceps strength and preserved tone. The patient is able to perform a straight leg raise. negative patellar grind test.  Anterior drawer tests is negative  negative Lachman Test.   Posterior drawer test is   negative   Varus stress testing reveals no pain or instability at 0 and 30 degrees   Valgus stress testing reveals no pain or instability at 0 and 30 degrees  Romel's testing is negative   The patient is neurovascular intact distally. Eyes:  Anicteric sclerae. Neck:  Supple. Lungs:  Normal respiratory effort. Cardiovascular:  Capillary refill is less than 2 seconds. Skin:  Intact without erythema. Neurologic:  Sensation grossly intact to light touch. Psychiatric:  Mood and affect are appropriate. Data Review     I have personally reviewed pertinent films in PACS, and my interpretation follows:    X-rays taken 07/18/2023 of Right knee independently reviewed and demonstrate tricompartmental degenerative changes with moderate to severe lateral compartment degenerative changes with sclerosis, joint space narrowing, and osteophyte formation.       Past Medical History:   Diagnosis Date   • A-fib [facility])    • Chronic kidney disease    • Colostomy present ([address])    • Diabetes mellitus ([address])    • Hyperlipidemia    • Hypertension        Past Surgical History:   Procedure Laterality Date   • APPENDECTOMY     • CARPAL TUNNEL RELEASE Bilateral    • CATARACT EXTRACTION     • CHOLECYSTECTOMY     • COLON SURGERY     • COLOSTOMY     • HYSTERECTOMY         Allergies   Allergen Reactions   • Amoxicillin-Pot Clavulanate Other (See Comments)     C-DIFF, x's 2  developed c diff taking drug       Current Outpatient Medications on File Prior to Visit   Medication Sig Dispense Refill   • amLODIPine (NORVASC) 10 mg tablet TAKE ONE TABLET BY MOUTH EVERY DAY 90 tablet 3   • atenolol (TENORMIN) 100 mg tablet TAKE ONE TABLET BY MOUTH EVERY DAY 90 tablet 3   • atorvastatin (LIPITOR) 20 mg tablet TAKE ONE TABLET BY MOUTH EVERY DAY 90 tablet 3   • Blood Glucose Monitoring Suppl (ONE TOUCH ULTRA 2) w/Device KIT by Does not apply route daily 1 each 0   • calcium carbonate-vitamin D (OSCAL-D) 500 mg-200 units per tablet Take 1 tablet by mouth 2 (two) times a day with meals     • Diclofenac Sodium (VOLTAREN) 1 % APPLY TWO GRAMS TOPICALLY TWO TIMES A  g 1   • dicyclomine (BENTYL) 20 mg tablet Take 1 tablet (20 mg total) by mouth 2 (two) times a day as needed (abd pain) 20 tablet 0   • docusate sodium (COLACE) 100 mg capsule Take 100 mg by mouth 2 (two) times a day     • Eliquis 5 MG TAKE ONE TABLET BY MOUTH TWICE A  tablet 3   • ferrous gluconate (FERGON) 324 mg tablet Take 324 mg by mouth daily with breakfast     • glucose blood (ONE TOUCH ULTRA TEST) test strip Test once daily 100 each 3   • losartan (COZAAR) 100 MG tablet TAKE ONE TABLET BY MOUTH EVERY DAY 90 tablet 3   • mesalamine (ASACOL) 800 MG EC tablet TAKE ONE TABLET BY MOUTH THREE TIMES A  tablet 3   • ONE TOUCH LANCETS MISC by Does not apply route daily 100 each 3   • Ostomy Supplies Pouch MISC Use once a week [id number] 10 each 6   • polyethylene glycol (MIRALAX) 17 g packet Take 17 g by mouth daily     • Ostomy Supplies MISC Use once a week [id number] Barrier 10 each 6     No current facility-administered medications on file prior to visit.        Social History     Tobacco Use   • Smoking status: Never   • Smokeless tobacco: Never   Vaping Use   • Vaping Use: Never used   Substance Use Topics   • Alcohol use: Never   • Drug use: No       Family History   Problem Relation Age of Onset   • Heart disease Mother    • Cancer Father              Procedures Performed     Large joint arthrocentesis: R knee  Universal Protocol:  Risks and benefits: risks, benefits and alternatives were discussed  Consent given by: patient  Patient identity confirmed: verbally with patient    Procedure Details  Location: knee - R knee  Needle size: 22 G  Approach: lateral  Medications administered: 2 mL bupivacaine 0.25 %; 2 mL lidocaine 1 %; 2 mL methylPREDNISolone acetate 40 mg/mL    Patient tolerance: patient tolerated the procedure well with no immediate complications  Dressing:  Sterile dressing applied              Carlie Kumar DO

## 2023-09-12 ENCOUNTER — OFFICE VISIT (OUTPATIENT)
Dept: URGENT CARE | Facility: CLINIC | Age: 88
End: 2023-09-12
Payer: COMMERCIAL

## 2023-09-12 VITALS
TEMPERATURE: 99.3 F | SYSTOLIC BLOOD PRESSURE: 140 MMHG | DIASTOLIC BLOOD PRESSURE: 80 MMHG | HEART RATE: 78 BPM | OXYGEN SATURATION: 95 % | BODY MASS INDEX: 27.52 KG/M2 | WEIGHT: 155.38 LBS | RESPIRATION RATE: 18 BRPM

## 2023-09-12 DIAGNOSIS — H61.21 HEARING LOSS OF RIGHT EAR DUE TO CERUMEN IMPACTION: Primary | ICD-10-CM

## 2023-09-12 PROCEDURE — S9088 SERVICES PROVIDED IN URGENT: HCPCS | Performed by: PHYSICIAN ASSISTANT

## 2023-09-12 PROCEDURE — 99213 OFFICE O/P EST LOW 20 MIN: CPT | Performed by: PHYSICIAN ASSISTANT

## 2023-09-12 PROCEDURE — 69209 REMOVE IMPACTED EAR WAX UNI: CPT | Performed by: PHYSICIAN ASSISTANT

## 2023-09-12 NOTE — PROGRESS NOTES
[facility]        NAME: Duane Estelle is a 80 y.o. female  : 1932    MRN: [id number]  DATE: 2023  TIME: 11:05 AM    Assessment and Plan   Hearing loss of right ear due to cerumen impaction [H61.21]  1. Hearing loss of right ear due to cerumen impaction              Patient Instructions   There are no Patient Instructions on file for this visit. Follow up with PCP in 3-5 days. Proceed to  ER if symptoms worsen. Chief Complaint     Chief Complaint   Patient presents with   • Earache     Started 2 days ago, throbbing pain. Pt isn't currently wearing hearing aid due to pain. Denies cold sx, congestion. Having periods of sweating. Unsure of fever, no chills. Took ES tylenol for pain, with some relief. History of Present Illness       Patient presents with right ear pain starting last night. Hasnt been wearing hearing aid today due to the pain. Denies drainage, congestion, runny nose, fevers, cough. Review of Systems   Review of Systems   Constitutional: Negative for fever. HENT: Positive for ear pain. Negative for congestion, ear discharge, mouth sores, postnasal drip, rhinorrhea, sinus pressure, sinus pain, sore throat and trouble swallowing. Respiratory: Negative for cough.           Current Medications       Current Outpatient Medications:   •  amLODIPine (NORVASC) 10 mg tablet, TAKE ONE TABLET BY MOUTH EVERY DAY, Disp: 90 tablet, Rfl: 3  •  atenolol (TENORMIN) 100 mg tablet, TAKE ONE TABLET BY MOUTH EVERY DAY, Disp: 90 tablet, Rfl: 3  •  atorvastatin (LIPITOR) 20 mg tablet, TAKE ONE TABLET BY MOUTH EVERY DAY, Disp: 90 tablet, Rfl: 3  •  Blood Glucose Monitoring Suppl (ONE TOUCH ULTRA 2) w/Device KIT, by Does not apply route daily, Disp: 1 each, Rfl: 0  •  calcium carbonate-vitamin D (OSCAL-D) 500 mg-200 units per tablet, Take 1 tablet by mouth 2 (two) times a day with meals, Disp: , Rfl:   •  Diclofenac Sodium (VOLTAREN) 1 %, APPLY TWO GRAMS TOPICALLY TWO TIMES A DAY, Disp: 400 g, Rfl: 1  •  dicyclomine (BENTYL) 20 mg tablet, Take 1 tablet (20 mg total) by mouth 2 (two) times a day as needed (abd pain), Disp: 20 tablet, Rfl: 0  •  docusate sodium (COLACE) 100 mg capsule, Take 100 mg by mouth 2 (two) times a day, Disp: , Rfl:   •  Eliquis 5 MG, TAKE ONE TABLET BY MOUTH TWICE A DAY, Disp: 180 tablet, Rfl: 3  •  ferrous gluconate (FERGON) 324 mg tablet, Take 324 mg by mouth daily with breakfast, Disp: , Rfl:   •  glucose blood (ONE TOUCH ULTRA TEST) test strip, Test once daily, Disp: 100 each, Rfl: 3  •  losartan (COZAAR) 100 MG tablet, TAKE ONE TABLET BY MOUTH EVERY DAY, Disp: 90 tablet, Rfl: 3  •  mesalamine (ASACOL) 800 MG EC tablet, TAKE ONE TABLET BY MOUTH THREE TIMES A DAY, Disp: 270 tablet, Rfl: 3  •  ONE TOUCH LANCETS MISC, by Does not apply route daily, Disp: 100 each, Rfl: 3  •  Ostomy Supplies Pouch MISC, Use once a week [id number], Disp: 10 each, Rfl: 6  •  polyethylene glycol (MIRALAX) 17 g packet, Take 17 g by mouth daily, Disp: , Rfl:   •  Ostomy Supplies MISC, Use once a week [id number] Barrier, Disp: 10 each, Rfl: 6    Current Allergies     Allergies as of 09/12/2023 - Reviewed 09/12/2023   Allergen Reaction Noted   • Amoxicillin-pot clavulanate Other (See Comments) 01/06/2011            The following portions of the patient's history were reviewed and updated as appropriate: allergies, current medications, past family history, past medical history, past social history, past surgical history and problem list.     Past Medical History:   Diagnosis Date   • A-fib ([address])    • Chronic kidney disease    • Colostomy present ([address])    • Diabetes mellitus ([address])    • Hyperlipidemia    • Hypertension        Past Surgical History:   Procedure Laterality Date   • APPENDECTOMY     • CARPAL TUNNEL RELEASE Bilateral    • CATARACT EXTRACTION     • CHOLECYSTECTOMY     • COLON SURGERY     • COLOSTOMY     • HYSTERECTOMY         Family History   Problem Relation Age of Onset • Heart disease Mother    • Cancer Father          Medications have been verified. Objective   /80   Pulse 78   Temp 99.3 °F (37.4 °C)   Resp 18   Wt 70.5 kg (155 lb 6 oz)   SpO2 95%   BMI 27.52 kg/m²          Physical Exam     Physical Exam  Constitutional:       Appearance: Normal appearance. HENT:      Head: Normocephalic and atraumatic. Right Ear: Tympanic membrane, ear canal and external ear normal. There is impacted cerumen. Left Ear: Tympanic membrane, ear canal and external ear normal.      Nose: Nose normal.      Mouth/Throat:      Mouth: Mucous membranes are moist.      Pharynx: Oropharynx is clear. Musculoskeletal:      Cervical back: No muscular tenderness. Neurological:      Mental Status: She is alert. Psychiatric:         Mood and Affect: Mood normal.         Behavior: Behavior normal.         Ear cerumen removal    Date/Time: 9/12/2023 10:30 AM    Performed by: Ariana Barber PA-C  Authorized by: Ariana Barber PA-C  Universal Protocol:  Consent: Verbal consent obtained. Risks and benefits: risks, benefits and alternatives were discussed  Consent given by: patient  Patient understanding: patient states understanding of the procedure being performed  Patient consent: the patient's understanding of the procedure matches consent given  Procedure consent: procedure consent matches procedure scheduled  Patient identity confirmed: verbally with patient      Patient location:  Clinic  Procedure details:     Location:  R ear    Procedure type: irrigation only      Approach:  External  Post-procedure details:     Complication:  None    Hearing quality:  Improved    Patient tolerance of procedure: Tolerated well, no immediate complications  Comments:      TM visualized to be intact and WNL following lavage.

## 2023-09-13 DIAGNOSIS — M25.512 CHRONIC LEFT SHOULDER PAIN: ICD-10-CM

## 2023-09-13 DIAGNOSIS — G89.29 CHRONIC LEFT SHOULDER PAIN: ICD-10-CM

## 2023-09-15 ENCOUNTER — NURSE TRIAGE (OUTPATIENT)
Dept: OTHER | Facility: OTHER | Age: 88
End: 2023-09-15

## 2023-09-15 NOTE — TELEPHONE ENCOUNTER
Reason for Disposition  • [1] Shingles rash already diagnosed and [2] taking antiviral medication    Answer Assessment - Initial Assessment Questions  1. APPEARANCE of RASH: "Describe the rash."       Blistering about nickel-sized. Full of liquid. Scratch-looking rash on shoulder  2. LOCATION: "Where is the rash located?"       Right chin to right abdomen, back and shoulder  3. ONSET: "When did the rash start?"       9/12  4. ITCHING: "Does the rash itch?" If Yes, ask: "How bad is the itch?"  (Scale 1-10; or mild, moderate, severe)       Not itchy  5. PAIN: "Does the rash hurt?" If Yes, ask: "How bad is the pain?"  (Scale 1-10; or mild, moderate, severe)      "annoying" Stinging sensation  6. OTHER SYMPTOMS: "Do you have any other symptoms?" (e.g., fever)      Was feeling feverish the other day (sweats)  Has not measured temp. Also started with an earache right before the rash started. 7. OTHER       Went to care now on 9/12 for an earache        Evaluated at [facility] yesterday. Was diagnosed with Shingles. Prescribed an ointment and an antiviral.    Protocols used: [facility]    General questions about treatment as well as contagiousness. Answered all questions. Will call back with any further questions.

## 2023-09-15 NOTE — TELEPHONE ENCOUNTER
Regarding: medical advice  ----- Message from Brannon Barrow sent at 9/15/2023  4:47 PM EDT -----  "I have the shingles and I'm not sure how I'm supposed to take care of myself"

## 2023-09-21 DIAGNOSIS — B02.9 HERPES ZOSTER WITHOUT COMPLICATION: Primary | ICD-10-CM

## 2023-09-21 RX ORDER — VALACYCLOVIR HYDROCHLORIDE 1 G/1
1000 TABLET, FILM COATED ORAL 3 TIMES DAILY
Qty: 21 TABLET | Refills: 0 | Status: SHIPPED | OUTPATIENT
Start: 2023-09-21 | End: 2023-09-28

## 2023-09-21 NOTE — TELEPHONE ENCOUNTER
Patient was seen on 9/13 at [address] [place] [address] because our urgent care left due to no water and we had no openings. She was diagnosed with Shingles and given valacyclovir ointment and tablets. She is out of the ointment, which her son has to apply as she cannot reach, and is asking what should she do.

## 2023-09-22 ENCOUNTER — OFFICE VISIT (OUTPATIENT)
Dept: FAMILY MEDICINE CLINIC | Facility: CLINIC | Age: 88
End: 2023-09-22
Payer: COMMERCIAL

## 2023-09-22 VITALS
DIASTOLIC BLOOD PRESSURE: 58 MMHG | WEIGHT: 154 LBS | SYSTOLIC BLOOD PRESSURE: 129 MMHG | BODY MASS INDEX: 27.29 KG/M2 | HEART RATE: 84 BPM | TEMPERATURE: 98.9 F | OXYGEN SATURATION: 95 % | HEIGHT: 63 IN

## 2023-09-22 DIAGNOSIS — Z93.3 COLOSTOMY IN PLACE (HCC): ICD-10-CM

## 2023-09-22 DIAGNOSIS — N18.32 TYPE 2 DIABETES MELLITUS WITH STAGE 3B CHRONIC KIDNEY DISEASE, WITHOUT LONG-TERM CURRENT USE OF INSULIN (HCC): Chronic | ICD-10-CM

## 2023-09-22 DIAGNOSIS — E11.22 TYPE 2 DIABETES MELLITUS WITH STAGE 3B CHRONIC KIDNEY DISEASE, WITHOUT LONG-TERM CURRENT USE OF INSULIN (HCC): Chronic | ICD-10-CM

## 2023-09-22 DIAGNOSIS — Z00.00 MEDICARE ANNUAL WELLNESS VISIT, SUBSEQUENT: ICD-10-CM

## 2023-09-22 DIAGNOSIS — B02.8 HERPES ZOSTER WITH COMPLICATION: Primary | ICD-10-CM

## 2023-09-22 DIAGNOSIS — E86.0 DEHYDRATION: ICD-10-CM

## 2023-09-22 DIAGNOSIS — R41.82 ALTERED MENTAL STATUS, UNSPECIFIED ALTERED MENTAL STATUS TYPE: ICD-10-CM

## 2023-09-22 PROCEDURE — 99215 OFFICE O/P EST HI 40 MIN: CPT

## 2023-09-22 PROCEDURE — G0439 PPPS, SUBSEQ VISIT: HCPCS

## 2023-09-22 NOTE — PATIENT INSTRUCTIONS
Medicare Preventive Visit Patient Instructions  Thank you for completing your Welcome to Medicare Visit or Medicare Annual Wellness Visit today. Your next wellness visit will be due in one year (9/22/2024). The screening/preventive services that you may require over the next 5-10 years are detailed below. Some tests may not apply to you based off risk factors and/or age. Screening tests ordered at today's visit but not completed yet may show as past due. Also, please note that scanned in results may not display below. Preventive Screenings:  Service Recommendations Previous Testing/Comments   Colorectal Cancer Screening  * Colonoscopy    * Fecal Occult Blood Test (FOBT)/Fecal Immunochemical Test (FIT)  * Fecal DNA/Cologuard Test  * Flexible Sigmoidoscopy Age: 43-73 years old   Colonoscopy: every 10 years (may be performed more frequently if at higher risk)  OR  FOBT/FIT: every 1 year  OR  Cologuard: every 3 years  OR  Sigmoidoscopy: every 5 years  Screening may be recommended earlier than age 39 if at higher risk for colorectal cancer. Also, an individualized decision between you and your healthcare provider will decide whether screening between the ages of 77-80 would be appropriate. Colonoscopy: 07/19/2017  FOBT/FIT: Not on file  Cologuard: Not on file  Sigmoidoscopy: Not on file    Screening Not Indicated     Breast Cancer Screening Age: 36 years old  Frequency: every 1-2 years  Not required if history of left and right mastectomy Mammogram: Not on file    Risks and Benefits Discussed  Screening Not Indicated   Cervical Cancer Screening Between the ages of 21-29, pap smear recommended once every 3 years. Between the ages of 32-69, can perform pap smear with HPV co-testing every 5 years.    Recommendations may differ for women with a history of total hysterectomy, cervical cancer, or abnormal pap smears in past. Pap Smear: Not on file    Screening Not Indicated   Hepatitis C Screening Once for adults born between [facility]  More frequently in patients at high risk for Hepatitis C Hep C Antibody: Not on file    Risks and Benefits Discussed  Screening Not Indicated   Diabetes Screening 1-2 times per year if you're at risk for diabetes or have pre-diabetes Fasting glucose: 153 mg/dL (11/18/2022)  A1C: 6.7 % (11/18/2022)  Screening Not Indicated  History Diabetes   Cholesterol Screening Once every 5 years if you don't have a lipid disorder. May order more often based on risk factors. Lipid panel: 11/18/2022    Screening Not Indicated  History Lipid Disorder     Other Preventive Screenings Covered by Medicare:  1. Abdominal Aortic Aneurysm (AAA) Screening: covered once if your at risk. You're considered to be at risk if you have a family history of AAA. 2. Lung Cancer Screening: covers low dose CT scan once per year if you meet all of the following conditions: (1) Age 48-67; (2) No signs or symptoms of lung cancer; (3) Current smoker or have quit smoking within the last 15 years; (4) You have a tobacco smoking history of at least 20 pack years (packs per day multiplied by number of years you smoked); (5) You get a written order from a healthcare provider. 3. Glaucoma Screening: covered annually if you're considered high risk: (1) You have diabetes OR (2) Family history of glaucoma OR (3)  aged 48 and older OR (3)  American aged 72 and older  3. Osteoporosis Screening: covered every 2 years if you meet one of the following conditions: (1) You're estrogen deficient and at risk for osteoporosis based off medical history and other findings; (2) Have a vertebral abnormality; (3) On glucocorticoid therapy for more than 3 months; (4) Have primary hyperparathyroidism; (5) On osteoporosis medications and need to assess response to drug therapy. · Last bone density test (DXA Scan): Not on file. 5. HIV Screening: covered annually if you're between the age of 14-79.  Also covered annually if you are younger than 13 and older than 72 with risk factors for HIV infection. For pregnant patients, it is covered up to 3 times per pregnancy. Immunizations:  Immunization Recommendations   Influenza Vaccine Annual influenza vaccination during flu season is recommended for all persons aged >= 6 months who do not have contraindications   Pneumococcal Vaccine   * Pneumococcal conjugate vaccine = PCV13 (Prevnar 13), PCV15 (Vaxneuvance), PCV20 (Prevnar 20)  * Pneumococcal polysaccharide vaccine = PPSV23 (Pneumovax) Adults 20-63 years old: 1-3 doses may be recommended based on certain risk factors  Adults 72 years old: 1-2 doses may be recommended based off what pneumonia vaccine you previously received   Hepatitis B Vaccine 3 dose series if at intermediate or high risk (ex: diabetes, end stage renal disease, liver disease)   Tetanus (Td) Vaccine - COST NOT COVERED BY MEDICARE PART B Following completion of primary series, a booster dose should be given every 10 years to maintain immunity against tetanus. Td may also be given as tetanus wound prophylaxis. Tdap Vaccine - COST NOT COVERED BY MEDICARE PART B Recommended at least once for all adults. For pregnant patients, recommended with each pregnancy. Shingles Vaccine (Shingrix) - COST NOT COVERED BY MEDICARE PART B  2 shot series recommended in those aged 48 and above     Health Maintenance Due:  There are no preventive care reminders to display for this patient. Immunizations Due:      Topic Date Due   • COVID-19 Vaccine (3 - Pfizer series) 06/02/2021   • Influenza Vaccine (1) 09/01/2023     Advance Directives   What are advance directives? Advance directives are legal documents that state your wishes and plans for medical care. These plans are made ahead of time in case you lose your ability to make decisions for yourself. Advance directives can apply to any medical decision, such as the treatments you want, and if you want to donate organs.    What are the types of advance directives? There are many types of advance directives, and each state has rules about how to use them. You may choose a combination of any of the following:  · Living will: This is a written record of the treatment you want. You can also choose which treatments you do not want, which to limit, and which to stop at a certain time. This includes surgery, medicine, IV fluid, and tube feedings. · Durable power of  for [facility]): This is a written record that states who you want to make healthcare choices for you when you are unable to make them for yourself. This person, called a proxy, is usually a family member or a friend. You may choose more than 1 proxy. · Do not resuscitate (DNR) order:  A DNR order is used in case your heart stops beating or you stop breathing. It is a request not to have certain forms of treatment, such as CPR. A DNR order may be included in other types of advance directives. · Medical directive: This covers the care that you want if you are in a coma, near death, or unable to make decisions for yourself. You can list the treatments you want for each condition. Treatment may include pain medicine, surgery, blood transfusions, dialysis, IV or tube feedings, and a ventilator (breathing machine). · Values history: This document has questions about your views, beliefs, and how you feel and think about life. This information can help others choose the care that you would choose. Why are advance directives important? An advance directive helps you control your care. Although spoken wishes may be used, it is better to have your wishes written down. Spoken wishes can be misunderstood, or not followed. Treatments may be given even if you do not want them. An advance directive may make it easier for your family to make difficult choices about your care. Fall Prevention    Fall prevention  includes ways to make your home and other areas safer.  It also includes ways you can move more carefully to prevent a fall. Health conditions that cause changes in your blood pressure, vision, or muscle strength and coordination may increase your risk for falls. Medicines may also increase your risk for falls if they make you dizzy, weak, or sleepy. Fall prevention tips:   · Stand or sit up slowly. · Use assistive devices as directed. · Wear shoes that fit well and have soles that . · Wear a personal alarm. · Stay active. · Manage your medical conditions. Home Safety Tips:  · Add items to prevent falls in the bathroom. · Keep paths clear. · Install bright lights in your home. · Keep items you use often on shelves within reach. · Paint or place reflective tape on the edges of your stairs. Urinary Incontinence   Urinary incontinence (UI)  is when you lose control of your bladder. UI develops because your bladder cannot store or empty urine properly. The 3 most common types of UI are stress incontinence, urge incontinence, or both. Medicines:   · May be given to help strengthen your bladder control. Report any side effects of medication to your healthcare provider. Do pelvic muscle exercises often:  Your pelvic muscles help you stop urinating. Squeeze these muscles tight for 5 seconds, then relax for 5 seconds. Gradually work up to squeezing for 10 seconds. Do 3 sets of 15 repetitions a day, or as directed. This will help strengthen your pelvic muscles and improve bladder control. Train your bladder:  Go to the bathroom at set times, such as every 2 hours, even if you do not feel the urge to go. You can also try to hold your urine when you feel the urge to go. For example, hold your urine for 5 minutes when you feel the urge to go. As that becomes easier, hold your urine for 10 minutes. Self-care:   · Keep a UI record. Write down how often you leak urine and how much you leak. Make a note of what you were doing when you leaked urine.   · Drink liquids as directed. You may need to limit the amount of liquid you drink to help control your urine leakage. Do not drink any liquid right before you go to bed. Limit or do not have drinks that contain caffeine or alcohol. · Prevent constipation. Eat a variety of high-fiber foods. Good examples are high-fiber cereals, beans, vegetables, and whole-grain breads. Walking is the best way to trigger your intestines to have a bowel movement. · Exercise regularly and maintain a healthy weight. Weight loss and exercise will decrease pressure on your bladder and help you control your leakage. · Use a catheter as directed  to help empty your bladder. A catheter is a tiny, plastic tube that is put into your bladder to drain your urine. · Go to behavior therapy as directed. Behavior therapy may be used to help you learn to control your urge to urinate. Weight Management   Why it is important to manage your weight:  Being overweight increases your risk of health conditions such as heart disease, high blood pressure, type 2 diabetes, and certain types of cancer. It can also increase your risk for osteoarthritis, sleep apnea, and other respiratory problems. Aim for a slow, steady weight loss. Even a small amount of weight loss can lower your risk of health problems. How to lose weight safely:  A safe and healthy way to lose weight is to eat fewer calories and get regular exercise. You can lose up about 1 pound a week by decreasing the number of calories you eat by 500 calories each day. Healthy meal plan for weight management:  A healthy meal plan includes a variety of foods, contains fewer calories, and helps you stay healthy. A healthy meal plan includes the following:  · Eat whole-grain foods more often. A healthy meal plan should contain fiber. Fiber is the part of grains, fruits, and vegetables that is not broken down by your body. Whole-grain foods are healthy and provide extra fiber in your diet.  Some examples of whole-grain foods are whole-wheat breads and pastas, oatmeal, brown rice, and bulgur. · Eat a variety of vegetables every day. Include dark, leafy greens such as spinach, kale, tiffanie greens, and mustard greens. Eat yellow and orange vegetables such as carrots, sweet potatoes, and winter squash. · Eat a variety of fruits every day. Choose fresh or canned fruit (canned in its own juice or light syrup) instead of juice. Fruit juice has very little or no fiber. · Eat low-fat dairy foods. Drink fat-free (skim) milk or 1% milk. Eat fat-free yogurt and low-fat cottage cheese. Try low-fat cheeses such as mozzarella and other reduced-fat cheeses. · Choose meat and other protein foods that are low in fat. Choose beans or other legumes such as split peas or lentils. Choose fish, skinless poultry (chicken or turkey), or lean cuts of red meat (beef or pork). Before you cook meat or poultry, cut off any visible fat. · Use less fat and oil. Try baking foods instead of frying them. Add less fat, such as margarine, sour cream, regular salad dressing and mayonnaise to foods. Eat fewer high-fat foods. Some examples of high-fat foods include french fries, doughnuts, ice cream, and cakes. · Eat fewer sweets. Limit foods and drinks that are high in sugar. This includes candy, cookies, regular soda, and sweetened drinks. Exercise:  Exercise at least 30 minutes per day on most days of the week. Some examples of exercise include walking, biking, dancing, and swimming. You can also fit in more physical activity by taking the stairs instead of the elevator or parking farther away from stores. Ask your healthcare provider about the best exercise plan for you. © Copyright [employer] 2018 Information is for End User's use only and may not be sold, redistributed or otherwise used for commercial purposes.  All illustrations and images included in CareNotes® are the copyrighted property of [employer] or [employer]

## 2023-09-22 NOTE — PROGRESS NOTES
Assessment and Plan:     Problem List Items Addressed This Visit        Endocrine    Type 2 diabetes mellitus with stage 3 chronic kidney disease, without long-term current use of insulin (HCC) (Chronic)       Other    Colostomy in place [facility])   Other Visit Diagnoses     Herpes zoster with complication    -  Primary    Relevant Orders    Transfer to other facility (Completed)    Medicare annual wellness visit, subsequent        Altered mental status, unspecified altered mental status type        Relevant Orders    Transfer to other facility (Completed)    Dehydration        Relevant Orders    Transfer to other facility (Completed)          Depression Screening and Follow-up Plan: Patient was screened for depression during today's encounter. They screened negative with a PHQ-2 score of 0. Preventive health issues were discussed with patient, and age appropriate screening tests were ordered as noted in patient's After Visit Summary. Patient declined all diabetic testing at today's visit - does not believe she is diabetic except for when "she is sick", does not wish to talk about diabetic diagnosis further. Patient has aged out of all screenings. Patient presents for medicare wellness visit along with herpes zoster follow up. Her son notes a change in her mental status within the last few days (see HPI), along with lack of drinking/eating with decreased urine output. With patient's history and severity of herpes zoster and new onset mental status change, sent to ER to be further evaluated and rule out severe complication such as HSV encephalitis from herpes zoster. Patient is also likely dehydrated and will need IV fluids to restore her hydration status. Patient reports no problems with her colostomy bag - notes recent decreased output because she has not been eating as much as she normally does.       History of Present Illness:     Patient presents for a Medicare Wellness Visit    Patient presents for shingles follow-up. Patient was elevated at [facility] on 9/13 where she was diagnosed with herpes zoster and was given valacyclovir. Rash is located over her right upper arm, chest, back, and right side of neck. Patient notes the "bubbles" popped yesterday and are now scabing over - denies any pain in any of the areas the rash is located over. Her son is present in the room with her today - he brings up concern regarding her mental status. States there are times during the day that she is not herself and she seems confused and is non-compliant to what he is trying to tell her to do. He does not believe this is her normal and confirms this is a change from her baseline. These mental status concerns began a few days ago, after the herpes zoster was diagnosed. He also notes that she has not been eating much or drinking fluids - patient admits that her urine output has significantly decreased and she is only urinating about once a day and it is less than usual. She also admits that her colostomy bag output is decreased - she usually changes it 2-3 times a week but only had to change it once this week. Patient Care Team:  Mansi Wong as PCP - General (Family Medicine)  Donavan Olsen MD as PCP - [address] [place] [address] (RTE)  Donavan Olsen MD as PCP - PCP-[facility] ([facility])     Review of Systems:     Review of Systems   Constitutional: Positive for activity change (decreased per son), appetite change (not eating much per son), diaphoresis (intermittent sweats) and fatigue. Negative for chills and fever. HENT: Negative for congestion, rhinorrhea and sore throat. Eyes: Negative for visual disturbance. Respiratory: Negative for cough, chest tightness and shortness of breath. Cardiovascular: Negative for chest pain, palpitations and leg swelling. Gastrointestinal: Negative for abdominal pain, blood in stool, nausea and vomiting.         Decreased output from her colostomy bag - usually changes it 2-3 times a week, only changed it once this past week   Genitourinary: Positive for decreased urine volume (is only urinating once a day for the last few days, feels it is decreased from her normal). Negative for difficulty urinating, dysuria, frequency and urgency. Musculoskeletal: Negative for neck pain. Skin: Positive for rash (shingles rash over right upper arm, chest, back, and posterior right neck). Neurological: Positive for weakness. Negative for dizziness, light-headedness and headaches. Psychiatric/Behavioral: Positive for confusion (per son, patient has been more confused and non-compliant with what he tells her to do) and sleep disturbance (trouble sleeping at night).         Problem List:     Patient Active Problem List   Diagnosis   • Benign essential hypertension   • Atrial fibrillation (HCC)   • Anticoagulant long-term use   • Mixed hyperlipidemia   • Diastolic dysfunction   • Mitral annular calcification   • Chronic kidney disease, stage III (moderate) ([facility])   • Colostomy in place [facility])   • Osteopenia of multiple sites   • Subclinical hypothyroidism   • Type 2 diabetes mellitus with stage 3 chronic kidney disease, without long-term current use of insulin ([facility])   • BMI 28.0-28.9,adult   • Ulcerative colitis with complication ([address])   • Conductive hearing loss, bilateral   • Chronic right shoulder pain   • Closed fracture of left hip ([facility])   • Closed nondisplaced intertrochanteric fracture of left femur ([facility])   • Pancreatic lesion   • Abdominal pain, acute, generalized      Past Medical and Surgical History:     Past Medical History:   Diagnosis Date   • A-fib ([address])    • Chronic kidney disease    • Colostomy present ([address])    • Diabetes mellitus ([address])    • Hyperlipidemia    • Hypertension      Past Surgical History:   Procedure Laterality Date   • APPENDECTOMY     • CARPAL TUNNEL RELEASE Bilateral    • CATARACT EXTRACTION     • CHOLECYSTECTOMY     • COLON SURGERY     • COLOSTOMY     • HYSTERECTOMY Family History:     Family History   Problem Relation Age of Onset   • Heart disease Mother    • Cancer Father       Social History:     Social History     Socioeconomic History   • Marital status:      Spouse name: None   • Number of children: None   • Years of education: None   • Highest education level: None   Occupational History   • None   Tobacco Use   • Smoking status: Never   • Smokeless tobacco: Never   Vaping Use   • Vaping Use: Never used   Substance and Sexual Activity   • Alcohol use: Never   • Drug use: No   • Sexual activity: None   Other Topics Concern   • None   Social History Narrative          Social Determinants of Health     Financial Resource Strain: Low Risk  (9/22/2023)    Overall Financial Resource Strain (CARDIA)    • Difficulty of Paying Living Expenses: Not very hard   Food Insecurity: Not on file   Transportation Needs: No Transportation Needs (9/22/2023)    PRAPARE - Transportation    • Lack of Transportation (Medical): No    • Lack of Transportation (Non-Medical):  No   Physical Activity: Not on file   Stress: Not on file   Social Connections: Not on file   Intimate Partner Violence: Not on file   Housing Stability: Not on file      Medications and Allergies:     Current Outpatient Medications   Medication Sig Dispense Refill   • amLODIPine (NORVASC) 10 mg tablet TAKE ONE TABLET BY MOUTH EVERY DAY 90 tablet 3   • atenolol (TENORMIN) 100 mg tablet TAKE ONE TABLET BY MOUTH EVERY DAY 90 tablet 3   • atorvastatin (LIPITOR) 20 mg tablet TAKE ONE TABLET BY MOUTH EVERY DAY 90 tablet 3   • Blood Glucose Monitoring Suppl (ONE TOUCH ULTRA 2) w/Device KIT by Does not apply route daily 1 each 0   • calcium carbonate-vitamin D (OSCAL-D) 500 mg-200 units per tablet Take 1 tablet by mouth 2 (two) times a day with meals     • Diclofenac Sodium (VOLTAREN) 1 % Apply 2 grams topically 2 times a day 400 g 1   • dicyclomine (BENTYL) 20 mg tablet Take 1 tablet (20 mg total) by mouth 2 (two) times a day as needed (abd pain) 20 tablet 0   • docusate sodium (COLACE) 100 mg capsule Take 100 mg by mouth 2 (two) times a day     • Eliquis 5 MG TAKE ONE TABLET BY MOUTH TWICE A  tablet 3   • ferrous gluconate (FERGON) 324 mg tablet Take 324 mg by mouth daily with breakfast     • glucose blood (ONE TOUCH ULTRA TEST) test strip Test once daily 100 each 3   • losartan (COZAAR) 100 MG tablet TAKE ONE TABLET BY MOUTH EVERY DAY 90 tablet 3   • mesalamine (ASACOL) 800 MG EC tablet TAKE ONE TABLET BY MOUTH THREE TIMES A  tablet 3   • ONE TOUCH LANCETS MISC by Does not apply route daily 100 each 3   • Ostomy Supplies MISC Use once a week [id number] Barrier 10 each 6   • Ostomy Supplies Pouch MISC Use once a week [id number] 10 each 6   • polyethylene glycol (MIRALAX) 17 g packet Take 17 g by mouth daily     • valACYclovir (VALTREX) 1,000 mg tablet Take 1 tablet (1,000 mg total) by mouth 3 (three) times a day for 7 days 21 tablet 0     No current facility-administered medications for this visit. Allergies   Allergen Reactions   • Amoxicillin-Pot Clavulanate Other (See Comments)     C-DIFF, x's 2  developed c diff taking drug      Immunizations:     Immunization History   Administered Date(s) Administered   • COVID-19 PFIZER VACCINE 0.3 ML IM 03/11/2021, 04/07/2021   • INFLUENZA 10/28/2011, 11/12/2013, 10/07/2014, 09/29/2015, 09/21/2016, 09/19/2017, 10/03/2018, 10/03/2018, 09/17/2019, 01/12/2023   • Influenza Split High Dose Preservative Free IM 09/17/2019   • Influenza, high dose seasonal 0.7 mL 11/18/2020, 11/18/2021, 01/12/2023   • Influenza, seasonal, injectable 10/28/2011, 11/12/2013, 09/19/2017   • Pneumococcal Conjugate 13-Valent 08/03/2015   • Pneumococcal Conjugate PCV 7 01/01/2008   • Pneumococcal Polysaccharide PPV23 01/01/2008, 05/18/2021   • Tdap 08/10/2016   • Zoster 03/03/2016      Health Maintenance: There are no preventive care reminders to display for this patient.       Topic Date Due   • COVID-19 Vaccine (3 - Pfizer series) 06/02/2021   • Influenza Vaccine (1) 09/01/2023      Medicare Screening Tests and Risk Assessments:     Josh Reilly is here for her Subsequent Wellness visit. Last Medicare Wellness visit information reviewed, patient interviewed and updates made to the record today. Health Risk Assessment:   Patient rates overall health as fair. Patient feels that their physical health rating is slightly worse. Patient is very satisfied with their life. Eyesight was rated as same. Hearing was rated as same. Patient feels that their emotional and mental health rating is much better. Patients states they are never, rarely angry. Patient states they are sometimes unusually tired/fatigued. Pain experienced in the last 7 days has been some. Patient's pain rating has been 5/10. Patient states that she has experienced no weight loss or gain in last 6 months. Depression Screening:   PHQ-2 Score: 0      Fall Risk Screening: In the past year, patient has experienced: history of falling in past year    Number of falls: 1  Injured during fall?: No    Feels unsteady when standing or walking?: No    Worried about falling?: No      Urinary Incontinence Screening:   Patient has leaked urine accidently in the last six months. Home Safety:  Patient does not have trouble with stairs inside or outside of their home. Patient has no working smoke alarms and has no working carbon monoxide detector. Home safety hazards include: none. Nutrition:   Current diet is Diabetic. Medications:   Patient is not currently taking any over-the-counter supplements. Patient is able to manage medications. Activities of Daily Living (ADLs)/Instrumental Activities of Daily Living (IADLs):   Walk and transfer into and out of bed and chair?: Yes  Dress and groom yourself?: No    Bathe or shower yourself?: No    Feed yourself?  Yes  Do your laundry/housekeeping?: No  Manage your money, pay your bills and track your expenses?: No  Make your own meals?: No    Do your own shopping?: No    Previous Hospitalizations:   Any hospitalizations or ED visits within the last 12 months?: Yes    How many hospitalizations have you had in the last year?: 1-2    Advance Care Planning:   Living will: Yes    Durable POA for healthcare: Yes    Advanced directive: Yes    Advanced directive counseling given: Yes    Five wishes given: Yes    End of Life Decisions reviewed with patient: Yes    Provider agrees with end of life decisions: Yes      Cognitive Screening:   Provider or family/friend/caregiver concerned regarding cognition?: No    PREVENTIVE SCREENINGS      Cardiovascular Screening:    General: Screening Not Indicated and History Lipid Disorder      Diabetes Screening:     General: Screening Not Indicated and History Diabetes      Colorectal Cancer Screening:     General: Screening Not Indicated      Breast Cancer Screening:     General: Risks and Benefits Discussed and Screening Not Indicated      Cervical Cancer Screening:    General: Screening Not Indicated      Osteoporosis Screening:    General: Risks and Benefits Discussed and Screening Not Indicated      Abdominal Aortic Aneurysm (AAA) Screening:        General: Risks and Benefits Discussed and Screening Not Indicated      Lung Cancer Screening:     General: Screening Not Indicated      Hepatitis C Screening:    General: Risks and Benefits Discussed and Screening Not Indicated    Screening, Brief Intervention, and Referral to Treatment (SBIRT)    Screening  Typical number of drinks in a day: 0    Single Item Drug Screening:  How often have you used an illegal drug (including marijuana) or a prescription medication for non-medical reasons in the past year? never    Single Item Drug Screen Score: 0  Interpretation: Negative screen for possible drug use disorder    No results found.      Physical Exam:     /58   Pulse 84   Temp 98.9 °F (37.2 °C)   Ht 5' 3" (1.6 m)   Wt 69.9 kg (154 lb)   SpO2 95%   BMI 27.28 kg/m²     Physical Exam  Constitutional:       General: She is not in acute distress. Appearance: Normal appearance. She is ill-appearing. HENT:      Head: Normocephalic and atraumatic. Right Ear: Tympanic membrane, ear canal and external ear normal. There is no impacted cerumen. Left Ear: Tympanic membrane, ear canal and external ear normal. There is no impacted cerumen. Nose: Nose normal. No congestion or rhinorrhea. Mouth/Throat:      Mouth: Mucous membranes are dry. Pharynx: No oropharyngeal exudate or posterior oropharyngeal erythema. Eyes:      General:         Right eye: No discharge. Left eye: No discharge. Conjunctiva/sclera: Conjunctivae normal.   Cardiovascular:      Rate and Rhythm: Normal rate and regular rhythm. Pulses: Normal pulses. Heart sounds: Normal heart sounds. Pulmonary:      Effort: Pulmonary effort is normal. No respiratory distress. Breath sounds: Normal breath sounds. No wheezing, rhonchi or rales. Abdominal:      General: Bowel sounds are normal.      Tenderness: There is no abdominal tenderness. Comments: Colostomy bag in place. Musculoskeletal:         General: Normal range of motion. Cervical back: Normal range of motion. No rigidity. Right lower leg: No edema. Left lower leg: No edema. Comments: 4/5 strength testing in B/L upper and lower extremities - patient is very shaky and unable to put hands out straight. Lymphadenopathy:      Cervical: No cervical adenopathy. Skin:     Findings: Rash present. Neurological:      Mental Status: She is alert and oriented to person, place, and time. Cranial Nerves: No cranial nerve deficit. Motor: Weakness present. Comments: Patient oriented to person, place, and time.    Patient did have small episode of confusion during visit - took shirt off to show me shingles rash in the beginning of the visit, forgot she did this and asked if I wanted to see the rash roughly thirty minutes later.             Suni Zayas PA-C

## 2023-09-25 ENCOUNTER — APPOINTMENT (EMERGENCY)
Dept: RADIOLOGY | Facility: HOSPITAL | Age: 88
End: 2023-09-25
Payer: COMMERCIAL

## 2023-09-25 ENCOUNTER — HOSPITAL ENCOUNTER (EMERGENCY)
Facility: HOSPITAL | Age: 88
Discharge: HOME | End: 2023-09-25
Attending: EMERGENCY MEDICINE
Payer: COMMERCIAL

## 2023-09-25 ENCOUNTER — APPOINTMENT (EMERGENCY)
Dept: CT IMAGING | Facility: HOSPITAL | Age: 88
End: 2023-09-25
Payer: COMMERCIAL

## 2023-09-25 VITALS
SYSTOLIC BLOOD PRESSURE: 148 MMHG | HEART RATE: 75 BPM | RESPIRATION RATE: 16 BRPM | OXYGEN SATURATION: 96 % | DIASTOLIC BLOOD PRESSURE: 69 MMHG | TEMPERATURE: 99.2 F

## 2023-09-25 DIAGNOSIS — R53.1 GENERALIZED WEAKNESS: ICD-10-CM

## 2023-09-25 DIAGNOSIS — E86.0 DEHYDRATION: ICD-10-CM

## 2023-09-25 DIAGNOSIS — R73.09 ELEVATED GLUCOSE: Primary | ICD-10-CM

## 2023-09-25 LAB
2HR DELTA HS TROPONIN: -1 NG/L
ALBUMIN SERPL BCP-MCNC: 3.9 G/DL (ref 3.5–5)
ALP SERPL-CCNC: 86 U/L (ref 34–104)
ALT SERPL W P-5'-P-CCNC: 15 U/L (ref 7–52)
ANION GAP SERPL CALCULATED.3IONS-SCNC: 9 MMOL/L
APTT PPP: 33 SECONDS (ref 23–37)
AST SERPL W P-5'-P-CCNC: 16 U/L (ref 13–39)
ATRIAL RATE: 77 BPM
BACTERIA UR QL AUTO: NORMAL /HPF
BASOPHILS # BLD AUTO: 0.06 THOUSANDS/ÂΜL (ref 0–0.1)
BASOPHILS NFR BLD AUTO: 1 % (ref 0–1)
BILIRUB SERPL-MCNC: 0.4 MG/DL (ref 0.2–1)
BILIRUB UR QL STRIP: NEGATIVE
BNP SERPL-MCNC: 141 PG/ML (ref 0–100)
BUN SERPL-MCNC: 27 MG/DL (ref 5–25)
CALCIUM SERPL-MCNC: 9.4 MG/DL (ref 8.4–10.2)
CARDIAC TROPONIN I PNL SERPL HS: 8 NG/L
CARDIAC TROPONIN I PNL SERPL HS: 9 NG/L
CHLORIDE SERPL-SCNC: 100 MMOL/L (ref 96–108)
CLARITY UR: CLEAR
CO2 SERPL-SCNC: 22 MMOL/L (ref 21–32)
COLOR UR: COLORLESS
CREAT SERPL-MCNC: 1.22 MG/DL (ref 0.6–1.3)
EOSINOPHIL # BLD AUTO: 0.29 THOUSAND/ÂΜL (ref 0–0.61)
EOSINOPHIL NFR BLD AUTO: 4 % (ref 0–6)
ERYTHROCYTE [DISTWIDTH] IN BLOOD BY AUTOMATED COUNT: 12 % (ref 11.6–15.1)
FLUAV RNA RESP QL NAA+PROBE: NEGATIVE
FLUBV RNA RESP QL NAA+PROBE: NEGATIVE
GFR SERPL CREATININE-BSD FRML MDRD: 38 ML/MIN/1.73SQ M
GLUCOSE SERPL-MCNC: 385 MG/DL (ref 65–140)
GLUCOSE SERPL-MCNC: 457 MG/DL (ref 65–140)
GLUCOSE UR STRIP-MCNC: ABNORMAL MG/DL
HCT VFR BLD AUTO: 40.5 % (ref 34.8–46.1)
HGB BLD-MCNC: 13.4 G/DL (ref 11.5–15.4)
HGB UR QL STRIP.AUTO: NEGATIVE
IMM GRANULOCYTES # BLD AUTO: 0.06 THOUSAND/UL (ref 0–0.2)
IMM GRANULOCYTES NFR BLD AUTO: 1 % (ref 0–2)
INR PPP: 1.2 (ref 0.84–1.19)
KETONES UR STRIP-MCNC: NEGATIVE MG/DL
LACTATE SERPL-SCNC: 1.9 MMOL/L (ref 0.5–2)
LACTATE SERPL-SCNC: 3 MMOL/L (ref 0.5–2)
LEUKOCYTE ESTERASE UR QL STRIP: ABNORMAL
LIPASE SERPL-CCNC: 30 U/L (ref 11–82)
LYMPHOCYTES # BLD AUTO: 0.92 THOUSANDS/ÂΜL (ref 0.6–4.47)
LYMPHOCYTES NFR BLD AUTO: 12 % (ref 14–44)
MCH RBC QN AUTO: 31.5 PG (ref 26.8–34.3)
MCHC RBC AUTO-ENTMCNC: 33.1 G/DL (ref 31.4–37.4)
MCV RBC AUTO: 95 FL (ref 82–98)
MONOCYTES # BLD AUTO: 0.59 THOUSAND/ÂΜL (ref 0.17–1.22)
MONOCYTES NFR BLD AUTO: 8 % (ref 4–12)
NEUTROPHILS # BLD AUTO: 5.7 THOUSANDS/ÂΜL (ref 1.85–7.62)
NEUTS SEG NFR BLD AUTO: 74 % (ref 43–75)
NITRITE UR QL STRIP: NEGATIVE
NON-SQ EPI CELLS URNS QL MICRO: NORMAL /HPF
NRBC BLD AUTO-RTO: 0 /100 WBCS
P AXIS: 68 DEGREES
PH UR STRIP.AUTO: 5 [PH]
PLATELET # BLD AUTO: 257 THOUSANDS/UL (ref 149–390)
PMV BLD AUTO: 9.8 FL (ref 8.9–12.7)
POTASSIUM SERPL-SCNC: 4.8 MMOL/L (ref 3.5–5.3)
PR INTERVAL: 164 MS
PROCALCITONIN SERPL-MCNC: 0.09 NG/ML
PROT SERPL-MCNC: 7.7 G/DL (ref 6.4–8.4)
PROT UR STRIP-MCNC: NEGATIVE MG/DL
PROTHROMBIN TIME: 15.8 SECONDS (ref 11.6–14.5)
QRS AXIS: 0 DEGREES
QRSD INTERVAL: 76 MS
QT INTERVAL: 414 MS
QTC INTERVAL: 468 MS
RBC # BLD AUTO: 4.25 MILLION/UL (ref 3.81–5.12)
RBC #/AREA URNS AUTO: NORMAL /HPF
RSV RNA RESP QL NAA+PROBE: NEGATIVE
SARS-COV-2 RNA RESP QL NAA+PROBE: NEGATIVE
SODIUM SERPL-SCNC: 131 MMOL/L (ref 135–147)
SP GR UR STRIP.AUTO: 1.01 (ref 1–1.03)
T WAVE AXIS: 78 DEGREES
TSH SERPL DL<=0.05 MIU/L-ACNC: 1.33 UIU/ML (ref 0.45–4.5)
UROBILINOGEN UR STRIP-ACNC: <2 MG/DL
VENTRICULAR RATE: 77 BPM
WBC # BLD AUTO: 7.62 THOUSAND/UL (ref 4.31–10.16)
WBC #/AREA URNS AUTO: NORMAL /HPF

## 2023-09-25 PROCEDURE — 96361 HYDRATE IV INFUSION ADD-ON: CPT

## 2023-09-25 PROCEDURE — 99285 EMERGENCY DEPT VISIT HI MDM: CPT | Performed by: EMERGENCY MEDICINE

## 2023-09-25 PROCEDURE — 81001 URINALYSIS AUTO W/SCOPE: CPT | Performed by: EMERGENCY MEDICINE

## 2023-09-25 PROCEDURE — G1004 CDSM NDSC: HCPCS

## 2023-09-25 PROCEDURE — 80053 COMPREHEN METABOLIC PANEL: CPT | Performed by: EMERGENCY MEDICINE

## 2023-09-25 PROCEDURE — 36415 COLL VENOUS BLD VENIPUNCTURE: CPT | Performed by: EMERGENCY MEDICINE

## 2023-09-25 PROCEDURE — 0241U HB NFCT DS VIR RESP RNA 4 TRGT: CPT | Performed by: EMERGENCY MEDICINE

## 2023-09-25 PROCEDURE — 83605 ASSAY OF LACTIC ACID: CPT | Performed by: EMERGENCY MEDICINE

## 2023-09-25 PROCEDURE — 93010 ELECTROCARDIOGRAM REPORT: CPT | Performed by: INTERNAL MEDICINE

## 2023-09-25 PROCEDURE — 85025 COMPLETE CBC W/AUTO DIFF WBC: CPT | Performed by: EMERGENCY MEDICINE

## 2023-09-25 PROCEDURE — 83690 ASSAY OF LIPASE: CPT | Performed by: EMERGENCY MEDICINE

## 2023-09-25 PROCEDURE — 93005 ELECTROCARDIOGRAM TRACING: CPT

## 2023-09-25 PROCEDURE — 99285 EMERGENCY DEPT VISIT HI MDM: CPT

## 2023-09-25 PROCEDURE — 71045 X-RAY EXAM CHEST 1 VIEW: CPT

## 2023-09-25 PROCEDURE — 85610 PROTHROMBIN TIME: CPT | Performed by: EMERGENCY MEDICINE

## 2023-09-25 PROCEDURE — 96360 HYDRATION IV INFUSION INIT: CPT

## 2023-09-25 PROCEDURE — 82948 REAGENT STRIP/BLOOD GLUCOSE: CPT

## 2023-09-25 PROCEDURE — 84443 ASSAY THYROID STIM HORMONE: CPT | Performed by: EMERGENCY MEDICINE

## 2023-09-25 PROCEDURE — 74177 CT ABD & PELVIS W/CONTRAST: CPT

## 2023-09-25 PROCEDURE — 83880 ASSAY OF NATRIURETIC PEPTIDE: CPT | Performed by: EMERGENCY MEDICINE

## 2023-09-25 PROCEDURE — 85730 THROMBOPLASTIN TIME PARTIAL: CPT | Performed by: EMERGENCY MEDICINE

## 2023-09-25 PROCEDURE — 84145 PROCALCITONIN (PCT): CPT | Performed by: EMERGENCY MEDICINE

## 2023-09-25 PROCEDURE — 87040 BLOOD CULTURE FOR BACTERIA: CPT | Performed by: EMERGENCY MEDICINE

## 2023-09-25 PROCEDURE — 84484 ASSAY OF TROPONIN QUANT: CPT | Performed by: EMERGENCY MEDICINE

## 2023-09-25 PROCEDURE — 71260 CT THORAX DX C+: CPT

## 2023-09-25 RX ADMIN — IOHEXOL 100 ML: 350 INJECTION, SOLUTION INTRAVENOUS at 15:48

## 2023-09-25 RX ADMIN — SODIUM CHLORIDE 1000 ML: 0.9 INJECTION, SOLUTION INTRAVENOUS at 13:37

## 2023-09-25 NOTE — ED PROVIDER NOTES
History  Chief Complaint   Patient presents with   • Weakness - Generalized     Pt states she feels weak and nauseas and not "totally with it". Patient is a 40-year-old female past medical history of hypertension, hyperlipidemia, diabetes, CKD stage III, hypothyroid, atrial fibrillation, ulcerative colitis with colostomy presenting for generalized weakness. Patient notes constant nausea, generalized weakness over the past 2 weeks and states that 2 weeks ago she was diagnosed with shingles but that that is improving and she has had minimal pain, not taking medication for pain. Currently taking a antivirals. She states that she is noting decreased urination, decreased output into her stoma but denies any blood and states she initially was having diarrhea but that has since resolved. Denies any vomiting and notes decreased p.o. intake. Denies any falls or injuries recently. Denies any chest pain, shortness breath, fevers, vision changes, dysuria, hematuria but does note intermittent abdominal pain better with passing gas. She states that she is requiring more support to ambulate at home. Prior to Admission Medications   Prescriptions Last Dose Informant Patient Reported? Taking?    Blood Glucose Monitoring Suppl (ONE TOUCH ULTRA 2) w/Device KIT  Self No No   Sig: by Does not apply route daily   Diclofenac Sodium (VOLTAREN) 1 %   No No   Sig: Apply 2 grams topically 2 times a day   Eliquis 5 MG  Self No No   Sig: TAKE ONE TABLET BY MOUTH TWICE A DAY   ONE TOUCH LANCETS MISC  Self No No   Sig: by Does not apply route daily   Ostomy Supplies MISC   No No   Sig: Use once a week [id number] Barrier   Ostomy Supplies Pouch MISC  Self No No   Sig: Use once a week [id number]   amLODIPine (NORVASC) 10 mg tablet  Self No No   Sig: TAKE ONE TABLET BY MOUTH EVERY DAY   atenolol (TENORMIN) 100 mg tablet  Self No No   Sig: TAKE ONE TABLET BY MOUTH EVERY DAY   atorvastatin (LIPITOR) 20 mg tablet  Self No No   Sig: TAKE ONE TABLET BY MOUTH EVERY DAY   calcium carbonate-vitamin D (OSCAL-D) 500 mg-200 units per tablet  Self Yes No   Sig: Take 1 tablet by mouth 2 (two) times a day with meals   dicyclomine (BENTYL) 20 mg tablet  Self No No   Sig: Take 1 tablet (20 mg total) by mouth 2 (two) times a day as needed (abd pain)   docusate sodium (COLACE) 100 mg capsule  Self Yes No   Sig: Take 100 mg by mouth 2 (two) times a day   ferrous gluconate (FERGON) 324 mg tablet  Self Yes No   Sig: Take 324 mg by mouth daily with breakfast   glucose blood (ONE TOUCH ULTRA TEST) test strip  Self No No   Sig: Test once daily   losartan (COZAAR) 100 MG tablet  Self No No   Sig: TAKE ONE TABLET BY MOUTH EVERY DAY   mesalamine (ASACOL) 800 MG EC tablet  Self No No   Sig: TAKE ONE TABLET BY MOUTH THREE TIMES A DAY   polyethylene glycol (MIRALAX) 17 g packet  Self Yes No   Sig: Take 17 g by mouth daily   valACYclovir (VALTREX) 1,000 mg tablet   No No   Sig: Take 1 tablet (1,000 mg total) by mouth 3 (three) times a day for 7 days      Facility-Administered Medications: None       Past Medical History:   Diagnosis Date   • A-fib (HCC)    • Chronic kidney disease    • Colostomy present (HCC)    • Diabetes mellitus ([address])    • Hyperlipidemia    • Hypertension        Past Surgical History:   Procedure Laterality Date   • APPENDECTOMY     • CARPAL TUNNEL RELEASE Bilateral    • CATARACT EXTRACTION     • CHOLECYSTECTOMY     • COLON SURGERY     • COLOSTOMY     • HYSTERECTOMY         Family History   Problem Relation Age of Onset   • Heart disease Mother    • Cancer Father      I have reviewed and agree with the history as documented.     E-Cigarette/Vaping   • E-Cigarette Use Never User      E-Cigarette/Vaping Substances   • Nicotine No    • THC No    • CBD No    • Flavoring No    • Other No    • Unknown No      Social History     Tobacco Use   • Smoking status: Never   • Smokeless tobacco: Never   Vaping Use   • Vaping Use: Never used   Substance Use Topics   • Alcohol use: Never   • Drug use: No       Review of Systems   All other systems reviewed and are negative. Physical Exam  Physical Exam  Vitals reviewed. Constitutional:       General: She is not in acute distress. Appearance: Normal appearance. She is not ill-appearing. HENT:      Mouth/Throat:      Mouth: Mucous membranes are dry. Eyes:      Conjunctiva/sclera: Conjunctivae normal.   Cardiovascular:      Rate and Rhythm: Normal rate and regular rhythm. Pulses: Normal pulses. Heart sounds: Normal heart sounds. Pulmonary:      Effort: Pulmonary effort is normal.      Comments: Right basilar rales  Abdominal:      General: Abdomen is flat. Palpations: Abdomen is soft. Tenderness: There is no abdominal tenderness. Musculoskeletal:         General: No swelling. Normal range of motion. Cervical back: Neck supple. Right lower leg: No edema. Left lower leg: No edema. Skin:     General: Skin is warm and dry. Neurological:      General: No focal deficit present. Mental Status: She is alert.       Comments: Generalized weakness, no focal deficits   Psychiatric:         Mood and Affect: Mood normal.         Vital Signs  ED Triage Vitals [09/25/23 1223]   Temperature Pulse Respirations Blood Pressure SpO2   99.2 °F (37.3 °C) 82 18 140/66 97 %      Temp Source Heart Rate Source Patient Position - Orthostatic VS BP Location FiO2 (%)   Oral -- -- -- --      Pain Score       --           Vitals:    09/25/23 1400 09/25/23 1430 09/25/23 1600 09/25/23 1700   BP: 139/74 139/94 144/69 148/69   Pulse: 78 81 80 75         Visual Acuity  Visual Acuity    Flowsheet Row Most Recent Value   L Pupil Size (mm) 3   R Pupil Size (mm) 3          ED Medications  Medications   sodium chloride 0.9 % bolus 1,000 mL (0 mL Intravenous Stopped 9/25/23 1738)   iohexol (OMNIPAQUE) 350 MG/ML injection (MULTI-DOSE) 100 mL (100 mL Intravenous Given 9/25/23 1548)       Diagnostic Studies  Results Reviewed     Procedure Component Value Units Date/Time    Blood culture #1 [[id number]] Collected: 09/25/23 1328    Lab Status: Preliminary result Specimen: Blood from Arm, Left Updated: 09/25/23 2202     Blood Culture Received in Microbiology Lab. Culture in Progress. Blood culture #2 [[id number]] Collected: 09/25/23 1320    Lab Status: Preliminary result Specimen: Blood from Arm, Right Updated: 09/25/23 2202     Blood Culture Received in Microbiology Lab. Culture in Progress. Lactic acid 2 Hours [[id number]]  (Normal) Collected: 09/25/23 1608    Lab Status: Final result Specimen: Blood from Arm, Left Updated: 09/25/23 1725     LACTIC ACID 1.9 mmol/L     Narrative:      Result may be elevated if tourniquet was used during collection. HS Troponin I 2hr [[id number]]  (Normal) Collected: 09/25/23 1608    Lab Status: Final result Specimen: Blood from Arm, Left Updated: 09/25/23 1707     hs TnI 2hr 8 ng/L      Delta 2hr hsTnI -1 ng/L     Urine Microscopic [[id number]]  (Normal) Collected: 09/25/23 1510    Lab Status: Final result Specimen: Urine, Clean Catch Updated: 09/25/23 1549     RBC, UA None Seen /hpf      WBC, UA None Seen /hpf      Epithelial Cells None Seen /hpf      Bacteria, UA Occasional /hpf     UA w Reflex to Microscopic w Reflex to Culture [[id number]]  (Abnormal) Collected: 09/25/23 1510    Lab Status: Final result Specimen: Urine, Clean Catch Updated: 09/25/23 1540     Color, UA Colorless     Clarity, UA Clear     Specific Gravity, UA 1.010     pH, UA 5.0     Leukocytes, UA Elevated glucose may cause decreased leukocyte values.  See urine microscopic for UWBC result     Nitrite, UA Negative     Protein, UA Negative mg/dl      Glucose, UA >=1000 (1%) mg/dl      Ketones, UA Negative mg/dl      Urobilinogen, UA <2.0 mg/dl      Bilirubin, UA Negative     Occult Blood, UA Negative    FLU/RSV/COVID - if FLU/RSV clinically relevant [[id number]]  (Normal) Collected: 09/25/23 1320    Lab Status: Final result Specimen: Nares from Nose Updated: 09/25/23 1413     SARS-CoV-2 Negative     INFLUENZA A PCR Negative     INFLUENZA B PCR Negative     RSV PCR Negative    Narrative:      FOR PEDIATRIC PATIENTS - copy/paste COVID Guidelines URL to browser: [email or web address]    SARS-CoV-2 assay is a Nucleic Acid Amplification assay intended for the  qualitative detection of nucleic acid from SARS-CoV-2 in nasopharyngeal  swabs. Results are for the presumptive identification of SARS-CoV-2 RNA. Positive results are indicative of infection with SARS-CoV-2, the virus  causing COVID-19, but do not rule out bacterial infection or co-infection  with other viruses. Laboratories within the [facility] and its  territories are required to report all positive results to the appropriate  public health authorities. Negative results do not preclude SARS-CoV-2  infection and should not be used as the sole basis for treatment or other  patient management decisions. Negative results must be combined with  clinical observations, patient history, and epidemiological information. This test has not been FDA cleared or approved. This test has been authorized by FDA under an Emergency Use Authorization  (EUA). This test is only authorized for the duration of time the  declaration that circumstances exist justifying the authorization of the  emergency use of an in vitro diagnostic tests for detection of SARS-CoV-2  virus and/or diagnosis of COVID-19 infection under section 564(b)(1) of  the Act, 21 U. S.C. 103QGI-0(J)(6), unless the authorization is terminated  or revoked sooner. The test has been validated but independent review by FDA  and CLIA is pending. Test performed using manetchpert: This RT-PCR assay targets N2,  a region unique to SARS-CoV-2. A conserved region in the E-gene was chosen  for pan-Sarbecovirus detection which includes SARS-CoV-2.     According to CMS-2020-01-R, this platform meets the definition of high-throughput technology. TSH, 3rd generation with Free T4 reflex [[id number]]  (Normal) Collected: 09/25/23 1320    Lab Status: Final result Specimen: Blood from Arm, Left Updated: 09/25/23 1413     TSH 3RD GENERATON 1.335 uIU/mL     Procalcitonin [[id number]]  (Normal) Collected: 09/25/23 1320    Lab Status: Final result Specimen: Blood from Arm, Right Updated: 09/25/23 1407     Procalcitonin 0.09 ng/ml     Lactic acid [[id number]]  (Abnormal) Collected: 09/25/23 1320    Lab Status: Final result Specimen: Blood from Arm, Right Updated: 09/25/23 1406     LACTIC ACID 3.0 mmol/L     Narrative:      Result may be elevated if tourniquet was used during collection.     HS Troponin 0hr (reflex protocol) [[id number]]  (Normal) Collected: 09/25/23 1320    Lab Status: Final result Specimen: Blood from Arm, Right Updated: 09/25/23 1403     hs TnI 0hr 9 ng/L     B-Type Natriuretic Peptide(BNP) [[id number]]  (Abnormal) Collected: 09/25/23 1320    Lab Status: Final result Specimen: Blood from Arm, Right Updated: 09/25/23 1402      pg/mL     Protime-INR [[id number]]  (Abnormal) Collected: 09/25/23 1320    Lab Status: Final result Specimen: Blood from Arm, Right Updated: 09/25/23 1357     Protime 15.8 seconds      INR 1.20    APTT [[id number]]  (Normal) Collected: 09/25/23 1320    Lab Status: Final result Specimen: Blood from Arm, Right Updated: 09/25/23 1357     PTT 33 seconds     Comprehensive metabolic panel [[id number]]  (Abnormal) Collected: 09/25/23 1320    Lab Status: Final result Specimen: Blood from Arm, Right Updated: 09/25/23 1356     Sodium 131 mmol/L      Potassium 4.8 mmol/L      Chloride 100 mmol/L      CO2 22 mmol/L      ANION GAP 9 mmol/L      BUN 27 mg/dL      Creatinine 1.22 mg/dL      Glucose 457 mg/dL      Calcium 9.4 mg/dL      AST 16 U/L      ALT 15 U/L      Alkaline Phosphatase 86 U/L      Total Protein 7.7 g/dL      Albumin 3.9 g/dL      Total Bilirubin 0.40 mg/dL eGFR 38 ml/min/1.73sq m     Narrative:      [facility] guidelines for Chronic Kidney Disease (CKD):   •  Stage 1 with normal or high GFR (GFR > 90 mL/min/1.73 square meters)  •  Stage 2 Mild CKD (GFR = 60-89 mL/min/1.73 square meters)  •  Stage 3A Moderate CKD (GFR = 45-59 mL/min/1.73 square meters)  •  Stage 3B Moderate CKD (GFR = 30-44 mL/min/1.73 square meters)  •  Stage 4 Severe CKD (GFR = 15-29 mL/min/1.73 square meters)  •  Stage 5 End Stage CKD (GFR <15 mL/min/1.73 square meters)  Note: GFR calculation is accurate only with a steady state creatinine    Lipase [[id number]]  (Normal) Collected: 09/25/23 1320    Lab Status: Final result Specimen: Blood from Arm, Right Updated: 09/25/23 1356     Lipase 30 u/L     Fingerstick Glucose (POCT) [[id number]]  (Abnormal) Collected: 09/25/23 1335    Lab Status: Final result Updated: 09/25/23 1339     POC Glucose 385 mg/dl     CBC and differential [[id number]]  (Abnormal) Collected: 09/25/23 1320    Lab Status: Final result Specimen: Blood from Arm, Right Updated: 09/25/23 1336     WBC 7.62 Thousand/uL      RBC 4.25 Million/uL      Hemoglobin 13.4 g/dL      Hematocrit 40.5 %      MCV 95 fL      MCH 31.5 pg      MCHC 33.1 g/dL      RDW 12.0 %      MPV 9.8 fL      Platelets 108 Thousands/uL      nRBC 0 /100 WBCs      Neutrophils Relative 74 %      Immat GRANS % 1 %      Lymphocytes Relative 12 %      Monocytes Relative 8 %      Eosinophils Relative 4 %      Basophils Relative 1 %      Neutrophils Absolute 5.70 Thousands/µL      Immature Grans Absolute 0.06 Thousand/uL      Lymphocytes Absolute 0.92 Thousands/µL      Monocytes Absolute 0.59 Thousand/µL      Eosinophils Absolute 0.29 Thousand/µL      Basophils Absolute 0.06 Thousands/µL                  CT chest abdomen pelvis w contrast   Final Result by Jesus Crespo MD (09/25 1641)      1. No acute abnormality in the chest, abdomen, or pelvis. 2.  Stable pancreatic cysts.       3.  Stable 6 mm lung nodule. Workstation performed: [id number]         XR chest portable   ED Interpretation by Bennie Stout DO (09/25 1315)   NAD      Final Result by Gregory Gaming MD (09/25 1326)      No acute cardiopulmonary disease. Workstation performed: [id number]                    Procedures  ECG 12 Lead Documentation Only    Date/Time: 9/25/2023 2:14 PM    Performed by: Bennie Stout DO  Authorized by: Bennie Stout DO    Patient location:  ED  Previous ECG:     Previous ECG:  Compared to current    Similarity:  No change  Interpretation:     Interpretation: normal    Rate:     ECG rate assessment: normal    Rhythm:     Rhythm: sinus rhythm    Ectopy:     Ectopy: none    QRS:     QRS axis:  Left    QRS intervals:  Normal  Conduction:     Conduction: normal    ST segments:     ST segments:  Normal  T waves:     T waves: normal               ED Course               Identification of Seniors at Risk    Flowsheet Row Most Recent Value   (ISAR) Identification of Seniors at Risk    Before the illness or injury that brought you to the Emergency, did you need someone to help you on a regular basis? 0 Filed at: 09/25/2023 1227   In the last 24 hours, have you needed more help than usual? 0 Filed at: 09/25/2023 1227   Have you been hospitalized for one or more nights during the past 6 months? 0 Filed at: 09/25/2023 1227   In general, do you see well? 0 Filed at: 09/25/2023 1227   In general, do you have serious problems with your memory? 1 Filed at: 09/25/2023 1227   Do you take more than three different medications every day? 1 Filed at: 09/25/2023 1227   ISAR Score 2 Filed at: 09/25/2023 1227                      SBIRT 22yo+    Flowsheet Row Most Recent Value   Initial Alcohol Screen: US AUDIT-C     1. How often do you have a drink containing alcohol? 0 Filed at: 09/25/2023 1229   2. How many drinks containing alcohol do you have on a typical day you are drinking?   0 Filed at: 09/25/2023 1229   3b. FEMALE Any Age, or MALE 65+: How often do you have 4 or more drinks on one occassion? 0 Filed at: 09/25/2023 1229   Audit-C Score 0 Filed at: 09/25/2023 1229   ANATOLIY: How many times in the past year have you. .. Used an illegal drug or used a prescription medication for non-medical reasons? Never Filed at: 09/25/2023 1229                    Medical Decision Making  Patient is a 80-year-old female past medical Struve hypertension, atrial fibrillation, diabetes, hyperlipidemia, ulcerative colitis with ostomy, CKD stage III, hypothyroid presenting with generalized weakness. Patient is well-appearing at bedside though with low-grade fever 99.2, mentating appropriately in no acute distress with right basilar rales, no abdominal tenderness, dry mucous membranes, no lower extremity edema, equal pulses and no other significant physical exam findings. Will obtain broad-spectrum work-up with labs to assess for electrolyte abnormalities, anemia, source of infection, JOSE ROBERTO, CT chest abdomen pelvis to assess for bowel obstruction, pneumonia, pneumothorax, appendicitis, diverticulitis, other intra-abdominal pathology, give fluids and continue to monitor. Amount and/or Complexity of Data Reviewed  Labs: ordered. Radiology: ordered.           Disposition  Final diagnoses:   Generalized weakness   Dehydration   Elevated glucose     Time reflects when diagnosis was documented in both MDM as applicable and the Disposition within this note     Time User Action Codes Description Comment    9/25/2023  4:57 PM Theodor Noon Add [R53.1] Generalized weakness     9/25/2023  4:57 PM Theodor Noon Add [E86.0] Dehydration     9/25/2023  4:57 PM Theodor Noon Add [R73.09] Elevated glucose     9/25/2023  4:57 PM Theodor Noon Modify [R53.1] Generalized weakness     9/25/2023  4:57 PM Theodor Noon Modify [R73.09] Elevated glucose       ED Disposition     ED Disposition   Discharge    Condition   Stable    Date/Time   Mon Sep 25, 2023  5:10 PM    Comment   Amber Specking discharge to home/self care.                Follow-up Information     Follow up With Specialties Details Why Contact Info    David Georgia, [address] [facility], Nurse Practitioner Schedule an appointment as soon as possible for a visit in 1 week  [address]  [address]  [address] [place] [address]  [phone]            Discharge Medication List as of 9/25/2023  5:11 PM      CONTINUE these medications which have NOT CHANGED    Details   amLODIPine (NORVASC) 10 mg tablet TAKE ONE TABLET BY MOUTH EVERY DAY, Normal      atenolol (TENORMIN) 100 mg tablet TAKE ONE TABLET BY MOUTH EVERY DAY, Normal      atorvastatin (LIPITOR) 20 mg tablet TAKE ONE TABLET BY MOUTH EVERY DAY, Normal      Blood Glucose Monitoring Suppl (ONE TOUCH ULTRA 2) w/Device KIT by Does not apply route daily, Starting Sun 3/10/2019, Normal      calcium carbonate-vitamin D (OSCAL-D) 500 mg-200 units per tablet Take 1 tablet by mouth 2 (two) times a day with meals, Historical Med      Diclofenac Sodium (VOLTAREN) 1 % Apply 2 grams topically 2 times a day, Normal      dicyclomine (BENTYL) 20 mg tablet Take 1 tablet (20 mg total) by mouth 2 (two) times a day as needed (abd pain), Starting Fri 12/30/2022, Normal      docusate sodium (COLACE) 100 mg capsule Take 100 mg by mouth 2 (two) times a day, Historical Med      Eliquis 5 MG TAKE ONE TABLET BY MOUTH TWICE A DAY, Normal      ferrous gluconate (FERGON) 324 mg tablet Take 324 mg by mouth daily with breakfast, Historical Med      glucose blood (ONE TOUCH ULTRA TEST) test strip Test once daily, Normal      losartan (COZAAR) 100 MG tablet TAKE ONE TABLET BY MOUTH EVERY DAY, Normal      mesalamine (ASACOL) 800 MG EC tablet TAKE ONE TABLET BY MOUTH THREE TIMES A DAY, Normal      ONE TOUCH LANCETS MISC by Does not apply route daily, Starting Sun 3/10/2019, Normal      Ostomy Supplies MISC Use once a week [id number] Barrier, Starting Fri 6/24/2022, Until Thu 1/12/2023, Print      Ostomy Supplies Pouch MISC Use once a week [id number], Starting Fri 6/24/2022, Print      polyethylene glycol (MIRALAX) 17 g packet Take 17 g by mouth daily, Historical Med      valACYclovir (VALTREX) 1,000 mg tablet Take 1 tablet (1,000 mg total) by mouth 3 (three) times a day for 7 days, Starting Thu 9/21/2023, Until Thu 9/28/2023, Normal             No discharge procedures on file.     PDMP Review     None          ED Provider  Electronically Signed by           Chip Grullon,   09/26/23 [address] [place] [address],   09/26/23 [id number]

## 2023-09-25 NOTE — DISCHARGE INSTRUCTIONS
Please follow up PCP. Recommend discussing elevated blood glucose in the 400s with primary provider. Recommend tylenol 650 mg and ibuprofen 600 mg every 6 hours as needed for pain. Please return for severe chest pain, significant shortness of breath, severely worsening symptoms, or any other concerning signs or symptoms. Please refer to the following documents for additional instructions and return precautions.

## 2023-09-26 ENCOUNTER — VBI (OUTPATIENT)
Dept: FAMILY MEDICINE CLINIC | Facility: CLINIC | Age: 88
End: 2023-09-26

## 2023-09-26 NOTE — TELEPHONE ENCOUNTER
09/26/23 1:16 PM    Patient contacted post ED visit, [facility] department spoke with patient/caregiver and outreach was successful. Thank you.   Maurizio Lemus  [facility]

## 2023-09-30 LAB
BACTERIA BLD CULT: NORMAL
BACTERIA BLD CULT: NORMAL

## 2023-10-02 ENCOUNTER — OFFICE VISIT (OUTPATIENT)
Dept: FAMILY MEDICINE CLINIC | Facility: CLINIC | Age: 88
End: 2023-10-02
Payer: COMMERCIAL

## 2023-10-02 VITALS
HEIGHT: 63 IN | DIASTOLIC BLOOD PRESSURE: 62 MMHG | BODY MASS INDEX: 26.93 KG/M2 | HEART RATE: 77 BPM | WEIGHT: 152 LBS | OXYGEN SATURATION: 96 % | SYSTOLIC BLOOD PRESSURE: 118 MMHG

## 2023-10-02 DIAGNOSIS — Z93.3 COLOSTOMY IN PLACE (HCC): ICD-10-CM

## 2023-10-02 DIAGNOSIS — R54 AGE-RELATED PHYSICAL DEBILITY: Primary | ICD-10-CM

## 2023-10-02 DIAGNOSIS — N18.32 TYPE 2 DIABETES MELLITUS WITH STAGE 3B CHRONIC KIDNEY DISEASE, WITHOUT LONG-TERM CURRENT USE OF INSULIN (HCC): ICD-10-CM

## 2023-10-02 DIAGNOSIS — K86.9 PANCREATIC LESION: ICD-10-CM

## 2023-10-02 DIAGNOSIS — I10 BENIGN ESSENTIAL HYPERTENSION: Chronic | ICD-10-CM

## 2023-10-02 DIAGNOSIS — E11.22 TYPE 2 DIABETES MELLITUS WITH STAGE 3B CHRONIC KIDNEY DISEASE, WITHOUT LONG-TERM CURRENT USE OF INSULIN (HCC): ICD-10-CM

## 2023-10-02 DIAGNOSIS — E03.8 SUBCLINICAL HYPOTHYROIDISM: Chronic | ICD-10-CM

## 2023-10-02 DIAGNOSIS — K51.919 ULCERATIVE COLITIS WITH COMPLICATION, UNSPECIFIED LOCATION (HCC): ICD-10-CM

## 2023-10-02 DIAGNOSIS — B02.29 POSTHERPETIC NEURALGIA: ICD-10-CM

## 2023-10-02 DIAGNOSIS — B02.7 DISSEMINATED HERPES ZOSTER: ICD-10-CM

## 2023-10-02 PROBLEM — R10.84 ABDOMINAL PAIN, ACUTE, GENERALIZED: Status: RESOLVED | Noted: 2022-12-30 | Resolved: 2023-10-02

## 2023-10-02 PROBLEM — S72.002A CLOSED FRACTURE OF LEFT HIP (HCC): Status: RESOLVED | Noted: 2022-09-29 | Resolved: 2023-10-02

## 2023-10-02 PROBLEM — S72.145A CLOSED NONDISPLACED INTERTROCHANTERIC FRACTURE OF LEFT FEMUR (HCC): Status: RESOLVED | Noted: 2022-09-29 | Resolved: 2023-10-02

## 2023-10-02 LAB — SL AMB POCT HEMOGLOBIN AIC: 9.5 (ref ?–6.5)

## 2023-10-02 PROCEDURE — 99214 OFFICE O/P EST MOD 30 MIN: CPT | Performed by: NURSE PRACTITIONER

## 2023-10-02 PROCEDURE — 83036 HEMOGLOBIN GLYCOSYLATED A1C: CPT | Performed by: NURSE PRACTITIONER

## 2023-10-02 RX ORDER — BLOOD-GLUCOSE METER
KIT MISCELLANEOUS
Qty: 1 KIT | Refills: 0 | Status: SHIPPED | OUTPATIENT
Start: 2023-10-02

## 2023-10-02 RX ORDER — GLIPIZIDE 2.5 MG/1
2.5 TABLET, EXTENDED RELEASE ORAL DAILY
Qty: 90 TABLET | Refills: 1 | Status: SHIPPED | OUTPATIENT
Start: 2023-10-02 | End: 2024-03-30

## 2023-10-02 RX ORDER — GABAPENTIN 100 MG/1
100 CAPSULE ORAL
Qty: 90 CAPSULE | Refills: 0 | Status: SHIPPED | OUTPATIENT
Start: 2023-10-02 | End: 2023-12-31

## 2023-10-02 RX ORDER — BLOOD SUGAR DIAGNOSTIC
STRIP MISCELLANEOUS
Qty: 100 EACH | Refills: 3 | Status: SHIPPED | OUTPATIENT
Start: 2023-10-02

## 2023-10-02 RX ORDER — LANCETS 33 GAUGE
EACH MISCELLANEOUS
Qty: 100 EACH | Refills: 3 | Status: SHIPPED | OUTPATIENT
Start: 2023-10-02

## 2023-10-02 NOTE — PROGRESS NOTES
Name: Kellen Membreno      : 1932      MRN: [id number]  Encounter Provider: SULEMA Tran  Encounter Date: 10/2/2023   Encounter department: [address] [place] [address]     1. Type 2 diabetes mellitus with stage 3b chronic kidney disease, without long-term current use of insulin ([facility])  -     POCT hemoglobin A1c  -     Basic metabolic panel; Future  -     Blood Glucose Monitoring Suppl (OneTouch Verio Reflect) w/Device KIT; Check blood sugars once daily. Please substitute with appropriate alternative as covered by patient's insurance. Dx: E11.65  -     glucose blood (OneTouch Verio) test strip; Check blood sugars once daily. Please substitute with appropriate alternative as covered by patient's insurance. Dx: E11.65  -     OneTouch Delica Lancets 79S MISC; Check blood sugars once daily. Please substitute with appropriate alternative as covered by patient's insurance. Dx: E11.65  -     glipiZIDE (GLUCOTROL XL) 2.5 mg 24 hr tablet; Take 1 tablet (2.5 mg total) by mouth daily    2. Colostomy in place [facility])  -     Ambulatory Referral to Gastroenterology; Future    3. Ulcerative colitis with complication, unspecified location [facility])  Assessment & Plan:  Referral placed for gastroenterology    Orders:  -     Ambulatory Referral to Gastroenterology; Future    4. Pancreatic lesion  Assessment & Plan:  Stable pancreas cyst      5. Subclinical hypothyroidism  Assessment & Plan:  Thyroid was normal       6. Benign essential hypertension  Assessment & Plan:  Well controlled       7. Disseminated herpes zoster  -     gabapentin (Neurontin) 100 mg capsule; Take 1 capsule (100 mg total) by mouth daily at bedtime    8. Postherpetic neuralgia  -     gabapentin (Neurontin) 100 mg capsule; Take 1 capsule (100 mg total) by mouth daily at bedtime           Subjective      Patient here and reports that on 2023 she had an outbreak of shingles and was located on her right shoulder and arm. Patient here for follow up and reports that she has finished her Valtrex and still has a significant rash on her right shoulder and arm and has healed and scabbed over an dis now having a sharp pain in the area She is having pain. Review of Systems   Constitutional: Positive for activity change, appetite change and fatigue. Negative for chills, diaphoresis, fever and unexpected weight change. HENT: Positive for hearing loss. Negative for congestion, dental problem, drooling, ear discharge, ear pain, facial swelling, mouth sores, nosebleeds, postnasal drip, rhinorrhea, sinus pressure, sinus pain, sneezing, sore throat, tinnitus, trouble swallowing and voice change. Bl hearing aides    Eyes: Negative for pain, discharge, redness, itching and visual disturbance. Wearing glasses    Respiratory: Negative for apnea, cough, choking, chest tightness, shortness of breath, wheezing and stridor. Cardiovascular: Negative for chest pain, palpitations and leg swelling. Gastrointestinal: Negative for abdominal distention, abdominal pain, diarrhea, nausea and vomiting. Has her colostomy no blood in the colostomy    Endocrine: Negative. Genitourinary: Negative. Musculoskeletal: Positive for gait problem. Negative for arthralgias. Skin: Positive for rash. Allergic/Immunologic: Negative. Neurological: Negative for dizziness and light-headedness. Hematological: Negative. Psychiatric/Behavioral: Negative for behavioral problems and dysphoric mood.        Current Outpatient Medications on File Prior to Visit   Medication Sig   • amLODIPine (NORVASC) 10 mg tablet TAKE ONE TABLET BY MOUTH EVERY DAY   • atenolol (TENORMIN) 100 mg tablet TAKE ONE TABLET BY MOUTH EVERY DAY   • atorvastatin (LIPITOR) 20 mg tablet TAKE ONE TABLET BY MOUTH EVERY DAY   • Blood Glucose Monitoring Suppl (ONE TOUCH ULTRA 2) w/Device KIT by Does not apply route daily   • calcium carbonate-vitamin D (OSCAL-D) 500 mg-200 units per tablet Take 1 tablet by mouth 2 (two) times a day with meals   • Diclofenac Sodium (VOLTAREN) 1 % Apply 2 grams topically 2 times a day   • dicyclomine (BENTYL) 20 mg tablet Take 1 tablet (20 mg total) by mouth 2 (two) times a day as needed (abd pain)   • docusate sodium (COLACE) 100 mg capsule Take 100 mg by mouth 2 (two) times a day   • Eliquis 5 MG TAKE ONE TABLET BY MOUTH TWICE A DAY   • ferrous gluconate (FERGON) 324 mg tablet Take 324 mg by mouth daily with breakfast   • glucose blood (ONE TOUCH ULTRA TEST) test strip Test once daily   • losartan (COZAAR) 100 MG tablet TAKE ONE TABLET BY MOUTH EVERY DAY   • mesalamine (ASACOL) 800 MG EC tablet TAKE ONE TABLET BY MOUTH THREE TIMES A DAY   • ONE TOUCH LANCETS MISC by Does not apply route daily   • Ostomy Supplies MISC Use once a week [id number] Barrier   • Ostomy Supplies Pouch MISC Use once a week [id number]   • polyethylene glycol (MIRALAX) 17 g packet Take 17 g by mouth daily   • [DISCONTINUED] valACYclovir (VALTREX) 1,000 mg tablet Take 1 tablet (1,000 mg total) by mouth 3 (three) times a day for 7 days       Objective     /62   Pulse 77   Ht 5' 3" (1.6 m)   Wt 68.9 kg (152 lb)   SpO2 96%   BMI 26.93 kg/m²     Physical Exam  Vitals and nursing note reviewed. Constitutional:       General: She is not in acute distress. Appearance: She is well-developed. HENT:      Head: Normocephalic and atraumatic. Eyes:      Pupils: Pupils are equal, round, and reactive to light. Neck:      Thyroid: No thyromegaly. Cardiovascular:      Rate and Rhythm: Normal rate and regular rhythm. Heart sounds: Normal heart sounds. No murmur heard. Pulmonary:      Effort: Pulmonary effort is normal. No respiratory distress. Breath sounds: Normal breath sounds. No wheezing. Abdominal:      General: Bowel sounds are normal.      Palpations: Abdomen is soft. Musculoskeletal:         General: Normal range of motion.       Cervical back: Normal range of motion. Skin:     General: Skin is warm and dry. Findings: Rash present. Rash is crusting and vesicular. Neurological:      Mental Status: She is alert and oriented to person, place, and time.        SULEMA Solis

## 2023-10-09 ENCOUNTER — TELEPHONE (OUTPATIENT)
Dept: FAMILY MEDICINE CLINIC | Facility: CLINIC | Age: 88
End: 2023-10-09

## 2023-10-09 NOTE — TELEPHONE ENCOUNTER
This just come from residential home health care. I'm calling regarding Laurent Wright. Her YOB: 1932. I have seen her on Friday. She did get her glucometer. She hadn't taken her glipizide and she had eaten breakfast and her number was [phone] on Friday. Came back today. She had breakfast with oatmeal and pair, she thinks, and coffee and I'm not too sure that it was diabetic, low sugar of anything. But she's still complaining of right shoulder pain, and neck pain from the shingles is pretty extensive, that she's just pretty upset and just sitting. But the most concerning thing is the blood sugar. Of course, she hadn't taken her glipizide for two days because she couldn't figure out how to do the glucometer. I did leave a detailed picture and instructions to call me, but she wasn't able to do it, so I just needed instruction from you. My number is [phone]. Thank you.

## 2023-10-10 DIAGNOSIS — B02.29 POSTHERPETIC NEURALGIA: ICD-10-CM

## 2023-10-10 DIAGNOSIS — B02.7 DISSEMINATED HERPES ZOSTER: ICD-10-CM

## 2023-10-10 RX ORDER — GABAPENTIN 100 MG/1
100 CAPSULE ORAL 2 TIMES DAILY
Qty: 180 CAPSULE | Refills: 0 | Status: SHIPPED | OUTPATIENT
Start: 2023-10-10 | End: 2023-11-21 | Stop reason: SDUPTHER

## 2023-10-10 RX ORDER — GABAPENTIN 100 MG/1
100 CAPSULE ORAL 2 TIMES DAILY
Qty: 180 CAPSULE | Refills: 0
Start: 2023-10-10 | End: 2023-10-10 | Stop reason: SDUPTHER

## 2023-10-10 RX ORDER — GABAPENTIN 100 MG/1
100 CAPSULE ORAL 2 TIMES DAILY
Qty: 180 CAPSULE | Refills: 0 | Status: SHIPPED | OUTPATIENT
Start: 2023-10-10 | End: 2023-10-10 | Stop reason: SDUPTHER

## 2023-10-16 ENCOUNTER — TELEPHONE (OUTPATIENT)
Dept: FAMILY MEDICINE CLINIC | Facility: CLINIC | Age: 88
End: 2023-10-16

## 2023-10-16 NOTE — TELEPHONE ENCOUNTER
Received voicemail from Janusz valencia at [facility], returned her call and had to leave her a voicemail

## 2023-10-18 NOTE — TELEPHONE ENCOUNTER
[facility] - home health nurse called regarding patient and her BS - she stated that BS are getting better but still high in the mid 250's-280's. She stated that she talked to you and you were possibly going to think about insulin.  Nurse is concerned that patient might be non complaint with a daily insulin so she was thinking maybe one a week dosing - also maybe a continuous blood glucose monitor as well.    [phone]

## 2023-10-19 DIAGNOSIS — E11.22 TYPE 2 DIABETES MELLITUS WITH STAGE 3B CHRONIC KIDNEY DISEASE, WITHOUT LONG-TERM CURRENT USE OF INSULIN (HCC): ICD-10-CM

## 2023-10-19 DIAGNOSIS — K51.919 ULCERATIVE COLITIS WITH COMPLICATION, UNSPECIFIED LOCATION (HCC): ICD-10-CM

## 2023-10-19 DIAGNOSIS — N18.32 TYPE 2 DIABETES MELLITUS WITH STAGE 3B CHRONIC KIDNEY DISEASE, WITHOUT LONG-TERM CURRENT USE OF INSULIN (HCC): ICD-10-CM

## 2023-10-19 RX ORDER — GLIPIZIDE 5 MG/1
5 TABLET, FILM COATED, EXTENDED RELEASE ORAL DAILY
Qty: 90 TABLET | Refills: 1 | Status: SHIPPED | OUTPATIENT
Start: 2023-10-19 | End: 2024-04-16

## 2023-10-19 RX ORDER — MESALAMINE 800 MG/1
800 TABLET, DELAYED RELEASE ORAL 3 TIMES DAILY
Qty: 270 TABLET | Refills: 3 | Status: SHIPPED | OUTPATIENT
Start: 2023-10-19

## 2023-10-23 DIAGNOSIS — K51.919 ULCERATIVE COLITIS WITH COMPLICATION, UNSPECIFIED LOCATION (HCC): ICD-10-CM

## 2023-10-23 RX ORDER — MESALAMINE 800 MG/1
800 TABLET, DELAYED RELEASE ORAL 3 TIMES DAILY
Qty: 270 TABLET | Refills: 3 | Status: SHIPPED | OUTPATIENT
Start: 2023-10-23

## 2023-10-24 ENCOUNTER — RA CDI HCC (OUTPATIENT)
Dept: OTHER | Facility: HOSPITAL | Age: 88
End: 2023-10-24

## 2023-10-24 NOTE — PROGRESS NOTES
[address] coding opportunities       Chart reviewed, no opportunity found: [address] Review     Patients Insurance     Medicare Insurance: [employer]

## 2023-10-30 DIAGNOSIS — I10 BENIGN ESSENTIAL HYPERTENSION: Chronic | ICD-10-CM

## 2023-10-30 RX ORDER — ATENOLOL 100 MG/1
100 TABLET ORAL DAILY
Qty: 90 TABLET | Refills: 3 | Status: SHIPPED | OUTPATIENT
Start: 2023-10-30

## 2023-10-30 RX ORDER — LOSARTAN POTASSIUM 100 MG/1
100 TABLET ORAL DAILY
Qty: 90 TABLET | Refills: 3 | Status: SHIPPED | OUTPATIENT
Start: 2023-10-30

## 2023-10-30 RX ORDER — LOSARTAN POTASSIUM 100 MG/1
100 TABLET ORAL DAILY
Qty: 90 TABLET | Refills: 3 | Status: SHIPPED | OUTPATIENT
Start: 2023-10-30 | End: 2023-10-30 | Stop reason: SDUPTHER

## 2023-10-30 RX ORDER — ATENOLOL 100 MG/1
100 TABLET ORAL DAILY
Qty: 90 TABLET | Refills: 3 | Status: SHIPPED | OUTPATIENT
Start: 2023-10-30 | End: 2023-10-30 | Stop reason: SDUPTHER

## 2023-11-02 ENCOUNTER — OFFICE VISIT (OUTPATIENT)
Dept: FAMILY MEDICINE CLINIC | Facility: CLINIC | Age: 88
End: 2023-11-02
Payer: COMMERCIAL

## 2023-11-02 ENCOUNTER — RA CDI HCC (OUTPATIENT)
Dept: OTHER | Facility: HOSPITAL | Age: 88
End: 2023-11-02

## 2023-11-02 VITALS
SYSTOLIC BLOOD PRESSURE: 122 MMHG | OXYGEN SATURATION: 100 % | BODY MASS INDEX: 27.11 KG/M2 | HEART RATE: 79 BPM | TEMPERATURE: 97.8 F | DIASTOLIC BLOOD PRESSURE: 78 MMHG | WEIGHT: 153 LBS | HEIGHT: 63 IN

## 2023-11-02 DIAGNOSIS — G89.29 CHRONIC RIGHT SHOULDER PAIN: ICD-10-CM

## 2023-11-02 DIAGNOSIS — E03.8 SUBCLINICAL HYPOTHYROIDISM: Chronic | ICD-10-CM

## 2023-11-02 DIAGNOSIS — K51.919 ULCERATIVE COLITIS WITH COMPLICATION, UNSPECIFIED LOCATION (HCC): ICD-10-CM

## 2023-11-02 DIAGNOSIS — B02.7 DISSEMINATED HERPES ZOSTER: ICD-10-CM

## 2023-11-02 DIAGNOSIS — B02.29 POSTHERPETIC NEURALGIA: Primary | ICD-10-CM

## 2023-11-02 DIAGNOSIS — E11.22 TYPE 2 DIABETES MELLITUS WITH STAGE 3B CHRONIC KIDNEY DISEASE, WITHOUT LONG-TERM CURRENT USE OF INSULIN (HCC): Chronic | ICD-10-CM

## 2023-11-02 DIAGNOSIS — N18.32 TYPE 2 DIABETES MELLITUS WITH STAGE 3B CHRONIC KIDNEY DISEASE, WITHOUT LONG-TERM CURRENT USE OF INSULIN (HCC): Chronic | ICD-10-CM

## 2023-11-02 DIAGNOSIS — M25.511 CHRONIC RIGHT SHOULDER PAIN: ICD-10-CM

## 2023-11-02 DIAGNOSIS — M85.89 OSTEOPENIA OF MULTIPLE SITES: ICD-10-CM

## 2023-11-02 PROCEDURE — 99214 OFFICE O/P EST MOD 30 MIN: CPT | Performed by: NURSE PRACTITIONER

## 2023-11-02 RX ORDER — CALCIUM CARBONATE/VITAMIN D3 500MG-5MCG
1 TABLET ORAL 2 TIMES DAILY WITH MEALS
Qty: 180 TABLET | Refills: 1 | Status: SHIPPED | OUTPATIENT
Start: 2023-11-02 | End: 2024-04-30

## 2023-11-02 RX ORDER — LIDOCAINE 50 MG/G
1 PATCH TOPICAL DAILY
Qty: 30 PATCH | Refills: 2 | Status: SHIPPED | OUTPATIENT
Start: 2023-11-02 | End: 2024-01-31

## 2023-11-02 RX ORDER — QUINIDINE GLUCONATE 324 MG
240 TABLET, EXTENDED RELEASE ORAL
Qty: 90 TABLET | Refills: 3 | Status: SHIPPED | OUTPATIENT
Start: 2023-11-02 | End: 2024-11-01

## 2023-11-02 NOTE — PROGRESS NOTES
Name: Kellen Membreno      : 1932      MRN: [id number]  Encounter Provider: Rolla Lefort, CRNP  Encounter Date: 2023   Encounter department: [address] [place] [address]     1. Postherpetic neuralgia  -     lidocaine (Lidoderm) 5 %; Apply 1 patch topically over 12 hours daily Remove & Discard patch within 12 hours or as directed by MD    2. Chronic right shoulder pain  Assessment & Plan:  DW patient and son william and referral placed for orthopedics and x-ray ordered     Orders:  -     XR shoulder 2+ vw right; Future; Expected date: 2023  -     Ambulatory Referral to Orthopedic Surgery; Future    3. Disseminated herpes zoster  Assessment & Plan:  Healing shingles lesions     Orders:  -     lidocaine (Lidoderm) 5 %; Apply 1 patch topically over 12 hours daily Remove & Discard patch within 12 hours or as directed by MD    4. 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 Subclinical hypothyroidism  -     TSH, 3rd generation with Free T4 reflex; Future; Expected date: 2024    6. Ulcerative colitis with complication, unspecified location (HCC)  -     Calcium Carb-Cholecalciferol (Calcium + Vitamin D3) 500-5 MG-MCG TABS; Take 1 tablet by mouth 2 (two) times a day with meals  -     ferrous gluconate (FERGON) 240 (27 FE) MG tablet;  Take 1 tablet (240 mg total) by mouth daily before dinner    7. Osteopenia of multiple sites  -     Calcium Carb-Cholecalciferol (Calcium + Vitamin D3) 500-5 MG-MCG TABS; Take 1 tablet by mouth 2 (two) times a day with meals           Subjective      Patient here today and reports that she is having a lot of pain from the shingles and is having right shoulder pain from the shingles about two months ago. She is having pain in her right shoulder and having limited ROM and had a cortisone injection a few years ago and is now back and limiting her ROM. She has an overlying shingles rash which have healed has some scar tissue of the rash. Patient has been checking her blood sugars this past month every morning and reports that her blood sugars are 172 this morning (185, [phone]) previous readings this week         Review of Systems   Constitutional:  Positive for fatigue. Negative for activity change, appetite change, chills, diaphoresis, fever and unexpected weight change. HENT:  Negative for congestion, ear pain, hearing loss, postnasal drip, sinus pressure, sinus pain, sneezing, sore throat and trouble swallowing. Eyes:  Negative for pain, redness and visual disturbance. Respiratory:  Negative for cough and shortness of breath. Cardiovascular:  Negative for chest pain and leg swelling. Gastrointestinal:  Negative for abdominal pain, diarrhea, nausea and vomiting. Endocrine: Negative. Negative for cold intolerance. Genitourinary: Negative. Musculoskeletal:  Positive for arthralgias and myalgias. Skin:  Positive for rash. Allergic/Immunologic: Negative. Neurological:  Negative for dizziness and light-headedness. Hematological: Negative. Psychiatric/Behavioral:  Negative for behavioral problems and dysphoric mood.         Current Outpatient Medications on File Prior to Visit   Medication Sig   • amLODIPine (NORVASC) 10 mg tablet TAKE ONE TABLET BY MOUTH EVERY DAY   • atenolol (TENORMIN) 100 mg tablet Take 1 tablet (100 mg total) by mouth daily   • atorvastatin (LIPITOR) 20 mg tablet TAKE ONE TABLET BY MOUTH EVERY DAY   • Blood Glucose Monitoring Suppl (ONE TOUCH ULTRA 2) w/Device KIT by Does not apply route daily   • Blood Glucose Monitoring Suppl (OneTouch Verio Reflect) w/Device KIT Check blood sugars once daily. Please substitute with appropriate alternative as covered by patient's insurance. Dx: E11.65   • Diclofenac Sodium (VOLTAREN) 1 % Apply 2 grams topically 2 times a day   • dicyclomine (BENTYL) 20 mg tablet Take 1 tablet (20 mg total) by mouth 2 (two) times a day as needed (abd pain)   • docusate sodium (COLACE) 100 mg capsule Take 100 mg by mouth 2 (two) times a day   • Eliquis 5 MG TAKE ONE TABLET BY MOUTH TWICE A DAY   • ferrous gluconate (FERGON) 324 mg tablet Take 324 mg by mouth daily with breakfast   • gabapentin (Neurontin) 100 mg capsule Take 1 capsule (100 mg total) by mouth 2 (two) times a day   • glipiZIDE (GLUCOTROL XL) 5 mg 24 hr tablet Take 1 tablet (5 mg total) by mouth daily   • glucose blood (ONE TOUCH ULTRA TEST) test strip Test once daily   • glucose blood (OneTouch Verio) test strip Check blood sugars once daily. Please substitute with appropriate alternative as covered by patient's insurance. Dx: E11.65   • losartan (COZAAR) 100 MG tablet Take 1 tablet (100 mg total) by mouth daily   • mesalamine (ASACOL) 800 MG EC tablet Take 1 tablet (800 mg total) by mouth 3 (three) times a day   • ONE TOUCH LANCETS MISC by Does not apply route daily   • OneTouch Delica Lancets 84P MISC Check blood sugars once daily. Please substitute with appropriate alternative as covered by patient's insurance.  Dx: E11.65   • Ostomy Supplies Pouch MISC Use once a week [id number]   • polyethylene glycol (MIRALAX) 17 g packet Take 17 g by mouth daily   • [DISCONTINUED] calcium carbonate-vitamin D (OSCAL-D) 500 mg-200 units per tablet Take 1 tablet by mouth 2 (two) times a day with meals   • Ostomy Supplies MISC Use once a week [id number] Barrier       Objective     /78 (BP Location: Left arm, Patient Position: Sitting)   Pulse 79   Temp 97.8 °F (36.6 °C) (Temporal)   Ht 5' 3" (1.6 m)   Wt 69.4 kg (153 lb)   SpO2 100%   BMI 27.10 kg/m²     Physical Exam  Vitals and nursing note reviewed. Constitutional:       General: She is not in acute distress. Appearance: She is well-developed. HENT:      Head: Normocephalic and atraumatic. Right Ear: Tympanic membrane normal.      Left Ear: Tympanic membrane normal.      Nose: Nose normal.      Mouth/Throat:      Mouth: Mucous membranes are moist.   Eyes:      Pupils: Pupils are equal, round, and reactive to light. Neck:      Thyroid: No thyromegaly. Cardiovascular:      Rate and Rhythm: Normal rate and regular rhythm. Heart sounds: Normal heart sounds. No murmur heard. Pulmonary:      Effort: Pulmonary effort is normal. No respiratory distress. Breath sounds: Normal breath sounds. No wheezing. Abdominal:      General: Bowel sounds are normal.      Palpations: Abdomen is soft. Musculoskeletal:      Right shoulder: Tenderness present. Decreased range of motion. Cervical back: Normal range of motion. Skin:     General: Skin is warm and dry. Neurological:      Mental Status: She is alert and oriented to person, place, and time.        Vick Ordoñez

## 2023-11-02 NOTE — ASSESSMENT & PLAN NOTE
Lab Results   Component Value Date    HGBA1C 9.5 (A) 10/02/2023   Taking the Glipizide daily and monitoring her blood sugars and is coming down

## 2023-11-02 NOTE — PROGRESS NOTES
[address] [facility] coding opportunities     E11.65     Chart Reviewed number of suggestions sent to Provider: 1     GR    Patients Insurance     Medicare Insurance: [facility]

## 2023-11-03 ENCOUNTER — TELEPHONE (OUTPATIENT)
Dept: ADMINISTRATIVE | Facility: OTHER | Age: 88
End: 2023-11-03

## 2023-11-03 NOTE — LETTER
Diabetic Eye Exam Form    Date Requested: 23  Patient: Alfonzo Rawls  Patient : 1932   Referring Provider: SULEMA Vitale      DIABETIC Eye Exam Date _______________________________      Type of Exam MUST be documented for Diabetic Eye Exams. Please CHECK ONE. Retinal Exam       Dilated Retinal Exam       OCT       Optomap-Iris Exam      Fundus Photography       Left Eye - Please check Retinopathy or No Retinopathy        Exam did show retinopathy    Exam did not show retinopathy       Right Eye - Please check Retinopathy or No Retinopathy       Exam did show retinopathy    Exam did not show retinopathy       Comments __________________________________________________________    Practice Providing Exam ______________________________________________    Exam Performed By (print name) _______________________________________      Provider Signature ___________________________________________________      These reports are needed for  compliance. Please fax this completed form and a copy of the Diabetic Eye Exam report to our office located at [address] [place] [address] as soon as possible via Fax [phone] attention [place] [facility]: Phone [phone]  We thank you for your assistance in treating our mutual patient.

## 2023-11-03 NOTE — TELEPHONE ENCOUNTER
----- Message from Christina Huff sent at 11/2/2023  1:33 PM EDT -----  Regarding: diabetic eye exam  11/02/23 1:33 PM    Hello, our patient Luis Enrique Seth has had Diabetic Eye Exam completed/performed. Please assist in updating the patient chart by making an External outreach to [address] [place] [address] located in [address] [place] [address]. The date of service is per pt about 9 months ago.     Thank you,  Christina Huff  [facility]

## 2023-11-03 NOTE — TELEPHONE ENCOUNTER
Upon review of the In Basket request and the patient's chart, initial outreach has been made via fax to facility. Please see Contacts section for details.      Thank you  David Gonzalez

## 2023-11-06 ENCOUNTER — APPOINTMENT (OUTPATIENT)
Dept: RADIOLOGY | Facility: CLINIC | Age: 88
End: 2023-11-06
Payer: COMMERCIAL

## 2023-11-06 DIAGNOSIS — M25.511 CHRONIC RIGHT SHOULDER PAIN: ICD-10-CM

## 2023-11-06 DIAGNOSIS — G89.29 CHRONIC RIGHT SHOULDER PAIN: ICD-10-CM

## 2023-11-06 PROCEDURE — 73030 X-RAY EXAM OF SHOULDER: CPT

## 2023-11-07 NOTE — TELEPHONE ENCOUNTER
Upon review of the In Basket request we were able to locate, review, and update the patient chart as requested for Diabetic Eye Exam.    Any additional questions or concerns should be emailed to the Practice Liaisons via the appropriate education email address, please do not reply via In Basket.     Thank you  Mecca Caballero

## 2023-11-21 ENCOUNTER — OFFICE VISIT (OUTPATIENT)
Dept: OBGYN CLINIC | Facility: CLINIC | Age: 88
End: 2023-11-21
Payer: COMMERCIAL

## 2023-11-21 VITALS
SYSTOLIC BLOOD PRESSURE: 149 MMHG | WEIGHT: 153 LBS | HEIGHT: 63 IN | BODY MASS INDEX: 27.11 KG/M2 | DIASTOLIC BLOOD PRESSURE: 82 MMHG | HEART RATE: 74 BPM

## 2023-11-21 DIAGNOSIS — B02.29 POSTHERPETIC NEURALGIA: ICD-10-CM

## 2023-11-21 DIAGNOSIS — B02.7 DISSEMINATED HERPES ZOSTER: ICD-10-CM

## 2023-11-21 DIAGNOSIS — M25.511 CHRONIC RIGHT SHOULDER PAIN: ICD-10-CM

## 2023-11-21 DIAGNOSIS — G89.29 CHRONIC RIGHT SHOULDER PAIN: ICD-10-CM

## 2023-11-21 DIAGNOSIS — B02.8 HERPES ZOSTER WITH COMPLICATION: ICD-10-CM

## 2023-11-21 DIAGNOSIS — M19.011 PRIMARY OSTEOARTHRITIS OF RIGHT SHOULDER: Primary | ICD-10-CM

## 2023-11-21 PROCEDURE — 99213 OFFICE O/P EST LOW 20 MIN: CPT | Performed by: ORTHOPAEDIC SURGERY

## 2023-11-21 RX ORDER — GABAPENTIN 100 MG/1
CAPSULE ORAL
Qty: 150 CAPSULE | Refills: 1 | Status: SHIPPED | OUTPATIENT
Start: 2023-11-21 | End: 2023-11-24 | Stop reason: SDUPTHER

## 2023-11-21 RX ORDER — ACYCLOVIR 50 MG/G
OINTMENT TOPICAL
COMMUNITY
Start: 2023-10-10

## 2023-11-21 NOTE — PROGRESS NOTES
Patient Name:  Flora Hatch  MRN:  [id number]    [address] [address]     1. Primary osteoarthritis of right shoulder    2. Chronic right shoulder pain  -     Ambulatory Referral to Orthopedic Surgery    3. Herpes zoster with complication      Right shoulder osteoarthritis  X-rays reviewed in office today with patient  Discussed nonoperative treatments of Right shoulder osteoarthritis including CSI but will avoid at this time secondary to patient's recent diagnosis of shingles and most recent vesicles noted about 1-2 weeks ago and persistent neuropathic pain in the area. Advised patient CSI may be considered in 2-3 months   In the meantime, advised patient to follow up with PCP to discuss appropriate medications for treatment of acute nerve pain from shingles. Continue OTC medications as needed  Advised patient to work on passive range of motion with table slides and wall walks  Follow up 2-3 months for consideration of CSI    Chief Complaint     Right shoulder pain    History of the Present Illness     Flora Hatch is a RHD 80 y.o. female with Right shoulder pain ongoing for about 1 month. Patient admits to shingles diagnosis about 2 months ago. Patient admits the rash is over the Right shoulder, Right side of her chest, neck and posterior aspect of the neck. She admits she saw blisters about 1-2 weeks ago, but none since. She locates her pain mostly to the Right side of her neck and describes it as "burning". She admits to decreased shoulder range of motion ongoing for about 6 months with some cracking noises. Patient lives at home alone. Review of Systems     Review of Systems   Constitutional:  Negative for chills and fever. HENT:  Negative for congestion. Respiratory:  Negative for cough, chest tightness and shortness of breath. Cardiovascular:  Negative for chest pain and palpitations. Gastrointestinal:  Negative for abdominal pain.    Endocrine: Negative for cold intolerance and heat intolerance. Neurological:  Negative for syncope. Psychiatric/Behavioral:  Negative for confusion. Physical Exam     /82   Pulse 74   Ht 5' 3" (1.6 m)   Wt 69.4 kg (153 lb)   BMI 27.10 kg/m²     Right Shoulder:   Skin over the Right shoulder, anterior chest and neck with hypopigmentation and hypersensitivity over cervical paraspinals and upper trapezius  Active range of motion   80 degrees forward flexion  70 degrees abduction  10 degrees external rotation   SI joint internal rotation    Passive range of motion   130 degrees of forward flexion   Supraspinatus testing 4/5  Infraspinatus testing 4/5  Subscapularis testing 4/5  The patient is neurovascularly intact distally in the extremity. Eyes:  Anicteric sclerae. Neck:  Supple. Lungs:  Normal respiratory effort. Cardiovascular:  Capillary refill is less than 2 seconds. Skin:  Intact without erythema. Neurologic:  Sensation grossly intact to light touch. Psychiatric:  Mood and affect are appropriate. Data Review     I have personally reviewed pertinent films in PACS, and my interpretation follows:    X-rays taken 11/06/2023 of Right shoulder demonstrate moderate to severe glenohumeral degenerative changes and joint space narrowing. Inferior humeral head osteophyte noted. No acute fracture or dislocation noted.      Past Medical History:   Diagnosis Date    A-fib ([address])     Chronic kidney disease     Colostomy present ([address])     Diabetes mellitus ([address])     Hyperlipidemia     Hypertension        Past Surgical History:   Procedure Laterality Date    APPENDECTOMY      CARPAL TUNNEL RELEASE Bilateral     CATARACT EXTRACTION      CHOLECYSTECTOMY      COLON SURGERY      COLOSTOMY      HYSTERECTOMY         Allergies   Allergen Reactions    Amoxicillin-Pot Clavulanate Other (See Comments)     C-DIFF, x's 2  developed c diff taking drug       Current Outpatient Medications on File Prior to Visit   Medication Sig Dispense Refill    acyclovir (ZOVIRAX) 5 % ointment       amLODIPine (NORVASC) 10 mg tablet TAKE ONE TABLET BY MOUTH EVERY DAY 90 tablet 3    atenolol (TENORMIN) 100 mg tablet Take 1 tablet (100 mg total) by mouth daily 90 tablet 3    atorvastatin (LIPITOR) 20 mg tablet TAKE ONE TABLET BY MOUTH EVERY DAY 90 tablet 3    Blood Glucose Monitoring Suppl (ONE TOUCH ULTRA 2) w/Device KIT by Does not apply route daily 1 each 0    Blood Glucose Monitoring Suppl (OneTouch Verio Reflect) w/Device KIT Check blood sugars once daily. Please substitute with appropriate alternative as covered by patient's insurance. Dx: E11.65 1 kit 0    Calcium Carb-Cholecalciferol (Calcium + Vitamin D3) 500-5 MG-MCG TABS Take 1 tablet by mouth 2 (two) times a day with meals 180 tablet 1    Diclofenac Sodium (VOLTAREN) 1 % Apply 2 grams topically 2 times a day 400 g 1    dicyclomine (BENTYL) 20 mg tablet Take 1 tablet (20 mg total) by mouth 2 (two) times a day as needed (abd pain) 20 tablet 0    docusate sodium (COLACE) 100 mg capsule Take 100 mg by mouth 2 (two) times a day      Eliquis 5 MG TAKE ONE TABLET BY MOUTH TWICE A  tablet 3    ferrous gluconate (FERGON) 240 (27 FE) MG tablet Take 1 tablet (240 mg total) by mouth daily before dinner 90 tablet 3    ferrous gluconate (FERGON) 324 mg tablet Take 324 mg by mouth daily with breakfast      gabapentin (Neurontin) 100 mg capsule Take 1 capsule (100 mg total) by mouth 2 (two) times a day 180 capsule 0    glipiZIDE (GLUCOTROL XL) 5 mg 24 hr tablet Take 1 tablet (5 mg total) by mouth daily 90 tablet 1    glucose blood (ONE TOUCH ULTRA TEST) test strip Test once daily 100 each 3    glucose blood (OneTouch Verio) test strip Check blood sugars once daily. Please substitute with appropriate alternative as covered by patient's insurance.  Dx: E11.65 100 each 3    lidocaine (Lidoderm) 5 % Apply 1 patch topically over 12 hours daily Remove & Discard patch within 12 hours or as directed by MD 30 patch 2    losartan (COZAAR) 100 MG tablet Take 1 tablet (100 mg total) by mouth daily 90 tablet 3    mesalamine (ASACOL) 800 MG EC tablet Take 1 tablet (800 mg total) by mouth 3 (three) times a day 270 tablet 3    ONE TOUCH LANCETS MISC by Does not apply route daily 100 each 3    OneTouch Delica Lancets 76J MISC Check blood sugars once daily. Please substitute with appropriate alternative as covered by patient's insurance. Dx: E11.65 100 each 3    Ostomy Supplies Pouch MISC Use once a week [id number] 10 each 6    polyethylene glycol (MIRALAX) 17 g packet Take 17 g by mouth daily      Ostomy Supplies MISC Use once a week [id number] Barrier 10 each 6     No current facility-administered medications on file prior to visit.        Social History     Tobacco Use    Smoking status: Never    Smokeless tobacco: Never   Vaping Use    Vaping Use: Never used   Substance Use Topics    Alcohol use: Never    Drug use: No       Family History   Problem Relation Age of Onset    Heart disease Mother     Cancer Father              Procedures Performed     Procedures  None       Stanley Witt PA-C

## 2023-11-22 ENCOUNTER — CONSULT (OUTPATIENT)
Dept: GASTROENTEROLOGY | Facility: CLINIC | Age: 88
End: 2023-11-22
Payer: COMMERCIAL

## 2023-11-22 VITALS
HEIGHT: 63 IN | OXYGEN SATURATION: 97 % | HEART RATE: 71 BPM | SYSTOLIC BLOOD PRESSURE: 122 MMHG | BODY MASS INDEX: 27.54 KG/M2 | WEIGHT: 155.4 LBS | DIASTOLIC BLOOD PRESSURE: 73 MMHG

## 2023-11-22 DIAGNOSIS — Z93.3 COLOSTOMY IN PLACE (HCC): ICD-10-CM

## 2023-11-22 DIAGNOSIS — K51.919 ULCERATIVE COLITIS WITH COMPLICATION, UNSPECIFIED LOCATION (HCC): ICD-10-CM

## 2023-11-22 PROCEDURE — 99203 OFFICE O/P NEW LOW 30 MIN: CPT | Performed by: INTERNAL MEDICINE

## 2023-11-22 NOTE — PROGRESS NOTES
[facility] - Outpatient Consultation  Carlo Thacker 80 y.o. female MRN: [id number]  Encounter: [id number]          ASSESSMENT AND PLAN:      1. Colostomy in place [facility])  - Ambulatory Referral to Gastroenterology    2. Ulcerative colitis with complication, unspecified location [facility])  - Ambulatory Referral to Gastroenterology    3. Recent shingles  -Now with postherpetic neuralgia    The patient is currently asymptomatic. I told her that nothing needs to be done with her colostomy at the present time. I also informed her that if something ever had to be done with her colostomy due to malfunction, she would need to see a general surgeon rather than a gastroenterologist.  There is no indication for flexible sigmoidoscopy or colonoscopy, particularly because of her age and because she has no symptoms at the present time. Follow-up as needed    ______________________________________________________________________    HPI: This 51-year-old female presents to the office today for evaluation of problems that she has had in the recent past with her colostomy. The colostomy was originally placed in 2010 because of a complication related to her underlying history of ulcerative colitis. She denies any current problems with abdominal pain problem problems with the colostomy, bleeding from the colostomy, nausea, vomiting, heartburn, dysphagia, odynophagia. She did state that 1 day she had some stool leaking from the stoma and colostomy site. She denies any weight loss. She did have a recent episode of the shingles involving her right upper thorax and neck area. She still experiencing pain at the site even though the rash itself has improved. REVIEW OF SYSTEMS:    CONSTITUTIONAL: Denies any fever, chills, rigors, and weight loss. HEENT: No earache or tinnitus. Denies hearing loss or visual disturbances. CARDIOVASCULAR: No chest pain or palpitations.    RESPIRATORY: Denies any cough, hemoptysis, shortness of breath or dyspnea on exertion. GASTROINTESTINAL: As noted in the History of Present Illness. GENITOURINARY: No problems with urination. Denies any hematuria or dysuria. NEUROLOGIC: No dizziness or vertigo, denies headaches. MUSCULOSKELETAL: Denies any muscle or joint pain. SKIN: Denies skin rashes or itching. ENDOCRINE: Denies excessive thirst. Denies intolerance to heat or cold. PSYCHOSOCIAL: Denies depression or anxiety. Denies any recent memory loss.        Historical Information   Past Medical History:   Diagnosis Date    A-fib ([facility])     Chronic kidney disease     Colostomy present ([facility])     Diabetes mellitus ([facility])     Hyperlipidemia     Hypertension      Past Surgical History:   Procedure Laterality Date    APPENDECTOMY      CARPAL TUNNEL RELEASE Bilateral     CATARACT EXTRACTION      CHOLECYSTECTOMY      COLON SURGERY      COLOSTOMY      HYSTERECTOMY       Social History   Social History     Substance and Sexual Activity   Alcohol Use Never     Social History     Substance and Sexual Activity   Drug Use No     Social History     Tobacco Use   Smoking Status Never   Smokeless Tobacco Never     Family History   Problem Relation Age of Onset    Heart disease Mother     Cancer Father        Meds/Allergies       Current Outpatient Medications:     acyclovir (ZOVIRAX) 5 % ointment    amLODIPine (NORVASC) 10 mg tablet    atenolol (TENORMIN) 100 mg tablet    atorvastatin (LIPITOR) 20 mg tablet    Blood Glucose Monitoring Suppl (ONE TOUCH ULTRA 2) w/Device KIT    Blood Glucose Monitoring Suppl (OneTouch Verio Reflect) w/Device KIT    Calcium Carb-Cholecalciferol (Calcium + Vitamin D3) 500-5 MG-MCG TABS    Diclofenac Sodium (VOLTAREN) 1 %    dicyclomine (BENTYL) 20 mg tablet    docusate sodium (COLACE) 100 mg capsule    Eliquis 5 MG    ferrous gluconate (FERGON) 240 (27 FE) MG tablet    ferrous gluconate (FERGON) 324 mg tablet    gabapentin (Neurontin) 100 mg capsule    glipiZIDE (GLUCOTROL XL) 5 mg 24 hr tablet    glucose blood (ONE TOUCH ULTRA TEST) test strip    glucose blood (OneTouch Verio) test strip    lidocaine (Lidoderm) 5 %    losartan (COZAAR) 100 MG tablet    mesalamine (ASACOL) 800 MG EC tablet    ONE TOUCH LANCETS MISC    OneTouch Delica Lancets 42X MISC    Ostomy Supplies MISC    Ostomy Supplies Pouch MISC    polyethylene glycol (MIRALAX) 17 g packet    Allergies   Allergen Reactions    Amoxicillin-Pot Clavulanate Other (See Comments)     C-DIFF, x's 2  developed c diff taking drug           Objective     Blood pressure 122/73, pulse 71, height 5' 3" (1.6 m), weight 70.5 kg (155 lb 6.4 oz), SpO2 97 %. Body mass index is 27.53 kg/m². PHYSICAL EXAM:      General Appearance:   Alert, cooperative, no distress   HEENT:   Normocephalic, atraumatic, anicteric. Neck:  Supple, symmetrical, trachea midline   Lungs:   Clear to auscultation bilaterally; no rales, rhonchi or wheezing; respirations unlabored    Heart[de-identified]   Regular rate and rhythm; no murmur, rub, or gallop. Abdomen:   Soft, non-tender, non-distended; normal bowel sounds; no masses, no organomegaly    Genitalia:   Deferred    Rectal:   Deferred    Extremities:  No cyanosis, clubbing or edema    Pulses:  2+ and symmetric    Skin:  No jaundice or lesions. There are multiple round macular lesions in the right upper anterior thorax as well as supraclavicular region. Lymph nodes:  No palpable cervical lymphadenopathy        Lab Results:   No visits with results within 1 Day(s) from this visit. Latest known visit with results is:   Telephone on 11/03/2023   Component Date Value    Right Eye Diabetic Retin* 05/08/2023 None     Left Eye Diabetic Retino* 05/08/2023 None          Radiology Results:   XR shoulder 2+ vw right    Result Date: 11/10/2023  Narrative: RIGHT SHOULDER INDICATION:   Pain in right shoulder. Other chronic pain. COMPARISON:  Comparison made with previous examination(s) dated (DX) 09-Nov-2022. VIEWS:  XR SHOULDER 2+ VW RIGHT Images: 3 FINDINGS: There is no acute fracture or dislocation. Severe osteoarthritis glenohumeral joint. Mild acromioclavicular osteoarthritis. No lytic or blastic osseous lesion. Soft tissues are unremarkable. Impression: No acute osseous abnormality. Degenerative changes as described.  Electronically signed: 11/10/2023 09:45 AM Birgit Jessica MPH,MD

## 2023-11-24 DIAGNOSIS — B02.29 POSTHERPETIC NEURALGIA: ICD-10-CM

## 2023-11-24 DIAGNOSIS — B02.7 DISSEMINATED HERPES ZOSTER: ICD-10-CM

## 2023-11-24 RX ORDER — GABAPENTIN 100 MG/1
CAPSULE ORAL
Qty: 270 CAPSULE | Refills: 1 | Status: SHIPPED | OUTPATIENT
Start: 2023-11-24

## 2023-11-30 ENCOUNTER — TELEPHONE (OUTPATIENT)
Dept: FAMILY MEDICINE CLINIC | Facility: CLINIC | Age: 88
End: 2023-11-30

## 2023-11-30 NOTE — TELEPHONE ENCOUNTER
Pt requesting to have you order her ostomy supplies as her insurance company recommended that she does it that way.      Pt needs 2 boxes of 2 piece ostomy skin barriers (2 boxes with 5 barriers in each)    1 box of pouches (10 in a box)    Scripts to be faxed to [phone]

## 2023-12-01 DIAGNOSIS — Z93.3 COLOSTOMY IN PLACE (HCC): Primary | ICD-10-CM

## 2023-12-04 DIAGNOSIS — E78.2 MIXED HYPERLIPIDEMIA: ICD-10-CM

## 2023-12-04 DIAGNOSIS — I10 BENIGN ESSENTIAL HYPERTENSION: Chronic | ICD-10-CM

## 2023-12-04 RX ORDER — AMLODIPINE BESYLATE 10 MG/1
10 TABLET ORAL DAILY
Qty: 90 TABLET | Refills: 3 | Status: SHIPPED | OUTPATIENT
Start: 2023-12-04

## 2023-12-04 RX ORDER — ATORVASTATIN CALCIUM 20 MG/1
20 TABLET, FILM COATED ORAL DAILY
Qty: 90 TABLET | Refills: 3 | Status: SHIPPED | OUTPATIENT
Start: 2023-12-04

## 2023-12-13 ENCOUNTER — TELEPHONE (OUTPATIENT)
Dept: FAMILY MEDICINE CLINIC | Facility: CLINIC | Age: 88
End: 2023-12-13

## 2023-12-13 DIAGNOSIS — B02.7 DISSEMINATED HERPES ZOSTER: ICD-10-CM

## 2023-12-13 DIAGNOSIS — B02.29 POSTHERPETIC NEURALGIA: ICD-10-CM

## 2023-12-13 RX ORDER — LIDOCAINE 50 MG/G
1 PATCH TOPICAL DAILY
Qty: 90 PATCH | Refills: 0 | Status: SHIPPED | OUTPATIENT
Start: 2023-12-13 | End: 2024-03-12

## 2023-12-13 NOTE — TELEPHONE ENCOUNTER
The [address] [place] [address] [facility] to request a new order for a 90 day supply of the patient's lidoderm patches be sent to them.

## 2024-01-04 ENCOUNTER — VBI (OUTPATIENT)
Dept: ADMINISTRATIVE | Facility: OTHER | Age: 89
End: 2024-01-04

## 2024-01-25 ENCOUNTER — RA CDI HCC (OUTPATIENT)
Dept: OTHER | Facility: HOSPITAL | Age: 89
End: 2024-01-25

## 2024-02-03 ENCOUNTER — RA CDI HCC (OUTPATIENT)
Dept: OTHER | Facility: HOSPITAL | Age: 89
End: 2024-02-03

## 2024-02-03 PROBLEM — N18.32 TYPE 2 DIABETES MELLITUS WITH STAGE 3B CHRONIC KIDNEY DISEASE, WITHOUT LONG-TERM CURRENT USE OF INSULIN (HCC): Chronic | Status: ACTIVE | Noted: 2018-11-21

## 2024-02-03 PROBLEM — E11.22 TYPE 2 DIABETES MELLITUS WITH STAGE 3B CHRONIC KIDNEY DISEASE, WITHOUT LONG-TERM CURRENT USE OF INSULIN (HCC): Status: ACTIVE | Noted: 2024-02-03

## 2024-02-03 PROBLEM — N18.32 TYPE 2 DIABETES MELLITUS WITH STAGE 3B CHRONIC KIDNEY DISEASE, WITHOUT LONG-TERM CURRENT USE OF INSULIN (HCC): Status: ACTIVE | Noted: 2024-02-03

## 2024-02-03 PROBLEM — N18.32 CHRONIC KIDNEY DISEASE, STAGE 3B (HCC): Status: ACTIVE | Noted: 2024-02-03

## 2024-02-03 PROBLEM — E11.22 TYPE 2 DIABETES MELLITUS WITH STAGE 3B CHRONIC KIDNEY DISEASE, WITHOUT LONG-TERM CURRENT USE OF INSULIN (HCC): Chronic | Status: ACTIVE | Noted: 2018-11-21

## 2024-02-05 DIAGNOSIS — I48.0 PAROXYSMAL ATRIAL FIBRILLATION (HCC): ICD-10-CM

## 2024-02-23 ENCOUNTER — OFFICE VISIT (OUTPATIENT)
Dept: FAMILY MEDICINE CLINIC | Facility: CLINIC | Age: 89
End: 2024-02-23
Payer: COMMERCIAL

## 2024-02-23 VITALS
OXYGEN SATURATION: 97 % | BODY MASS INDEX: 28.39 KG/M2 | HEIGHT: 63 IN | SYSTOLIC BLOOD PRESSURE: 120 MMHG | DIASTOLIC BLOOD PRESSURE: 74 MMHG | TEMPERATURE: 97.8 F | HEART RATE: 79 BPM | WEIGHT: 160.2 LBS

## 2024-02-23 DIAGNOSIS — N18.32 TYPE 2 DIABETES MELLITUS WITH STAGE 3B CHRONIC KIDNEY DISEASE, WITHOUT LONG-TERM CURRENT USE OF INSULIN (HCC): ICD-10-CM

## 2024-02-23 DIAGNOSIS — Z93.3 COLOSTOMY IN PLACE (HCC): ICD-10-CM

## 2024-02-23 DIAGNOSIS — K51.919 ULCERATIVE COLITIS WITH COMPLICATION, UNSPECIFIED LOCATION (HCC): ICD-10-CM

## 2024-02-23 DIAGNOSIS — E11.22 TYPE 2 DIABETES MELLITUS WITH STAGE 3B CHRONIC KIDNEY DISEASE, WITHOUT LONG-TERM CURRENT USE OF INSULIN (HCC): ICD-10-CM

## 2024-02-23 DIAGNOSIS — I48.11 LONGSTANDING PERSISTENT ATRIAL FIBRILLATION (HCC): ICD-10-CM

## 2024-02-23 PROCEDURE — 99214 OFFICE O/P EST MOD 30 MIN: CPT | Performed by: NURSE PRACTITIONER

## 2024-02-23 RX ORDER — DAPAGLIFLOZIN 5 MG/1
5 TABLET, FILM COATED ORAL DAILY
Qty: 90 TABLET | Refills: 1 | Status: SHIPPED | OUTPATIENT
Start: 2024-02-23

## 2024-02-23 NOTE — PROGRESS NOTES
Name: Mer Philippe      : 1932      MRN: [id number]  Encounter Provider: SULEMA Giron  Encounter Date: 2024   Encounter department: [facility]    Assessment & Plan     1. Colostomy in place ([facility])  Assessment & Plan:  Patient colostomy working well draining brown stool       2. Longstanding persistent atrial fibrillation (HCC)  Assessment & Plan:  Appearing to have a RRR on the Atenolol and Eliquis       3. Ulcerative colitis with complication, unspecified location ([facility])  Assessment & Plan:  Patient is not having any active symptoms and is s/p colostomy in place       4. Type 2 diabetes mellitus with stage 3b chronic kidney disease, without long-term current use of insulin ([facility])  Assessment & Plan:    Lab Results   Component Value Date    HGBA1C 8.6 (H) 2024   Taking the Glipizide 5 mg daily    Orders:  -     Comprehensive metabolic panel; Future; Expected date: 2024  -     CBC and differential; Future; Expected date: 2024  -     Albumin / creatinine urine ratio; Future; Expected date: 2024  -     Lipid Panel with Direct LDL reflex; Future; Expected date: 2024  -     Hemoglobin A1C; Future; Expected date: 2024  -     dapagliflozin (Farxiga) 5 MG TABS; Take 1 tablet (5 mg total) by mouth in the morning         Subjective      Patient here today and rpeorts that her right shoulder is still being very painful and having limited movement and having issues with being able to sleep and having ongoing pain and issues and following up next week with orthopedics. Patient had her labs completed. Patient had Shingles in her right arm and shoulder.       Review of Systems   Constitutional:  Negative for activity change, appetite change, chills, diaphoresis, fatigue, fever and unexpected weight change.   HENT:  Positive for rhinorrhea. Negative for congestion, ear pain, hearing loss, postnasal drip, sinus pressure, sinus pain, sneezing and  sore throat.    Eyes:  Negative for pain, discharge, redness and visual disturbance.        Eye exam last week and is doing well    Respiratory:  Negative for apnea, cough, choking, chest tightness, shortness of breath, wheezing and stridor.    Cardiovascular:  Negative for chest pain, palpitations and leg swelling.   Gastrointestinal:  Negative for abdominal distention, abdominal pain, anal bleeding, blood in stool, constipation, diarrhea, nausea, rectal pain and vomiting.   Endocrine: Negative.    Genitourinary: Negative.         Incontinence and wearing her adult diapers    Musculoskeletal:  Positive for arthralgias and gait problem.   Allergic/Immunologic: Negative.    Neurological:  Negative for dizziness and light-headedness.   Hematological: Negative.    Psychiatric/Behavioral:  Negative for behavioral problems and dysphoric mood.        Current Outpatient Medications on File Prior to Visit   Medication Sig   • acyclovir (ZOVIRAX) 5 % ointment    • amLODIPine (NORVASC) 10 mg tablet Take 1 tablet (10 mg total) by mouth daily   • apixaban (Eliquis) 5 mg Take 1 tablet (5 mg total) by mouth 2 (two) times a day   • atenolol (TENORMIN) 100 mg tablet Take 1 tablet (100 mg total) by mouth daily   • atorvastatin (LIPITOR) 20 mg tablet Take 1 tablet (20 mg total) by mouth daily   • Blood Glucose Monitoring Suppl (ONE TOUCH ULTRA 2) w/Device KIT by Does not apply route daily   • Blood Glucose Monitoring Suppl (OneTouch Verio Reflect) w/Device KIT Check blood sugars once daily. Please substitute with appropriate alternative as covered by patient's insurance. Dx: E11.65   • Calcium Carb-Cholecalciferol (Calcium + Vitamin D3) 500-5 MG-MCG TABS Take 1 tablet by mouth 2 (two) times a day with meals   • Diclofenac Sodium (VOLTAREN) 1 % Apply 2 grams topically 2 times a day   • dicyclomine (BENTYL) 20 mg tablet Take 1 tablet (20 mg total) by mouth 2 (two) times a day as needed (abd pain)   • docusate sodium (COLACE) 100 mg  "capsule Take 100 mg by mouth 2 (two) times a day   • ferrous gluconate (FERGON) 240 (27 FE) MG tablet Take 1 tablet (240 mg total) by mouth daily before dinner   • ferrous gluconate (FERGON) 324 mg tablet Take 324 mg by mouth daily with breakfast   • glipiZIDE (GLUCOTROL XL) 5 mg 24 hr tablet Take 1 tablet (5 mg total) by mouth daily   • glucose blood (ONE TOUCH ULTRA TEST) test strip Test once daily   • glucose blood (OneTouch Verio) test strip Check blood sugars once daily. Please substitute with appropriate alternative as covered by patient's insurance. Dx: E11.65   • lidocaine (Lidoderm) 5 % Apply 1 patch topically over 12 hours daily Remove & Discard patch within 12 hours or as directed by MD   • losartan (COZAAR) 100 MG tablet Take 1 tablet (100 mg total) by mouth daily   • mesalamine (ASACOL) 800 MG EC tablet Take 1 tablet (800 mg total) by mouth 3 (three) times a day   • ONE TOUCH LANCETS MISC by Does not apply route daily   • OneTouch Delica Lancets 33G MISC Check blood sugars once daily. Please substitute with appropriate alternative as covered by patient's insurance. Dx: E11.65   • Ostomy Supplies Pouch MISC Use once a week [id number]   • polyethylene glycol (MIRALAX) 17 g packet Take 17 g by mouth daily   • Ostomy Supplies MISC Use once a week [id number] Barrier       Objective     /74 (BP Location: Left arm, Patient Position: Sitting)   Pulse 79   Temp 97.8 °F (36.6 °C) (Temporal)   Ht 5' 3\" (1.6 m)   Wt 72.7 kg (160 lb 3.2 oz)   SpO2 97%   BMI 28.38 kg/m²     Physical Exam  Vitals and nursing note reviewed.   Constitutional:       General: She is not in acute distress.     Appearance: Normal appearance. She is well-developed.   HENT:      Head: Normocephalic and atraumatic.      Right Ear: Hearing and tympanic membrane normal.      Left Ear: Hearing and tympanic membrane normal.      Ears:      Comments: Wearing bilateral hearing aides      Nose: Nose normal.      Mouth/Throat:      Lips: Pink. "      Mouth: Mucous membranes are moist.   Eyes:      General: Lids are normal.      Pupils: Pupils are equal, round, and reactive to light.      Comments: Wearing glasses    Neck:      Thyroid: No thyromegaly.   Cardiovascular:      Rate and Rhythm: Normal rate and regular rhythm.      Pulses: no weak pulses.           Dorsalis pedis pulses are 2+ on the right side and 2+ on the left side.        Posterior tibial pulses are 2+ on the right side and 2+ on the left side.      Heart sounds: Normal heart sounds. No murmur heard.  Pulmonary:      Effort: Pulmonary effort is normal. No respiratory distress.      Breath sounds: Normal breath sounds. No wheezing.   Abdominal:      General: Bowel sounds are normal.      Palpations: Abdomen is soft.      Tenderness: There is no abdominal tenderness.       Musculoskeletal:      Right shoulder: Tenderness present. Decreased range of motion.        Arms:       Cervical back: Normal range of motion.      Right lower le+ Pitting Edema present.      Left lower le+ Pitting Edema present.   Feet:      Right foot:      Skin integrity: No ulcer, skin breakdown, erythema, warmth, callus or dry skin.      Left foot:      Skin integrity: No ulcer, skin breakdown, erythema, warmth, callus or dry skin.   Skin:     General: Skin is warm and dry.          Neurological:      Mental Status: She is alert and oriented to person, place, and time.      Cranial Nerves: Cranial nerves 2-12 are intact.   Psychiatric:         Attention and Perception: Attention normal.         Mood and Affect: Mood normal.         Speech: Speech normal.         Behavior: Behavior normal. Behavior is cooperative.         Thought Content: Thought content normal.         Cognition and Memory: Cognition normal.         Judgment: Judgment normal.     Patient's shoes and socks removed.    Right Foot/Ankle   Right Foot Inspection  Skin Exam: skin normal and skin intact. No dry skin, no warmth, no callus, no erythema,  no maceration, no abnormal color, no pre-ulcer, no ulcer and no callus.     Toe Exam: ROM and strength within normal limits.     Sensory   Vibration: intact  Proprioception: intact  Monofilament testing: intact    Vascular  Capillary refills: < 3 seconds  The right DP pulse is 2+. The right PT pulse is 2+.     Left Foot/Ankle  Left Foot Inspection  Skin Exam: skin normal and skin intact. No dry skin, no warmth, no erythema, no maceration, normal color, no pre-ulcer, no ulcer and no callus.     Toe Exam: ROM and strength within normal limits.     Sensory   Vibration: intact  Proprioception: intact  Monofilament testing: intact    Vascular  Capillary refills: < 3 seconds  The left DP pulse is 2+. The left PT pulse is 2+.     Assign Risk Category  No deformity present  No loss of protective sensation  No weak pulses  Risk: 0     SULEMA Giron

## 2024-02-23 NOTE — ASSESSMENT & PLAN NOTE
Lab Results   Component Value Date    HGBA1C 8.6 (H) 01/29/2024   Taking the Glipizide 5 mg daily

## 2024-02-27 ENCOUNTER — OFFICE VISIT (OUTPATIENT)
Dept: OBGYN CLINIC | Facility: CLINIC | Age: 89
End: 2024-02-27
Payer: COMMERCIAL

## 2024-02-27 VITALS
BODY MASS INDEX: 27.46 KG/M2 | DIASTOLIC BLOOD PRESSURE: 83 MMHG | HEART RATE: 78 BPM | SYSTOLIC BLOOD PRESSURE: 152 MMHG | HEIGHT: 63 IN | WEIGHT: 155 LBS

## 2024-02-27 DIAGNOSIS — M25.511 CHRONIC RIGHT SHOULDER PAIN: ICD-10-CM

## 2024-02-27 DIAGNOSIS — G89.29 CHRONIC RIGHT SHOULDER PAIN: ICD-10-CM

## 2024-02-27 DIAGNOSIS — M19.011 PRIMARY OSTEOARTHRITIS OF RIGHT SHOULDER: Primary | ICD-10-CM

## 2024-02-27 PROCEDURE — 99214 OFFICE O/P EST MOD 30 MIN: CPT | Performed by: ORTHOPAEDIC SURGERY

## 2024-02-27 PROCEDURE — 20610 DRAIN/INJ JOINT/BURSA W/O US: CPT | Performed by: ORTHOPAEDIC SURGERY

## 2024-02-27 RX ORDER — LIDOCAINE HYDROCHLORIDE 10 MG/ML
2 INJECTION, SOLUTION INFILTRATION; PERINEURAL
Status: COMPLETED | OUTPATIENT
Start: 2024-02-27 | End: 2024-02-27

## 2024-02-27 RX ORDER — BUPIVACAINE HYDROCHLORIDE 2.5 MG/ML
2 INJECTION, SOLUTION INFILTRATION; PERINEURAL
Status: COMPLETED | OUTPATIENT
Start: 2024-02-27 | End: 2024-02-27

## 2024-02-27 RX ORDER — KETOROLAC TROMETHAMINE 30 MG/ML
30 INJECTION, SOLUTION INTRAMUSCULAR; INTRAVENOUS
Status: COMPLETED | OUTPATIENT
Start: 2024-02-27 | End: 2024-02-27

## 2024-02-27 RX ADMIN — KETOROLAC TROMETHAMINE 30 MG: 30 INJECTION, SOLUTION INTRAMUSCULAR; INTRAVENOUS at 10:45

## 2024-02-27 RX ADMIN — LIDOCAINE HYDROCHLORIDE 2 ML: 10 INJECTION, SOLUTION INFILTRATION; PERINEURAL at 10:45

## 2024-02-27 RX ADMIN — BUPIVACAINE HYDROCHLORIDE 2 ML: 2.5 INJECTION, SOLUTION INFILTRATION; PERINEURAL at 10:45

## 2024-02-27 NOTE — PROGRESS NOTES
"Patient Name:  Mer Philippe  MRN:  [id number]    Assessment & Plan     1. Primary osteoarthritis of right shoulder  -     Large joint arthrocentesis    2. Chronic right shoulder pain  -     Large joint arthrocentesis      89 y.o. female with Right shoulder glenohumeral osteoarthritis. With worsening pain.   The patient was offered a Toradol injection for their right shoulder. They tolerated the procedure well.    The patient was educated they may have some irritation in the next few days and should rest, ice, elevate and perform gentle range of motion exercises. They were advised the medicine should begin to work in a few days time.    She declined referral to physical therapy today  Home exercises were demonstrated in the office today focused on shoulder range of motion and postural mechanics  She will follow up in 3-4 months    History of the Present Illness   Mer Philippe is a 89 y.o. female with Right shoulder glenohumeral osteoarthritis. She was last seen in the office 11/21/2023 when CSI was deferred due to shingles diagnosis. She is doing better today. She admits to trying to use her arm more but has pain and difficulty with range of motion. She admits a history of diabetes and her sugar \"has been running high.\" She does not check her sugar daily.         Review of Systems     Review of Systems   Constitutional:  Negative for chills and fever.   HENT:  Negative for ear pain and sore throat.    Eyes:  Negative for pain and visual disturbance.   Respiratory:  Negative for cough and shortness of breath.    Cardiovascular:  Negative for chest pain and palpitations.   Gastrointestinal:  Negative for abdominal pain and vomiting.   Genitourinary:  Negative for dysuria and hematuria.   Musculoskeletal:  Negative for arthralgias and back pain.   Skin:  Negative for color change and rash.   Neurological:  Negative for seizures and syncope.   All other systems reviewed and are negative.      Physical Exam     BP " "152/83   Pulse 78   Ht 5' 3\" (1.6 m)   Wt 70.3 kg (155 lb)   BMI 27.46 kg/m²     Right Shoulder:   Active range of motion   70 degrees forward flexion  50 degrees abduction  10 degrees external rotation   Greater trochanter internal rotation      There is no tenderness present over the shoulder.   Supraspinatus testing 4-/5  Infraspinatus testing 4/5  Subscapularis testing 4/5  The patient is neurovascularly intact distally in the extremity.      Data Review     I have personally reviewed pertinent films in PACS, and my interpretation follows.    X-rays taken 11/06/2023 of Right shoulder demonstrate moderate to severe glenohumeral degenerative changes and joint space narrowing. Inferior humeral head osteophyte noted. No acute fracture or dislocation noted.      Social History     Tobacco Use    Smoking status: Never    Smokeless tobacco: Never   Vaping Use    Vaping status: Never Used   Substance Use Topics    Alcohol use: Never    Drug use: No           Large joint arthrocentesis  Universal Protocol:  Consent: Verbal consent obtained.  Risks and benefits: risks, benefits and alternatives were discussed  Consent given by: patient  Time out: Immediately prior to procedure a \"time out\" was called to verify the correct patient, procedure, equipment, support staff and site/side marked as required.  Patient understanding: patient states understanding of the procedure being performed  Site marked: the operative site was marked  Patient identity confirmed: verbally with patient  Supporting Documentation  Indications: pain   Procedure Details  Preparation: Patient was prepped and draped in the usual sterile fashion  Needle size: 22 G  Ultrasound guidance: no  Medications administered: 2 mL bupivacaine 0.25 %; 2 mL lidocaine 1 %; 30 mg ketorolac 30 mg/mL    Patient tolerance: patient tolerated the procedure well with no immediate complications  Dressing:  Sterile dressing applied            Maria E Cardona " Attestation      I,:  Maria E Navarro am acting as a scribe while in the presence of the attending physician.:       I,:  Vick Mason, DO personally performed the services described in this documentation    as scribed in my presence.:

## 2024-03-22 DIAGNOSIS — Z93.3 COLOSTOMY IN PLACE (HCC): Primary | ICD-10-CM

## 2024-05-16 ENCOUNTER — RA CDI HCC (OUTPATIENT)
Dept: OTHER | Facility: HOSPITAL | Age: 89
End: 2024-05-16

## 2024-05-17 ENCOUNTER — RA CDI HCC (OUTPATIENT)
Dept: OTHER | Facility: HOSPITAL | Age: 89
End: 2024-05-17

## 2024-05-17 PROBLEM — I12.9 BENIGN HYPERTENSIVE KIDNEY DISEASE WITH CHRONIC KIDNEY DISEASE: Status: ACTIVE | Noted: 2024-05-17

## 2024-05-24 ENCOUNTER — APPOINTMENT (OUTPATIENT)
Dept: LAB | Facility: CLINIC | Age: 89
End: 2024-05-24
Payer: COMMERCIAL

## 2024-05-24 DIAGNOSIS — N18.32 TYPE 2 DIABETES MELLITUS WITH STAGE 3B CHRONIC KIDNEY DISEASE, WITHOUT LONG-TERM CURRENT USE OF INSULIN (HCC): Chronic | ICD-10-CM

## 2024-05-24 DIAGNOSIS — E11.22 TYPE 2 DIABETES MELLITUS WITH STAGE 3B CHRONIC KIDNEY DISEASE, WITHOUT LONG-TERM CURRENT USE OF INSULIN (HCC): Chronic | ICD-10-CM

## 2024-05-24 DIAGNOSIS — E03.8 SUBCLINICAL HYPOTHYROIDISM: Chronic | ICD-10-CM

## 2024-05-24 LAB
ALBUMIN SERPL BCP-MCNC: 4.2 G/DL (ref 3.5–5)
ALP SERPL-CCNC: 65 U/L (ref 34–104)
ALT SERPL W P-5'-P-CCNC: 10 U/L (ref 7–52)
ANION GAP SERPL CALCULATED.3IONS-SCNC: 11 MMOL/L (ref 4–13)
AST SERPL W P-5'-P-CCNC: 17 U/L (ref 13–39)
BASOPHILS # BLD AUTO: 0.05 THOUSANDS/ÂΜL (ref 0–0.1)
BASOPHILS NFR BLD AUTO: 1 % (ref 0–1)
BILIRUB SERPL-MCNC: 0.42 MG/DL (ref 0.2–1)
BUN SERPL-MCNC: 27 MG/DL (ref 5–25)
CALCIUM SERPL-MCNC: 9.4 MG/DL (ref 8.4–10.2)
CHLORIDE SERPL-SCNC: 104 MMOL/L (ref 96–108)
CHOLEST SERPL-MCNC: 161 MG/DL
CO2 SERPL-SCNC: 26 MMOL/L (ref 21–32)
CREAT SERPL-MCNC: 1.28 MG/DL (ref 0.6–1.3)
CREAT UR-MCNC: 101.1 MG/DL
EOSINOPHIL # BLD AUTO: 0.09 THOUSAND/ÂΜL (ref 0–0.61)
EOSINOPHIL NFR BLD AUTO: 1 % (ref 0–6)
ERYTHROCYTE [DISTWIDTH] IN BLOOD BY AUTOMATED COUNT: 12.4 % (ref 11.6–15.1)
EST. AVERAGE GLUCOSE BLD GHB EST-MCNC: 169 MG/DL
GFR SERPL CREATININE-BSD FRML MDRD: 36 ML/MIN/1.73SQ M
GLUCOSE P FAST SERPL-MCNC: 146 MG/DL (ref 65–99)
HBA1C MFR BLD: 7.5 %
HCT VFR BLD AUTO: 49.1 % (ref 34.8–46.1)
HDLC SERPL-MCNC: 57 MG/DL
HGB BLD-MCNC: 15.2 G/DL (ref 11.5–15.4)
IMM GRANULOCYTES # BLD AUTO: 0.01 THOUSAND/UL (ref 0–0.2)
IMM GRANULOCYTES NFR BLD AUTO: 0 % (ref 0–2)
LDLC SERPL CALC-MCNC: 85 MG/DL (ref 0–100)
LYMPHOCYTES # BLD AUTO: 1.11 THOUSANDS/ÂΜL (ref 0.6–4.47)
LYMPHOCYTES NFR BLD AUTO: 14 % (ref 14–44)
MCH RBC QN AUTO: 31.4 PG (ref 26.8–34.3)
MCHC RBC AUTO-ENTMCNC: 31 G/DL (ref 31.4–37.4)
MCV RBC AUTO: 101 FL (ref 82–98)
MICROALBUMIN UR-MCNC: 20.2 MG/L
MICROALBUMIN/CREAT 24H UR: 20 MG/G CREATININE (ref 0–30)
MONOCYTES # BLD AUTO: 0.48 THOUSAND/ÂΜL (ref 0.17–1.22)
MONOCYTES NFR BLD AUTO: 6 % (ref 4–12)
NEUTROPHILS # BLD AUTO: 6.06 THOUSANDS/ÂΜL (ref 1.85–7.62)
NEUTS SEG NFR BLD AUTO: 78 % (ref 43–75)
NRBC BLD AUTO-RTO: 0 /100 WBCS
PLATELET # BLD AUTO: 215 THOUSANDS/UL (ref 149–390)
PMV BLD AUTO: 10.6 FL (ref 8.9–12.7)
POTASSIUM SERPL-SCNC: 4.1 MMOL/L (ref 3.5–5.3)
PROT SERPL-MCNC: 7.7 G/DL (ref 6.4–8.4)
RBC # BLD AUTO: 4.84 MILLION/UL (ref 3.81–5.12)
SODIUM SERPL-SCNC: 141 MMOL/L (ref 135–147)
TRIGL SERPL-MCNC: 96 MG/DL
TSH SERPL DL<=0.05 MIU/L-ACNC: 1.15 UIU/ML (ref 0.45–4.5)
WBC # BLD AUTO: 7.8 THOUSAND/UL (ref 4.31–10.16)

## 2024-05-24 PROCEDURE — 83036 HEMOGLOBIN GLYCOSYLATED A1C: CPT

## 2024-05-24 PROCEDURE — 82570 ASSAY OF URINE CREATININE: CPT

## 2024-05-24 PROCEDURE — 80061 LIPID PANEL: CPT

## 2024-05-24 PROCEDURE — 85025 COMPLETE CBC W/AUTO DIFF WBC: CPT

## 2024-05-24 PROCEDURE — 84443 ASSAY THYROID STIM HORMONE: CPT

## 2024-05-24 PROCEDURE — 80053 COMPREHEN METABOLIC PANEL: CPT

## 2024-05-24 PROCEDURE — 36415 COLL VENOUS BLD VENIPUNCTURE: CPT

## 2024-05-24 PROCEDURE — 82043 UR ALBUMIN QUANTITATIVE: CPT

## 2024-05-28 ENCOUNTER — OFFICE VISIT (OUTPATIENT)
Dept: FAMILY MEDICINE CLINIC | Facility: CLINIC | Age: 89
End: 2024-05-28
Payer: COMMERCIAL

## 2024-05-28 ENCOUNTER — OFFICE VISIT (OUTPATIENT)
Dept: OBGYN CLINIC | Facility: CLINIC | Age: 89
End: 2024-05-28
Payer: COMMERCIAL

## 2024-05-28 VITALS
HEIGHT: 63 IN | OXYGEN SATURATION: 94 % | DIASTOLIC BLOOD PRESSURE: 74 MMHG | TEMPERATURE: 97.4 F | SYSTOLIC BLOOD PRESSURE: 142 MMHG | HEART RATE: 74 BPM | WEIGHT: 152 LBS | BODY MASS INDEX: 26.93 KG/M2

## 2024-05-28 VITALS
BODY MASS INDEX: 26.93 KG/M2 | HEART RATE: 75 BPM | WEIGHT: 152 LBS | HEIGHT: 63 IN | DIASTOLIC BLOOD PRESSURE: 83 MMHG | SYSTOLIC BLOOD PRESSURE: 138 MMHG

## 2024-05-28 DIAGNOSIS — Z93.3 COLOSTOMY IN PLACE (HCC): ICD-10-CM

## 2024-05-28 DIAGNOSIS — I48.11 LONGSTANDING PERSISTENT ATRIAL FIBRILLATION (HCC): Chronic | ICD-10-CM

## 2024-05-28 DIAGNOSIS — B02.29 POSTHERPETIC NEURALGIA: ICD-10-CM

## 2024-05-28 DIAGNOSIS — E03.8 SUBCLINICAL HYPOTHYROIDISM: Chronic | ICD-10-CM

## 2024-05-28 DIAGNOSIS — M85.89 OSTEOPENIA OF MULTIPLE SITES: ICD-10-CM

## 2024-05-28 DIAGNOSIS — E78.2 MIXED HYPERLIPIDEMIA: ICD-10-CM

## 2024-05-28 DIAGNOSIS — N18.32 CHRONIC KIDNEY DISEASE, STAGE 3B (HCC): ICD-10-CM

## 2024-05-28 DIAGNOSIS — N18.32 TYPE 2 DIABETES MELLITUS WITH STAGE 3B CHRONIC KIDNEY DISEASE, WITHOUT LONG-TERM CURRENT USE OF INSULIN (HCC): ICD-10-CM

## 2024-05-28 DIAGNOSIS — E11.22 TYPE 2 DIABETES MELLITUS WITH STAGE 3B CHRONIC KIDNEY DISEASE, WITHOUT LONG-TERM CURRENT USE OF INSULIN (HCC): ICD-10-CM

## 2024-05-28 DIAGNOSIS — M19.011 PRIMARY OSTEOARTHRITIS OF RIGHT SHOULDER: Primary | ICD-10-CM

## 2024-05-28 DIAGNOSIS — B02.7 DISSEMINATED HERPES ZOSTER: ICD-10-CM

## 2024-05-28 DIAGNOSIS — K51.919 ULCERATIVE COLITIS WITH COMPLICATION, UNSPECIFIED LOCATION (HCC): ICD-10-CM

## 2024-05-28 DIAGNOSIS — M25.511 CHRONIC RIGHT SHOULDER PAIN: ICD-10-CM

## 2024-05-28 DIAGNOSIS — I10 BENIGN ESSENTIAL HYPERTENSION: Primary | Chronic | ICD-10-CM

## 2024-05-28 DIAGNOSIS — G89.29 CHRONIC RIGHT SHOULDER PAIN: ICD-10-CM

## 2024-05-28 PROCEDURE — 20610 DRAIN/INJ JOINT/BURSA W/O US: CPT | Performed by: ORTHOPAEDIC SURGERY

## 2024-05-28 PROCEDURE — G2211 COMPLEX E/M VISIT ADD ON: HCPCS | Performed by: NURSE PRACTITIONER

## 2024-05-28 PROCEDURE — 99214 OFFICE O/P EST MOD 30 MIN: CPT | Performed by: NURSE PRACTITIONER

## 2024-05-28 PROCEDURE — 99214 OFFICE O/P EST MOD 30 MIN: CPT | Performed by: ORTHOPAEDIC SURGERY

## 2024-05-28 RX ORDER — METHYLPREDNISOLONE ACETATE 40 MG/ML
2 INJECTION, SUSPENSION INTRA-ARTICULAR; INTRALESIONAL; INTRAMUSCULAR; SOFT TISSUE
Status: COMPLETED | OUTPATIENT
Start: 2024-05-28 | End: 2024-05-28

## 2024-05-28 RX ORDER — LIDOCAINE 50 MG/G
1 PATCH TOPICAL DAILY
Qty: 90 PATCH | Refills: 0 | Status: SHIPPED | OUTPATIENT
Start: 2024-05-28 | End: 2024-08-26

## 2024-05-28 RX ORDER — GLIPIZIDE 5 MG/1
5 TABLET, FILM COATED, EXTENDED RELEASE ORAL DAILY
Qty: 90 TABLET | Refills: 1 | Status: SHIPPED | OUTPATIENT
Start: 2024-05-28 | End: 2024-11-24

## 2024-05-28 RX ORDER — CALCIUM CARBONATE/VITAMIN D3 500MG-5MCG
1 TABLET ORAL 2 TIMES DAILY WITH MEALS
Qty: 180 TABLET | Refills: 1 | Status: SHIPPED | OUTPATIENT
Start: 2024-05-28 | End: 2024-11-24

## 2024-05-28 RX ORDER — LIDOCAINE HYDROCHLORIDE 10 MG/ML
2 INJECTION, SOLUTION INFILTRATION; PERINEURAL
Status: COMPLETED | OUTPATIENT
Start: 2024-05-28 | End: 2024-05-28

## 2024-05-28 RX ORDER — BUPIVACAINE HYDROCHLORIDE 2.5 MG/ML
2 INJECTION, SOLUTION INFILTRATION; PERINEURAL
Status: COMPLETED | OUTPATIENT
Start: 2024-05-28 | End: 2024-05-28

## 2024-05-28 RX ADMIN — BUPIVACAINE HYDROCHLORIDE 2 ML: 2.5 INJECTION, SOLUTION INFILTRATION; PERINEURAL at 13:45

## 2024-05-28 RX ADMIN — METHYLPREDNISOLONE ACETATE 2 ML: 40 INJECTION, SUSPENSION INTRA-ARTICULAR; INTRALESIONAL; INTRAMUSCULAR; SOFT TISSUE at 13:45

## 2024-05-28 RX ADMIN — LIDOCAINE HYDROCHLORIDE 2 ML: 10 INJECTION, SOLUTION INFILTRATION; PERINEURAL at 13:45

## 2024-05-28 NOTE — PROGRESS NOTES
Patient Name:  Mer Philippe  MRN:  [id number]    Assessment & Plan     1. Primary osteoarthritis of right shoulder  -     Large joint arthrocentesis: R glenohumeral  2. Chronic right shoulder pain  -     Large joint arthrocentesis: R glenohumeral    Right shoulder glenohumeral osteoarthritis with worsening pain  Treatment options were discussed including oral and topical medications, corticosteroid injection  The patient was offered a corticosteroid injection for their right shoulder. Risks and benefits were discussed in detail. They tolerated the procedure well.    The patient was educated they may have some irritation in the next few days and should rest, ice, elevate and perform gentle range of motion exercises. They were advised the medicine should begin to work in a few days time.    They were informed their blood sugar levels can temporarily elevate and should reach out to their PCP if this becomes an issue.   The patient was informed corticosteroid injections can be repeated at the earliest every 3 months.    She will follow up on an as needed basis    History of the Present Illness   Mer Philippe is a 89 y.o. female with Right shoulder glenohumeral osteoarthritis s/p Toradol injection on 2/27/2024. She is still having right arm and shoulder pain. She admits she uses lidocaine patches for symptom management. Pain does wake her up at night. She admits a history of diabetes and her most recent HbA1C being 7.5 on 5/24/2024.        Review of Systems     Review of Systems   Constitutional:  Negative for chills and fever.   HENT:  Negative for ear pain and sore throat.    Eyes:  Negative for pain and visual disturbance.   Respiratory:  Negative for cough and shortness of breath.    Cardiovascular:  Negative for chest pain and palpitations.   Gastrointestinal:  Negative for abdominal pain and vomiting.   Genitourinary:  Negative for dysuria and hematuria.   Musculoskeletal:  Negative for arthralgias and back  "pain.   Skin:  Negative for color change and rash.   Neurological:  Negative for seizures and syncope.   All other systems reviewed and are negative.      Physical Exam     /83   Pulse 75   Ht 5' 3\" (1.6 m)   Wt 68.9 kg (152 lb)   BMI 26.93 kg/m²     Right Shoulder:   Active range of motion   80 degrees forward flexion  85 degrees abduction  10 degrees external rotation   SI joint internal rotation    Passive range of motion   95 degrees of forward flexion     There is no tenderness present over the shoulder.   Forward flexion testing 4/5  External rotation testing 4/5  Internal rotation testing 5/5  The patient is neurovascularly intact distally in the extremity.      Data Review     I have personally reviewed pertinent films in PACS, and my interpretation follows.    X-rays taken 11/06/2023 of Right shoulder demonstrate moderate to severe glenohumeral degenerative changes and joint space narrowing. Inferior humeral head osteophyte noted. No acute fracture or dislocation noted.       Social History     Tobacco Use    Smoking status: Never    Smokeless tobacco: Never   Vaping Use    Vaping status: Never Used   Substance Use Topics    Alcohol use: Never    Drug use: No           Large joint arthrocentesis: R glenohumeral  [place] Protocol:  Consent: Verbal consent obtained.  Risks and benefits: risks, benefits and alternatives were discussed  Consent given by: patient  Time out: Immediately prior to procedure a \"time out\" was called to verify the correct patient, procedure, equipment, support staff and site/side marked as required.  Patient understanding: patient states understanding of the procedure being performed  Site marked: the operative site was marked  Patient identity confirmed: verbally with patient  Supporting Documentation  Indications: pain   Procedure Details  Location: shoulder - R glenohumeral  Preparation: Patient was prepped and draped in the usual sterile fashion  Needle size: 22 " G  Ultrasound guidance: no  Approach: superior  Medications administered: 2 mL bupivacaine 0.25 %; 2 mL methylPREDNISolone acetate 40 mg/mL; 2 mL lidocaine 1 %    Patient tolerance: patient tolerated the procedure well with no immediate complications  Dressing:  Sterile dressing applied            Maria E Navarro   Scribe Attestation      I,:  Maria E Navarro am acting as a scribe while in the presence of the attending physician.:       I,:  Vick Mason DO personally performed the services described in this documentation    as scribed in my presence.:

## 2024-05-28 NOTE — ASSESSMENT & PLAN NOTE
Lab Results   Component Value Date    HGBA1C 7.5 (H) 05/24/2024   Taking the Glipizide 5 mg and Farxiga 5 mg refills sent and is much better controlled

## 2024-05-28 NOTE — PROGRESS NOTES
Ambulatory Visit  Name: Mer Philippe      : 1932      MRN: [id number]  Encounter Provider: SULEMA Giron  Encounter Date: 2024   Encounter department: [facility]    Assessment & Plan   1. Benign essential hypertension  Assessment & Plan:  BP is well controlled on the Losartan 100 mg Atenolol 100 mg and Amlodipine 10 mg   2. Type 2 diabetes mellitus with stage 3b chronic kidney disease, without long-term current use of insulin ([facility])  Assessment & Plan:    Lab Results   Component Value Date    HGBA1C 7.5 (H) 2024   Taking the Glipizide 5 mg and Farxiga 5 mg refills sent and is much better controlled   Orders:  -     glipiZIDE (GLUCOTROL XL) 5 mg 24 hr tablet; Take 1 tablet (5 mg total) by mouth daily  -     Albumin / creatinine urine ratio; Future; Expected date: 2024  -     Comprehensive metabolic panel; Future; Expected date: 2024  -     Hemoglobin A1C; Future; Expected date: 2024  -     CBC and differential; Future; Expected date: 2024  -     Albumin / creatinine urine ratio; Future; Expected date: 2024  -     Lipid Panel with Direct LDL reflex; Future; Expected date: 2024  3. Ulcerative colitis with complication, unspecified location ([facility])  Assessment & Plan:  Has slight cramping at times in her lower abdomen   Orders:  -     Calcium Carb-Cholecalciferol (Calcium + Vitamin D3) 500-5 MG-MCG TABS; Take 1 tablet by mouth 2 (two) times a day with meals  4. Osteopenia of multiple sites  -     Calcium Carb-Cholecalciferol (Calcium + Vitamin D3) 500-5 MG-MCG TABS; Take 1 tablet by mouth 2 (two) times a day with meals  5. Colostomy in place ([facility])  -     Ostomy Supplies Pouch MISC; Use once a week [id number]  6. Disseminated herpes zoster  -     lidocaine (Lidoderm) 5 %; Apply 1 patch topically over 12 hours daily Remove & Discard patch within 12 hours or as directed by MD  7. Postherpetic neuralgia  Assessment & Plan:  Refill  provided for her lidocaine   Orders:  -     lidocaine (Lidoderm) 5 %; Apply 1 patch topically over 12 hours daily Remove & Discard patch within 12 hours or as directed by MD  8. Longstanding persistent atrial fibrillation (HCC)  Assessment & Plan:  Eliquis 5 mg bid   9. Subclinical hypothyroidism  Assessment & Plan:  Thyroid is in range   10. Chronic kidney disease, stage 3b (HCC)  Assessment & Plan:  Lab Results   Component Value Date    EGFR 36 05/24/2024    EGFR 46 (L) 01/29/2024    EGFR 44 (L) 10/18/2023    CREATININE 1.28 05/24/2024    CREATININE 1.12 (H) 01/29/2024    CREATININE 1.17 (H) 10/18/2023   Stable CKD advised to avoid NSAIDS other than Tylenol   11. Mixed hyperlipidemia  Assessment & Plan:  Taking the Lipitor at night          History of Present Illness   {Disappearing Hyperlinks I Encounters * My Last Note * Since Last Visit * History :38513}  Patient here today for follow up and is here today to review her recent blood work and reports that she has been struggling pain in the right arm and shoulder since her shingles and is using lidocaine patches and is helping. She still is waking up with pain in the arm from the shingles. She would like a renewal on the lidocaine patches She has had her labs completed. She also needs renewal on her ostomy supplies       Review of Systems   Constitutional:  Negative for activity change, appetite change, chills, diaphoresis, fatigue, fever and unexpected weight change.   HENT:  Negative for congestion, ear pain, hearing loss, postnasal drip, sinus pressure, sinus pain, sneezing and sore throat.    Eyes:  Negative for pain, redness and visual disturbance.   Respiratory:  Negative for cough and shortness of breath.    Cardiovascular:  Negative for chest pain and leg swelling.   Gastrointestinal:  Negative for abdominal pain, diarrhea, nausea and vomiting.   Endocrine: Negative.    Genitourinary: Negative.    Musculoskeletal:  Positive for arthralgias and gait  problem.        Right arm pain upper arm    Skin: Negative.    Allergic/Immunologic: Negative.    Neurological:  Negative for dizziness and light-headedness.   Hematological: Negative.    Psychiatric/Behavioral:  Negative for behavioral problems and dysphoric mood.      Past Medical History:   Diagnosis Date   • A-fib (HCC)    • Chronic kidney disease    • Colostomy present (HCC)    • Diabetes mellitus (HCC)    • Hyperlipidemia    • Hypertension      Past Surgical History:   Procedure Laterality Date   • APPENDECTOMY     • CARPAL TUNNEL RELEASE Bilateral    • CATARACT EXTRACTION     • CHOLECYSTECTOMY     • COLON SURGERY     • COLOSTOMY     • HYSTERECTOMY       Family History   Problem Relation Age of Onset   • Heart disease Mother    • Cancer Father      Social History     Tobacco Use   • Smoking status: Never   • Smokeless tobacco: Never   Vaping Use   • Vaping status: Never Used   Substance and Sexual Activity   • Alcohol use: Never   • Drug use: No   • Sexual activity: Not on file     Current Outpatient Medications on File Prior to Visit   Medication Sig   • acyclovir (ZOVIRAX) 5 % ointment    • amLODIPine (NORVASC) 10 mg tablet Take 1 tablet (10 mg total) by mouth daily   • apixaban (Eliquis) 5 mg Take 1 tablet (5 mg total) by mouth 2 (two) times a day   • atenolol (TENORMIN) 100 mg tablet Take 1 tablet (100 mg total) by mouth daily   • atorvastatin (LIPITOR) 20 mg tablet Take 1 tablet (20 mg total) by mouth daily   • Blood Glucose Monitoring Suppl (ONE TOUCH ULTRA 2) w/Device KIT by Does not apply route daily   • Blood Glucose Monitoring Suppl (OneTouch Verio Reflect) w/Device KIT Check blood sugars once daily. Please substitute with appropriate alternative as covered by patient's insurance. Dx: E11.65   • dapagliflozin (Farxiga) 5 MG TABS Take 1 tablet (5 mg total) by mouth in the morning   • Diclofenac Sodium (VOLTAREN) 1 % Apply 2 grams topically 2 times a day   • dicyclomine (BENTYL) 20 mg tablet Take 1  tablet (20 mg total) by mouth 2 (two) times a day as needed (abd pain)   • docusate sodium (COLACE) 100 mg capsule Take 100 mg by mouth 2 (two) times a day   • ferrous gluconate (FERGON) 240 (27 FE) MG tablet Take 1 tablet (240 mg total) by mouth daily before dinner   • ferrous gluconate (FERGON) 324 mg tablet Take 324 mg by mouth daily with breakfast   • glucose blood (ONE TOUCH ULTRA TEST) test strip Test once daily   • glucose blood (OneTouch Verio) test strip Check blood sugars once daily. Please substitute with appropriate alternative as covered by patient's insurance. Dx: E11.65   • losartan (COZAAR) 100 MG tablet Take 1 tablet (100 mg total) by mouth daily   • mesalamine (ASACOL) 800 MG EC tablet Take 1 tablet (800 mg total) by mouth 3 (three) times a day   • ONE TOUCH LANCETS MISC by Does not apply route daily   • OneTouch Delica Lancets 33G MISC Check blood sugars once daily. Please substitute with appropriate alternative as covered by patient's insurance. Dx: E11.65   • Ostomy Supplies MISC Use once a week [id number] Barrier   • polyethylene glycol (MIRALAX) 17 g packet Take 17 g by mouth daily   • [DISCONTINUED] Calcium Carb-Cholecalciferol (Calcium + Vitamin D3) 500-5 MG-MCG TABS Take 1 tablet by mouth 2 (two) times a day with meals   • [DISCONTINUED] glipiZIDE (GLUCOTROL XL) 5 mg 24 hr tablet Take 1 tablet (5 mg total) by mouth daily   • [DISCONTINUED] lidocaine (Lidoderm) 5 % Apply 1 patch topically over 12 hours daily Remove & Discard patch within 12 hours or as directed by MD   • [DISCONTINUED] Ostomy Supplies Pouch MISC Use once a week [id number]     Allergies   Allergen Reactions   • Amoxicillin-Pot Clavulanate Other (See Comments)     C-DIFF, x's 2  developed c diff taking drug   • Gabapentin Confusion     Immunization History   Administered Date(s) Administered   • COVID-19 PFIZER VACCINE 0.3 ML IM 03/11/2021, 04/07/2021, 01/16/2022   • INFLUENZA 10/28/2011, 11/12/2013, 10/07/2014, 09/29/2015,  "09/21/2016, 09/19/2017, 10/03/2018, 10/03/2018, 09/17/2019, 01/12/2023   • Influenza Split High Dose Preservative Free IM 09/17/2019   • Influenza, high dose seasonal 0.7 mL 11/18/2020, 11/18/2021, 01/12/2023   • Influenza, seasonal, injectable 10/28/2011, 11/12/2013, 09/19/2017   • Pneumococcal Conjugate 13-Valent 08/03/2015   • Pneumococcal Conjugate PCV 7 01/01/2008   • Pneumococcal Polysaccharide PPV23 01/01/2008, 05/18/2021   • Tdap 08/10/2016   • Zoster 03/03/2016     Objective   {Disappearing Hyperlinks   Review Vitals * Enter New Vitals * Results Review * Labs * Imaging * Cardiology * Procedures * Lung Cancer Screening :70043}  /74 (BP Location: Left arm, Patient Position: Sitting, Cuff Size: Adult)   Pulse 74   Temp (!) 97.4 °F (36.3 °C)   Ht 5' 3\" (1.6 m)   Wt 68.9 kg (152 lb)   SpO2 94%   BMI 26.93 kg/m²     Physical Exam  Constitutional:       General: She is not in acute distress.     Appearance: She is well-developed.   HENT:      Head: Normocephalic and atraumatic.   Eyes:      Pupils: Pupils are equal, round, and reactive to light.   Neck:      Thyroid: No thyromegaly.   Cardiovascular:      Rate and Rhythm: Normal rate and regular rhythm.      Pulses: no weak pulses.           Dorsalis pedis pulses are 2+ on the right side and 2+ on the left side.        Posterior tibial pulses are 2+ on the right side and 2+ on the left side.      Heart sounds: Normal heart sounds. No murmur heard.  Pulmonary:      Effort: Pulmonary effort is normal. No respiratory distress.      Breath sounds: Normal breath sounds. No wheezing.   Abdominal:      General: Bowel sounds are normal.      Palpations: Abdomen is soft.       Musculoskeletal:         General: Normal range of motion.      Cervical back: Normal range of motion.      Comments: Using cane for ambulation    Feet:      Right foot:      Skin integrity: No ulcer, skin breakdown, erythema, warmth, callus or dry skin.      Left foot:      Skin " integrity: No ulcer, skin breakdown, erythema, warmth, callus or dry skin.   Skin:     General: Skin is warm and dry.   Neurological:      Mental Status: She is alert and oriented to person, place, and time.     Patient's shoes and socks removed.    Right Foot/Ankle   Right Foot Inspection  Skin Exam: skin normal and skin intact. No dry skin, no warmth, no callus, no erythema, no maceration, no abnormal color, no pre-ulcer, no ulcer and no callus.     Toe Exam: ROM and strength within normal limits.     Sensory   Vibration: intact  Proprioception: intact  Monofilament testing: intact    Vascular  Capillary refills: < 3 seconds  The right DP pulse is 2+. The right PT pulse is 2+.     Left Foot/Ankle  Left Foot Inspection  Skin Exam: skin normal and skin intact. No dry skin, no warmth, no erythema, no maceration, normal color, no pre-ulcer, no ulcer and no callus.     Toe Exam: ROM and strength within normal limits.     Sensory   Vibration: intact  Proprioception: intact  Monofilament testing: intact    Vascular  Capillary refills: < 3 seconds  The left DP pulse is 2+. The left PT pulse is 2+.     Assign Risk Category  No deformity present  No loss of protective sensation  No weak pulses  Risk: 0     Administrative Statements {Disappearing Hyperlinks I  Level of Service * [id number]}

## 2024-05-28 NOTE — ASSESSMENT & PLAN NOTE
Lab Results   Component Value Date    EGFR 36 05/24/2024    EGFR 46 (L) 01/29/2024    EGFR 44 (L) 10/18/2023    CREATININE 1.28 05/24/2024    CREATININE 1.12 (H) 01/29/2024    CREATININE 1.17 (H) 10/18/2023   Stable CKD advised to avoid NSAIDS other than Tylenol

## 2024-05-29 ENCOUNTER — TELEPHONE (OUTPATIENT)
Age: 89
End: 2024-05-29

## 2024-05-29 NOTE — TELEPHONE ENCOUNTER
PA for lidocaine (Lidoderm) 5 %     Submitted via    []CMM-KEY   []SurescriEmotient-Case ID #   []Faxed to plan   [x]Other website OseekxBK-Gemxpypit-VK: [id number]  []Phone call Case ID #     Office notes sent, clinical questions answered. Awaiting determination    Turnaround time for your insurance to make a decision on your Prior Authorization can take 7-21 business days.

## 2024-05-30 ENCOUNTER — TELEPHONE (OUTPATIENT)
Age: 89
End: 2024-05-30

## 2024-05-30 NOTE — TELEPHONE ENCOUNTER
PA for  lidocaine (Lidoderm) 5 %  Approved   Date(s) approved 5/30/24-12/31/24  Case #    Patient advised by [] [employer] Message                      [x] Phone call       Pharmacy advised by [x]Fax                                     []Phone call    Approval letter scanned into Media Yes

## 2024-06-03 ENCOUNTER — TELEPHONE (OUTPATIENT)
Age: 89
End: 2024-06-03

## 2024-06-03 NOTE — TELEPHONE ENCOUNTER
Patient called she just re-ordered her ostomy supplies and the company asked if we could fax a order to have on file for her for the next time she orders.     Company is [facility],  Fax number: [phone]  Phone Number: [phone]    Please advise, thank you!

## 2024-08-24 DIAGNOSIS — N18.32 TYPE 2 DIABETES MELLITUS WITH STAGE 3B CHRONIC KIDNEY DISEASE, WITHOUT LONG-TERM CURRENT USE OF INSULIN (HCC): ICD-10-CM

## 2024-08-24 DIAGNOSIS — E11.22 TYPE 2 DIABETES MELLITUS WITH STAGE 3B CHRONIC KIDNEY DISEASE, WITHOUT LONG-TERM CURRENT USE OF INSULIN (HCC): ICD-10-CM

## 2024-08-25 RX ORDER — DAPAGLIFLOZIN 5 MG/1
5 TABLET, FILM COATED ORAL EVERY MORNING
Qty: 90 TABLET | Refills: 1 | Status: SHIPPED | OUTPATIENT
Start: 2024-08-25

## 2024-11-06 DIAGNOSIS — K51.919 ULCERATIVE COLITIS WITH COMPLICATION, UNSPECIFIED LOCATION (HCC): Primary | ICD-10-CM

## 2024-11-06 DIAGNOSIS — I48.11 LONGSTANDING PERSISTENT ATRIAL FIBRILLATION (HCC): Chronic | ICD-10-CM

## 2024-11-06 RX ORDER — DABIGATRAN ETEXILATE 150 MG/1
150 CAPSULE ORAL 2 TIMES DAILY
Qty: 180 CAPSULE | Refills: 3 | Status: SHIPPED | OUTPATIENT
Start: 2024-11-06 | End: 2025-11-06

## 2024-11-06 RX ORDER — MESALAMINE 400 MG/1
400 CAPSULE, DELAYED RELEASE ORAL 3 TIMES DAILY
Qty: 270 CAPSULE | Refills: 3 | Status: SHIPPED | OUTPATIENT
Start: 2024-11-06 | End: 2025-02-04

## 2024-11-23 ENCOUNTER — RA CDI HCC (OUTPATIENT)
Dept: OTHER | Facility: HOSPITAL | Age: 89
End: 2024-11-23

## 2024-11-23 NOTE — PROGRESS NOTES
HCC coding opportunities          Chart Reviewed number of suggestions sent to Provider: 2  E11.65  Recommend adding I12.9 - combination code - hypertensive renal disease      Patients Insurance     Medicare Insurance: [employer] Advantage

## 2024-11-24 DIAGNOSIS — E78.2 MIXED HYPERLIPIDEMIA: ICD-10-CM

## 2024-11-24 DIAGNOSIS — I10 BENIGN ESSENTIAL HYPERTENSION: Chronic | ICD-10-CM

## 2024-11-25 RX ORDER — ATORVASTATIN CALCIUM 20 MG/1
20 TABLET, FILM COATED ORAL DAILY
Qty: 90 TABLET | Refills: 1 | Status: SHIPPED | OUTPATIENT
Start: 2024-11-25

## 2024-11-25 RX ORDER — AMLODIPINE BESYLATE 10 MG/1
10 TABLET ORAL DAILY
Qty: 90 TABLET | Refills: 1 | Status: SHIPPED | OUTPATIENT
Start: 2024-11-25

## 2024-11-27 ENCOUNTER — APPOINTMENT (OUTPATIENT)
Dept: LAB | Facility: CLINIC | Age: 89
End: 2024-11-27
Payer: COMMERCIAL

## 2024-11-27 DIAGNOSIS — N18.32 TYPE 2 DIABETES MELLITUS WITH STAGE 3B CHRONIC KIDNEY DISEASE, WITHOUT LONG-TERM CURRENT USE OF INSULIN (HCC): ICD-10-CM

## 2024-11-27 DIAGNOSIS — E11.22 TYPE 2 DIABETES MELLITUS WITH STAGE 3B CHRONIC KIDNEY DISEASE, WITHOUT LONG-TERM CURRENT USE OF INSULIN (HCC): ICD-10-CM

## 2024-11-27 LAB
ALBUMIN SERPL BCG-MCNC: 4 G/DL (ref 3.5–5)
ALP SERPL-CCNC: 85 U/L (ref 34–104)
ALT SERPL W P-5'-P-CCNC: 12 U/L (ref 7–52)
ANION GAP SERPL CALCULATED.3IONS-SCNC: 9 MMOL/L (ref 4–13)
AST SERPL W P-5'-P-CCNC: 18 U/L (ref 13–39)
BASOPHILS # BLD AUTO: 0.06 THOUSANDS/ΜL (ref 0–0.1)
BASOPHILS NFR BLD AUTO: 1 % (ref 0–1)
BILIRUB SERPL-MCNC: 0.47 MG/DL (ref 0.2–1)
BUN SERPL-MCNC: 29 MG/DL (ref 5–25)
CALCIUM SERPL-MCNC: 9.6 MG/DL (ref 8.4–10.2)
CHLORIDE SERPL-SCNC: 105 MMOL/L (ref 96–108)
CHOLEST SERPL-MCNC: 152 MG/DL (ref ?–200)
CO2 SERPL-SCNC: 27 MMOL/L (ref 21–32)
CREAT SERPL-MCNC: 1.34 MG/DL (ref 0.6–1.3)
CREAT UR-MCNC: 102.9 MG/DL
EOSINOPHIL # BLD AUTO: 0.14 THOUSAND/ΜL (ref 0–0.61)
EOSINOPHIL NFR BLD AUTO: 2 % (ref 0–6)
ERYTHROCYTE [DISTWIDTH] IN BLOOD BY AUTOMATED COUNT: 12.5 % (ref 11.6–15.1)
EST. AVERAGE GLUCOSE BLD GHB EST-MCNC: 157 MG/DL
GFR SERPL CREATININE-BSD FRML MDRD: 34 ML/MIN/1.73SQ M
GLUCOSE P FAST SERPL-MCNC: 139 MG/DL (ref 65–99)
HBA1C MFR BLD: 7.1 %
HCT VFR BLD AUTO: 48.1 % (ref 34.8–46.1)
HDLC SERPL-MCNC: 54 MG/DL
HGB BLD-MCNC: 15.3 G/DL (ref 11.5–15.4)
IMM GRANULOCYTES # BLD AUTO: 0.04 THOUSAND/UL (ref 0–0.2)
IMM GRANULOCYTES NFR BLD AUTO: 1 % (ref 0–2)
LDLC SERPL CALC-MCNC: 79 MG/DL (ref 0–100)
LYMPHOCYTES # BLD AUTO: 1.18 THOUSANDS/ΜL (ref 0.6–4.47)
LYMPHOCYTES NFR BLD AUTO: 16 % (ref 14–44)
MCH RBC QN AUTO: 31.3 PG (ref 26.8–34.3)
MCHC RBC AUTO-ENTMCNC: 31.8 G/DL (ref 31.4–37.4)
MCV RBC AUTO: 98 FL (ref 82–98)
MICROALBUMIN UR-MCNC: 24.1 MG/L
MICROALBUMIN/CREAT 24H UR: 23 MG/G CREATININE (ref 0–30)
MONOCYTES # BLD AUTO: 0.51 THOUSAND/ΜL (ref 0.17–1.22)
MONOCYTES NFR BLD AUTO: 7 % (ref 4–12)
NEUTROPHILS # BLD AUTO: 5.55 THOUSANDS/ΜL (ref 1.85–7.62)
NEUTS SEG NFR BLD AUTO: 73 % (ref 43–75)
NRBC BLD AUTO-RTO: 0 /100 WBCS
PLATELET # BLD AUTO: 224 THOUSANDS/UL (ref 149–390)
PMV BLD AUTO: 11.1 FL (ref 8.9–12.7)
POTASSIUM SERPL-SCNC: 4.4 MMOL/L (ref 3.5–5.3)
PROT SERPL-MCNC: 7.3 G/DL (ref 6.4–8.4)
RBC # BLD AUTO: 4.89 MILLION/UL (ref 3.81–5.12)
SODIUM SERPL-SCNC: 141 MMOL/L (ref 135–147)
TRIGL SERPL-MCNC: 96 MG/DL (ref ?–150)
WBC # BLD AUTO: 7.48 THOUSAND/UL (ref 4.31–10.16)

## 2024-11-27 PROCEDURE — 80061 LIPID PANEL: CPT

## 2024-11-27 PROCEDURE — 36415 COLL VENOUS BLD VENIPUNCTURE: CPT

## 2024-11-27 PROCEDURE — 83036 HEMOGLOBIN GLYCOSYLATED A1C: CPT

## 2024-11-27 PROCEDURE — 82043 UR ALBUMIN QUANTITATIVE: CPT

## 2024-11-27 PROCEDURE — 85025 COMPLETE CBC W/AUTO DIFF WBC: CPT

## 2024-11-27 PROCEDURE — 80053 COMPREHEN METABOLIC PANEL: CPT

## 2024-11-27 PROCEDURE — 82570 ASSAY OF URINE CREATININE: CPT

## 2024-11-28 ENCOUNTER — RESULTS FOLLOW-UP (OUTPATIENT)
Dept: FAMILY MEDICINE CLINIC | Facility: CLINIC | Age: 89
End: 2024-11-28

## 2024-12-03 ENCOUNTER — OFFICE VISIT (OUTPATIENT)
Dept: FAMILY MEDICINE CLINIC | Facility: CLINIC | Age: 89
End: 2024-12-03
Payer: COMMERCIAL

## 2024-12-03 VITALS
SYSTOLIC BLOOD PRESSURE: 122 MMHG | TEMPERATURE: 97.3 F | HEART RATE: 70 BPM | DIASTOLIC BLOOD PRESSURE: 70 MMHG | HEIGHT: 63 IN | BODY MASS INDEX: 27.46 KG/M2 | WEIGHT: 155 LBS | OXYGEN SATURATION: 98 %

## 2024-12-03 DIAGNOSIS — I34.81 MITRAL ANNULAR CALCIFICATION: ICD-10-CM

## 2024-12-03 DIAGNOSIS — B02.29 POSTHERPETIC NEURALGIA: ICD-10-CM

## 2024-12-03 DIAGNOSIS — K51.919 ULCERATIVE COLITIS WITH COMPLICATION, UNSPECIFIED LOCATION (HCC): ICD-10-CM

## 2024-12-03 DIAGNOSIS — I48.11 LONGSTANDING PERSISTENT ATRIAL FIBRILLATION (HCC): Chronic | ICD-10-CM

## 2024-12-03 DIAGNOSIS — I10 BENIGN ESSENTIAL HYPERTENSION: Chronic | ICD-10-CM

## 2024-12-03 DIAGNOSIS — B02.7 DISSEMINATED HERPES ZOSTER: ICD-10-CM

## 2024-12-03 DIAGNOSIS — Z00.00 MEDICARE ANNUAL WELLNESS VISIT, SUBSEQUENT: Primary | ICD-10-CM

## 2024-12-03 DIAGNOSIS — M85.89 OSTEOPENIA OF MULTIPLE SITES: ICD-10-CM

## 2024-12-03 DIAGNOSIS — N18.32 CHRONIC KIDNEY DISEASE, STAGE 3B (HCC): ICD-10-CM

## 2024-12-03 DIAGNOSIS — K86.9 PANCREATIC LESION: ICD-10-CM

## 2024-12-03 DIAGNOSIS — Z93.3 COLOSTOMY IN PLACE (HCC): ICD-10-CM

## 2024-12-03 DIAGNOSIS — I12.9 BENIGN HYPERTENSIVE KIDNEY DISEASE WITH CHRONIC KIDNEY DISEASE STAGE I THROUGH STAGE IV, OR UNSPECIFIED: ICD-10-CM

## 2024-12-03 DIAGNOSIS — Z79.01 ANTICOAGULANT LONG-TERM USE: ICD-10-CM

## 2024-12-03 DIAGNOSIS — N18.32 TYPE 2 DIABETES MELLITUS WITH STAGE 3B CHRONIC KIDNEY DISEASE, WITHOUT LONG-TERM CURRENT USE OF INSULIN (HCC): Chronic | ICD-10-CM

## 2024-12-03 DIAGNOSIS — E78.2 MIXED HYPERLIPIDEMIA: ICD-10-CM

## 2024-12-03 DIAGNOSIS — E11.22 TYPE 2 DIABETES MELLITUS WITH STAGE 3B CHRONIC KIDNEY DISEASE, WITHOUT LONG-TERM CURRENT USE OF INSULIN (HCC): Chronic | ICD-10-CM

## 2024-12-03 DIAGNOSIS — H90.0 CONDUCTIVE HEARING LOSS, BILATERAL: ICD-10-CM

## 2024-12-03 DIAGNOSIS — E03.8 SUBCLINICAL HYPOTHYROIDISM: Chronic | ICD-10-CM

## 2024-12-03 PROCEDURE — 99214 OFFICE O/P EST MOD 30 MIN: CPT | Performed by: NURSE PRACTITIONER

## 2024-12-03 PROCEDURE — G0439 PPPS, SUBSEQ VISIT: HCPCS | Performed by: NURSE PRACTITIONER

## 2024-12-03 RX ORDER — DAPAGLIFLOZIN 5 MG/1
5 TABLET, FILM COATED ORAL EVERY MORNING
Qty: 90 TABLET | Refills: 1 | Status: SHIPPED | OUTPATIENT
Start: 2024-12-03 | End: 2024-12-03 | Stop reason: ALTCHOICE

## 2024-12-03 NOTE — PROGRESS NOTES
Name: Mer Philippe      : 1932      MRN: [id number]  Encounter Provider: SULEMA Giron  Encounter Date: 12/3/2024   Encounter department: [facility]  Medicare annual wellness visit, subsequent         Benign essential hypertension       Well controlled 122/70 amlodipine 10 mg Atenolol 100 mg losartan 100 mg   Longstanding persistent atrial fibrillation (HCC)       RRR on the Atenolol 100 and to take the Pradaxa 150 mg  Ulcerative colitis with complication, unspecified location (HCC)       no recent flare ups of the UC taking the Mesalamine   Type 2 diabetes mellitus with stage 3b chronic kidney disease, without long-term current use of insulin (HCC)    Lab Results   Component Value Date    HGBA1C 7.1 (H) 2024       Orders:    Hemoglobin A1C; Future    Comprehensive metabolic panel; Future    CBC and differential; Future    Albumin / creatinine urine ratio; Future    Lipid Panel with Direct LDL reflex; Future    TSH, 3rd generation with Free T4 reflex; Future  dw patient to stop the farxiga and recheck labs in three months and stay on the glipizide  Mitral annular calcification         Pancreatic lesion         Subclinical hypothyroidism    Orders:    TSH, 3rd generation with Free T4 reflex; Future  tsh ordered   Postherpetic neuralgia         Conductive hearing loss, bilateral         Osteopenia of multiple sites         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 I through stage IV, or unspecified  Lab Results   Component Value Date    EGFR 34 2024    EGFR 36 2024    EGFR 46 (L) 2024    CREATININE 1.34 (H) 2024    CREATININE 1.28 2024    CREATININE 1.12 (H) 2024          labs ordered for three months   Chronic kidney disease, stage 3b (HCC)  Lab Results   Component Value Date    EGFR 34 2024    EGFR 36 2024    EGFR 46 (L) 2024    CREATININE 1.34 (H) 2024     CREATININE 1.28 05/24/2024    CREATININE 1.12 (H) 01/29/2024            Colostomy in place (HCC)       using ostomy   Anticoagulant long-term use         BMI 28.0-28.9,adult         Disseminated herpes zoster         Mixed hyperlipidemia       lipid panel ordered     Depression Screening and Follow-up Plan: Patient was screened for depression during today's encounter. They screened negative with a PHQ-2 score of 0.    Urinary Incontinence Plan of Care: counseling topics discussed: practice Kegel (pelvic floor strengthening) exercises, use restroom every 2 hours, keeping a bladder diary, limiting fluid intake 3-4 hours before bed, preventing constipation and improving blood sugar control.       Preventive health issues were discussed with patient, and age appropriate screening tests were ordered as noted in patient's After Visit Summary. Personalized health advice and appropriate referrals for health education or preventive services given if needed, as noted in patient's After Visit Summary.    History of Present Illness     Patient here today for her wellness visit and check up and reports that she has several questions.        Patient Care Team:  SULEMA Giron as PCP - General (Family Medicine)  Linda Venegas MD as PCP - PCP-[facility] ([facility])  Linda Venegas MD as PCP - PCP-[facility] ([facility])    Review of Systems   Constitutional:  Negative for chills and fever.   HENT:  Negative for ear pain and sore throat.    Eyes:  Negative for pain and visual disturbance.   Respiratory:  Negative for cough and shortness of breath.    Cardiovascular:  Negative for chest pain and palpitations.   Gastrointestinal:  Negative for abdominal pain and vomiting.   Genitourinary:  Negative for dysuria and hematuria.   Musculoskeletal:  Negative for arthralgias and back pain.   Skin:  Negative for color change and rash.   Neurological:  Negative for seizures and syncope.   All other systems reviewed and are  negative.    Medical History Reviewed by provider this encounter:       Annual Wellness Visit Questionnaire   Mer is here for her Subsequent Wellness visit. Last Medicare Wellness visit information reviewed, patient interviewed and updates made to the record today.      Health Risk Assessment:   Patient rates overall health as fair. Patient feels that their physical health rating is slightly worse. Patient is very satisfied with their life. Eyesight was rated as same. Hearing was rated as same. Patient feels that their emotional and mental health rating is much better. Patients states they are never, rarely angry. Patient states they are sometimes unusually tired/fatigued. Pain experienced in the last 7 days has been some. Patient's pain rating has been 5/10. Patient states that she has experienced no weight loss or gain in last 6 months.     Depression Screening:   PHQ-2 Score: 0      Fall Risk Screening:   In the past year, patient has experienced: no history of falling in past year      Urinary Incontinence Screening:   Patient has leaked urine accidently in the last six months.     Home Safety:  Patient does not have trouble with stairs inside or outside of their home. Patient has no working smoke alarms and has no working carbon monoxide detector. Home safety hazards include: none.     Nutrition:   Current diet is Diabetic.     Medications:   Patient is not currently taking any over-the-counter supplements. Patient is able to manage medications.     Activities of Daily Living (ADLs)/Instrumental Activities of Daily Living (IADLs):   Walk and transfer into and out of bed and chair?: Yes  Dress and groom yourself?: No    Bathe or shower yourself?: No    Feed yourself? Yes  Do your laundry/housekeeping?: No  Manage your money, pay your bills and track your expenses?: No  Make your own meals?: No    Do your own shopping?: No    Previous Hospitalizations:   Any hospitalizations or ED visits within the last 12  months?: No    How many hospitalizations have you had in the last year?: 1-2    Advance Care Planning:   Living will: Yes    Durable POA for healthcare: Yes    Advanced directive: Yes    Advanced directive counseling given: Yes    ACP document given: Yes    End of Life Decisions reviewed with patient: Yes    Provider agrees with end of life decisions: Yes      Cognitive Screening:   Provider or family/friend/caregiver concerned regarding cognition?: No    PREVENTIVE SCREENINGS      Cardiovascular Screening:    General: Screening Not Indicated, History Lipid Disorder, Risks and Benefits Discussed and Screening Current      Diabetes Screening:     General: Screening Not Indicated, History Diabetes, Risks and Benefits Discussed and Screening Current      Colorectal Cancer Screening:     General: Screening Not Indicated and Risks and Benefits Discussed      Breast Cancer Screening:     General: Risks and Benefits Discussed and Screening Not Indicated      Cervical Cancer Screening:    General: Screening Not Indicated and Risks and Benefits Discussed      Osteoporosis Screening:    General: Risks and Benefits Discussed and Screening Not Indicated      Abdominal Aortic Aneurysm (AAA) Screening:        General: Risks and Benefits Discussed and Screening Not Indicated      Lung Cancer Screening:     General: Screening Not Indicated and Risks and Benefits Discussed      Hepatitis C Screening:    General: Risks and Benefits Discussed and Screening Not Indicated    Screening, Brief Intervention, and Referral to Treatment (SBIRT)    Screening  Typical number of drinks in a day: 0    Single Item Drug Screening:  How often have you used an illegal drug (including marijuana) or a prescription medication for non-medical reasons in the past year? never    Single Item Drug Screen Score: 0  Interpretation: Negative screen for possible drug use disorder    Social Drivers of Health     Financial Resource Strain: Low Risk  (11/11/2024)  "   Received from "[employer]    Financial Resource Strain     Do you have any trouble paying for your medications, or do you think you might in the future?: No   Food Insecurity: Patient Declined (12/3/2024)    Hunger Vital Sign     Worried About Running Out of Food in the Last Year: Patient declined     Ran Out of Food in the Last Year: Patient declined   Transportation Needs: Patient Declined (12/3/2024)    PRAPARE - Transportation     Lack of Transportation (Medical): Patient declined     Lack of Transportation (Non-Medical): Patient declined   Recent Concern: Transportation Needs - Unmet Transportation Needs (11/11/2024)    Received from "[employer]    Transportation Needs     Has lack of transportation kept you from medical appointments, meetings, work, or from getting things needed for daily living? Check all that apply.: Yes, it has kept me from medical appointments   Housing Stability: Patient Declined (12/3/2024)    Housing Stability Vital Sign     Unable to Pay for Housing in the Last Year: Patient declined     Number of Times Moved in the Last Year: 1     Homeless in the Last Year: Patient declined   Utilities: Patient Declined (12/3/2024)    [facility] Utilities     Threatened with loss of utilities: Patient declined     No results found.    Objective   /70 (BP Location: Left arm, Patient Position: Sitting, Cuff Size: Large)   Pulse 70   Temp (!) 97.3 °F (36.3 °C)   Ht 5' 3\" (1.6 m)   Wt 70.3 kg (155 lb)   SpO2 98%   BMI 27.46 kg/m²     Physical Exam  Vitals and nursing note reviewed.   Constitutional:       General: She is not in acute distress.     Appearance: She is well-developed.   HENT:      Head: Normocephalic and atraumatic.   Eyes:      Conjunctiva/sclera: Conjunctivae normal.      Pupils: Pupils are equal, round, and reactive to light.   Neck:      Thyroid: No thyromegaly.   Cardiovascular:      Rate and Rhythm: Normal rate and regular rhythm.      Pulses: no weak pulses.           Dorsalis " pedis pulses are 2+ on the right side and 2+ on the left side.        Posterior tibial pulses are 2+ on the right side and 2+ on the left side.      Heart sounds: Murmur heard.      Systolic murmur is present with a grade of 3/6.   Pulmonary:      Effort: Pulmonary effort is normal. No respiratory distress.      Breath sounds: Normal breath sounds. No wheezing.   Abdominal:      General: Bowel sounds are normal.      Palpations: Abdomen is soft.      Tenderness: There is no abdominal tenderness.       Musculoskeletal:         General: No swelling. Normal range of motion.      Cervical back: Normal range of motion and neck supple.      Right lower le+ Edema present.      Left lower le+ Edema present.   Feet:      Right foot:      Skin integrity: No ulcer, skin breakdown, erythema, warmth, callus or dry skin.      Left foot:      Skin integrity: No ulcer, skin breakdown, erythema, warmth, callus or dry skin.   Skin:     General: Skin is warm and dry.      Capillary Refill: Capillary refill takes less than 2 seconds.   Neurological:      Mental Status: She is alert and oriented to person, place, and time.   Psychiatric:         Attention and Perception: Attention normal.         Mood and Affect: Mood normal.         Speech: Speech normal.         Behavior: Behavior normal. Behavior is cooperative.         Thought Content: Thought content normal.         Cognition and Memory: Cognition normal.         Judgment: Judgment normal.       Patient's shoes and socks removed.    Right Foot/Ankle   Right Foot Inspection  Skin Exam: skin normal and skin intact. No dry skin, no warmth, no callus, no erythema, no maceration, no abnormal color, no pre-ulcer, no ulcer and no callus.     Toe Exam: ROM and strength within normal limits.     Sensory   Vibration: intact  Proprioception: intact  Monofilament testing: intact    Vascular  Capillary refills: < 3 seconds  The right DP pulse is 2+. The right PT pulse is 2+.     Left  Foot/Ankle  Left Foot Inspection  Skin Exam: skin normal and skin intact. No dry skin, no warmth, no erythema, no maceration, normal color, no pre-ulcer, no ulcer and no callus.     Toe Exam: ROM and strength within normal limits.     Sensory   Vibration: intact  Proprioception: intact  Monofilament testing: intact    Vascular  Capillary refills: < 3 seconds  The left DP pulse is 2+. The left PT pulse is 2+.     Assign Risk Category  No deformity present  No loss of protective sensation  No weak pulses  Risk: 0

## 2024-12-03 NOTE — ASSESSMENT & PLAN NOTE
Lab Results   Component Value Date    HGBA1C 7.1 (H) 11/27/2024       Orders:    Hemoglobin A1C; Future    Comprehensive metabolic panel; Future    CBC and differential; Future    Albumin / creatinine urine ratio; Future    Lipid Panel with Direct LDL reflex; Future    TSH, 3rd generation with Free T4 reflex; Future  dw patient to stop the farxiga and recheck labs in three months and stay on the glipizide

## 2024-12-03 NOTE — ASSESSMENT & PLAN NOTE
Lab Results   Component Value Date    EGFR 34 11/27/2024    EGFR 36 05/24/2024    EGFR 46 (L) 01/29/2024    CREATININE 1.34 (H) 11/27/2024    CREATININE 1.28 05/24/2024    CREATININE 1.12 (H) 01/29/2024

## 2024-12-03 NOTE — PATIENT INSTRUCTIONS
Medicare Preventive Visit Patient Instructions  Thank you for completing your Welcome to Medicare Visit or Medicare Annual Wellness Visit today. Your next wellness visit will be due in one year (12/4/2025).  The screening/preventive services that you may require over the next 5-10 years are detailed below. Some tests may not apply to you based off risk factors and/or age. Screening tests ordered at today's visit but not completed yet may show as past due. Also, please note that scanned in results may not display below.  Preventive Screenings:  Service Recommendations Previous Testing/Comments   Colorectal Cancer Screening  * Colonoscopy    * Fecal Occult Blood Test (FOBT)/Fecal Immunochemical Test (FIT)  * Fecal DNA/Cologuard Test  * Flexible Sigmoidoscopy Age: 45-75 years old   Colonoscopy: every 10 years (may be performed more frequently if at higher risk)  OR  FOBT/FIT: every 1 year  OR  Cologuard: every 3 years  OR  Sigmoidoscopy: every 5 years  Screening may be recommended earlier than age 45 if at higher risk for colorectal cancer. Also, an individualized decision between you and your healthcare provider will decide whether screening between the ages of 76-85 would be appropriate. Colonoscopy: 07/19/2017  FOBT/FIT: Not on file  Cologuard: Not on file  Sigmoidoscopy: Not on file    Screening Not Indicated     Breast Cancer Screening Age: 40+ years old  Frequency: every 1-2 years  Not required if history of left and right mastectomy Mammogram: 01/02/2015    Risks and Benefits Discussed  Screening Not Indicated   Cervical Cancer Screening Between the ages of 21-29, pap smear recommended once every 3 years.   Between the ages of 30-65, can perform pap smear with HPV co-testing every 5 years.   Recommendations may differ for women with a history of total hysterectomy, cervical cancer, or abnormal pap smears in past. Pap Smear: Not on file    Screening Not Indicated   Hepatitis C Screening Once for adults born  between 1945 and 1965  More frequently in patients at high risk for Hepatitis C Hep C Antibody: Not on file    Risks and Benefits Discussed  Screening Not Indicated   Diabetes Screening 1-2 times per year if you're at risk for diabetes or have pre-diabetes Fasting glucose: 139 mg/dL (11/27/2024)  A1C: 7.1 % (11/27/2024)  Screening Not Indicated  History Diabetes   Cholesterol Screening Once every 5 years if you don't have a lipid disorder. May order more often based on risk factors. Lipid panel: 11/27/2024    Screening Not Indicated  History Lipid Disorder     Other Preventive Screenings Covered by Medicare:  Abdominal Aortic Aneurysm (AAA) Screening: covered once if your at risk. You're considered to be at risk if you have a family history of AAA.  Lung Cancer Screening: covers low dose CT scan once per year if you meet all of the following conditions: (1) Age 55-77; (2) No signs or symptoms of lung cancer; (3) Current smoker or have quit smoking within the last 15 years; (4) You have a tobacco smoking history of at least 20 pack years (packs per day multiplied by number of years you smoked); (5) You get a written order from a healthcare provider.  Glaucoma Screening: covered annually if you're considered high risk: (1) You have diabetes OR (2) Family history of glaucoma OR (3)  aged 50 and older OR (4)  American aged 65 and older  Osteoporosis Screening: covered every 2 years if you meet one of the following conditions: (1) You're estrogen deficient and at risk for osteoporosis based off medical history and other findings; (2) Have a vertebral abnormality; (3) On glucocorticoid therapy for more than 3 months; (4) Have primary hyperparathyroidism; (5) On osteoporosis medications and need to assess response to drug therapy.   Last bone density test (DXA Scan): 10/09/2018.  HIV Screening: covered annually if you're between the age of 15-65. Also covered annually if you are younger than 15 and  older than 65 with risk factors for HIV infection. For pregnant patients, it is covered up to 3 times per pregnancy.    Immunizations:  Immunization Recommendations   Influenza Vaccine Annual influenza vaccination during flu season is recommended for all persons aged >= 6 months who do not have contraindications   Pneumococcal Vaccine   * Pneumococcal conjugate vaccine = PCV13 (Prevnar 13), PCV15 (Vaxneuvance), PCV20 (Prevnar 20)  * Pneumococcal polysaccharide vaccine = PPSV23 (Pneumovax) Adults 19-65 yo with certain risk factors or if 65+ yo  If never received any pneumonia vaccine: recommend Prevnar 20 (PCV20)  Give PCV20 if previously received 1 dose of PCV13 or PPSV23   Hepatitis B Vaccine 3 dose series if at intermediate or high risk (ex: diabetes, end stage renal disease, liver disease)   Respiratory syncytial virus (RSV) Vaccine - COVERED BY MEDICARE PART D  * RSVPreF3 (Arexvy) CDC recommends that adults 60 years of age and older may receive a single dose of RSV vaccine using shared clinical decision-making (SCDM)   Tetanus (Td) Vaccine - COST NOT COVERED BY MEDICARE PART B Following completion of primary series, a booster dose should be given every 10 years to maintain immunity against tetanus. Td may also be given as tetanus wound prophylaxis.   Tdap Vaccine - COST NOT COVERED BY MEDICARE PART B Recommended at least once for all adults. For pregnant patients, recommended with each pregnancy.   Shingles Vaccine (Shingrix) - COST NOT COVERED BY MEDICARE PART B  2 shot series recommended in those 19 years and older who have or will have weakened immune systems or those 50 years and older     Health Maintenance Due:  There are no preventive care reminders to display for this patient.  Immunizations Due:      Topic Date Due   • Influenza Vaccine (1) 09/01/2024   • COVID-19 Vaccine (4 - 2024-25 season) 09/01/2024     Advance Directives   What are advance directives?  Advance directives are legal documents that  state your wishes and plans for medical care. These plans are made ahead of time in case you lose your ability to make decisions for yourself. Advance directives can apply to any medical decision, such as the treatments you want, and if you want to donate organs.   What are the types of advance directives?  There are many types of advance directives, and each state has rules about how to use them. You may choose a combination of any of the following:  Living will:  This is a written record of the treatment you want. You can also choose which treatments you do not want, which to limit, and which to stop at a certain time. This includes surgery, medicine, IV fluid, and tube feedings.   Durable power of  for healthcare (DPAHC):  This is a written record that states who you want to make healthcare choices for you when you are unable to make them for yourself. This person, called a proxy, is usually a family member or a friend. You may choose more than 1 proxy.  Do not resuscitate (DNR) order:  A DNR order is used in case your heart stops beating or you stop breathing. It is a request not to have certain forms of treatment, such as CPR. A DNR order may be included in other types of advance directives.  Medical directive:  This covers the care that you want if you are in a coma, near death, or unable to make decisions for yourself. You can list the treatments you want for each condition. Treatment may include pain medicine, surgery, blood transfusions, dialysis, IV or tube feedings, and a ventilator (breathing machine).  Values history:  This document has questions about your views, beliefs, and how you feel and think about life. This information can help others choose the care that you would choose.  Why are advance directives important?  An advance directive helps you control your care. Although spoken wishes may be used, it is better to have your wishes written down. Spoken wishes can be misunderstood, or not  followed. Treatments may be given even if you do not want them. An advance directive may make it easier for your family to make difficult choices about your care.   Urinary Incontinence   Urinary incontinence (UI)  is when you lose control of your bladder. UI develops because your bladder cannot store or empty urine properly. The 3 most common types of UI are stress incontinence, urge incontinence, or both.  Medicines:   May be given to help strengthen your bladder control. Report any side effects of medication to your healthcare provider.  Do pelvic muscle exercises often:  Your pelvic muscles help you stop urinating. Squeeze these muscles tight for 5 seconds, then relax for 5 seconds. Gradually work up to squeezing for 10 seconds. Do 3 sets of 15 repetitions a day, or as directed. This will help strengthen your pelvic muscles and improve bladder control.  Train your bladder:  Go to the bathroom at set times, such as every 2 hours, even if you do not feel the urge to go. You can also try to hold your urine when you feel the urge to go. For example, hold your urine for 5 minutes when you feel the urge to go. As that becomes easier, hold your urine for 10 minutes.   Self-care:   Keep a UI record.  Write down how often you leak urine and how much you leak. Make a note of what you were doing when you leaked urine.  Drink liquids as directed. You may need to limit the amount of liquid you drink to help control your urine leakage. Do not drink any liquid right before you go to bed. Limit or do not have drinks that contain caffeine or alcohol.   Prevent constipation.  Eat a variety of high-fiber foods. Good examples are high-fiber cereals, beans, vegetables, and whole-grain breads. Walking is the best way to trigger your intestines to have a bowel movement.  Exercise regularly and maintain a healthy weight.  Weight loss and exercise will decrease pressure on your bladder and help you control your leakage.   Use a catheter  as directed  to help empty your bladder. A catheter is a tiny, plastic tube that is put into your bladder to drain your urine.   Go to behavior therapy as directed.  Behavior therapy may be used to help you learn to control your urge to urinate.    Weight Management   Why it is important to manage your weight:  Being overweight increases your risk of health conditions such as heart disease, high blood pressure, type 2 diabetes, and certain types of cancer. It can also increase your risk for osteoarthritis, sleep apnea, and other respiratory problems. Aim for a slow, steady weight loss. Even a small amount of weight loss can lower your risk of health problems.  How to lose weight safely:  A safe and healthy way to lose weight is to eat fewer calories and get regular exercise. You can lose up about 1 pound a week by decreasing the number of calories you eat by 500 calories each day.   Healthy meal plan for weight management:  A healthy meal plan includes a variety of foods, contains fewer calories, and helps you stay healthy. A healthy meal plan includes the following:  Eat whole-grain foods more often.  A healthy meal plan should contain fiber. Fiber is the part of grains, fruits, and vegetables that is not broken down by your body. Whole-grain foods are healthy and provide extra fiber in your diet. Some examples of whole-grain foods are whole-wheat breads and pastas, oatmeal, brown rice, and bulgur.  Eat a variety of vegetables every day.  Include dark, leafy greens such as spinach, kale, tiffanie greens, and mustard greens. Eat yellow and orange vegetables such as carrots, sweet potatoes, and winter squash.   Eat a variety of fruits every day.  Choose fresh or canned fruit (canned in its own juice or light syrup) instead of juice. Fruit juice has very little or no fiber.  Eat low-fat dairy foods.  Drink fat-free (skim) milk or 1% milk. Eat fat-free yogurt and low-fat cottage cheese. Try low-fat cheeses such as  mozzarella and other reduced-fat cheeses.  Choose meat and other protein foods that are low in fat.  Choose beans or other legumes such as split peas or lentils. Choose fish, skinless poultry (chicken or turkey), or lean cuts of red meat (beef or pork). Before you cook meat or poultry, cut off any visible fat.   Use less fat and oil.  Try baking foods instead of frying them. Add less fat, such as margarine, sour cream, regular salad dressing and mayonnaise to foods. Eat fewer high-fat foods. Some examples of high-fat foods include french fries, doughnuts, ice cream, and cakes.  Eat fewer sweets.  Limit foods and drinks that are high in sugar. This includes candy, cookies, regular soda, and sweetened drinks.  Exercise:  Exercise at least 30 minutes per day on most days of the week. Some examples of exercise include walking, biking, dancing, and swimming. You can also fit in more physical activity by taking the stairs instead of the elevator or parking farther away from stores. Ask your healthcare provider about the best exercise plan for you.      © Copyright [employer] 2018 Information is for End User's use only and may not be sold, redistributed or otherwise used for commercial purposes. All illustrations and images included in CareNotes® are the copyrighted property of [employer] Inc[employer] or [employer]

## 2024-12-03 NOTE — ASSESSMENT & PLAN NOTE
Lab Results   Component Value Date    EGFR 34 11/27/2024    EGFR 36 05/24/2024    EGFR 46 (L) 01/29/2024    CREATININE 1.34 (H) 11/27/2024    CREATININE 1.28 05/24/2024    CREATININE 1.12 (H) 01/29/2024          labs ordered for three months

## 2024-12-04 DIAGNOSIS — E11.22 TYPE 2 DIABETES MELLITUS WITH STAGE 3B CHRONIC KIDNEY DISEASE, WITHOUT LONG-TERM CURRENT USE OF INSULIN (HCC): ICD-10-CM

## 2024-12-04 DIAGNOSIS — N18.32 TYPE 2 DIABETES MELLITUS WITH STAGE 3B CHRONIC KIDNEY DISEASE, WITHOUT LONG-TERM CURRENT USE OF INSULIN (HCC): ICD-10-CM

## 2024-12-05 RX ORDER — GLIPIZIDE 5 MG/1
5 TABLET, FILM COATED, EXTENDED RELEASE ORAL DAILY
Qty: 90 TABLET | Refills: 1 | Status: SHIPPED | OUTPATIENT
Start: 2024-12-05 | End: 2025-06-03

## 2024-12-09 DIAGNOSIS — K51.919 ULCERATIVE COLITIS WITH COMPLICATION, UNSPECIFIED LOCATION (HCC): ICD-10-CM

## 2024-12-09 DIAGNOSIS — M85.89 OSTEOPENIA OF MULTIPLE SITES: ICD-10-CM

## 2024-12-29 DIAGNOSIS — I10 BENIGN ESSENTIAL HYPERTENSION: Chronic | ICD-10-CM

## 2024-12-30 RX ORDER — ATENOLOL 100 MG/1
100 TABLET ORAL DAILY
Qty: 90 TABLET | Refills: 1 | Status: SHIPPED | OUTPATIENT
Start: 2024-12-30

## 2024-12-30 RX ORDER — LOSARTAN POTASSIUM 100 MG/1
100 TABLET ORAL DAILY
Qty: 90 TABLET | Refills: 1 | Status: SHIPPED | OUTPATIENT
Start: 2024-12-30

## 2025-01-02 PROBLEM — Z00.00 MEDICARE ANNUAL WELLNESS VISIT, SUBSEQUENT: Status: RESOLVED | Noted: 2024-12-03 | Resolved: 2025-01-02

## 2025-02-04 ENCOUNTER — TELEPHONE (OUTPATIENT)
Age: OVER 89
End: 2025-02-04

## 2025-02-04 DIAGNOSIS — Z93.3 COLOSTOMY IN PLACE (HCC): Primary | ICD-10-CM

## 2025-02-04 NOTE — TELEPHONE ENCOUNTER
Molly calling from [facility] [employer] asking for new order to be faxed for ostomy supplies. Patient's order  in November. Fax order for ostomy supplies to [phone]. Molly also said someone could call the supply company and give a verbal for a one time order but she will need an order faxed for ongoing supplies.

## 2025-02-18 ENCOUNTER — RA CDI HCC (OUTPATIENT)
Dept: OTHER | Facility: HOSPITAL | Age: OVER 89
End: 2025-02-18

## 2025-02-26 ENCOUNTER — APPOINTMENT (OUTPATIENT)
Dept: LAB | Facility: CLINIC | Age: OVER 89
End: 2025-02-26
Payer: COMMERCIAL

## 2025-02-26 DIAGNOSIS — E03.8 SUBCLINICAL HYPOTHYROIDISM: Chronic | ICD-10-CM

## 2025-02-26 DIAGNOSIS — N18.32 TYPE 2 DIABETES MELLITUS WITH STAGE 3B CHRONIC KIDNEY DISEASE, WITHOUT LONG-TERM CURRENT USE OF INSULIN (HCC): Chronic | ICD-10-CM

## 2025-02-26 DIAGNOSIS — E11.22 TYPE 2 DIABETES MELLITUS WITH STAGE 3B CHRONIC KIDNEY DISEASE, WITHOUT LONG-TERM CURRENT USE OF INSULIN (HCC): Chronic | ICD-10-CM

## 2025-02-26 LAB
ALBUMIN SERPL BCG-MCNC: 4 G/DL (ref 3.5–5)
ALP SERPL-CCNC: 103 U/L (ref 34–104)
ALT SERPL W P-5'-P-CCNC: 12 U/L (ref 7–52)
ANION GAP SERPL CALCULATED.3IONS-SCNC: 9 MMOL/L (ref 4–13)
AST SERPL W P-5'-P-CCNC: 19 U/L (ref 13–39)
BASOPHILS # BLD AUTO: 0.04 THOUSANDS/ÂΜL (ref 0–0.1)
BASOPHILS NFR BLD AUTO: 1 % (ref 0–1)
BILIRUB SERPL-MCNC: 0.53 MG/DL (ref 0.2–1)
BUN SERPL-MCNC: 29 MG/DL (ref 5–25)
CALCIUM SERPL-MCNC: 9.6 MG/DL (ref 8.4–10.2)
CHLORIDE SERPL-SCNC: 104 MMOL/L (ref 96–108)
CHOLEST SERPL-MCNC: 160 MG/DL (ref ?–200)
CO2 SERPL-SCNC: 27 MMOL/L (ref 21–32)
CREAT SERPL-MCNC: 1.29 MG/DL (ref 0.6–1.3)
EOSINOPHIL # BLD AUTO: 0.15 THOUSAND/ÂΜL (ref 0–0.61)
EOSINOPHIL NFR BLD AUTO: 2 % (ref 0–6)
ERYTHROCYTE [DISTWIDTH] IN BLOOD BY AUTOMATED COUNT: 12.2 % (ref 11.6–15.1)
EST. AVERAGE GLUCOSE BLD GHB EST-MCNC: 189 MG/DL
GFR SERPL CREATININE-BSD FRML MDRD: 35 ML/MIN/1.73SQ M
GLUCOSE P FAST SERPL-MCNC: 201 MG/DL (ref 65–99)
HBA1C MFR BLD: 8.2 %
HCT VFR BLD AUTO: 41.9 % (ref 34.8–46.1)
HDLC SERPL-MCNC: 55 MG/DL
HGB BLD-MCNC: 13.3 G/DL (ref 11.5–15.4)
IMM GRANULOCYTES # BLD AUTO: 0.03 THOUSAND/UL (ref 0–0.2)
IMM GRANULOCYTES NFR BLD AUTO: 0 % (ref 0–2)
LDLC SERPL CALC-MCNC: 86 MG/DL (ref 0–100)
LYMPHOCYTES # BLD AUTO: 1.28 THOUSANDS/ÂΜL (ref 0.6–4.47)
LYMPHOCYTES NFR BLD AUTO: 15 % (ref 14–44)
MCH RBC QN AUTO: 30.9 PG (ref 26.8–34.3)
MCHC RBC AUTO-ENTMCNC: 31.7 G/DL (ref 31.4–37.4)
MCV RBC AUTO: 97 FL (ref 82–98)
MONOCYTES # BLD AUTO: 0.5 THOUSAND/ÂΜL (ref 0.17–1.22)
MONOCYTES NFR BLD AUTO: 6 % (ref 4–12)
NEUTROPHILS # BLD AUTO: 6.37 THOUSANDS/ÂΜL (ref 1.85–7.62)
NEUTS SEG NFR BLD AUTO: 76 % (ref 43–75)
NRBC BLD AUTO-RTO: 0 /100 WBCS
PLATELET # BLD AUTO: 215 THOUSANDS/UL (ref 149–390)
PMV BLD AUTO: 11.5 FL (ref 8.9–12.7)
POTASSIUM SERPL-SCNC: 4.7 MMOL/L (ref 3.5–5.3)
PROT SERPL-MCNC: 7.1 G/DL (ref 6.4–8.4)
RBC # BLD AUTO: 4.3 MILLION/UL (ref 3.81–5.12)
SODIUM SERPL-SCNC: 140 MMOL/L (ref 135–147)
TRIGL SERPL-MCNC: 97 MG/DL (ref ?–150)
TSH SERPL DL<=0.05 MIU/L-ACNC: 1.41 UIU/ML (ref 0.45–4.5)
WBC # BLD AUTO: 8.37 THOUSAND/UL (ref 4.31–10.16)

## 2025-02-26 PROCEDURE — 80053 COMPREHEN METABOLIC PANEL: CPT

## 2025-02-26 PROCEDURE — 36415 COLL VENOUS BLD VENIPUNCTURE: CPT

## 2025-02-26 PROCEDURE — 85025 COMPLETE CBC W/AUTO DIFF WBC: CPT

## 2025-02-26 PROCEDURE — 80061 LIPID PANEL: CPT

## 2025-02-26 PROCEDURE — 83036 HEMOGLOBIN GLYCOSYLATED A1C: CPT

## 2025-02-26 PROCEDURE — 84443 ASSAY THYROID STIM HORMONE: CPT

## 2025-02-27 ENCOUNTER — RESULTS FOLLOW-UP (OUTPATIENT)
Dept: FAMILY MEDICINE CLINIC | Facility: CLINIC | Age: OVER 89
End: 2025-02-27

## 2025-03-03 DIAGNOSIS — M85.89 OSTEOPENIA OF MULTIPLE SITES: ICD-10-CM

## 2025-03-03 DIAGNOSIS — K51.919 ULCERATIVE COLITIS WITH COMPLICATION, UNSPECIFIED LOCATION (HCC): ICD-10-CM

## 2025-03-04 ENCOUNTER — OFFICE VISIT (OUTPATIENT)
Dept: FAMILY MEDICINE CLINIC | Facility: CLINIC | Age: OVER 89
End: 2025-03-04
Payer: COMMERCIAL

## 2025-03-04 VITALS
WEIGHT: 157 LBS | BODY MASS INDEX: 27.82 KG/M2 | OXYGEN SATURATION: 98 % | HEART RATE: 72 BPM | SYSTOLIC BLOOD PRESSURE: 122 MMHG | DIASTOLIC BLOOD PRESSURE: 66 MMHG | TEMPERATURE: 97.3 F | HEIGHT: 63 IN

## 2025-03-04 DIAGNOSIS — K51.919 ULCERATIVE COLITIS WITH COMPLICATION, UNSPECIFIED LOCATION (HCC): ICD-10-CM

## 2025-03-04 DIAGNOSIS — E78.2 MIXED HYPERLIPIDEMIA: ICD-10-CM

## 2025-03-04 DIAGNOSIS — Z93.3 COLOSTOMY IN PLACE (HCC): ICD-10-CM

## 2025-03-04 DIAGNOSIS — N18.32 TYPE 2 DIABETES MELLITUS WITH STAGE 3B CHRONIC KIDNEY DISEASE, WITHOUT LONG-TERM CURRENT USE OF INSULIN (HCC): ICD-10-CM

## 2025-03-04 DIAGNOSIS — E03.8 SUBCLINICAL HYPOTHYROIDISM: Chronic | ICD-10-CM

## 2025-03-04 DIAGNOSIS — I48.11 LONGSTANDING PERSISTENT ATRIAL FIBRILLATION (HCC): ICD-10-CM

## 2025-03-04 DIAGNOSIS — Z99.89 DEPENDENCE ON CANE: Primary | ICD-10-CM

## 2025-03-04 DIAGNOSIS — E11.22 TYPE 2 DIABETES MELLITUS WITH STAGE 3B CHRONIC KIDNEY DISEASE, WITHOUT LONG-TERM CURRENT USE OF INSULIN (HCC): ICD-10-CM

## 2025-03-04 DIAGNOSIS — B02.29 POSTHERPETIC NEURALGIA: ICD-10-CM

## 2025-03-04 DIAGNOSIS — N18.32 CHRONIC KIDNEY DISEASE, STAGE 3B (HCC): ICD-10-CM

## 2025-03-04 PROCEDURE — 99214 OFFICE O/P EST MOD 30 MIN: CPT | Performed by: NURSE PRACTITIONER

## 2025-03-04 PROCEDURE — G2211 COMPLEX E/M VISIT ADD ON: HCPCS | Performed by: NURSE PRACTITIONER

## 2025-03-04 NOTE — ASSESSMENT & PLAN NOTE
Lab Results   Component Value Date    EGFR 35 02/26/2025    EGFR 34 11/27/2024    EGFR 36 05/24/2024    CREATININE 1.29 02/26/2025    CREATININE 1.34 (H) 11/27/2024    CREATININE 1.28 05/24/2024   Stable CKD

## 2025-03-04 NOTE — ASSESSMENT & PLAN NOTE
Lab Results   Component Value Date    HGBA1C 8.2 (H) 02/26/2025     Orders:  •  Hemoglobin A1C; Future  •  Comprehensive metabolic panel; Future  •  CBC and differential; Future  •  Albumin / creatinine urine ratio; Future  Taking the Glipizide once a day and does admit to dietary indiscretion will recheck labs in 4 months Patient is following with podiatry and her foot exam is normal with the exception of decreased vibratory sense

## 2025-05-05 DIAGNOSIS — I48.0 PAROXYSMAL ATRIAL FIBRILLATION (HCC): ICD-10-CM

## 2025-05-05 DIAGNOSIS — I10 BENIGN ESSENTIAL HYPERTENSION: Primary | Chronic | ICD-10-CM

## 2025-05-06 RX ORDER — MESALAMINE 800 MG/1
800 TABLET, DELAYED RELEASE ORAL 3 TIMES DAILY
Qty: 270 TABLET | Refills: 1 | Status: SHIPPED | OUTPATIENT
Start: 2025-05-06 | End: 2025-05-07 | Stop reason: ALTCHOICE

## 2025-05-06 RX ORDER — APIXABAN 5 MG/1
5 TABLET, FILM COATED ORAL 2 TIMES DAILY
Qty: 180 TABLET | Refills: 3 | OUTPATIENT
Start: 2025-05-06

## 2025-05-07 ENCOUNTER — TELEPHONE (OUTPATIENT)
Age: OVER 89
End: 2025-05-07

## 2025-05-07 DIAGNOSIS — I48.11 LONGSTANDING PERSISTENT ATRIAL FIBRILLATION (HCC): Primary | Chronic | ICD-10-CM

## 2025-05-07 DIAGNOSIS — Z79.01 ANTICOAGULANT LONG-TERM USE: ICD-10-CM

## 2025-05-07 DIAGNOSIS — K51.919 ULCERATIVE COLITIS WITH COMPLICATION, UNSPECIFIED LOCATION (HCC): ICD-10-CM

## 2025-05-07 RX ORDER — MESALAMINE 400 MG/1
400 CAPSULE, DELAYED RELEASE ORAL 3 TIMES DAILY
Qty: 270 CAPSULE | Refills: 3 | Status: SHIPPED | OUTPATIENT
Start: 2025-05-07 | End: 2025-08-05

## 2025-05-07 NOTE — TELEPHONE ENCOUNTER
Pt is looking to go back on the 400 mg mesalamine instead of the 800 mg, and is looking to go back on the eliquis as well please advise.please send to the [facility] mail order pharmacy

## 2025-05-17 DIAGNOSIS — E78.2 MIXED HYPERLIPIDEMIA: ICD-10-CM

## 2025-05-17 DIAGNOSIS — I10 BENIGN ESSENTIAL HYPERTENSION: Chronic | ICD-10-CM

## 2025-05-17 RX ORDER — AMLODIPINE BESYLATE 10 MG/1
10 TABLET ORAL DAILY
Qty: 90 TABLET | Refills: 1 | Status: SHIPPED | OUTPATIENT
Start: 2025-05-17

## 2025-05-17 RX ORDER — ATORVASTATIN CALCIUM 20 MG/1
20 TABLET, FILM COATED ORAL DAILY
Qty: 90 TABLET | Refills: 1 | Status: SHIPPED | OUTPATIENT
Start: 2025-05-17

## 2025-05-27 DIAGNOSIS — E11.22 TYPE 2 DIABETES MELLITUS WITH STAGE 3B CHRONIC KIDNEY DISEASE, WITHOUT LONG-TERM CURRENT USE OF INSULIN (HCC): ICD-10-CM

## 2025-05-27 DIAGNOSIS — N18.32 TYPE 2 DIABETES MELLITUS WITH STAGE 3B CHRONIC KIDNEY DISEASE, WITHOUT LONG-TERM CURRENT USE OF INSULIN (HCC): ICD-10-CM

## 2025-05-27 RX ORDER — GLIPIZIDE 5 MG/1
5 TABLET, FILM COATED, EXTENDED RELEASE ORAL DAILY
Qty: 90 TABLET | Refills: 1 | Status: SHIPPED | OUTPATIENT
Start: 2025-05-27

## 2025-06-01 DIAGNOSIS — K51.919 ULCERATIVE COLITIS WITH COMPLICATION, UNSPECIFIED LOCATION (HCC): ICD-10-CM

## 2025-06-01 DIAGNOSIS — M85.89 OSTEOPENIA OF MULTIPLE SITES: ICD-10-CM

## 2025-06-24 DIAGNOSIS — I10 BENIGN ESSENTIAL HYPERTENSION: Chronic | ICD-10-CM

## 2025-06-24 RX ORDER — ATENOLOL 100 MG/1
100 TABLET ORAL DAILY
Qty: 90 TABLET | Refills: 1 | Status: SHIPPED | OUTPATIENT
Start: 2025-06-24

## 2025-06-24 RX ORDER — LOSARTAN POTASSIUM 100 MG/1
100 TABLET ORAL DAILY
Qty: 90 TABLET | Refills: 1 | Status: SHIPPED | OUTPATIENT
Start: 2025-06-24

## 2025-07-02 ENCOUNTER — APPOINTMENT (OUTPATIENT)
Dept: LAB | Facility: CLINIC | Age: OVER 89
End: 2025-07-02
Payer: COMMERCIAL

## 2025-07-02 ENCOUNTER — RA CDI HCC (OUTPATIENT)
Dept: OTHER | Facility: HOSPITAL | Age: OVER 89
End: 2025-07-02

## 2025-07-02 DIAGNOSIS — E11.22 TYPE 2 DIABETES MELLITUS WITH STAGE 3B CHRONIC KIDNEY DISEASE, WITHOUT LONG-TERM CURRENT USE OF INSULIN (HCC): ICD-10-CM

## 2025-07-02 DIAGNOSIS — N18.32 TYPE 2 DIABETES MELLITUS WITH STAGE 3B CHRONIC KIDNEY DISEASE, WITHOUT LONG-TERM CURRENT USE OF INSULIN (HCC): ICD-10-CM

## 2025-07-02 LAB
ALBUMIN SERPL BCG-MCNC: 4.1 G/DL (ref 3.5–5)
ALP SERPL-CCNC: 95 U/L (ref 34–104)
ALT SERPL W P-5'-P-CCNC: 12 U/L (ref 7–52)
ANION GAP SERPL CALCULATED.3IONS-SCNC: 9 MMOL/L (ref 4–13)
AST SERPL W P-5'-P-CCNC: 16 U/L (ref 13–39)
BASOPHILS # BLD AUTO: 0.05 THOUSANDS/ÂΜL (ref 0–0.1)
BASOPHILS NFR BLD AUTO: 1 % (ref 0–1)
BILIRUB SERPL-MCNC: 0.45 MG/DL (ref 0.2–1)
BUN SERPL-MCNC: 29 MG/DL (ref 5–25)
CALCIUM SERPL-MCNC: 9.3 MG/DL (ref 8.4–10.2)
CHLORIDE SERPL-SCNC: 104 MMOL/L (ref 96–108)
CO2 SERPL-SCNC: 28 MMOL/L (ref 21–32)
CREAT SERPL-MCNC: 1.13 MG/DL (ref 0.6–1.3)
CREAT UR-MCNC: 107.8 MG/DL
EOSINOPHIL # BLD AUTO: 0.15 THOUSAND/ÂΜL (ref 0–0.61)
EOSINOPHIL NFR BLD AUTO: 2 % (ref 0–6)
ERYTHROCYTE [DISTWIDTH] IN BLOOD BY AUTOMATED COUNT: 12.4 % (ref 11.6–15.1)
EST. AVERAGE GLUCOSE BLD GHB EST-MCNC: 183 MG/DL
GFR SERPL CREATININE-BSD FRML MDRD: 41 ML/MIN/1.73SQ M
GLUCOSE P FAST SERPL-MCNC: 168 MG/DL (ref 65–99)
HBA1C MFR BLD: 8 %
HCT VFR BLD AUTO: 39.9 % (ref 34.8–46.1)
HGB BLD-MCNC: 13 G/DL (ref 11.5–15.4)
IMM GRANULOCYTES # BLD AUTO: 0.05 THOUSAND/UL (ref 0–0.2)
IMM GRANULOCYTES NFR BLD AUTO: 1 % (ref 0–2)
LYMPHOCYTES # BLD AUTO: 1.17 THOUSANDS/ÂΜL (ref 0.6–4.47)
LYMPHOCYTES NFR BLD AUTO: 12 % (ref 14–44)
MCH RBC QN AUTO: 31.3 PG (ref 26.8–34.3)
MCHC RBC AUTO-ENTMCNC: 32.6 G/DL (ref 31.4–37.4)
MCV RBC AUTO: 96 FL (ref 82–98)
MICROALBUMIN UR-MCNC: 38.6 MG/L
MICROALBUMIN/CREAT 24H UR: 36 MG/G CREATININE (ref 0–30)
MONOCYTES # BLD AUTO: 0.65 THOUSAND/ÂΜL (ref 0.17–1.22)
MONOCYTES NFR BLD AUTO: 7 % (ref 4–12)
NEUTROPHILS # BLD AUTO: 7.69 THOUSANDS/ÂΜL (ref 1.85–7.62)
NEUTS SEG NFR BLD AUTO: 77 % (ref 43–75)
NRBC BLD AUTO-RTO: 0 /100 WBCS
PLATELET # BLD AUTO: 240 THOUSANDS/UL (ref 149–390)
PMV BLD AUTO: 10.6 FL (ref 8.9–12.7)
POTASSIUM SERPL-SCNC: 4.3 MMOL/L (ref 3.5–5.3)
PROT SERPL-MCNC: 7.5 G/DL (ref 6.4–8.4)
RBC # BLD AUTO: 4.16 MILLION/UL (ref 3.81–5.12)
SODIUM SERPL-SCNC: 141 MMOL/L (ref 135–147)
WBC # BLD AUTO: 9.76 THOUSAND/UL (ref 4.31–10.16)

## 2025-07-02 PROCEDURE — 83036 HEMOGLOBIN GLYCOSYLATED A1C: CPT

## 2025-07-02 PROCEDURE — 85025 COMPLETE CBC W/AUTO DIFF WBC: CPT

## 2025-07-02 PROCEDURE — 82570 ASSAY OF URINE CREATININE: CPT

## 2025-07-02 PROCEDURE — 36415 COLL VENOUS BLD VENIPUNCTURE: CPT

## 2025-07-02 PROCEDURE — 80053 COMPREHEN METABOLIC PANEL: CPT

## 2025-07-02 PROCEDURE — 82043 UR ALBUMIN QUANTITATIVE: CPT

## 2025-07-03 NOTE — PROGRESS NOTES
HCC coding opportunities    E11.65  I12.9 for [facility]  GR     Chart Reviewed number of suggestions sent to Provider: 2     Patients Insurance     Medicare Insurance: [employer]age

## 2025-07-11 ENCOUNTER — OFFICE VISIT (OUTPATIENT)
Dept: FAMILY MEDICINE CLINIC | Facility: CLINIC | Age: OVER 89
End: 2025-07-11
Payer: COMMERCIAL

## 2025-07-11 VITALS
HEIGHT: 63 IN | SYSTOLIC BLOOD PRESSURE: 144 MMHG | OXYGEN SATURATION: 96 % | WEIGHT: 161.8 LBS | HEART RATE: 70 BPM | TEMPERATURE: 97.9 F | DIASTOLIC BLOOD PRESSURE: 80 MMHG | BODY MASS INDEX: 28.67 KG/M2

## 2025-07-11 DIAGNOSIS — I48.11 LONGSTANDING PERSISTENT ATRIAL FIBRILLATION (HCC): Chronic | ICD-10-CM

## 2025-07-11 DIAGNOSIS — E03.8 SUBCLINICAL HYPOTHYROIDISM: Chronic | ICD-10-CM

## 2025-07-11 DIAGNOSIS — I10 BENIGN ESSENTIAL HYPERTENSION: Primary | Chronic | ICD-10-CM

## 2025-07-11 DIAGNOSIS — N18.32 TYPE 2 DIABETES MELLITUS WITH STAGE 3B CHRONIC KIDNEY DISEASE, WITHOUT LONG-TERM CURRENT USE OF INSULIN (HCC): Chronic | ICD-10-CM

## 2025-07-11 DIAGNOSIS — K51.919 ULCERATIVE COLITIS WITH COMPLICATION, UNSPECIFIED LOCATION (HCC): ICD-10-CM

## 2025-07-11 DIAGNOSIS — E11.22 TYPE 2 DIABETES MELLITUS WITH STAGE 3B CHRONIC KIDNEY DISEASE, WITHOUT LONG-TERM CURRENT USE OF INSULIN (HCC): Chronic | ICD-10-CM

## 2025-07-11 DIAGNOSIS — N18.32 CHRONIC KIDNEY DISEASE, STAGE 3B (HCC): ICD-10-CM

## 2025-07-11 DIAGNOSIS — E78.2 MIXED HYPERLIPIDEMIA: ICD-10-CM

## 2025-07-11 PROCEDURE — G2211 COMPLEX E/M VISIT ADD ON: HCPCS | Performed by: NURSE PRACTITIONER

## 2025-07-11 PROCEDURE — 99214 OFFICE O/P EST MOD 30 MIN: CPT | Performed by: NURSE PRACTITIONER

## 2025-07-11 NOTE — ASSESSMENT & PLAN NOTE
Lab Results   Component Value Date    HGBA1C 8.0 (H) 07/02/2025   Glipizide 5 mg daily    Orders:    Hemoglobin A1C; Future    Comprehensive metabolic panel; Future    CBC and differential; Future    Albumin / creatinine urine ratio; Future    Lipid Panel with Direct LDL reflex; Future    TSH, 3rd generation with Free T4 reflex; Future

## 2025-07-11 NOTE — ASSESSMENT & PLAN NOTE
Lab Results   Component Value Date    EGFR 41 07/02/2025    EGFR 35 02/26/2025    EGFR 34 11/27/2024    CREATININE 1.13 07/02/2025    CREATININE 1.29 02/26/2025    CREATININE 1.34 (H) 11/27/2024   Stable CKD    Orders:    Comprehensive metabolic panel; Future

## 2025-07-11 NOTE — PROGRESS NOTES
Name: Mer Philippe      : 1932      MRN: [id number]  Encounter Provider: SULEMA Giron  Encounter Date: 2025   Encounter department: [facility]  :  Assessment & Plan  Benign essential hypertension  BP is well controlled Atenolol Amlodipine and Losartan          Longstanding persistent atrial fibrillation (HCC)  RRR eliquis 2.5 mg and BB         Ulcerative colitis with complication, unspecified location (HCC)  Mesalamine 400 mg tid currently          Type 2 diabetes mellitus with stage 3b chronic kidney disease, without long-term current use of insulin (HCC)    Lab Results   Component Value Date    HGBA1C 8.0 (H) 2025   Glipizide 5 mg daily    Orders:    Hemoglobin A1C; Future    Comprehensive metabolic panel; Future    CBC and differential; Future    Albumin / creatinine urine ratio; Future    Lipid Panel with Direct LDL reflex; Future    TSH, 3rd generation with Free T4 reflex; Future    Subclinical hypothyroidism  Not currently on medication     Orders:    TSH, 3rd generation with Free T4 reflex; Future    Chronic kidney disease, stage 3b ([facility])  Lab Results   Component Value Date    EGFR 41 2025    EGFR 35 2025    EGFR 34 2024    CREATININE 1.13 2025    CREATININE 1.29 2025    CREATININE 1.34 (H) 2024   Stable CKD    Orders:    Comprehensive metabolic panel; Future    Mixed hyperlipidemia  Lipitor 20 mg     Orders:    Lipid Panel with Direct LDL reflex; Future           History of Present Illness   Patient here today for her check up and rpeorts that sh eis having some mild lower abdominal discomfort and at times is afraid to go out when she is having  lower abdominal pain. She I shaving some mild blood in the stoma with wiping and draining the way it has been She is otherwise doing well.       Review of Systems   Constitutional:  Negative for activity change, appetite change, chills, diaphoresis, fatigue, fever and  "unexpected weight change.   HENT:  Negative for congestion, ear pain, hearing loss, postnasal drip, sinus pressure, sinus pain, sneezing and sore throat.    Eyes:  Negative for pain, discharge, redness and visual disturbance.   Respiratory:  Negative for cough and shortness of breath.    Cardiovascular:  Positive for leg swelling. Negative for chest pain and palpitations.   Gastrointestinal:  Negative for abdominal distention, abdominal pain, anal bleeding, blood in stool, constipation, diarrhea, nausea, rectal pain and vomiting.        Suprapubic pain comes and goes    Endocrine: Negative.    Genitourinary: Negative.    Musculoskeletal:  Positive for gait problem. Negative for arthralgias.   Skin: Negative.    Neurological:  Negative for dizziness, tremors, seizures, syncope, speech difficulty, weakness, light-headedness, numbness and headaches.   Hematological: Negative.    Psychiatric/Behavioral:  Positive for sleep disturbance. Negative for behavioral problems and dysphoric mood. The patient is not nervous/anxious.        Objective   /80 (BP Location: Left arm, Patient Position: Sitting)   Pulse 70   Temp 97.9 °F (36.6 °C) (Temporal)   Ht 5' 3\" (1.6 m)   Wt 73.4 kg (161 lb 12.8 oz)   SpO2 96%   BMI 28.66 kg/m²      Physical Exam  Vitals and nursing note reviewed.   Constitutional:       General: She is not in acute distress.     Appearance: Normal appearance. She is well-developed.   HENT:      Head: Normocephalic and atraumatic.      Ears:      Comments: Hearing aide     Nose: Nose normal.      Mouth/Throat:      Lips: Pink.      Mouth: Mucous membranes are moist.     Eyes:      General: Lids are normal.      Pupils: Pupils are equal, round, and reactive to light.     Neck:      Thyroid: No thyromegaly.     Cardiovascular:      Rate and Rhythm: Normal rate and regular rhythm.      Pulses: no weak pulses.           Dorsalis pedis pulses are 2+ on the right side and 2+ on the left side.        " Posterior tibial pulses are 2+ on the right side and 2+ on the left side.      Heart sounds: Normal heart sounds. No murmur heard.  Pulmonary:      Effort: Pulmonary effort is normal. No respiratory distress.      Breath sounds: Normal breath sounds. No wheezing.   Abdominal:      General: Bowel sounds are normal.      Palpations: Abdomen is soft.      Comments: Left colostomy in place draining brown stool ostomy is beefy red      Musculoskeletal:         General: Normal range of motion.      Cervical back: Full passive range of motion without pain and normal range of motion.      Right lower leg: No edema.      Left lower leg: No edema.      Comments: Using cane for ambulation   Feet:      Right foot:      Skin integrity: No ulcer, skin breakdown, erythema, warmth, callus or dry skin.      Left foot:      Skin integrity: No ulcer, skin breakdown, erythema, warmth, callus or dry skin.     Skin:     General: Skin is warm and dry.     Neurological:      General: No focal deficit present.      Mental Status: She is alert and oriented to person, place, and time.      GCS: GCS eye subscore is 4. GCS verbal subscore is 5. GCS motor subscore is 6.     Psychiatric:         Attention and Perception: Attention normal.         Mood and Affect: Mood normal.         Speech: Speech normal.         Behavior: Behavior is cooperative.         Thought Content: Thought content normal.         Cognition and Memory: Cognition and memory normal.         Judgment: Judgment normal.     Patient's shoes and socks removed.    Right Foot/Ankle   Right Foot Inspection  Skin Exam: skin normal and skin intact. No dry skin, no warmth, no callus, no erythema, no maceration, no abnormal color, no pre-ulcer, no ulcer and no callus.     Toe Exam: ROM and strength within normal limits.     Sensory   Vibration: intact  Proprioception: intact  Monofilament testing: intact    Vascular  Capillary refills: < 3 seconds  The right DP pulse is 2+. The right PT  pulse is 2+.     Left Foot/Ankle  Left Foot Inspection  Skin Exam: skin normal and skin intact. No dry skin, no warmth, no erythema, no maceration, normal color, no pre-ulcer, no ulcer and no callus.     Toe Exam: ROM and strength within normal limits.     Sensory   Vibration: intact  Proprioception: intact  Monofilament testing: intact    Vascular  Capillary refills: < 3 seconds  The left DP pulse is 2+. The left PT pulse is 2+.     Assign Risk Category  No deformity present  No loss of protective sensation  No weak pulses  Risk: 0